# Patient Record
Sex: FEMALE | Race: WHITE | HISPANIC OR LATINO | Employment: PART TIME | URBAN - METROPOLITAN AREA
[De-identification: names, ages, dates, MRNs, and addresses within clinical notes are randomized per-mention and may not be internally consistent; named-entity substitution may affect disease eponyms.]

---

## 2018-07-22 ENCOUNTER — HOSPITAL ENCOUNTER (EMERGENCY)
Facility: HOSPITAL | Age: 38
Discharge: HOME | End: 2018-07-22
Attending: EMERGENCY MEDICINE | Admitting: EMERGENCY MEDICINE
Payer: COMMERCIAL

## 2018-07-22 ENCOUNTER — HOSPITAL ENCOUNTER (EMERGENCY)
Facility: HOSPITAL | Age: 38
Discharge: HOME | End: 2018-07-22
Attending: EMERGENCY MEDICINE
Payer: COMMERCIAL

## 2018-07-22 VITALS
HEART RATE: 73 BPM | TEMPERATURE: 98.1 F | SYSTOLIC BLOOD PRESSURE: 152 MMHG | RESPIRATION RATE: 18 BRPM | WEIGHT: 218.26 LBS | OXYGEN SATURATION: 97 % | BODY MASS INDEX: 41.24 KG/M2 | DIASTOLIC BLOOD PRESSURE: 72 MMHG

## 2018-07-22 VITALS
RESPIRATION RATE: 18 BRPM | HEART RATE: 84 BPM | SYSTOLIC BLOOD PRESSURE: 110 MMHG | HEIGHT: 61 IN | TEMPERATURE: 97 F | BODY MASS INDEX: 41.54 KG/M2 | OXYGEN SATURATION: 100 % | DIASTOLIC BLOOD PRESSURE: 76 MMHG | WEIGHT: 220 LBS

## 2018-07-22 DIAGNOSIS — F41.9 ANXIETY: Primary | ICD-10-CM

## 2018-07-22 DIAGNOSIS — F10.929 ALCOHOL INTOXICATION (HCC): ICD-10-CM

## 2018-07-22 LAB
ATRIAL RATE: 79 BPM
P AXIS: 91 DEGREES
PR INTERVAL: 160 MS
QRS AXIS: 69 DEGREES
QRSD INTERVAL: 82 MS
QT INTERVAL: 406 MS
QTC INTERVAL: 465 MS
T WAVE AXIS: 64 DEGREES
VENTRICULAR RATE: 79 BPM

## 2018-07-22 PROCEDURE — 93010 ELECTROCARDIOGRAM REPORT: CPT | Performed by: INTERNAL MEDICINE

## 2018-07-22 PROCEDURE — 93005 ELECTROCARDIOGRAM TRACING: CPT

## 2018-07-22 PROCEDURE — 99283 EMERGENCY DEPT VISIT LOW MDM: CPT

## 2018-07-22 RX ORDER — HYDROXYZINE PAMOATE 50 MG/1
50 CAPSULE ORAL 3 TIMES DAILY PRN
Qty: 20 CAPSULE | Refills: 0 | Status: SHIPPED | OUTPATIENT
Start: 2018-07-22 | End: 2018-09-28 | Stop reason: ALTCHOICE

## 2018-07-22 RX ORDER — LORAZEPAM 1 MG/1
1 TABLET ORAL ONCE
Status: COMPLETED | OUTPATIENT
Start: 2018-07-22 | End: 2018-07-22

## 2018-07-22 RX ADMIN — LORAZEPAM 1 MG: 1 TABLET ORAL at 17:09

## 2018-07-22 NOTE — ED NOTES
Pt changed into hospital gown, when pt removed shirt and bra, small bag of marijuana fell onto floor  Security was called to dispose of small bag of marijuana  Pt states she does not use any drugs       Dearl Marcy  07/22/18 5917

## 2018-07-22 NOTE — ED NOTES
Pt continues to grab at staff, hyperventilating and shaking hands, only speaking one word phrase at a time, repeating same word over and over while hyperventilating  Pt states, "I am sorry  I am so sorry, I fucked up " Pt shaking hands and smaking them on her leg       Mati Du RN  07/22/18 6113

## 2018-07-22 NOTE — DISCHARGE INSTRUCTIONS
Anxiety   WHAT YOU SHOULD KNOW:   Anxiety is a condition that causes you to feel excessive worry, uneasiness, or fear  Family or work stress, smoking, caffeine, and alcohol can increase your risk for anxiety  Certain medicines or health conditions can also increase your risk  Anxiety may begin gradually, and can become a long-term condition if it is not managed or treated  AFTER YOU LEAVE:   Medicines:   · Medicines  can help you feel more calm and relaxed, and decrease your symptoms  · Take your medicine as directed  Contact your healthcare provider if you think your medicine is not helping or if you have side effects  Tell him if you are allergic to any medicine  Keep a list of the medicines, vitamins, and herbs you take  Include the amounts, and when and why you take them  Bring the list or the pill bottles to follow-up visits  Carry your medicine list with you in case of an emergency  Follow up with your healthcare provider within 2 weeks or as directed:  Write down your questions so you remember to ask them during your visits  Manage anxiety:   · Go to counseling as directed  Cognitive behavioral therapy can help you understand and change how you react to events that trigger your symptoms  · Find ways to manage your symptoms  Activities such as exercise, meditation, or listening to music can help you relax  · Practice deep breathing  Breathing can change how your body reacts to stress  Focus on taking slow, deep breaths several times a day, or during an anxiety attack  Breathe in through your nose, and out through your mouth  · Avoid caffeine  Caffeine can make your symptoms worse  Avoid foods or drinks that are meant to increase your energy level  · Limit or avoid alcohol  Ask your healthcare provider if alcohol is safe for you  You may not be able to drink alcohol if you take certain anxiety or depression medicines  Limit alcohol to 1 drink per day if you are a woman   Limit alcohol to 2 drinks per day if you are a man  A drink of alcohol is 12 ounces of beer, 5 ounces of wine, or 1½ ounces of liquor  Contact your healthcare provider if:   · Your symptoms get worse or do not get better with treatment  · You think your medicine may be causing side effects  · Your anxiety keeps you from doing your regular daily activities  · You have new symptoms since your last visit  · You have questions or concerns about your condition or care  Seek care immediately or call 911 if:   · You have chest pain, tightness, or heaviness that may spread to your shoulders, arms, jaw, neck, or back  · You feel like hurting yourself or someone else  · You feel dizzy, lightheaded, or faint  © 2014 [address] [employer] is for End User's use only and may not be sold, redistributed or otherwise used for commercial purposes  All illustrations and images included in CareNotes® are the copyrighted property of A D A M , Inc  or Michael Su  The above information is an  only  It is not intended as medical advice for individual conditions or treatments  Talk to your doctor, nurse or pharmacist before following any medical regimen to see if it is safe and effective for you

## 2018-07-22 NOTE — ED NOTES
Daughter and friend arrive to ED, pt states she wants NO visitors  Pt informed of current visitors and only requests her daughter to come back to room at this time  The friend waits in waiting room   Daughter of pt states, "I don't know what happened, I just got a call that she came in by ambulance "      Mati Du, RN  07/22/18 2200 [facility], [address] [place] [address]  07/22/18 2719

## 2018-07-22 NOTE — ED PROVIDER NOTES
History  Chief Complaint   Patient presents with    Anxiety     Pt reports anxiety attack since yesterday, reports "issues with her brother", pt hyperventalating, reports passed out before arriving  does not take anything for anxiety  Seen at [facility] yesterday, "they didnt do anything"       History provided by:  Patient   used: No    Anxiety   Presenting symptoms: no agitation and no suicidal thoughts    Presenting symptoms comment:  Anxious    Patient accompanied by:  Family member  Degree of incapacity (severity): Moderate  Onset quality:  Gradual  Duration:  2 days  Timing:  Intermittent  Progression:  Waxing and waning  Chronicity:  New  Context comment:  Family 'stuff'  Treatment compliance:  Untreated  Relieved by:  Nothing  Worsened by:  Nothing  Ineffective treatments:  None tried  Associated symptoms: anxiety and hyperventilating    Associated symptoms: no abdominal pain, no chest pain and no headaches    Risk factors: hx of mental illness        Prior to Admission Medications   Prescriptions Last Dose Informant Patient Reported? Taking?   hydrOXYzine pamoate (VISTARIL) 50 mg capsule   No No   Sig: Take 1 capsule (50 mg total) by mouth 3 (three) times a day as needed for anxiety      Facility-Administered Medications: None       Past Medical History:   Diagnosis Date    Anxiety        Past Surgical History:   Procedure Laterality Date    ABDOMINAL SURGERY         No family history on file  I have reviewed and agree with the history as documented  Social History   Substance Use Topics    Smoking status: Current Some Day Smoker     Types: Cigarettes    Smokeless tobacco: Never Used    Alcohol use Yes        Review of Systems   Constitutional: Negative for activity change, chills and fever  HENT: Negative for facial swelling, sore throat and trouble swallowing  Eyes: Negative for pain and visual disturbance     Respiratory: Negative for cough, chest tightness and shortness of breath  Cardiovascular: Negative for chest pain and leg swelling  Gastrointestinal: Negative for abdominal pain, blood in stool, diarrhea, nausea and vomiting  Genitourinary: Negative for dysuria and flank pain  Musculoskeletal: Negative for back pain, neck pain and neck stiffness  Skin: Negative for pallor and rash  Allergic/Immunologic: Negative for environmental allergies and immunocompromised state  Neurological: Negative for dizziness and headaches  Hematological: Negative for adenopathy  Does not bruise/bleed easily  Psychiatric/Behavioral: Negative for agitation, behavioral problems and suicidal ideas  The patient is nervous/anxious  All other systems reviewed and are negative  Physical Exam  Physical Exam   Constitutional: She is oriented to person, place, and time  She appears well-developed and well-nourished  No distress  HENT:   Head: Normocephalic and atraumatic  Eyes: EOM are normal    Neck: Normal range of motion  Neck supple  Cardiovascular: Normal rate, regular rhythm, normal heart sounds and intact distal pulses  Pulmonary/Chest: Effort normal and breath sounds normal    Abdominal: Soft  Bowel sounds are normal  There is no tenderness  There is no rebound and no guarding  Musculoskeletal: Normal range of motion  Neurological: She is alert and oriented to person, place, and time  Skin: Skin is warm and dry  Psychiatric: Her mood appears anxious  Her speech is rapid and/or pressured  She expresses no suicidal ideation  Nursing note and vitals reviewed        Vital Signs  ED Triage Vitals   Temperature Pulse Respirations Blood Pressure SpO2   07/22/18 1710 07/22/18 1616 07/22/18 1616 07/22/18 1616 07/22/18 1616   98 1 °F (36 7 °C) 89 (!) 34 109/69 98 %      Temp Source Heart Rate Source Patient Position - Orthostatic VS BP Location FiO2 (%)   07/22/18 1710 07/22/18 1616 07/22/18 1710 07/22/18 1616 --   Oral Monitor Sitting Right arm       Pain Score 07/22/18 1710       No Pain           Vitals:    07/22/18 1616 07/22/18 1710   BP: 109/69 152/72   Pulse: 89 73   Patient Position - Orthostatic VS:  Sitting       Visual Acuity      ED Medications  Medications   LORazepam (ATIVAN) tablet 1 mg (1 mg Oral Given 7/22/18 1709)       Diagnostic Studies  Results Reviewed     None                 No orders to display              Procedures  Procedures       Phone Contacts  ED Phone Contact    ED Course     Note: One dose of Ativan 1 mg po given, More family present at bedside  Patient felt better  Psych resources given for follow up  MDM  Number of Diagnoses or Management Options  Anxiety: new and does not require workup  Diagnosis management comments: Patient is a 76-year-old female, history of anxiety, bipolar disorder, not on medications currently, has not followed up with a psychiatrist recently; comes in with complaints of anxiety, states that happened after some 'family stuff', possible arguments; denies chest pain, dyspnea, patient has been beating fast; vital signs stable, lungs clear, cardiovascular normal heart sounds; appears anxious, family at bedside  Patient used to be on Xanax but currently on no medications  Will give 1 dose of Ativan outpatient follow-up with psychiatric and behavioral health  CritCare Time    Disposition  Final diagnoses:   Anxiety     Time reflects when diagnosis was documented in both MDM as applicable and the Disposition within this note     Time User Action Codes Description Comment    7/22/2018  5:18 PM Layla Kearney, [facility] [F41 9] Anxiety       ED Disposition     ED Disposition Condition Comment    Discharge  Emily Desai discharge to home/self care      Condition at discharge: Stable        Follow-up Information     Follow up With Specialties Details Why Contact Info    Venkatesh Burk MD [facility]   [address] [place] [address] [address]  [address] [place] [address]  [address]  [facility] [address]       Lori Busch MD Psychiatry Schedule an appointment as soon as possible for a visit in 1 day  [address]  [address]          Please follow-up with psychiatrist/Behavioral Health, as per printed resources instructions given to you  Discharge Medication List as of 7/22/2018  5:20 PM      CONTINUE these medications which have NOT CHANGED    Details   hydrOXYzine pamoate (VISTARIL) 50 mg capsule Take 1 capsule (50 mg total) by mouth 3 (three) times a day as needed for anxiety, Starting Sun 7/22/2018, Print           No discharge procedures on file      ED Provider  Electronically Signed by           Cem Parra MD  07/22/18 1320

## 2018-07-22 NOTE — ED NOTES
Patient hyperventilating and tearful throughout triage  Patient unable to answer most of triage questions on her own  Patient states that she was seen at [facility] yesterday for an "anxiety attack but they did nothing " Patient unable to control her breathing on her own  Non-rebreather placed on patient but patient ripped it off   Patient states, "that's not helping me, it's taking my breath away "      Evelyne Warner, NICHOLAS  07/22/18 5151

## 2018-07-22 NOTE — ED PROVIDER NOTES
History  Chief Complaint   Patient presents with    Anxiety     pt  anxious and hyperventilating upon arrival pt  uses xanax but has not had it in awhile     49-year-old female presents with [facility]  Patient reports that she is under a lot of stress and has a history of anxiety  She is not taking her Xanax because the place closed  She admits to drinking somewhat heavily today to deal with the stress and anxiety  She presents hyperventilating needing a great deal of redirection  No history or report of suicidal or homicidal ideation  Denies any drug use  Denies any other issues whatsoever at this time  Prior to Admission Medications   Prescriptions Last Dose Informant Patient Reported? Taking?   ondansetron (ZOFRAN ODT) 4 mg disintegrating tablet   No No   Sig: Take 1 tablet (4 mg total) by mouth every 8 (eight) hours as needed for nausea or vomiting    ranitidine (ZANTAC) 150 MG capsule   No No   Sig: Take 1 capsule (150 mg total) by mouth daily  sucralfate (CARAFATE) 1 g tablet   No No   Sig: Take 1 tablet (1 g total) by mouth 4 (four) times a day  Facility-Administered Medications: None       Past Medical History:   Diagnosis Date    Anxiety        Past Surgical History:   Procedure Laterality Date    ABDOMINAL SURGERY         History reviewed  No pertinent family history  I have reviewed and agree with the history as documented  Social History   Substance Use Topics    Smoking status: Current Some Day Smoker    Smokeless tobacco: Never Used    Alcohol use Yes        Review of Systems   Constitutional: Negative for appetite change, chills, fatigue and fever  HENT: Negative for postnasal drip, sinus pain and trouble swallowing  Eyes: Negative for redness and itching  Respiratory: Negative for chest tightness, shortness of breath and wheezing  Cardiovascular: Negative for chest pain and leg swelling     Gastrointestinal: Negative for abdominal pain, constipation, diarrhea, nausea and vomiting  Endocrine: Negative  Genitourinary: Negative for difficulty urinating and dysuria  Musculoskeletal: Negative for back pain and myalgias  Skin: Negative for rash  Allergic/Immunologic: Negative  Neurological: Negative for dizziness, numbness and headaches  Hematological: Negative  Psychiatric/Behavioral: Positive for agitation  Negative for suicidal ideas  The patient is nervous/anxious  Physical Exam  Physical Exam   Constitutional: She is oriented to person, place, and time  She appears well-developed and well-nourished  She is uncooperative  HENT:   Head: Normocephalic and atraumatic  Nose: Nose normal    Mouth/Throat: Oropharynx is clear and moist    Eyes: Conjunctivae and EOM are normal  Pupils are equal, round, and reactive to light  Neck: Normal range of motion  Neck supple  Cardiovascular: Normal rate, regular rhythm and normal heart sounds  Pulmonary/Chest: Effort normal and breath sounds normal  No respiratory distress  She has no wheezes  Hyperventilating   Abdominal: Soft  Bowel sounds are normal  There is no tenderness  There is no guarding  Musculoskeletal: She exhibits no edema, tenderness or deformity  Neurological: She is alert and oriented to person, place, and time  Skin: Skin is warm and dry  Capillary refill takes less than 2 seconds  No rash noted  Psychiatric: Her behavior is normal  Thought content normal  Her mood appears anxious  Her speech is rapid and/or pressured  She expresses impulsivity  Nursing note and vitals reviewed        Vital Signs  ED Triage Vitals [07/22/18 0455]   Temperature Pulse Respirations Blood Pressure SpO2   (!) 97 °F (36 1 °C) (!) 131 (!) 100 106/78 98 %      Temp Source Heart Rate Source Patient Position - Orthostatic VS BP Location FiO2 (%)   Temporal -- Sitting Left arm --      Pain Score       --           Vitals:    07/22/18 0455   BP: 106/78   Pulse: (!) 131   Patient Position - Orthostatic VS: Sitting       Visual Acuity      ED Medications  Medications - No data to display    Diagnostic Studies  Results Reviewed     None                 No orders to display              Procedures  Procedures       Phone Contacts  ED Phone Contact    ED Course  ED Course as of Jul 22 0612   Sun Jul 22, 2018   6423 Patient remains calm and cooperative at this time  Her daughter is here and is willing to take her home  Patient understands that she is in need of a family physician and/or a psychiatrist to manage her anxiety and provide appropriate prescriptions for her  She is aware of reasons to return to the emergency department  MDM  CritCare Time    Disposition  Final diagnoses:   Anxiety   Alcohol intoxication ([address] )     Time reflects when diagnosis was documented in both MDM as applicable and the Disposition within this note     Time User Action Codes Description Comment    7/22/2018  5:12 AM Dennie Brenner Add [F41 9] Anxiety     7/22/2018  5:12 AM Dennie Brenner Add [F10 929] Alcohol intoxication [facility])       ED Disposition     None      Follow-up Information    None         Patient's Medications   Discharge Prescriptions    No medications on file     No discharge procedures on file      ED Provider  Electronically Signed by           Gaby Schwarz DO  07/22/18 9102

## 2018-07-22 NOTE — DISCHARGE INSTRUCTIONS
Anxiety   WHAT YOU SHOULD KNOW:   Anxiety is a condition that causes you to feel excessive worry, uneasiness, or fear  Family or work stress, smoking, caffeine, and alcohol can increase your risk for anxiety  Certain medicines or health conditions can also increase your risk  Anxiety may begin gradually, and can become a long-term condition if it is not managed or treated  AFTER YOU LEAVE:   Medicines:   · Medicines  can help you feel more calm and relaxed, and decrease your symptoms  · Take your medicine as directed  Contact your healthcare provider if you think your medicine is not helping or if you have side effects  Tell him if you are allergic to any medicine  Keep a list of the medicines, vitamins, and herbs you take  Include the amounts, and when and why you take them  Bring the list or the pill bottles to follow-up visits  Carry your medicine list with you in case of an emergency  Follow up with your healthcare provider within 2 weeks or as directed:  Write down your questions so you remember to ask them during your visits  Manage anxiety:   · Go to counseling as directed  Cognitive behavioral therapy can help you understand and change how you react to events that trigger your symptoms  · Find ways to manage your symptoms  Activities such as exercise, meditation, or listening to music can help you relax  · Practice deep breathing  Breathing can change how your body reacts to stress  Focus on taking slow, deep breaths several times a day, or during an anxiety attack  Breathe in through your nose, and out through your mouth  · Avoid caffeine  Caffeine can make your symptoms worse  Avoid foods or drinks that are meant to increase your energy level  · Limit or avoid alcohol  Ask your healthcare provider if alcohol is safe for you  You may not be able to drink alcohol if you take certain anxiety or depression medicines  Limit alcohol to 1 drink per day if you are a woman   Limit alcohol to 2 drinks per day if you are a man  A drink of alcohol is 12 ounces of beer, 5 ounces of wine, or 1½ ounces of liquor  Contact your healthcare provider if:   · Your symptoms get worse or do not get better with treatment  · You think your medicine may be causing side effects  · Your anxiety keeps you from doing your regular daily activities  · You have new symptoms since your last visit  · You have questions or concerns about your condition or care  Seek care immediately or call 911 if:   · You have chest pain, tightness, or heaviness that may spread to your shoulders, arms, jaw, neck, or back  · You feel like hurting yourself or someone else  · You feel dizzy, lightheaded, or faint  © 2014 [address] [employer] is for End User's use only and may not be sold, redistributed or otherwise used for commercial purposes  All illustrations and images included in CareNotes® are the copyrighted property of A D A M , Inc  or [employer] Su  The above information is an  only  It is not intended as medical advice for individual conditions or treatments  Talk to your doctor, nurse or pharmacist before following any medical regimen to see if it is safe and effective for you

## 2018-07-22 NOTE — ED NOTES
Daughter of pt at bedside, pt appears to not be hyperventilating at this point       Eric Sloan RN  07/22/18 4114

## 2018-07-22 NOTE — ED NOTES
Pt more reasonable, understands situation, understands her anxiety  Pt thankful to staff for helping her get through it  Pt cooperative and A&O, dresses self and has steady gait  No complaints at this time        Francy Laboy RN  07/22/18 0202

## 2018-07-24 ENCOUNTER — HOSPITAL ENCOUNTER (EMERGENCY)
Facility: HOSPITAL | Age: 38
Discharge: HOME | End: 2018-07-24
Attending: EMERGENCY MEDICINE | Admitting: EMERGENCY MEDICINE
Payer: COMMERCIAL

## 2018-07-24 ENCOUNTER — APPOINTMENT (EMERGENCY)
Dept: CT IMAGING | Facility: HOSPITAL | Age: 38
End: 2018-07-24
Payer: COMMERCIAL

## 2018-07-24 VITALS
HEART RATE: 77 BPM | RESPIRATION RATE: 18 BRPM | BODY MASS INDEX: 38.17 KG/M2 | SYSTOLIC BLOOD PRESSURE: 115 MMHG | WEIGHT: 202 LBS | DIASTOLIC BLOOD PRESSURE: 67 MMHG | OXYGEN SATURATION: 100 % | TEMPERATURE: 97.1 F

## 2018-07-24 DIAGNOSIS — K22.6 MALLORY-WEISS TEAR: Primary | ICD-10-CM

## 2018-07-24 DIAGNOSIS — R11.10 VOMITING: ICD-10-CM

## 2018-07-24 LAB
ALBUMIN SERPL BCP-MCNC: 4.2 G/DL (ref 3–5.2)
ALP SERPL-CCNC: 73 U/L (ref 43–122)
ALT SERPL W P-5'-P-CCNC: 23 U/L (ref 9–52)
ANION GAP SERPL CALCULATED.3IONS-SCNC: 12 MMOL/L (ref 5–14)
APTT PPP: 30 SECONDS (ref 23–34)
AST SERPL W P-5'-P-CCNC: 31 U/L (ref 14–36)
BASOPHILS # BLD AUTO: 0.1 THOUSANDS/ΜL (ref 0–0.1)
BASOPHILS NFR BLD AUTO: 1 % (ref 0–1)
BILIRUB SERPL-MCNC: 0.5 MG/DL
BUN SERPL-MCNC: 13 MG/DL (ref 5–25)
CALCIUM SERPL-MCNC: 9.3 MG/DL (ref 8.4–10.2)
CHLORIDE SERPL-SCNC: 106 MMOL/L (ref 97–108)
CO2 SERPL-SCNC: 20 MMOL/L (ref 22–30)
CREAT SERPL-MCNC: 0.76 MG/DL (ref 0.6–1.2)
EOSINOPHIL # BLD AUTO: 0.1 THOUSAND/ΜL (ref 0–0.4)
EOSINOPHIL NFR BLD AUTO: 1 % (ref 0–6)
ERYTHROCYTE [DISTWIDTH] IN BLOOD BY AUTOMATED COUNT: 14.6 %
GFR SERPL CREATININE-BSD FRML MDRD: 99 ML/MIN/1.73SQ M
GLUCOSE SERPL-MCNC: 111 MG/DL (ref 70–99)
HCG SERPL QL: NEGATIVE
HCT VFR BLD AUTO: 41 % (ref 36–46)
HGB BLD-MCNC: 13.8 G/DL (ref 12–16)
INR PPP: 1.05 (ref 0.89–1.1)
LIPASE SERPL-CCNC: 35 U/L (ref 23–300)
LYMPHOCYTES # BLD AUTO: 1.7 THOUSANDS/ΜL (ref 0.5–4)
LYMPHOCYTES NFR BLD AUTO: 16 % (ref 20–50)
MCH RBC QN AUTO: 31.4 PG (ref 26–34)
MCHC RBC AUTO-ENTMCNC: 33.7 G/DL (ref 31–36)
MCV RBC AUTO: 93 FL (ref 80–100)
MONOCYTES # BLD AUTO: 0.6 THOUSAND/ΜL (ref 0.2–0.9)
MONOCYTES NFR BLD AUTO: 5 % (ref 1–10)
NEUTROPHILS # BLD AUTO: 8.5 THOUSANDS/ΜL (ref 1.8–7.8)
NEUTS SEG NFR BLD AUTO: 77 % (ref 45–65)
PLATELET # BLD AUTO: 224 THOUSANDS/UL (ref 150–450)
PMV BLD AUTO: 8.7 FL (ref 8.9–12.7)
POTASSIUM SERPL-SCNC: 3.5 MMOL/L (ref 3.6–5)
PROT SERPL-MCNC: 8 G/DL (ref 5.9–8.4)
PROTHROMBIN TIME: 11.1 SECONDS (ref 9.5–11.6)
RBC # BLD AUTO: 4.4 MILLION/UL (ref 4–5.2)
SODIUM SERPL-SCNC: 138 MMOL/L (ref 137–147)
WBC # BLD AUTO: 11 THOUSAND/UL (ref 4.5–11)

## 2018-07-24 PROCEDURE — 96361 HYDRATE IV INFUSION ADD-ON: CPT

## 2018-07-24 PROCEDURE — 85025 COMPLETE CBC W/AUTO DIFF WBC: CPT | Performed by: EMERGENCY MEDICINE

## 2018-07-24 PROCEDURE — 96376 TX/PRO/DX INJ SAME DRUG ADON: CPT

## 2018-07-24 PROCEDURE — 85730 THROMBOPLASTIN TIME PARTIAL: CPT | Performed by: EMERGENCY MEDICINE

## 2018-07-24 PROCEDURE — 96375 TX/PRO/DX INJ NEW DRUG ADDON: CPT

## 2018-07-24 PROCEDURE — 74177 CT ABD & PELVIS W/CONTRAST: CPT

## 2018-07-24 PROCEDURE — 80053 COMPREHEN METABOLIC PANEL: CPT | Performed by: EMERGENCY MEDICINE

## 2018-07-24 PROCEDURE — C9113 INJ PANTOPRAZOLE SODIUM, VIA: HCPCS

## 2018-07-24 PROCEDURE — 83690 ASSAY OF LIPASE: CPT | Performed by: EMERGENCY MEDICINE

## 2018-07-24 PROCEDURE — 84703 CHORIONIC GONADOTROPIN ASSAY: CPT | Performed by: EMERGENCY MEDICINE

## 2018-07-24 PROCEDURE — 96374 THER/PROPH/DIAG INJ IV PUSH: CPT

## 2018-07-24 PROCEDURE — 36415 COLL VENOUS BLD VENIPUNCTURE: CPT | Performed by: EMERGENCY MEDICINE

## 2018-07-24 PROCEDURE — 85610 PROTHROMBIN TIME: CPT | Performed by: EMERGENCY MEDICINE

## 2018-07-24 PROCEDURE — 99284 EMERGENCY DEPT VISIT MOD MDM: CPT

## 2018-07-24 RX ORDER — MORPHINE SULFATE 4 MG/ML
INJECTION, SOLUTION INTRAMUSCULAR; INTRAVENOUS
Status: COMPLETED
Start: 2018-07-24 | End: 2018-07-24

## 2018-07-24 RX ORDER — ONDANSETRON 2 MG/ML
4 INJECTION INTRAMUSCULAR; INTRAVENOUS ONCE
Status: COMPLETED | OUTPATIENT
Start: 2018-07-24 | End: 2018-07-24

## 2018-07-24 RX ORDER — SUCRALFATE ORAL 1 G/10ML
1000 SUSPENSION ORAL ONCE
Status: COMPLETED | OUTPATIENT
Start: 2018-07-24 | End: 2018-07-24

## 2018-07-24 RX ORDER — ONDANSETRON 4 MG/1
4 TABLET, ORALLY DISINTEGRATING ORAL EVERY 8 HOURS PRN
Qty: 12 TABLET | Refills: 0 | Status: SHIPPED | OUTPATIENT
Start: 2018-07-24 | End: 2018-09-28 | Stop reason: ALTCHOICE

## 2018-07-24 RX ORDER — OMEPRAZOLE 20 MG/1
20 CAPSULE, DELAYED RELEASE ORAL DAILY
Qty: 14 CAPSULE | Refills: 0 | Status: SHIPPED | OUTPATIENT
Start: 2018-07-24 | End: 2018-09-28 | Stop reason: ALTCHOICE

## 2018-07-24 RX ORDER — SUCRALFATE ORAL 1 G/10ML
SUSPENSION ORAL
Status: DISCONTINUED
Start: 2018-07-24 | End: 2018-07-24 | Stop reason: HOSPADM

## 2018-07-24 RX ORDER — ONDANSETRON 2 MG/ML
INJECTION INTRAMUSCULAR; INTRAVENOUS
Status: DISCONTINUED
Start: 2018-07-24 | End: 2018-07-24 | Stop reason: HOSPADM

## 2018-07-24 RX ORDER — MORPHINE SULFATE 10 MG/ML
4 INJECTION, SOLUTION INTRAMUSCULAR; INTRAVENOUS ONCE
Status: DISCONTINUED | OUTPATIENT
Start: 2018-07-24 | End: 2018-07-24 | Stop reason: HOSPADM

## 2018-07-24 RX ORDER — ONDANSETRON 2 MG/ML
INJECTION INTRAMUSCULAR; INTRAVENOUS
Status: COMPLETED
Start: 2018-07-24 | End: 2018-07-24

## 2018-07-24 RX ORDER — PANTOPRAZOLE SODIUM 40 MG/1
40 INJECTION, POWDER, FOR SOLUTION INTRAVENOUS ONCE
Status: COMPLETED | OUTPATIENT
Start: 2018-07-24 | End: 2018-07-24

## 2018-07-24 RX ORDER — SUCRALFATE ORAL 1 G/10ML
1 SUSPENSION ORAL 4 TIMES DAILY
Qty: 420 ML | Refills: 0 | Status: SHIPPED | OUTPATIENT
Start: 2018-07-24 | End: 2018-09-28 | Stop reason: ALTCHOICE

## 2018-07-24 RX ORDER — MORPHINE SULFATE 10 MG/ML
4 INJECTION, SOLUTION INTRAMUSCULAR; INTRAVENOUS ONCE
Status: COMPLETED | OUTPATIENT
Start: 2018-07-24 | End: 2018-07-24

## 2018-07-24 RX ORDER — PANTOPRAZOLE SODIUM 40 MG/1
INJECTION, POWDER, FOR SOLUTION INTRAVENOUS
Status: COMPLETED
Start: 2018-07-24 | End: 2018-07-24

## 2018-07-24 RX ADMIN — PANTOPRAZOLE SODIUM 40 MG: 40 INJECTION, POWDER, FOR SOLUTION INTRAVENOUS at 14:27

## 2018-07-24 RX ADMIN — SODIUM CHLORIDE 1000 ML: 9 INJECTION, SOLUTION INTRAVENOUS at 14:27

## 2018-07-24 RX ADMIN — MORPHINE SULFATE 4 MG: 10 INJECTION, SOLUTION INTRAMUSCULAR; INTRAVENOUS at 14:36

## 2018-07-24 RX ADMIN — IOHEXOL 100 ML: 350 INJECTION, SOLUTION INTRAVENOUS at 18:03

## 2018-07-24 RX ADMIN — SUCRALFATE 1000 MG: 1 SUSPENSION ORAL at 18:34

## 2018-07-24 RX ADMIN — ONDANSETRON 4 MG: 2 INJECTION INTRAMUSCULAR; INTRAVENOUS at 14:27

## 2018-07-24 RX ADMIN — ONDANSETRON 4 MG: 2 INJECTION, SOLUTION INTRAMUSCULAR; INTRAVENOUS at 14:27

## 2018-07-24 RX ADMIN — MORPHINE SULFATE 4 MG: 4 INJECTION INTRAVENOUS at 15:36

## 2018-07-24 RX ADMIN — MORPHINE SULFATE 4 MG: 4 INJECTION INTRAVENOUS at 14:28

## 2018-07-24 RX ADMIN — ONDANSETRON 4 MG: 2 INJECTION INTRAMUSCULAR; INTRAVENOUS at 15:25

## 2018-07-24 NOTE — DISCHARGE INSTRUCTIONS
Acute Nausea and Vomiting   WHAT YOU NEED TO KNOW:   Acute nausea and vomiting start suddenly, worsen quickly, and last a short time  DISCHARGE INSTRUCTIONS:   Return to the emergency department if:   · You see blood in your vomit or your bowel movements  · You have sudden, severe pain in your chest and upper abdomen after hard vomiting or retching  · You have swelling in your neck and chest      · You are dizzy, cold, and thirsty and your eyes and mouth are dry  · You are urinating very little or not at all  · You have muscle weakness, leg cramps, and trouble breathing  · Your heart is beating much faster than normal      · You continue to vomit for more than 48 hours  Contact your healthcare provider if:   · You have frequent dry heaves (vomiting but nothing comes out)  · Your nausea and vomiting does not get better or go away after you use medicine  · You have questions or concerns about your condition or treatment  Medicines: You may need any of the following:  · Medicines  may be given to calm your stomach and stop your vomiting  You may also need medicines to help you feel more relaxed or to stop nausea and vomiting caused by motion sickness  · Gastrointestinal stimulants  are used to help empty your stomach and bowels  This may help decrease nausea and vomiting  · Take your medicine as directed  Contact your healthcare provider if you think your medicine is not helping or if you have side effects  Tell him or her if you are allergic to any medicine  Keep a list of the medicines, vitamins, and herbs you take  Include the amounts, and when and why you take them  Bring the list or the pill bottles to follow-up visits  Carry your medicine list with you in case of an emergency  Prevent or manage acute nausea and vomiting:   · Do not drink alcohol  Alcohol may upset or irritate your stomach  Too much alcohol can also cause acute nausea and vomiting  · Control stress    Headaches due to stress may cause nausea and vomiting  Find ways to relax and manage your stress  Get more rest and sleep  · Drink more liquids as directed  Vomiting can lead to dehydration  It is important to drink more liquids to help replace lost body fluids  Ask your healthcare provider how much liquid to drink each day and which liquids are best for you  Your provider may recommend that you drink an oral rehydration solution (ORS)  ORS contains water, salts, and sugar that are needed to replace the lost body fluids  Ask what kind of ORS to use, how much to drink, and where to get it  · Eat smaller meals, more often  Eat small amounts of food every 2 to 3 hours, even if you are not hungry  Food in your stomach may decrease your nausea  · Talk to your healthcare provider before you take over-the-counter (OTC) medicines  These medicines can cause serious problems if you use certain other medicines, or you have a medical condition  You may have problems if you use too much or use them for longer than the label says  Follow directions on the label carefully  Follow up with your healthcare provider as directed:  Write down your questions so you remember to ask them during your follow-up visits  © 2017 [employer]  Information is for End User's use only and may not be sold, redistributed or otherwise used for commercial purposes  All illustrations and images included in CareNotes® are the copyrighted property of [employer]  or [employer]  The above information is an  only  It is not intended as medical advice for individual conditions or treatments  Talk to your doctor, nurse or pharmacist before following any medical regimen to see if it is safe and effective for you  Gastrointestinal Bleeding   WHAT YOU NEED TO KNOW:   Gastrointestinal (GI) bleeding may occur in any part of your digestive tract  This includes your esophagus, stomach, intestines, rectum, or anus  Bleeding may be mild to severe  Your bleeding may begin suddenly, or start slowly and last for a longer period of time  Bleeding that lasts for a longer period of time is called chronic GI bleeding  DISCHARGE INSTRUCTIONS:   Call 911 for any of the following:   · You have shortness of breath or trouble breathing  · You faint or lose consciousness  · You have chest pain  Return to the emergency department if:   · You feel dizzy or are too weak to stand  · Your heart is beating faster than usual      · You vomit blood, or your vomit looks like coffee grounds  · You have blood in your bowel movement  · You have abdominal pain or swelling  Contact your healthcare provider if:   · You have bowel movements that are tarry or black  · You have nausea or are vomiting  · You have heartburn  · You have questions or concerns about your condition or care  Activity:  Rest as directed  Ask when you can return to your usual activities, such as work  Slowly do more each day  Nutrition:  Ask if you need to be on a special diet  A special diet can help treat GI conditions and prevent problems such as GI bleeding  Eat small meals more often while your digestive system heals  Avoid or limit caffeine and spicy foods  Also avoid foods that cause heartburn, nausea, or diarrhea  Prevent GI bleeding:   · Manage GI conditions as directed  Examples of GI conditions include gastroesophageal reflux, peptic ulcer disease, and ulcerative colitis  Take all medicines for these conditions as directed  · Limit or do not take NSAIDs  Ask your healthcare provider if it is safe for you to take NSAIDs  NSAIDs can increase your risk for ulcers and GI bleeding  · Do not drink alcohol  Alcohol can cause ulcers and esophageal varices  Esophageal varices are swollen blood vessels in your esophagus  Over time the blood vessels become weak and may bleed  · Do not smoke    Nicotine and other chemicals in cigarettes and cigars can increase your risk for ulcers  Ask your healthcare provider for information if you currently smoke and need help to quit  E-cigarettes or smokeless tobacco still contain nicotine  Talk to your healthcare provider before you use these products  Follow up with your healthcare provider as directed: You may need to return for a colonoscopy, endoscopy, or other tests  These tests can make sure you do not have more bleeding  Write down your questions so you remember to ask them during your visits  © 2017 [employer] Information is for End User's use only and may not be sold, redistributed or otherwise used for commercial purposes  All illustrations and images included in CareNotes® are the copyrighted property of [employer]  or [employer] Su  The above information is an  only  It is not intended as medical advice for individual conditions or treatments  Talk to your doctor, nurse or pharmacist before following any medical regimen to see if it is safe and effective for you

## 2018-07-27 NOTE — ED PROVIDER NOTES
History  Chief Complaint   Patient presents with    Abdominal Pain     " I started this morning with stomach pains and vomiting at least 10 times "     This is a 75-year-old female presents emergency department with vomiting times several episodes this morning  Initially just plain emesis but then began to have flecks of blood throughout emesis  Patient has had similar symptoms in the past approximately 1 year ago with no identified cause  Complaining of epigastric abdominal pain  No radiation of symptoms  Patient rates symptoms severe in nature  No aggravating or alleviating factors  Denies black or bloody stool  Patient is very anxious with symptoms  Differential diagnosis includes could gastritis, Donna-Pena tear, pancreatitis, cholecystitis, bowel obstruction  Prior to Admission Medications   Prescriptions Last Dose Informant Patient Reported? Taking?   hydrOXYzine pamoate (VISTARIL) 50 mg capsule   No No   Sig: Take 1 capsule (50 mg total) by mouth 3 (three) times a day as needed for anxiety      Facility-Administered Medications: None       Past Medical History:   Diagnosis Date    Anxiety        Past Surgical History:   Procedure Laterality Date    ABDOMINAL SURGERY         History reviewed  No pertinent family history  I have reviewed and agree with the history as documented  Social History   Substance Use Topics    Smoking status: Current Some Day Smoker     Types: Cigarettes    Smokeless tobacco: Never Used    Alcohol use No        Review of Systems   Constitutional: Negative for activity change, appetite change, chills and fever  HENT: Negative for congestion, ear pain, rhinorrhea and sore throat  Eyes: Negative for pain, discharge and redness  Respiratory: Negative for cough, shortness of breath and wheezing  Cardiovascular: Negative for chest pain and palpitations  Gastrointestinal: Positive for abdominal pain, nausea and vomiting   Negative for blood in stool, constipation and diarrhea  Endocrine: Negative for polyuria  Genitourinary: Negative for difficulty urinating, dysuria, frequency and urgency  Musculoskeletal: Negative for arthralgias and myalgias  Skin: Negative for color change and rash  Allergic/Immunologic: Negative for immunocompromised state  Neurological: Negative for dizziness, syncope and light-headedness  Hematological: Does not bruise/bleed easily  Psychiatric/Behavioral: Negative for confusion  The patient is nervous/anxious  All other systems reviewed and are negative  Physical Exam  Physical Exam   Constitutional: She is oriented to person, place, and time  She appears well-developed and well-nourished  HENT:   Head: Normocephalic and atraumatic  Nose: Nose normal    Eyes: Conjunctivae are normal  No scleral icterus  Neck: Normal range of motion  Neck supple  Cardiovascular: Normal rate, regular rhythm, normal heart sounds and intact distal pulses  Pulmonary/Chest: Effort normal and breath sounds normal  No stridor  No respiratory distress  She has no wheezes  Abdominal: Soft  She exhibits no distension  There is tenderness (Tender epigastric region  )  There is no rebound and no guarding  Musculoskeletal: She exhibits no edema or deformity  Neurological: She is alert and oriented to person, place, and time  Skin: Skin is warm and dry  No rash noted  Psychiatric: She has a normal mood and affect  Thought content normal    Nursing note and vitals reviewed        Vital Signs  ED Triage Vitals [07/24/18 1352]   Temperature Pulse Respirations Blood Pressure SpO2   (!) 97 1 °F (36 2 °C) 91 20 148/51 100 %      Temp Source Heart Rate Source Patient Position - Orthostatic VS BP Location FiO2 (%)   Temporal Monitor Sitting Left arm --      Pain Score       Worst Possible Pain           Vitals:    07/24/18 1352 07/24/18 1538 07/24/18 1737   BP: 148/51 141/79 115/67   Pulse: 91 83 77   Patient Position - Orthostatic VS: Sitting Sitting Lying       Visual Acuity      ED Medications  Medications   sodium chloride 0 9 % bolus 1,000 mL (0 mL Intravenous Stopped 7/24/18 1745)   pantoprazole (PROTONIX) injection 40 mg (40 mg Intravenous Given 7/24/18 1427)   ondansetron (ZOFRAN) injection 4 mg (4 mg Intravenous Given 7/24/18 1427)   morphine (PF) 10 mg/mL injection 4 mg (4 mg Intravenous Given 7/24/18 1436)   morphine (PF) 4 mg/mL injection **AcuDose Override Pull** (4 mg  Given 7/24/18 1428)   ondansetron (ZOFRAN) injection 4 mg (4 mg Intravenous Given 7/24/18 1525)   morphine (PF) 4 mg/mL injection **AcuDose Override Pull** (4 mg  Given 7/24/18 1536)   iohexol (OMNIPAQUE) 350 MG/ML injection (MULTI-DOSE) 100 mL (100 mL Intravenous Given 7/24/18 1803)   sucralfate (CARAFATE) oral suspension 1,000 mg (1,000 mg Oral Given 7/24/18 1834)       Diagnostic Studies  Results Reviewed     Procedure Component Value Units Date/Time    hCG, qualitative pregnancy [[id number]]  (Normal) Collected:  07/24/18 1419    Lab Status:  Final result Specimen:  Blood from Arm, Right Updated:  07/24/18 1720     Preg, Serum Negative    Narrative:       PREGNANCY, SERUM: ef5    Protime-INR [[id number]]  (Normal) Collected:  07/24/18 1419    Lab Status:  Final result Specimen:  Blood from Arm, Right Updated:  07/24/18 1441     Protime 11 1 seconds      INR 1 05    Narrative:       INR:  ,PROTIME:      APTT [[id number]]  (Normal) Collected:  07/24/18 1419    Lab Status:  Final result Specimen:  Blood from Arm, Right Updated:  07/24/18 1441     PTT 30 seconds     Narrative:       PTT:      Lipase [[id number]]  (Normal) Collected:  07/24/18 1419    Lab Status:  Final result Specimen:  Blood from Arm, Right Updated:  07/24/18 1439     Lipase 35 u/L     Comprehensive metabolic panel [[id number]]  (Abnormal) Collected:  07/24/18 1419    Lab Status:  Final result Specimen:  Blood from Arm, Right Updated:  07/24/18 1438     Sodium 138 mmol/L      Potassium 3 5 (L) mmol/L      Chloride 106 mmol/L      CO2 20 (L) mmol/L      Anion Gap 12 mmol/L      BUN 13 mg/dL      Creatinine 0 76 mg/dL      Glucose 111 (H) mg/dL      Calcium 9 3 mg/dL      AST 31 U/L      ALT 23 U/L      Alkaline Phosphatase 73 U/L      Total Protein 8 0 g/dL      Albumin 4 2 g/dL      Total Bilirubin 0 50 mg/dL      eGFR 99 ml/min/1 73sq m     Narrative:         National Kidney Disease Education Program recommendations are as follows:  GFR calculation is accurate only with a steady state creatinine  Chronic Kidney disease less than 60 ml/min/1 73 sq  meters  Kidney failure less than 15 ml/min/1 73 sq  meters  CBC and differential [[id number]]  (Abnormal) Collected:  07/24/18 1419    Lab Status:  Final result Specimen:  Blood from Arm, Right Updated:  07/24/18 1430     WBC 11 00 Thousand/uL      RBC 4 40 Million/uL      Hemoglobin 13 8 g/dL      Hematocrit 41 0 %      MCV 93 fL      MCH 31 4 pg      MCHC 33 7 g/dL      RDW 14 6 %      MPV 8 7 (L) fL      Platelets 037 Thousands/uL      Neutrophils Relative 77 (H) %      Lymphocytes Relative 16 (L) %      Monocytes Relative 5 %      Eosinophils Relative 1 %      Basophils Relative 1 %      Neutrophils Absolute 8 50 (H) Thousands/µL      Lymphocytes Absolute 1 70 Thousands/µL      Monocytes Absolute 0 60 Thousand/µL      Eosinophils Absolute 0 10 Thousand/µL      Basophils Absolute 0 10 Thousands/µL                  CT abdomen pelvis with contrast   Final Result by Cady Shukla MD (07/24 1817)      No acute inflammatory findings in the abdomen or pelvis in this patient with nausea; specifically, no bowel obstruction or other acute bowel findings  As previously described there is thickening of the fundus of the gallbladder which could indicate adenomyomatosis  No acute gallbladder findings  Gallbladder is nondistended        Workstation performed: [id number]                    Procedures  Procedures       Phone Contacts  ED Phone Contact    ED Course  ED Course as of Jul 27 0959 Tue Jul 24, 2018   [id number] Patient is feeling much better afterCarafate  Discussed CT scan findings including thickened gallbladder fundusand need for follow-up with her primary care doctor  MDM  Number of Diagnoses or Management Options  Odnna-Pena tear:   Vomiting:   Diagnosis management comments: Patient feeling much better after Zofran and Carafate and Protonix  Suspect Donna-Pena tear  Will send patient home on PPI  Amount and/or Complexity of Data Reviewed  Clinical lab tests: ordered and reviewed  Tests in the radiology section of CPT®: ordered      CritCare Time    Disposition  Final diagnoses:   Donna-Pena tear   Vomiting     Time reflects when diagnosis was documented in both MDM as applicable and the Disposition within this note     Time User Action Codes Description Comment    7/24/2018  6:58 PM Antonio Halima Add [K22 6] Donna-Pena tear     7/24/2018  6:58 PM Buzz Plascencia 107 [R11 10] Vomiting       ED Disposition     ED Disposition Condition Comment    Discharge  [facility] discharge to home/self care  Condition at discharge: Stable        Follow-up Information     Follow up With Specialties Details Why Eryn Khan MD Family Medicine In 1 week for further evaluation of your gallbladder that looks chronically thickened over the fundus   [address]  [address]            Discharge Medication List as of 7/24/2018  7:01 PM      START taking these medications    Details   omeprazole (PriLOSEC) 20 mg delayed release capsule Take 1 capsule (20 mg total) by mouth daily for 14 days, Starting Tue 7/24/2018, Until Tue 8/7/2018, Print      ondansetron (ZOFRAN-ODT) 4 mg disintegrating tablet Take 1 tablet (4 mg total) by mouth every 8 (eight) hours as needed for nausea or vomiting for up to 12 doses, Starting Tue 7/24/2018, Print      sucralfate (1110 Profig) 1 g/10 mL suspension Take 10 mL (1 g total) by mouth 4 (four) times a day, Starting Tue 7/24/2018, Print         CONTINUE these medications which have NOT CHANGED    Details   hydrOXYzine pamoate (VISTARIL) 50 mg capsule Take 1 capsule (50 mg total) by mouth 3 (three) times a day as needed for anxiety, Starting Sun 7/22/2018, Print           No discharge procedures on file      ED Provider  Electronically Signed by           Marilyn Jackson MD  07/27/18 5770

## 2018-09-28 ENCOUNTER — APPOINTMENT (EMERGENCY)
Dept: ULTRASOUND IMAGING | Facility: HOSPITAL | Age: 38
End: 2018-09-28
Payer: COMMERCIAL

## 2018-09-28 ENCOUNTER — HOSPITAL ENCOUNTER (EMERGENCY)
Facility: HOSPITAL | Age: 38
Discharge: HOME | End: 2018-09-28
Attending: EMERGENCY MEDICINE | Admitting: EMERGENCY MEDICINE
Payer: COMMERCIAL

## 2018-09-28 VITALS
OXYGEN SATURATION: 97 % | SYSTOLIC BLOOD PRESSURE: 170 MMHG | WEIGHT: 210 LBS | HEART RATE: 84 BPM | TEMPERATURE: 97.8 F | BODY MASS INDEX: 39.68 KG/M2 | RESPIRATION RATE: 18 BRPM | DIASTOLIC BLOOD PRESSURE: 55 MMHG

## 2018-09-28 DIAGNOSIS — O26.91 ABNORMAL PREGNANCY IN FIRST TRIMESTER: Primary | ICD-10-CM

## 2018-09-28 DIAGNOSIS — O20.0 THREATENED MISCARRIAGE: ICD-10-CM

## 2018-09-28 LAB
ABO GROUP BLD: NORMAL
ANION GAP SERPL CALCULATED.3IONS-SCNC: 10 MMOL/L (ref 4–13)
APTT PPP: 29 SECONDS (ref 24–36)
B-HCG SERPL-ACNC: 4948.8 MIU/ML
BACTERIA UR QL AUTO: ABNORMAL /HPF
BASOPHILS # BLD AUTO: 0.05 THOUSANDS/ΜL (ref 0–0.1)
BASOPHILS NFR BLD AUTO: 1 % (ref 0–1)
BILIRUB UR QL STRIP: NEGATIVE
BLD GP AB SCN SERPL QL: NEGATIVE
BUN SERPL-MCNC: 10 MG/DL (ref 5–25)
CALCIUM SERPL-MCNC: 8.9 MG/DL (ref 8.3–10.1)
CHLORIDE SERPL-SCNC: 104 MMOL/L (ref 100–108)
CLARITY UR: ABNORMAL
CO2 SERPL-SCNC: 25 MMOL/L (ref 21–32)
COLOR UR: YELLOW
CREAT SERPL-MCNC: 0.76 MG/DL (ref 0.6–1.3)
EOSINOPHIL # BLD AUTO: 0.23 THOUSAND/ΜL (ref 0–0.61)
EOSINOPHIL NFR BLD AUTO: 2 % (ref 0–6)
ERYTHROCYTE [DISTWIDTH] IN BLOOD BY AUTOMATED COUNT: 13.9 % (ref 11.6–15.1)
EXT PREG TEST URINE: POSITIVE
GFR SERPL CREATININE-BSD FRML MDRD: 98 ML/MIN/1.73SQ M
GLUCOSE SERPL-MCNC: 89 MG/DL (ref 65–140)
GLUCOSE UR STRIP-MCNC: NEGATIVE MG/DL
HCT VFR BLD AUTO: 39.8 % (ref 34.8–46.1)
HGB BLD-MCNC: 13.1 G/DL (ref 11.5–15.4)
HGB UR QL STRIP.AUTO: ABNORMAL
IMM GRANULOCYTES # BLD AUTO: 0.03 THOUSAND/UL (ref 0–0.2)
IMM GRANULOCYTES NFR BLD AUTO: 0 % (ref 0–2)
INR PPP: 1.14 (ref 0.86–1.17)
KETONES UR STRIP-MCNC: NEGATIVE MG/DL
LEUKOCYTE ESTERASE UR QL STRIP: NEGATIVE
LYMPHOCYTES # BLD AUTO: 1.87 THOUSANDS/ΜL (ref 0.6–4.47)
LYMPHOCYTES NFR BLD AUTO: 19 % (ref 14–44)
MCH RBC QN AUTO: 30.5 PG (ref 26.8–34.3)
MCHC RBC AUTO-ENTMCNC: 32.9 G/DL (ref 31.4–37.4)
MCV RBC AUTO: 93 FL (ref 82–98)
MONOCYTES # BLD AUTO: 1.13 THOUSAND/ΜL (ref 0.17–1.22)
MONOCYTES NFR BLD AUTO: 11 % (ref 4–12)
MUCOUS THREADS UR QL AUTO: ABNORMAL
NEUTROPHILS # BLD AUTO: 6.68 THOUSANDS/ΜL (ref 1.85–7.62)
NEUTS SEG NFR BLD AUTO: 67 % (ref 43–75)
NITRITE UR QL STRIP: NEGATIVE
NON-SQ EPI CELLS URNS QL MICRO: ABNORMAL /HPF
NRBC BLD AUTO-RTO: 0 /100 WBCS
PH UR STRIP.AUTO: 6 [PH] (ref 4.5–8)
PLATELET # BLD AUTO: 260 THOUSANDS/UL (ref 149–390)
PMV BLD AUTO: 9.4 FL (ref 8.9–12.7)
POTASSIUM SERPL-SCNC: 4.5 MMOL/L (ref 3.5–5.3)
PROGEST SERPL-MCNC: 6.6 NG/ML
PROT UR STRIP-MCNC: ABNORMAL MG/DL
PROTHROMBIN TIME: 14.7 SECONDS (ref 11.8–14.2)
RBC # BLD AUTO: 4.29 MILLION/UL (ref 3.81–5.12)
RBC #/AREA URNS AUTO: ABNORMAL /HPF
RH BLD: POSITIVE
SODIUM SERPL-SCNC: 139 MMOL/L (ref 136–145)
SP GR UR STRIP.AUTO: >=1.03 (ref 1–1.03)
SPECIMEN EXPIRATION DATE: NORMAL
UROBILINOGEN UR QL STRIP.AUTO: 0.2 E.U./DL
WBC # BLD AUTO: 9.99 THOUSAND/UL (ref 4.31–10.16)
WBC #/AREA URNS AUTO: ABNORMAL /HPF

## 2018-09-28 PROCEDURE — 36415 COLL VENOUS BLD VENIPUNCTURE: CPT | Performed by: EMERGENCY MEDICINE

## 2018-09-28 PROCEDURE — 85025 COMPLETE CBC W/AUTO DIFF WBC: CPT | Performed by: EMERGENCY MEDICINE

## 2018-09-28 PROCEDURE — 85730 THROMBOPLASTIN TIME PARTIAL: CPT | Performed by: EMERGENCY MEDICINE

## 2018-09-28 PROCEDURE — 80048 BASIC METABOLIC PNL TOTAL CA: CPT | Performed by: EMERGENCY MEDICINE

## 2018-09-28 PROCEDURE — 86901 BLOOD TYPING SEROLOGIC RH(D): CPT | Performed by: EMERGENCY MEDICINE

## 2018-09-28 PROCEDURE — 84702 CHORIONIC GONADOTROPIN TEST: CPT | Performed by: EMERGENCY MEDICINE

## 2018-09-28 PROCEDURE — 84144 ASSAY OF PROGESTERONE: CPT | Performed by: EMERGENCY MEDICINE

## 2018-09-28 PROCEDURE — 76815 OB US LIMITED FETUS(S): CPT

## 2018-09-28 PROCEDURE — 87491 CHLMYD TRACH DNA AMP PROBE: CPT | Performed by: EMERGENCY MEDICINE

## 2018-09-28 PROCEDURE — 87591 N.GONORRHOEAE DNA AMP PROB: CPT | Performed by: EMERGENCY MEDICINE

## 2018-09-28 PROCEDURE — 81001 URINALYSIS AUTO W/SCOPE: CPT

## 2018-09-28 PROCEDURE — 99284 EMERGENCY DEPT VISIT MOD MDM: CPT

## 2018-09-28 PROCEDURE — 86900 BLOOD TYPING SEROLOGIC ABO: CPT | Performed by: EMERGENCY MEDICINE

## 2018-09-28 PROCEDURE — 86850 RBC ANTIBODY SCREEN: CPT | Performed by: EMERGENCY MEDICINE

## 2018-09-28 PROCEDURE — 81025 URINE PREGNANCY TEST: CPT | Performed by: EMERGENCY MEDICINE

## 2018-09-28 PROCEDURE — 85610 PROTHROMBIN TIME: CPT | Performed by: EMERGENCY MEDICINE

## 2018-09-28 RX ORDER — ONDANSETRON 4 MG/1
4 TABLET, ORALLY DISINTEGRATING ORAL ONCE
Status: COMPLETED | OUTPATIENT
Start: 2018-09-28 | End: 2018-09-28

## 2018-09-28 RX ADMIN — ONDANSETRON 4 MG: 4 TABLET, ORALLY DISINTEGRATING ORAL at 15:07

## 2018-09-28 NOTE — ED PROVIDER NOTES
History  Chief Complaint   Patient presents with    Pelvic Pain     c/o pelvic pain, headache  weakness that started today  missed period lmp 18  denies vaginal bleeding       History provided by:  Patient   used: No    Pelvic Pain   Location:  Left-sided adnexal pain  Severity:  Moderate  Onset quality:  Sudden  Timing:  Constant  Progression:  Waxing and waning  Chronicity:  New  Relieved by:  Nothing  Worsened by: Movement and movement and palpation  Ineffective treatments:  None tried  Associated symptoms: abdominal pain and nausea    Associated symptoms: no chest pain, no congestion, no cough, no diarrhea, no fever, no headaches, no shortness of breath, no sore throat and no vomiting     Patient is a  with 2 miscarriages and her last menstrual period was   Her pregnancy test here in the emergency department is positive  She is having no vaginal bleeding or discharge  She has had her tubal ligation reanastomosed  No urinary symptoms  No back pain  No other significant medical problems  None       Past Medical History:   Diagnosis Date    Anxiety     Scalp cyst 04/10/2014    EXCISION OF SCALP PLIAR CYST X 4        Past Surgical History:   Procedure Laterality Date    ABDOMINAL SURGERY      CYST REMOVAL  04/10/2014    EXCISION OF SCALP PLIAR CYST X 4        History reviewed  No pertinent family history  I have reviewed and agree with the history as documented  Social History   Substance Use Topics    Smoking status: Former Smoker     Types: Cigarettes    Smokeless tobacco: Never Used      Comment: NEVER SMOKER, NO TOBACCO USE AS PER NEXTGEN    Alcohol use No        Review of Systems   Constitutional: Negative for chills and fever  HENT: Negative for congestion and sore throat  Respiratory: Negative for cough, chest tightness and shortness of breath  Cardiovascular: Negative for chest pain and leg swelling     Gastrointestinal: Positive for abdominal pain and nausea  Negative for diarrhea and vomiting  Genitourinary: Positive for pelvic pain  Negative for dysuria, flank pain, vaginal bleeding and vaginal discharge  Musculoskeletal: Negative for neck pain  Neurological: Negative for weakness and headaches  All other systems reviewed and are negative  Physical Exam  Physical Exam   Constitutional: She appears well-developed and well-nourished  She is cooperative  Non-toxic appearance  She does not have a sickly appearance  She does not appear ill  No distress  HENT:   Head: Normocephalic and atraumatic  Right Ear: Hearing normal  No drainage or swelling  Left Ear: Hearing normal  No drainage or swelling  Mouth/Throat: Mucous membranes are normal    Eyes: Conjunctivae and lids are normal  Right eye exhibits no discharge  Left eye exhibits no discharge  Neck: Trachea normal and normal range of motion  No JVD present  Cardiovascular: Normal rate, regular rhythm, normal heart sounds, intact distal pulses and normal pulses  Exam reveals no gallop and no friction rub  No murmur heard  Pulmonary/Chest: Effort normal and breath sounds normal  No stridor  No respiratory distress  She has no wheezes  She has no rales  Abdominal: Soft  Normal appearance  She exhibits no distension, no ascites and no mass  There is no hepatosplenomegaly  There is no tenderness  There is no rebound, no guarding and no CVA tenderness  No hernia  Musculoskeletal: Normal range of motion  She exhibits no edema, tenderness or deformity  Lymphadenopathy:     She has no cervical adenopathy  Right: No inguinal adenopathy present  Left: No inguinal adenopathy present  Neurological: She is alert  She has normal strength  No cranial nerve deficit  She exhibits normal muscle tone  Gait normal  GCS eye subscore is 4  GCS verbal subscore is 5  GCS motor subscore is 6  Skin: Skin is warm, dry and intact  No rash noted   She is not diaphoretic  No pallor  Psychiatric: She has a normal mood and affect  Her speech is normal  Cognition and memory are normal    Nursing note and vitals reviewed  Vital Signs  ED Triage Vitals [09/28/18 1226]   Temperature Pulse Respirations Blood Pressure SpO2   97 8 °F (36 6 °C) 93 16 123/72 98 %      Temp Source Heart Rate Source Patient Position - Orthostatic VS BP Location FiO2 (%)   Temporal Monitor Sitting Right arm --      Pain Score       8           Vitals:    09/28/18 1226 09/28/18 1417 09/28/18 1620   BP: 123/72 96/57 170/55   Pulse: 93 86 84   Patient Position - Orthostatic VS: Sitting Sitting Lying       Visual Acuity      ED Medications  Medications   ondansetron (ZOFRAN-ODT) dispersible tablet 4 mg (4 mg Oral Given 9/28/18 1507)       Diagnostic Studies  Results Reviewed     Procedure Component Value Units Date/Time    Progesterone [[id number]] Collected:  09/28/18 1327    Lab Status:  Final result Specimen:  Blood from Arm, Left Updated:  09/28/18 1651     Progesterone 6 60 ng/mL     Basic metabolic panel [[id number]] Collected:  09/28/18 1327    Lab Status:  Final result Specimen:  Blood from Arm, Left Updated:  09/28/18 1418     Sodium 139 mmol/L      Potassium 4 5 mmol/L      Chloride 104 mmol/L      CO2 25 mmol/L      ANION GAP 10 mmol/L      BUN 10 mg/dL      Creatinine 0 76 mg/dL      Glucose 89 mg/dL      Calcium 8 9 mg/dL      eGFR 98 ml/min/1 73sq m     Narrative:         National Kidney Disease Education Program recommendations are as follows:  GFR calculation is accurate only with a steady state creatinine  Chronic Kidney disease less than 60 ml/min/1 73 sq  meters  Kidney failure less than 15 ml/min/1 73 sq  meters      Quantitative hCG [[id number]]  (Abnormal) Collected:  09/28/18 1327    Lab Status:  Final result Specimen:  Blood from Arm, Left Updated:  09/28/18 1418     HCG, Quant 4,948 8 (H) mIU/mL     Narrative:          Expected Ranges:     Approximate               Approximate HCG  Gestation age          Concentration ( mIU/mL)  _____________          ______________________   [place]                      HCG values  0 2-1                       5-50  1-2                           2-3                         100-5000  3-4                         500-27642  4-5                         1000-20688  5-6                         61550-297921  6-8                         74672-783471  8-12                        87391-033103    APTT [[id number]]  (Normal) Collected:  09/28/18 1327    Lab Status:  Final result Specimen:  Blood from Arm, Left Updated:  09/28/18 1352     PTT 29 seconds     Protime-INR [[id number]]  (Abnormal) Collected:  09/28/18 1327    Lab Status:  Final result Specimen:  Blood from Arm, Left Updated:  09/28/18 1352     Protime 14 7 (H) seconds      INR 1 14    Urine Microscopic [[id number]]  (Abnormal) Collected:  09/28/18 1325    Lab Status:  Final result Specimen:  Urine from Urine, Clean Catch Updated:  09/28/18 1333     RBC, UA 1-2 (A) /hpf      WBC, UA 4-10 (A) /hpf      Epithelial Cells Moderate (A) /hpf      Bacteria, UA Moderate (A) /hpf      MUCOUS THREADS Moderate (A)    CBC and differential [[id number]] Collected:  09/28/18 1327    Lab Status:  Final result Specimen:  Blood from Arm, Left Updated:  09/28/18 1333     WBC 9 99 Thousand/uL      RBC 4 29 Million/uL      Hemoglobin 13 1 g/dL      Hematocrit 39 8 %      MCV 93 fL      MCH 30 5 pg      MCHC 32 9 g/dL      RDW 13 9 %      MPV 9 4 fL      Platelets 250 Thousands/uL      nRBC 0 /100 WBCs      Neutrophils Relative 67 %      Immat GRANS % 0 %      Lymphocytes Relative 19 %      Monocytes Relative 11 %      Eosinophils Relative 2 %      Basophils Relative 1 %      Neutrophils Absolute 6 68 Thousands/µL      Immature Grans Absolute 0 03 Thousand/uL      Lymphocytes Absolute 1 87 Thousands/µL      Monocytes Absolute 1 13 Thousand/µL      Eosinophils Absolute 0 23 Thousand/µL      Basophils Absolute 0 05 Thousands/µL POCT urinalysis dipstick [[id number]]  (Abnormal) Resulted:  18 1316    Lab Status:  Final result Specimen:  Urine Updated:  18    ED Urine Macroscopic [[id number]]  (Abnormal) Collected:  18 1325    Lab Status:  Final result Specimen:  Urine Updated:  18 1314     Color, UA Yellow     Clarity, UA Slightly Cloudy     pH, UA 6 0     Leukocytes, UA Negative     Nitrite, UA Negative     Protein, UA Trace (A) mg/dl      Glucose, UA Negative mg/dl      Ketones, UA Negative mg/dl      Urobilinogen, UA 0 2 E U /dl      Bilirubin, UA Negative     Blood, UA Trace (A)     Specific Gravity, UA >=1 030    Narrative:       CLINITEK RESULT    POCT pregnancy, urine [[id number]]  (Abnormal) Resulted:  18 1300    Lab Status:  Final result Updated:  18     EXT PREG TEST UR (Ref: Negative) POSITIVE                 US OB pregnancy limited with transvaginal   Final Result by Nunu Reese MD ([id number])   No intrauterine gestation or adnexal mass identified  Markedly thickened endometrium  Differential remains early IUP, spontaneous  and ectopic pregnancy  Correlate with serial quantitative BHCG  Workstation performed: [id number]                    Procedures  Procedures       Phone Contacts  ED Phone Contact    ED Course                               MDM  Number of Diagnoses or Management Options  Abnormal pregnancy in first trimester:   Threatened miscarriage:   Diagnosis management comments: Patient pregnant  HCG mL 5000  Nothing seen on ultrasound  OBGYN consult  They came and saw the patient  Will check HCG in 2 days and follow up with the [facility]  Concerning with the cramping discomfort that this could potentially be a miscarriage  She will get follow-up blood work in 2 days and follow up with the [facility] next week  She was seen by OBGYN  Urine test looks quite contaminated with epithelial cells  Will be sent for culture    She has no urinary complaints  Amount and/or Complexity of Data Reviewed  Clinical lab tests: ordered and reviewed  Tests in the radiology section of CPT®: ordered and reviewed  Discuss the patient with other providers: yes    Patient Progress  Patient progress: stable    CritCare Time    Disposition  Final diagnoses:   Abnormal pregnancy in first trimester   Threatened miscarriage     Time reflects when diagnosis was documented in both MDM as applicable and the Disposition within this note     Time User Action Codes Description Comment    9/28/2018  4:01 PM Lauri Mccrary [O26 91] Abnormal pregnancy in first trimester     9/28/2018  4:46 PM Jamie [address] [place] [address] [O20 0] Threatened miscarriage       ED Disposition     ED Disposition Condition Comment    Discharge  Tin John discharge to home/self care  Condition at discharge: Good        Follow-up Information     Follow up With Specialties Details [facility] Schedule an appointment as soon as possible for a visit in 1 week  Michelle  [id number] [phone]          Patient's Medications   Discharge Prescriptions    No medications on file       Outpatient Discharge Orders  hCG, quantitative   Standing Status: Future  Standing Exp   Date: 09/28/19         ED Provider  Electronically Signed by           Juan M Shepherd MD  09/28/18 4125

## 2018-09-28 NOTE — DISCHARGE INSTRUCTIONS
Threatened Miscarriage   WHAT YOU NEED TO KNOW:   A threatened miscarriage occurs when you have vaginal bleeding within the first 20 weeks of pregnancy  It means that a miscarriage may happen  A threatened miscarriage may also be called a threatened   DISCHARGE INSTRUCTIONS:   Seek care immediately if:   · You feel weak or faint  · Your pain or cramping in your abdomen or back gets worse  · You have vaginal bleeding that soaks 1 or more pads in an hour  · You pass material that looks like tissue or large clots  Contact your healthcare provider or obstetrician if:   · You have a fever  · You have trouble urinating, burning when you urinate, or feel a need to urinate often  · You have new or worsening vaginal bleeding  · You have vaginal pain or itching, or vaginal discharge that is yellow, green, or foul-smelling  · You have questions or concerns about your condition or care  Self-care: The following may help you manage your symptoms and decrease your risk for a miscarriage:  · Do not put anything in your vagina  Do not have sex, douche, or use tampons  These actions may increase your risk for infection and miscarriage  · Rest as directed  Do not exercise or do strenuous activities  These activities may cause  labor or miscarriage  Ask your healthcare provider what activities are okay to do  Stay healthy during pregnancy:   · Eat a variety of healthy foods  Healthy foods can help you get extra protein, water, and calories that you need while you are pregnant  Healthy foods include fruits, vegetables, whole-grain breads, low-fat dairy products, beans, lean meats, and fish  Avoid raw or undercooked meat and fish  Ask your healthcare provider if you need a special diet  · Take prenatal vitamins as directed  These help you get the right amount of vitamins and minerals  They may also decrease the risk of certain birth defects      · Do not drink alcohol or use illegal drugs  These can increase your risk for a miscarriage or harm your baby  · Do not smoke  Nicotine and other chemicals in cigarettes and cigars can harm your baby and cause miscarriage or  labor  Ask your healthcare provider for information if you currently smoke and need help to quit  E-cigarettes or smokeless tobacco still contain nicotine  Do not use these products  · Decrease your risk for an infection  Always wash your hands before eating or preparing meals  Do not spend time with people who are sick  Ask your healthcare provider if you need immunizations such as the flu or hepatitis B vaccine  Immunizations may decrease your risk for infections that could cause a miscarriage  · Manage your medical conditions  Keep your blood pressure and blood sugars under control  Maintain a healthy weight during pregnancy  Follow up with your obstetrician as directed: You may need to see your obstetrician frequently for ultrasounds or blood tests  Write down your questions so you remember to ask them during your visits  ©  2600 [employer] Information is for End User's use only and may not be sold, redistributed or otherwise used for commercial purposes  All illustrations and images included in CareNotes® are the copyrighted property of [employer]  or [employer]  The above information is an  only  It is not intended as medical advice for individual conditions or treatments  Talk to your doctor, nurse or pharmacist before following any medical regimen to see if it is safe and effective for you

## 2018-09-28 NOTE — ED NOTES
Pt in [address] at this time        Devante Salgado, RN  09/28/18 8111

## 2018-09-28 NOTE — ED NOTES
Dr Davon Donovan aware that pt c/o nausea  No additional orders received to this time        Doug Reyes, RN  09/28/18 7707

## 2018-09-28 NOTE — CONSULTS
Consult - OB/GYN   Fahad Paz 34 y o  female MRN: [id number]  Unit/Bed#: ED 18 Encounter: [id number]          Chief complaint:  "I have some pain in my lower abdomen after I stretched"    HPI:   Santo Monroy is a 34 y o  [id number] with LMP 8/7/18 who presented to the emergency department complaining of left lower quadrant pain  Patient states that this morning she reached up high to retrieve an item and felt like she pulled something in her left lower abdomen  She started to have pain which was non-radiating in nature  She also started to feel dizzy and nauseous and decided to come to the ED for evaluation  Pregnancy test was noted to be positive with an HCG of 4,948 and a progesterone of 6 6  Transvaginal ultrasound showed a thickened endometrium however no intrauterine pregnancy was identified  No suspicious adnexal masses or free fluid was noted  Of note patient had a tubal reanastomosis surgery in 2015 by Dr Daysi Willoughby in [facility]  Patient denies any current nausea, vomiting, vaginal bleeding, dizziness, chest pain or shortness of breath  The patient does not currently have an OBGYN  She denies any history of any abnormal Pap smears or STDs  Her obstetrical history is significant for 4 prior vaginal deliveries and 2 spontaneous miscarriages  Active Problems:   Pregnancy of unknown location    PMH:  Past Medical History:   Diagnosis Date    Anxiety     Scalp cyst 04/10/2014    EXCISION OF SCALP PLIAR CYST X 4        PSH:  Past Surgical History:   Procedure Laterality Date    ABDOMINAL SURGERY      CYST REMOVAL  04/10/2014    EXCISION OF SCALP PLIAR CYST X 4    2011- bilateral tubal ligation  2015- tubal reanastomosis ease    Social Hx:  Social History     Social History    Marital status: Single     Spouse name: N/A    Number of children: N/A    Years of education: N/A     Occupational History    Not on file       Social History Main Topics    Smoking status: Former Smoker Types: Cigarettes    Smokeless tobacco: Never Used      Comment: NEVER SMOKER, NO TOBACCO USE AS PER NEXTGEN    Alcohol use No    Drug use: No    Sexual activity: Not on file     Other Topics Concern    Not on file     Social History Narrative    No narrative on file     Recent Results (from the past 12 hour(s))   POCT pregnancy, urine    Collection Time: 09/28/18  1:00 PM   Result Value Ref Range    EXT PREG TEST UR (Ref: Negative) POSITIVE    ED Urine Macroscopic    Collection Time: 09/28/18  1:25 PM   Result Value Ref Range    Color, UA Yellow     Clarity, UA Slightly Cloudy     pH, UA 6 0 4 5 - 8 0    Leukocytes, UA Negative Negative    Nitrite, UA Negative Negative    Protein, UA Trace (A) Negative mg/dl    Glucose, UA Negative Negative mg/dl    Ketones, UA Negative Negative mg/dl    Urobilinogen, UA 0 2 0 2, 1 0 E U /dl E U /dl    Bilirubin, UA Negative Negative    Blood, UA Trace (A) Negative    Specific Gravity, UA >=1 030 1 003 - 1 030   Urine Microscopic    Collection Time: 09/28/18  1:25 PM   Result Value Ref Range    RBC, UA 1-2 (A) None Seen, 0-5 /hpf    WBC, UA 4-10 (A) None Seen, 0-5, 5-55, 5-65 /hpf    Epithelial Cells Moderate (A) None Seen, Occasional /hpf    Bacteria, UA Moderate (A) None Seen, Occasional /hpf    MUCOUS THREADS Moderate (A) None Seen   CBC and differential    Collection Time: 09/28/18  1:27 PM   Result Value Ref Range    WBC 9 99 4 31 - 10 16 Thousand/uL    RBC 4 29 3 81 - 5 12 Million/uL    Hemoglobin 13 1 11 5 - 15 4 g/dL    Hematocrit 39 8 34 8 - 46 1 %    MCV 93 82 - 98 fL    MCH 30 5 26 8 - 34 3 pg    MCHC 32 9 31 4 - 37 4 g/dL    RDW 13 9 11 6 - 15 1 %    MPV 9 4 8 9 - 12 7 fL    Platelets 589 058 - 260 Thousands/uL    nRBC 0 /100 WBCs    Neutrophils Relative 67 43 - 75 %    Immat GRANS % 0 0 - 2 %    Lymphocytes Relative 19 14 - 44 %    Monocytes Relative 11 4 - 12 %    Eosinophils Relative 2 0 - 6 %    Basophils Relative 1 0 - 1 %    Neutrophils Absolute 6 68 1 85 - 7 62 Thousands/µL    Immature Grans Absolute 0 03 0 00 - 0 20 Thousand/uL    Lymphocytes Absolute 1 87 0 60 - 4 47 Thousands/µL    Monocytes Absolute 1 13 0 17 - 1 22 Thousand/µL    Eosinophils Absolute 0 23 0 00 - 0 61 Thousand/µL    Basophils Absolute 0 05 0 00 - 0 10 Thousands/µL   Basic metabolic panel    Collection Time: 09/28/18  1:27 PM   Result Value Ref Range    Sodium 139 136 - 145 mmol/L    Potassium 4 5 3 5 - 5 3 mmol/L    Chloride 104 100 - 108 mmol/L    CO2 25 21 - 32 mmol/L    ANION GAP 10 4 - 13 mmol/L    BUN 10 5 - 25 mg/dL    Creatinine 0 76 0 60 - 1 30 mg/dL    Glucose 89 65 - 140 mg/dL    Calcium 8 9 8 3 - 10 1 mg/dL    eGFR 98 ml/min/1 73sq m   Quantitative hCG    Collection Time: 09/28/18  1:27 PM   Result Value Ref Range    HCG, Quant 4,948 8 (H) <=6 mIU/mL   Protime-INR    Collection Time: 09/28/18  1:27 PM   Result Value Ref Range    Protime 14 7 (H) 11 8 - 14 2 seconds    INR 1 14 0 86 - 1 17   APTT    Collection Time: 09/28/18  1:27 PM   Result Value Ref Range    PTT 29 24 - 36 seconds   Type and screen    Collection Time: 09/28/18  1:27 PM   Result Value Ref Range    ABO Grouping A     Rh Factor Positive     Antibody Screen Negative     Specimen Expiration Date [id number]    Progesterone    Collection Time: 09/28/18  1:27 PM   Result Value Ref Range    Progesterone 6 60 ng/mL         Meds:  No current facility-administered medications on file prior to encounter        Current Outpatient Prescriptions on File Prior to Encounter   Medication Sig Dispense Refill    [DISCONTINUED] hydrOXYzine pamoate (VISTARIL) 50 mg capsule Take 1 capsule (50 mg total) by mouth 3 (three) times a day as needed for anxiety 20 capsule 0    [DISCONTINUED] omeprazole (PriLOSEC) 20 mg delayed release capsule Take 1 capsule (20 mg total) by mouth daily for 14 days 14 capsule 0    [DISCONTINUED] ondansetron (ZOFRAN-ODT) 4 mg disintegrating tablet Take 1 tablet (4 mg total) by mouth every 8 (eight) hours as needed for nausea or vomiting for up to 12 doses 12 tablet 0    [DISCONTINUED] sucralfate (CARAFATE) 1 g/10 mL suspension Take 10 mL (1 g total) by mouth 4 (four) times a day 420 mL 0       Allergies:  No Known Allergies    Physical Exam:  /55 (BP Location: Right arm)   Pulse 84   Temp 97 8 °F (36 6 °C) (Temporal)   Resp 18   Wt 95 3 kg (210 lb)   LMP 08/07/2018   SpO2 97%   BMI 39 68 kg/m²     Physical Exam   Constitutional: She is oriented to person, place, and time  She appears well-developed and well-nourished  No distress  HENT:   Head: Normocephalic and atraumatic  Cardiovascular: Normal rate, regular rhythm, normal heart sounds and intact distal pulses  Pulmonary/Chest: Effort normal and breath sounds normal  No respiratory distress  She has no wheezes  She has no rales  She exhibits no tenderness  Abdominal: Soft  She exhibits no distension and no mass  There is tenderness (mild in the left lower quadrant)  There is no rebound and no guarding  Genitourinary: Vagina normal  No vaginal discharge found  Genitourinary Comments: No bleeding noted  Cervical os visibly closed  Musculoskeletal: Normal range of motion  She exhibits no edema, tenderness or deformity  Neurological: She is alert and oriented to person, place, and time  Skin: Skin is warm and dry  She is not diaphoretic  Psychiatric: She has a normal mood and affect  Her behavior is normal    Vitals reviewed  Assessment:   34 y o  [id number] with left lower quadrant pain and pregnancy of undetermined location  Plan:   1  Pregnancy of undetermined location   - Detailed discussion with patient about the possibility of this pregnancy being an ectopic given her risk factors which include tubal reanastomosis and no identifiable IUP Surgery  Patient given precautions to return to the emergency department if she starts to have vaginal bleeding or increase abdominal pain       -  Slip for repeat HCG in 2 days provided to the patient  Hcg today is ~4900   - plan for patient to follow up with the [facility] next week following HCG for repeat ultrasound  - instructed patient to start taking a prenatal vitamin    Discussed with Dr Roland Stack

## 2018-09-28 NOTE — ED NOTES
OB resident paged to place order for culture collected during pelvic exam      Haley Rincon RN  09/28/18 6072

## 2018-10-01 ENCOUNTER — APPOINTMENT (OUTPATIENT)
Dept: LAB | Facility: HOSPITAL | Age: 38
End: 2018-10-01
Attending: EMERGENCY MEDICINE
Payer: COMMERCIAL

## 2018-10-01 DIAGNOSIS — O20.0 THREATENED MISCARRIAGE: ICD-10-CM

## 2018-10-01 DIAGNOSIS — O26.91 ABNORMAL PREGNANCY IN FIRST TRIMESTER: ICD-10-CM

## 2018-10-01 LAB
B-HCG SERPL-ACNC: 7033.5 MIU/ML
CHLAMYDIA DNA CVX QL NAA+PROBE: NORMAL
N GONORRHOEA DNA GENITAL QL NAA+PROBE: NORMAL

## 2018-10-01 PROCEDURE — 84702 CHORIONIC GONADOTROPIN TEST: CPT

## 2018-10-01 PROCEDURE — 36415 COLL VENOUS BLD VENIPUNCTURE: CPT

## 2018-10-04 ENCOUNTER — OFFICE VISIT (OUTPATIENT)
Dept: OBGYN CLINIC | Facility: CLINIC | Age: 38
End: 2018-10-04
Payer: COMMERCIAL

## 2018-10-04 VITALS
HEIGHT: 61 IN | SYSTOLIC BLOOD PRESSURE: 104 MMHG | BODY MASS INDEX: 41.16 KG/M2 | WEIGHT: 218 LBS | HEART RATE: 88 BPM | DIASTOLIC BLOOD PRESSURE: 67 MMHG

## 2018-10-04 DIAGNOSIS — N91.2 AMENORRHEA: Primary | ICD-10-CM

## 2018-10-04 DIAGNOSIS — O36.80X0 PREGNANCY, LOCATION UNKNOWN: ICD-10-CM

## 2018-10-04 LAB — SL AMB POCT URINE HCG: POSITIVE

## 2018-10-04 PROCEDURE — 99214 OFFICE O/P EST MOD 30 MIN: CPT | Performed by: OBSTETRICS & GYNECOLOGY

## 2018-10-04 PROCEDURE — 81025 URINE PREGNANCY TEST: CPT | Performed by: OBSTETRICS & GYNECOLOGY

## 2018-10-04 PROCEDURE — 76801 OB US < 14 WKS SINGLE FETUS: CPT | Performed by: OBSTETRICS & GYNECOLOGY

## 2018-10-04 NOTE — PROGRESS NOTES
OB/GYN  PN Visit  Lokesh Hamilton  [id number]  10/4/2018  4:44 PM  Dr Gracy Nino MD    S: Rupa Westbrook is a 35 y/o [id number] with LMP 8/7/18 who presented to the emergency department on 9/28/2018 complaining of left lower quadrant pain  At that time, pregnancy test was noted to be positive with a value of 4,948  No IUP was detected at that time  She was discharged with instructions to get a 48 hour HCG  Her follow up HCG at approximately 72 hours was 7,033 5  Today on TVUS, no pregnancy was detected  The patient had a tubal re-anastomosis in 2015  She states that she thinks she may have miscarried once since her procedure, but never took a pregnancy test      She states that she is experiencing nausea, fatigue and breast tenderness  She denies abdominal pain, vomiting, bleeding, dizziness, shortness of breath, chest pain or calf pain  O:  Vitals:    10/04/18 1610   BP: 104/67   Pulse: 88           A/P:  Rupa Westbrook is a 34 y o  [id number] who presents with pregnancy of undetermined location    - TVUS inconclusive: no IUP detected   - patient sent to hospital for inpatient pelvic US and follow up management     An extensive conversation with the patient about the ramifications of our findings and the management plan was held  She was agreeable and appeared to understand  Patient seen, examined and counseled with Dr Ana Nino MD  10/4/2018  4:44 PM

## 2018-10-05 ENCOUNTER — APPOINTMENT (EMERGENCY)
Dept: ULTRASOUND IMAGING | Facility: HOSPITAL | Age: 38
End: 2018-10-05
Payer: COMMERCIAL

## 2018-10-05 ENCOUNTER — HOSPITAL ENCOUNTER (OUTPATIENT)
Facility: HOSPITAL | Age: 38
Setting detail: OBSERVATION
Discharge: HOME | End: 2018-10-07
Attending: OBSTETRICS & GYNECOLOGY | Admitting: OBSTETRICS & GYNECOLOGY
Payer: COMMERCIAL

## 2018-10-05 DIAGNOSIS — O00.101 RIGHT TUBAL PREGNANCY WITHOUT INTRAUTERINE PREGNANCY: Primary | ICD-10-CM

## 2018-10-05 PROBLEM — O00.90 ECTOPIC PREGNANCY WITHOUT INTRAUTERINE PREGNANCY: Status: ACTIVE | Noted: 2018-10-05

## 2018-10-05 LAB
ALBUMIN SERPL BCP-MCNC: 3.1 G/DL (ref 3.5–5)
ALP SERPL-CCNC: 48 U/L (ref 46–116)
ALT SERPL W P-5'-P-CCNC: 32 U/L (ref 12–78)
ANION GAP SERPL CALCULATED.3IONS-SCNC: 8 MMOL/L (ref 4–13)
AST SERPL W P-5'-P-CCNC: 20 U/L (ref 5–45)
B-HCG SERPL-ACNC: ABNORMAL MIU/ML
BASOPHILS # BLD AUTO: 0.03 THOUSANDS/ΜL (ref 0–0.1)
BASOPHILS NFR BLD AUTO: 1 % (ref 0–1)
BILIRUB SERPL-MCNC: 0.17 MG/DL (ref 0.2–1)
BUN SERPL-MCNC: 7 MG/DL (ref 5–25)
CALCIUM SERPL-MCNC: 8.8 MG/DL (ref 8.3–10.1)
CHLORIDE SERPL-SCNC: 104 MMOL/L (ref 100–108)
CO2 SERPL-SCNC: 27 MMOL/L (ref 21–32)
CREAT SERPL-MCNC: 0.83 MG/DL (ref 0.6–1.3)
EOSINOPHIL # BLD AUTO: 0.27 THOUSAND/ΜL (ref 0–0.61)
EOSINOPHIL NFR BLD AUTO: 4 % (ref 0–6)
ERYTHROCYTE [DISTWIDTH] IN BLOOD BY AUTOMATED COUNT: 14.1 % (ref 11.6–15.1)
GFR SERPL CREATININE-BSD FRML MDRD: 88 ML/MIN/1.73SQ M
GLUCOSE SERPL-MCNC: 106 MG/DL (ref 65–140)
HCT VFR BLD AUTO: 37.5 % (ref 34.8–46.1)
HGB BLD-MCNC: 12.2 G/DL (ref 11.5–15.4)
IMM GRANULOCYTES # BLD AUTO: 0.01 THOUSAND/UL (ref 0–0.2)
IMM GRANULOCYTES NFR BLD AUTO: 0 % (ref 0–2)
LYMPHOCYTES # BLD AUTO: 1.24 THOUSANDS/ΜL (ref 0.6–4.47)
LYMPHOCYTES NFR BLD AUTO: 19 % (ref 14–44)
MCH RBC QN AUTO: 30.8 PG (ref 26.8–34.3)
MCHC RBC AUTO-ENTMCNC: 32.5 G/DL (ref 31.4–37.4)
MCV RBC AUTO: 95 FL (ref 82–98)
MONOCYTES # BLD AUTO: 0.45 THOUSAND/ΜL (ref 0.17–1.22)
MONOCYTES NFR BLD AUTO: 7 % (ref 4–12)
NEUTROPHILS # BLD AUTO: 4.58 THOUSANDS/ΜL (ref 1.85–7.62)
NEUTS SEG NFR BLD AUTO: 69 % (ref 43–75)
NRBC BLD AUTO-RTO: 0 /100 WBCS
PLATELET # BLD AUTO: 240 THOUSANDS/UL (ref 149–390)
PMV BLD AUTO: 9.6 FL (ref 8.9–12.7)
POTASSIUM SERPL-SCNC: 3.7 MMOL/L (ref 3.5–5.3)
PROGEST SERPL-MCNC: 4.2 NG/ML
PROT SERPL-MCNC: 6.6 G/DL (ref 6.4–8.2)
RBC # BLD AUTO: 3.96 MILLION/UL (ref 3.81–5.12)
SODIUM SERPL-SCNC: 139 MMOL/L (ref 136–145)
WBC # BLD AUTO: 6.58 THOUSAND/UL (ref 4.31–10.16)

## 2018-10-05 PROCEDURE — 85025 COMPLETE CBC W/AUTO DIFF WBC: CPT | Performed by: OBSTETRICS & GYNECOLOGY

## 2018-10-05 PROCEDURE — 96374 THER/PROPH/DIAG INJ IV PUSH: CPT

## 2018-10-05 PROCEDURE — 99285 EMERGENCY DEPT VISIT HI MDM: CPT

## 2018-10-05 PROCEDURE — 76817 TRANSVAGINAL US OBSTETRIC: CPT

## 2018-10-05 PROCEDURE — 36415 COLL VENOUS BLD VENIPUNCTURE: CPT | Performed by: OBSTETRICS & GYNECOLOGY

## 2018-10-05 PROCEDURE — 80053 COMPREHEN METABOLIC PANEL: CPT | Performed by: OBSTETRICS & GYNECOLOGY

## 2018-10-05 PROCEDURE — 76816 OB US FOLLOW-UP PER FETUS: CPT

## 2018-10-05 PROCEDURE — 96376 TX/PRO/DX INJ SAME DRUG ADON: CPT

## 2018-10-05 PROCEDURE — 99219 PR INITIAL OBSERVATION CARE/DAY 50 MINUTES: CPT | Performed by: OBSTETRICS & GYNECOLOGY

## 2018-10-05 PROCEDURE — 84702 CHORIONIC GONADOTROPIN TEST: CPT | Performed by: OBSTETRICS & GYNECOLOGY

## 2018-10-05 PROCEDURE — 84144 ASSAY OF PROGESTERONE: CPT | Performed by: OBSTETRICS & GYNECOLOGY

## 2018-10-05 RX ORDER — ONDANSETRON 2 MG/ML
4 INJECTION INTRAMUSCULAR; INTRAVENOUS EVERY 6 HOURS PRN
Status: DISCONTINUED | OUTPATIENT
Start: 2018-10-05 | End: 2018-10-07 | Stop reason: HOSPADM

## 2018-10-05 RX ORDER — IBUPROFEN 600 MG/1
600 TABLET ORAL EVERY 6 HOURS PRN
Status: DISCONTINUED | OUTPATIENT
Start: 2018-10-05 | End: 2018-10-05

## 2018-10-05 RX ORDER — HYDROMORPHONE HCL/PF 1 MG/ML
1 SYRINGE (ML) INJECTION
Status: DISCONTINUED | OUTPATIENT
Start: 2018-10-05 | End: 2018-10-07 | Stop reason: HOSPADM

## 2018-10-05 RX ORDER — SODIUM CHLORIDE, SODIUM LACTATE, POTASSIUM CHLORIDE, CALCIUM CHLORIDE 600; 310; 30; 20 MG/100ML; MG/100ML; MG/100ML; MG/100ML
125 INJECTION, SOLUTION INTRAVENOUS CONTINUOUS
Status: DISCONTINUED | OUTPATIENT
Start: 2018-10-05 | End: 2018-10-07 | Stop reason: HOSPADM

## 2018-10-05 RX ORDER — HYDROMORPHONE HCL/PF 1 MG/ML
1 SYRINGE (ML) INJECTION ONCE
Status: COMPLETED | OUTPATIENT
Start: 2018-10-05 | End: 2018-10-05

## 2018-10-05 RX ORDER — OXYCODONE HYDROCHLORIDE AND ACETAMINOPHEN 5; 325 MG/1; MG/1
2 TABLET ORAL EVERY 4 HOURS PRN
Status: DISCONTINUED | OUTPATIENT
Start: 2018-10-05 | End: 2018-10-07 | Stop reason: HOSPADM

## 2018-10-05 RX ORDER — OXYCODONE HYDROCHLORIDE AND ACETAMINOPHEN 5; 325 MG/1; MG/1
1 TABLET ORAL EVERY 4 HOURS PRN
Status: DISCONTINUED | OUTPATIENT
Start: 2018-10-05 | End: 2018-10-07 | Stop reason: HOSPADM

## 2018-10-05 RX ORDER — HYDROMORPHONE HCL/PF 1 MG/ML
1 SYRINGE (ML) INJECTION
Status: DISCONTINUED | OUTPATIENT
Start: 2018-10-05 | End: 2018-10-05

## 2018-10-05 RX ORDER — METHOTREXATE 25 MG/ML
49 INJECTION INTRA-ARTERIAL; INTRAMUSCULAR; INTRATHECAL; INTRAVENOUS ONCE
Status: COMPLETED | OUTPATIENT
Start: 2018-10-05 | End: 2018-10-05

## 2018-10-05 RX ORDER — METHOTREXATE 25 MG/ML
50 INJECTION, SOLUTION INTRA-ARTERIAL; INTRAMUSCULAR; INTRAVENOUS ONCE
Status: DISCONTINUED | OUTPATIENT
Start: 2018-10-05 | End: 2018-10-05 | Stop reason: SDUPTHER

## 2018-10-05 RX ADMIN — HYDROMORPHONE HYDROCHLORIDE 1 MG: 1 INJECTION, SOLUTION INTRAMUSCULAR; INTRAVENOUS; SUBCUTANEOUS at 21:29

## 2018-10-05 RX ADMIN — HYDROMORPHONE HYDROCHLORIDE 1 MG: 1 INJECTION, SOLUTION INTRAMUSCULAR; INTRAVENOUS; SUBCUTANEOUS at 17:29

## 2018-10-05 RX ADMIN — ONDANSETRON 4 MG: 2 INJECTION INTRAMUSCULAR; INTRAVENOUS at 21:29

## 2018-10-05 RX ADMIN — METHOTREXATE 49 MG: 25 INJECTION INTRA-ARTERIAL; INTRAMUSCULAR; INTRATHECAL; INTRAVENOUS at 19:28

## 2018-10-05 RX ADMIN — METHOTREXATE 49 MG: 25 INJECTION INTRA-ARTERIAL; INTRAMUSCULAR; INTRATHECAL; INTRAVENOUS at 19:31

## 2018-10-05 RX ADMIN — OXYCODONE AND ACETAMINOPHEN 1 TABLET: 5; 325 TABLET ORAL at 20:05

## 2018-10-05 RX ADMIN — HYDROMORPHONE HYDROCHLORIDE 1 MG: 1 INJECTION, SOLUTION INTRAMUSCULAR; INTRAVENOUS; SUBCUTANEOUS at 15:12

## 2018-10-05 RX ADMIN — SODIUM CHLORIDE, SODIUM LACTATE, POTASSIUM CHLORIDE, AND CALCIUM CHLORIDE 125 ML/HR: .6; .31; .03; .02 INJECTION, SOLUTION INTRAVENOUS at 20:09

## 2018-10-05 NOTE — PROGRESS NOTES
Attempted to call patient multiple times at [phone], her current number on file  Patient's sister had picked up initially and confirmed the patient's name and  accurately  She stated she would get in touch with patient as it was advised that the patient go to the emergency room for evaluation after being seen for possible ectopic pregnancy in clinic today (10/4/2018)  Subsequent calls to this number in the evening have been unsuccessful at reaching anyone  Voicemail was left with instructions for her sister and/or the patient to contact the hospital at [phone]  Will continue to try to contact the patient at this number  The emergency number listed Charmaine Chowdhury @ [phone], went straight to voicemail despite multiple attempts as well  Darinel Brown DO  PGY-2 OB/GYN   10/4/2018 8:37 PM    Addendum:    Attempted to contact patient at listed number again and unsuccessful      10/5/2018 12:40 AM

## 2018-10-05 NOTE — ED NOTES
Ob resident paged, aware that pt is requesting pain medication  Will place orders        Chiquis Mccartney RN  10/05/18 8384

## 2018-10-05 NOTE — ED NOTES
Pt requesting more pain medication, OB paged, will be down to evaluate pt shortly        Carry NICHOLAS Bruner  10/05/18 3795

## 2018-10-05 NOTE — ED NOTES
OBGYN resident paged again, patient states she just had an [address] done earlier today at her OBGYN office and does not want this test repeated if not necessary  Awaiting call back from [facility] resident       Yanna Abraham RN  10/05/18 2947

## 2018-10-05 NOTE — PROGRESS NOTES
H&P Exam - Gynecology   Jose Molina 34 y o  female MRN: [id number]  Unit/Bed#: ED 25 Encounter: [id number]    Chief Complaint   Patient presents with    Medical Problem     pt states she was sent by her doctor, refuses to answer triage questions  states she was sent in to get "some kind of medicine"          History of Present Illness     HPI:  Jose Molina is a 34 y o  [id number] with LMP 8/7/18 who presents for further evaluation for topic pregnancy  Patient was initially in the emergency department on 09/28/2018 for complaint of the left lower abdominal pain  Pregnancy was confirmed via HCG which was approximately 4900  Ultrasound at that time showed a thickened endometrium with no evidence of an IUP or adnexal mass  Patient was counseled about the risk of a possible ectopic pregnancy and instructed to have a repeat a hCG and follow-up at the [facility]  She was seen at the [facility] on 10/04/2018 and HCG was 7000  An office ultrasound was performed with no evidence of an IUP  Patient was instructed to go to the emergency department for a formal ultrasound  Repeat HCG today was 11,250 and transvaginal ultrasound showed a right adnexal mass with no evidence of an IUP  Patient states that she has some right lower quadrant pain, denies vaginal bleeding, chest pain, nausea, vomiting or any additional symptoms  She has expressed her desire to avoid surgical intervention at this time and would prefer conservative management  This was a desired pregnancy  Of note patient had a tubal reanastomosis surgery in 2015 by Dr Maday Loco in [place]  Review of Systems   Constitutional: Negative for appetite change, chills, fatigue, fever and unexpected weight change  Eyes: Negative for visual disturbance  Respiratory: Negative for cough, choking, chest tightness, shortness of breath, wheezing and stridor  Cardiovascular: Negative for chest pain, palpitations and leg swelling  Gastrointestinal: Positive for abdominal pain and nausea  Negative for abdominal distention, constipation, diarrhea and vomiting  Genitourinary: Positive for vaginal bleeding (spotting after vaginal exam)  Negative for dysuria and vaginal discharge  Musculoskeletal: Negative for back pain and myalgias  Skin: Negative for pallor  Neurological: Negative for dizziness, weakness, light-headedness, numbness and headaches  Psychiatric/Behavioral: Negative for behavioral problems  Historical Information   Past Medical History:   Diagnosis Date    Anxiety     Scalp cyst 04/10/2014    EXCISION OF SCALP PLIAR CYST X 4      Past Surgical History:   Procedure Laterality Date    ABDOMINAL SURGERY      CYST REMOVAL  04/10/2014    EXCISION OF SCALP PLIAR CYST X 4      OB/GYN History: [id number]  History reviewed  No pertinent family history  Social History   History   Alcohol Use No     History   Drug Use No     History   Smoking Status    Former Smoker    Types: Cigarettes   Smokeless Tobacco    Never Used     Comment: NEVER SMOKER, NO TOBACCO USE AS PER [facility]       Meds/Allergies     (Not in a hospital admission)  No Known Allergies    Objective   /64 (BP Location: Left arm)   Pulse 66   Temp 97 8 °F (36 6 °C) (Temporal)   Resp 18   LMP 08/07/2018   SpO2 96%     No intake or output data in the 24 hours ending 10/05/18 3215    Invasive Devices: Invasive Devices     Peripheral Intravenous Line            Peripheral IV 10/05/18 Left Antecubital less than 1 day                Physical Exam   Constitutional: She is oriented to person, place, and time  She appears well-developed and well-nourished  No distress  HENT:   Head: Normocephalic and atraumatic  Cardiovascular: Normal rate, regular rhythm, normal heart sounds and intact distal pulses  Exam reveals no gallop and no friction rub  No murmur heard  Pulmonary/Chest: Effort normal and breath sounds normal  No respiratory distress   She has no wheezes  She has no rales  She exhibits no tenderness  Abdominal: Soft  She exhibits no distension and no mass  There is tenderness  There is no rebound and no guarding  Genitourinary: Vagina normal    Genitourinary Comments: No bleeding noted, cervical os visibly closed  Small amount of clear discharge  Cervical motion tenderness with tenderness in the right adnexal region   Musculoskeletal: Normal range of motion  She exhibits no edema, tenderness or deformity  Neurological: She is alert and oriented to person, place, and time  Skin: Skin is warm and dry  Psychiatric: She has a normal mood and affect  Her behavior is normal    Vitals reviewed        Lab Results:   Admission on 10/05/2018   Component Date Value    WBC 10/05/2018 6 58     RBC 10/05/2018 3 96     Hemoglobin 10/05/2018 12 2     Hematocrit 10/05/2018 37 5     MCV 10/05/2018 95     MCH 10/05/2018 30 8     MCHC 10/05/2018 32 5     RDW 10/05/2018 14 1     MPV 10/05/2018 9 6     Platelets 81/32/5383 240     nRBC 10/05/2018 0     Neutrophils Relative 10/05/2018 69     Immat GRANS % 10/05/2018 0     Lymphocytes Relative 10/05/2018 19     Monocytes Relative 10/05/2018 7     Eosinophils Relative 10/05/2018 4     Basophils Relative 10/05/2018 1     Neutrophils Absolute 10/05/2018 4 58     Immature Grans Absolute 10/05/2018 0 01     Lymphocytes Absolute 10/05/2018 1 24     Monocytes Absolute 10/05/2018 0 45     Eosinophils Absolute 10/05/2018 0 27     Basophils Absolute 10/05/2018 0 03     Sodium 10/05/2018 139     Potassium 10/05/2018 3 7     Chloride 10/05/2018 104     CO2 10/05/2018 27     ANION GAP 10/05/2018 8     BUN 10/05/2018 7     Creatinine 10/05/2018 0 83     Glucose 10/05/2018 106     Calcium 10/05/2018 8 8     AST 10/05/2018 20     ALT 10/05/2018 32     Alkaline Phosphatase 10/05/2018 48     Total Protein 10/05/2018 6 6     Albumin 10/05/2018 3 1*    Total Bilirubin 10/05/2018 0 17*    eGFR 10/05/2018 88     HCG, Quant 10/05/2018 36001 5*      Imaging: I have personally reviewed pertinent reports  Assessment/Plan     A/P: Lizzie Cervantes is a 35 yo [id number] with LMP 8/7/18 admitted for observation for management of ectopic pregnancy     1) Ectopic pregnancy in the right adnexa  I had an extensive discussion with patient about options for management of ectopic pregnancy  We discussed medical management with methotrexate versus surgical management  Patient is aware that given her HCG of greater than 11,000 that she may indeed require more than 1 dose of methotrexate  She is also aware for the need for close follow-up with repeated beta HCG levels  I discussed that even with medical management she may still require surgical intervention if HCG levels do not decrease appropriately  Patient is currently stable at this time, however discuss that a ruptured ectopic can occur at any time and this is a medical emergency that requires immediate surgical intervention  I again reviewed with patient that if the methotrexate is successful that she is still at an increased risk for a repeat ectopic pregnancy  I also discussed with patient that after this pregnancy she will need to have a method of contraception until her beta HCGs levels have completely resolved  We reviewed the risk benefits and alternatives of both treatment options  Patient confirmed understanding and expressed her desire to proceed with medical management  -10/5: ultrasound showed 2 8 x 2 2 x 2 4 mass suspicious for an ectopic pregnancy  No cardiac activity noted   Small amount of pelvic free fluid    -the patient instructed to report signs of increased abdominal pain or vaginal bleeding   -methotrexate ordered with plan to repeat beta HCG on Tuesday 10/9 and Friday 1010/12   2) Pain Control:   Motrin, Percocet and Dilaudid for breakthrough  3) FEN: regular diet, IV fluids  4) DVT prophylaxis: SCDs    This case was discussed with Dr Shauna Rosenberg and she was present at Bedside for discussion with patient and her partner      Maria D Montoya MD, MPH  OB/GYN, PGY3  10/5/2018, 6:34 PM      Code Status: Full

## 2018-10-06 PROCEDURE — 99224 PR SBSQ OBSERVATION CARE/DAY 15 MINUTES: CPT | Performed by: OBSTETRICS & GYNECOLOGY

## 2018-10-06 RX ORDER — METOCLOPRAMIDE HYDROCHLORIDE 5 MG/ML
10 INJECTION INTRAMUSCULAR; INTRAVENOUS EVERY 6 HOURS PRN
Status: DISCONTINUED | OUTPATIENT
Start: 2018-10-06 | End: 2018-10-07 | Stop reason: HOSPADM

## 2018-10-06 RX ORDER — PROMETHAZINE HYDROCHLORIDE 25 MG/ML
12.5 INJECTION, SOLUTION INTRAMUSCULAR; INTRAVENOUS EVERY 4 HOURS PRN
Status: DISCONTINUED | OUTPATIENT
Start: 2018-10-06 | End: 2018-10-07 | Stop reason: HOSPADM

## 2018-10-06 RX ADMIN — SODIUM CHLORIDE, SODIUM LACTATE, POTASSIUM CHLORIDE, AND CALCIUM CHLORIDE 125 ML/HR: .6; .31; .03; .02 INJECTION, SOLUTION INTRAVENOUS at 04:00

## 2018-10-06 RX ADMIN — OXYCODONE HYDROCHLORIDE AND ACETAMINOPHEN 2 TABLET: 5; 325 TABLET ORAL at 09:26

## 2018-10-06 RX ADMIN — OXYCODONE HYDROCHLORIDE AND ACETAMINOPHEN 2 TABLET: 5; 325 TABLET ORAL at 14:02

## 2018-10-06 RX ADMIN — HYDROMORPHONE HYDROCHLORIDE 1 MG: 1 INJECTION, SOLUTION INTRAMUSCULAR; INTRAVENOUS; SUBCUTANEOUS at 22:39

## 2018-10-06 RX ADMIN — ONDANSETRON 4 MG: 2 INJECTION INTRAMUSCULAR; INTRAVENOUS at 16:41

## 2018-10-06 RX ADMIN — OXYCODONE HYDROCHLORIDE AND ACETAMINOPHEN 2 TABLET: 5; 325 TABLET ORAL at 19:52

## 2018-10-06 RX ADMIN — OXYCODONE AND ACETAMINOPHEN 1 TABLET: 5; 325 TABLET ORAL at 04:54

## 2018-10-06 RX ADMIN — HYDROMORPHONE HYDROCHLORIDE 1 MG: 1 INJECTION, SOLUTION INTRAMUSCULAR; INTRAVENOUS; SUBCUTANEOUS at 16:41

## 2018-10-06 RX ADMIN — PROMETHAZINE HYDROCHLORIDE 12.5 MG: 25 INJECTION INTRAMUSCULAR; INTRAVENOUS at 18:59

## 2018-10-06 RX ADMIN — ONDANSETRON 4 MG: 2 INJECTION INTRAMUSCULAR; INTRAVENOUS at 09:22

## 2018-10-06 RX ADMIN — METOCLOPRAMIDE 10 MG: 5 INJECTION, SOLUTION INTRAMUSCULAR; INTRAVENOUS at 14:02

## 2018-10-06 RX ADMIN — HYDROMORPHONE HYDROCHLORIDE 1 MG: 1 INJECTION, SOLUTION INTRAMUSCULAR; INTRAVENOUS; SUBCUTANEOUS at 08:02

## 2018-10-06 RX ADMIN — HYDROMORPHONE HYDROCHLORIDE 1 MG: 1 INJECTION, SOLUTION INTRAMUSCULAR; INTRAVENOUS; SUBCUTANEOUS at 12:21

## 2018-10-06 NOTE — PLAN OF CARE
DISCHARGE PLANNING     Discharge to home or other facility with appropriate resources Progressing        GASTROINTESTINAL - ADULT     Minimal or absence of nausea and/or vomiting Progressing        HEMATOLOGIC - ADULT     Maintains hematologic stability Progressing        INFECTION - ADULT     Absence or prevention of progression during hospitalization Progressing     Absence of fever/infection during neutropenic period Progressing        Knowledge Deficit     Patient/family/caregiver demonstrates understanding of disease process, treatment plan, medications, and discharge instructions Progressing        PAIN - ADULT     Verbalizes/displays adequate comfort level or baseline comfort level Progressing        SAFETY ADULT     Patient will remain free of falls Progressing     Maintain or return to baseline ADL function Progressing     Maintain or return mobility status to optimal level Progressing

## 2018-10-06 NOTE — CASE MANAGEMENT
Initial Clinical Review    Admission: Date/Time/Statement: OBS 10/5 @ 1804    Orders Placed This Encounter   Procedures    Place in Observation     Standing Status:   Standing     Number of Occurrences:   1     Order Specific Question:   Admitting Physician     Answer:   Karthikeyan Moore [[id number]]     Order Specific Question:   Level of Care     Answer:   Med Surg [[id number]]     Order Specific Question:   Bed request comments     Answer: If possible please plase on [address]       ED: Date/Time/Mode of Arrival:   ED Arrival Information     Expected Arrival Acuity Means of Arrival Escorted By Service Admission Type    10/4/2018  10/5/2018 13:50 Urgent Walk-In Self OB/GYN Urgent    Arrival Complaint    r/o ectopic -         Chief Complaint:   Chief Complaint   Patient presents with    Medical Problem     pt states she was sent by her doctor, refuses to answer triage questions  states she was sent in to get "some kind of medicine"        History of Illness: 34 y o  [id number] with LMP 8/7/18 who presents for further evaluation for topic pregnancy  Patient was initially in the emergency department on 09/28/2018 for complaint of the left lower abdominal pain  Pregnancy was confirmed via HCG which was approximately 4900  Ultrasound at that time showed a thickened endometrium with no evidence of an IUP or adnexal mass  Patient was counseled about the risk of a possible ectopic pregnancy and instructed to have a repeat a hCG and follow-up at the [facility]  She was seen at the [facility] on 10/04/2018 and HCG was 7000  An office ultrasound was performed with no evidence of an IUP  Patient was instructed to go to the emergency department for a formal ultrasound  Repeat HCG today was 11,250 and transvaginal ultrasound showed a right adnexal mass with no evidence of an IUP     Patient states that she has some right lower quadrant pain, denies vaginal bleeding, chest pain, nausea, vomiting or any additional symptoms  She has expressed her desire to avoid surgical intervention at this time and would prefer conservative management  This was a desired pregnancy    Of note patient had a tubal reanastomosis surgery in 2015 by Dr Milton Wright in [facility]    Genitourinary Comments: No bleeding noted, cervical os visibly closed  Small amount of clear discharge  Cervical motion tenderness with tenderness in the right adnexal region     ED Vital Signs:   ED Triage Vitals [10/05/18 1355]   Temperature Pulse Respirations Blood Pressure SpO2   97 8 °F (36 6 °C) 70 18 122/71 96 %      Temp Source Heart Rate Source Patient Position - Orthostatic VS BP Location FiO2 (%)   Temporal Monitor Sitting Right arm --      Pain Score       8        Wt Readings from Last 1 Encounters:   10/05/18 98 9 kg (218 lb 0 6 oz)       Abnormal Labs/Diagnostic Test Results:   HC,250    US: No intrauterine gestation   Somewhat suspicious appearing mass lesion in the right adnexal area concerning for ectopic pregnancy  Trace simple appearing pelvic free fluid   No overt evidence of hemoperitoneum  Thick heterogeneous vascular endometrium  ED Treatment:   Medication Administration from 10/04/2018 1730 to 10/05/2018 195       Date/Time Order Dose Route Action Action by Comments     10/05/2018 1512 HYDROmorphone (DILAUDID) injection 1 mg 1 mg Intravenous Given       10/05/2018 1729 HYDROmorphone (DILAUDID) injection 1 mg 1 mg Intravenous Given       10/05/2018 1928 methotrexate (PF) injection 49 mg 49 mg Intramuscular Given       10/05/2018 1931 methotrexate (PF) injection 49 mg 49 mg Intramuscular Given            Past Medical/Surgical History:    Active Ambulatory Problems     Diagnosis Date Noted    No Active Ambulatory Problems     Resolved Ambulatory Problems     Diagnosis Date Noted    No Resolved Ambulatory Problems     Past Medical History:   Diagnosis Date    Anxiety     Scalp cyst 04/10/2014       Admitting Diagnosis: History of medical problems [[id number]]    Age/Sex: 34 y o  female    Assessment/Plan:  35 yo [id number] with LMP 8/7/18 admitted for observation for management of ectopic pregnancy      1) Ectopic pregnancy in the right adnexa  I had an extensive discussion with patient about options for management of ectopic pregnancy  We discussed medical management with methotrexate versus surgical management  Patient is aware that given her HCG of greater than 11,000 that she may indeed require more than 1 dose of methotrexate  She is also aware for the need for close follow-up with repeated beta HCG levels  I discussed that even with medical management she may still require surgical intervention if HCG levels do not decrease appropriately  Patient is currently stable at this time, however discuss that a ruptured ectopic can occur at any time and this is a medical emergency that requires immediate surgical intervention  I again reviewed with patient that if the methotrexate is successful that she is still at an increased risk for a repeat ectopic pregnancy  I also discussed with patient that after this pregnancy she will need to have a method of contraception until her beta HCGs levels have completely resolved  We reviewed the risk benefits and alternatives of both treatment options  Patient confirmed understanding and expressed her desire to proceed with medical management  -10/5: ultrasound showed 2 8 x 2 2 x 2 4 mass suspicious for an ectopic pregnancy  No cardiac activity noted   Small amount of pelvic free fluid               -the patient instructed to report signs of increased abdominal pain or vaginal bleeding              -methotrexate ordered with plan to repeat beta HCG on Tuesday 10/9 and Friday 1010/12   2) Pain Control:   Motrin, Percocet and Dilaudid for breakthrough  3) FEN: regular diet, IV fluids  4) DVT prophylaxis: SCDs    Admission Orders: OBS  SCD's  Reg Diet    Scheduled Meds:   Current Facility-Administered Medications:  HYDROmorphone 1 mg Intravenous Q3H PRN             metoclopramide 10 mg Intravenous Q6H PRN     ondansetron 4 mg Intravenous Q6H PRN     oxyCODONE-acetaminophen 1 tablet Oral Q4H PRN     oxyCODONE-acetaminophen 2 tablet Oral Q4H PRN       Continuous Infusions:   lactated ringers 125 mL/hr Last Rate: 125 mL/hr (10/06/18 0400)     PRN Meds:   HYDROmorphone IV X 1 and x 1 thus far 10/6    metoclopramide    Ondansetron IV X 1    oxyCODONE-acetaminophen x 1 and x 1 10/6    oxyCODONE-acetaminophen    10/6:    reports nausea and vomiting overnight  She also c/o cramping and light pink vaginal bleeding when she wipes  intermittent sharp shooting RLQ pain that shoots ups to wrap around lower back   tenderness (in RLQ)  There is rebound (R side of abdomen)  There is no guarding  NPO  Ectopic pregnancy in the right adnexa               -10/5: ultrasound showed 2 8 x 2 2 x 2 4 mass suspicious for an ectopic pregnancy  No cardiac activity noted  Small amount of pelvic free fluid               - S/p MTX 98mg IM              - Given patient's physical exam findings and new complaints this AM, pt made NPO  VSS  Re-evaluate pt this morning for change in status requiring further inpatient management, either continued observation vs surgical management  This was fully discussed with patient  She verbalized understanding                -RKB patient instructed to report signs of increased abdominal pain or vaginal bleeding              -Will repeat HCG on Tuesday 10/9   2) Pain Control: Motrin, Percocet and Dilaudid for breakthrough  3) FEN: NPO, IV fluids  4) DVT prophylaxis: SCDs     A/P: Will continue with serial abdominal exam   If abdominal exams remain stable, will allow patient to eat dinner, possible discharge after tolerating oral intak    Thank you,  [address] [facility] Review Department  Phone: [phone];  Fax [phone]  ATTENTION: Please call with any questions or concerns to [phone]  and carefully follow the prompts so that you are directed to the right person  Send all requests for admission clinical reviews, approved or denied determinations and any other requests to fax [phone]   All voicemails are confidential

## 2018-10-06 NOTE — PROGRESS NOTES
S:Continue to report similar abdominal discomfort  Some nausea and dizziness with movements, but is ambulating out of bed to chair and bathroom  Vitals:    10/06/18 1500   BP: 93/54   Pulse: 78   Resp: 20   Temp: 97 6 °F (36 4 °C)   SpO2: 98%     Physical Exam   Constitutional: She appears well-developed and well-nourished  Abdominal: Soft  She exhibits no distension and no mass  There is tenderness  There is no guarding  No hernia  Tenderness to right lower quadrant    Skin: She is not diaphoretic  Will advance diet to clear liquids  Continue observation      Dr Abdi Given aware    Aga Bonifacio, DO

## 2018-10-06 NOTE — PROGRESS NOTES
S: Patient reporting similar abdominal discomfort as stated earlier today  Otherwise without complaints  Vitals:    10/06/18 0900   BP: 100/54   Pulse: 64   Resp: 16   Temp: 98 8 °F (37 1 °C)   SpO2: 98%     Physical Exam   Constitutional: She appears well-developed and well-nourished  No distress  Abdominal: Soft  Bowel sounds are normal  She exhibits no distension  There is tenderness  There is no rebound and no guarding  Right lower quadrant tenderness    Skin: She is not diaphoretic  A/P: Will continue with serial abdominal exam   If abdominal exams remain stable, will allow patient to eat dinner, possible discharge after tolerating oral intake      Dr Lyric Ortiz aware    Danielle Johnson, DO

## 2018-10-06 NOTE — PROGRESS NOTES
Gynocology Progress note   Connie Nava 34 y o  female MRN: [id number]  Unit/Bed#: E5 -01 Encounter: [id number]    Connie Nava reports nausea and vomiting overnight  She also c/o cramping and light pink vaginal bleeding when she wipes  Pt states she is unsure if this is related to the TVUS as she had similar bleeding after it before, but it only lasted for a couple hours  Pt reports intermittent sharp shooting RLQ pain that shoots ups to wrap around lower back, but states it is quick and resolves spontaneously  Pt also reports dizziness with getting up out of bed to use the restroom  Pt denies chest pain, sob, leg pain  She is passing flatus  Denies fever, chills, RUQ/shoulder pain  She took zofran and reglan overnight with minimal relief, however patient states she "felt like crap" after eating the steak for dinner that her friend made her  Pt states she is not in a rush to leave as she was told the ectopic could "burst and she could die " She states "I'm not trying to die "     /56 (BP Location: Right arm)   Pulse 75   Temp 98 °F (36 7 °C) (Temporal)   Resp 18   Ht 5' 1" (1 549 m)   Wt 98 9 kg (218 lb 0 6 oz)   LMP 08/07/2018   SpO2 100%   BMI 41 20 kg/m²     I/O last 3 completed shifts: In: 950 [I V :950]  Out: -   No intake/output data recorded  Lab Results   Component Value Date    WBC 6 58 10/05/2018    HGB 12 2 10/05/2018    HCT 37 5 10/05/2018    MCV 95 10/05/2018     10/05/2018       Lab Results   Component Value Date    GLUCOSE 95 05/30/2016    CALCIUM 8 8 10/05/2018     10/05/2018    K 3 7 10/05/2018    CO2 27 10/05/2018     10/05/2018    BUN 7 10/05/2018    CREATININE 0 83 10/05/2018       No results found for: POCGLU    Physical Exam   Constitutional: She is oriented to person, place, and time  She appears well-developed and well-nourished  No distress  HENT:   Head: Normocephalic and atraumatic     Cardiovascular: Normal rate, regular rhythm and normal heart sounds  Exam reveals no gallop and no friction rub  No murmur heard  Pulmonary/Chest: Effort normal and breath sounds normal  No respiratory distress  She has no wheezes  She has no rales  Abdominal: Soft  Bowel sounds are normal  She exhibits no distension and no mass  There is tenderness (in RLQ)  There is rebound (R side of abdomen)  There is no guarding  While talking to patient, I was listening to patient's bowel sounds and appreciated pt wincing in pain as I deeply palpated RLQ and RUQ with my stethoscope   Musculoskeletal: Normal range of motion  She exhibits no edema, tenderness or deformity  Neurological: She is alert and oriented to person, place, and time  Skin: Skin is warm and dry  No rash noted  She is not diaphoretic  No erythema  No pallor  Psychiatric: She has a normal mood and affect  Her behavior is normal  Judgment and thought content normal    Vitals reviewed  A/P: Everett Villegas is a 33 yo [id number] with LMP 8/7/18 admitted for observation for management of ectopic pregnancy      1) Ectopic pregnancy in the right adnexa               -10/5: ultrasound showed 2 8 x 2 2 x 2 4 mass suspicious for an ectopic pregnancy  No cardiac activity noted  Small amount of pelvic free fluid    - S/p MTX 98mg IM   - Given patient's physical exam findings and new complaints this AM, pt made NPO  VSS  Re-evaluate pt this morning for change in status requiring further inpatient management, either continued observation vs surgical management  This was fully discussed with patient   She verbalized understanding                -the patient instructed to report signs of increased abdominal pain or vaginal bleeding              -Will repeat HCG on Tuesday 10/9 and Friday 1010/18  2) Pain Control: Motrin, Percocet and Dilaudid for breakthrough  3) FEN: NPO, IV fluids  4) DVT prophylaxis: SCDs    Fiordaliza Barrios MD  OBGYN, PGY-2  10/6/2018 7:51 AM

## 2018-10-06 NOTE — PROGRESS NOTES
Spoke with OB resident re: patient's pain  Percocet given one hour ago without any change in pain, still 6/10  Per resident, will change IV dilaudid order from NPO only to breakthrough pain

## 2018-10-07 VITALS
DIASTOLIC BLOOD PRESSURE: 54 MMHG | TEMPERATURE: 97.1 F | SYSTOLIC BLOOD PRESSURE: 92 MMHG | RESPIRATION RATE: 16 BRPM | HEIGHT: 61 IN | BODY MASS INDEX: 41.17 KG/M2 | WEIGHT: 218.03 LBS | HEART RATE: 64 BPM | OXYGEN SATURATION: 97 %

## 2018-10-07 LAB
ALBUMIN SERPL BCP-MCNC: 2.5 G/DL (ref 3.5–5)
ALP SERPL-CCNC: 43 U/L (ref 46–116)
ALT SERPL W P-5'-P-CCNC: 66 U/L (ref 12–78)
ANION GAP SERPL CALCULATED.3IONS-SCNC: 5 MMOL/L (ref 4–13)
AST SERPL W P-5'-P-CCNC: 72 U/L (ref 5–45)
BASOPHILS # BLD AUTO: 0.04 THOUSANDS/ΜL (ref 0–0.1)
BASOPHILS NFR BLD AUTO: 1 % (ref 0–1)
BILIRUB SERPL-MCNC: 0.29 MG/DL (ref 0.2–1)
BUN SERPL-MCNC: 8 MG/DL (ref 5–25)
CALCIUM SERPL-MCNC: 8.2 MG/DL (ref 8.3–10.1)
CHLORIDE SERPL-SCNC: 105 MMOL/L (ref 100–108)
CO2 SERPL-SCNC: 26 MMOL/L (ref 21–32)
CREAT SERPL-MCNC: 0.71 MG/DL (ref 0.6–1.3)
EOSINOPHIL # BLD AUTO: 0.23 THOUSAND/ΜL (ref 0–0.61)
EOSINOPHIL NFR BLD AUTO: 3 % (ref 0–6)
ERYTHROCYTE [DISTWIDTH] IN BLOOD BY AUTOMATED COUNT: 14.2 % (ref 11.6–15.1)
GFR SERPL CREATININE-BSD FRML MDRD: 107 ML/MIN/1.73SQ M
GLUCOSE P FAST SERPL-MCNC: 83 MG/DL (ref 65–99)
GLUCOSE SERPL-MCNC: 83 MG/DL (ref 65–140)
HCT VFR BLD AUTO: 32.8 % (ref 34.8–46.1)
HGB BLD-MCNC: 10.8 G/DL (ref 11.5–15.4)
IMM GRANULOCYTES # BLD AUTO: 0.03 THOUSAND/UL (ref 0–0.2)
IMM GRANULOCYTES NFR BLD AUTO: 0 % (ref 0–2)
LYMPHOCYTES # BLD AUTO: 2.08 THOUSANDS/ΜL (ref 0.6–4.47)
LYMPHOCYTES NFR BLD AUTO: 27 % (ref 14–44)
MCH RBC QN AUTO: 31.2 PG (ref 26.8–34.3)
MCHC RBC AUTO-ENTMCNC: 32.9 G/DL (ref 31.4–37.4)
MCV RBC AUTO: 95 FL (ref 82–98)
MONOCYTES # BLD AUTO: 0.61 THOUSAND/ΜL (ref 0.17–1.22)
MONOCYTES NFR BLD AUTO: 8 % (ref 4–12)
NEUTROPHILS # BLD AUTO: 4.63 THOUSANDS/ΜL (ref 1.85–7.62)
NEUTS SEG NFR BLD AUTO: 61 % (ref 43–75)
NRBC BLD AUTO-RTO: 0 /100 WBCS
PLATELET # BLD AUTO: 174 THOUSANDS/UL (ref 149–390)
PMV BLD AUTO: 10.7 FL (ref 8.9–12.7)
POTASSIUM SERPL-SCNC: 4.9 MMOL/L (ref 3.5–5.3)
PROT SERPL-MCNC: 6 G/DL (ref 6.4–8.2)
RBC # BLD AUTO: 3.46 MILLION/UL (ref 3.81–5.12)
SODIUM SERPL-SCNC: 136 MMOL/L (ref 136–145)
WBC # BLD AUTO: 7.62 THOUSAND/UL (ref 4.31–10.16)

## 2018-10-07 PROCEDURE — 99217 PR OBSERVATION CARE DISCHARGE MANAGEMENT: CPT | Performed by: OBSTETRICS & GYNECOLOGY

## 2018-10-07 PROCEDURE — 80053 COMPREHEN METABOLIC PANEL: CPT | Performed by: OBSTETRICS & GYNECOLOGY

## 2018-10-07 PROCEDURE — 85025 COMPLETE CBC W/AUTO DIFF WBC: CPT | Performed by: OBSTETRICS & GYNECOLOGY

## 2018-10-07 RX ORDER — ACETAMINOPHEN AND CODEINE PHOSPHATE 300; 30 MG/1; MG/1
1 TABLET ORAL EVERY 4 HOURS PRN
Qty: 10 TABLET | Refills: 0 | Status: SHIPPED | OUTPATIENT
Start: 2018-10-07 | End: 2018-10-11 | Stop reason: HOSPADM

## 2018-10-07 RX ORDER — ACETAMINOPHEN 325 MG/1
650 TABLET ORAL EVERY 6 HOURS PRN
Status: DISCONTINUED | OUTPATIENT
Start: 2018-10-07 | End: 2018-10-07 | Stop reason: HOSPADM

## 2018-10-07 RX ORDER — ONDANSETRON 4 MG/1
4 TABLET, FILM COATED ORAL EVERY 8 HOURS PRN
Qty: 20 TABLET | Refills: 0 | Status: SHIPPED | OUTPATIENT
Start: 2018-10-07 | End: 2019-11-04

## 2018-10-07 RX ORDER — ACETAMINOPHEN AND CODEINE PHOSPHATE 300; 30 MG/1; MG/1
1 TABLET ORAL EVERY 4 HOURS PRN
Qty: 30 TABLET | Refills: 0 | Status: SHIPPED | OUTPATIENT
Start: 2018-10-07 | End: 2018-10-07

## 2018-10-07 RX ADMIN — ACETAMINOPHEN 650 MG: 325 TABLET, FILM COATED ORAL at 09:53

## 2018-10-07 RX ADMIN — ONDANSETRON 4 MG: 2 INJECTION INTRAMUSCULAR; INTRAVENOUS at 06:44

## 2018-10-07 RX ADMIN — METOCLOPRAMIDE 10 MG: 5 INJECTION, SOLUTION INTRAMUSCULAR; INTRAVENOUS at 12:24

## 2018-10-07 RX ADMIN — HYDROMORPHONE HYDROCHLORIDE 1 MG: 1 INJECTION, SOLUTION INTRAMUSCULAR; INTRAVENOUS; SUBCUTANEOUS at 02:26

## 2018-10-07 RX ADMIN — HYDROMORPHONE HYDROCHLORIDE 1 MG: 1 INJECTION, SOLUTION INTRAMUSCULAR; INTRAVENOUS; SUBCUTANEOUS at 09:46

## 2018-10-07 RX ADMIN — HYDROMORPHONE HYDROCHLORIDE 1 MG: 1 INJECTION, SOLUTION INTRAMUSCULAR; INTRAVENOUS; SUBCUTANEOUS at 06:44

## 2018-10-07 RX ADMIN — OXYCODONE AND ACETAMINOPHEN 1 TABLET: 5; 325 TABLET ORAL at 08:28

## 2018-10-07 RX ADMIN — OXYCODONE HYDROCHLORIDE AND ACETAMINOPHEN 2 TABLET: 5; 325 TABLET ORAL at 12:24

## 2018-10-07 NOTE — NURSING NOTE
Reviewed discharge instructions with patient including follow-up appointments and medications  IV was removed  Patient tolerated regular diet well, no complaints of nausea or vomiting at this time  Patient walked out in a wheelchair with her partner and a PCA

## 2018-10-07 NOTE — PROGRESS NOTES
Progress Note - OB/GYN   Matthew Mae 34 y o  female MRN: [id number]  Unit/Bed#: E5 -01 Encounter: [id number]    Assessment/Plan:  35 yo [id number] with LMP 8/7/18 admitted for observation for management of ectopic pregnancy      1) Ectopic pregnancy in the right adnexa               -10/5: ultrasound showed 2 8 x 2 2 x 2 4 mass suspicious for an ectopic pregnancy  No cardiac activity noted  Small amount of pelvic free fluid               - S/p MTX 98mg IM              - Serial abdominal exams stable  Remains NPO  Consider discharge home if patient remains clinically stable  Reviewed extensively plan for follow-up for day 4 and 7 hcg and follow-up at [facility] early next week  Patient continues to verbalize understanding  2) Pain Control: Motrin, Percocet and Dilaudid for breakthrough  3) FEN: NPO, IV fluids  4) DVT prophylaxis: SCDs  5) Nausea/Vomiting: zofran, reglan, phenergan    Subjective/Objective   Chief Complaint:   35 yo [id number] with LMP 8/7/18 admitted for observation for management of ectopic pregnancy       Subjective:   Vaginal bleeding: minimal pink spotting   Pain: continued right lower abdominal discomfort  Tolerating PO: yes, improved nausea with phenergan  Voiding: yes  Flatus: no  BM: no  Ambulating: yes  Chest pain: no  Shortness of breath: no  Leg pain: no      Objective:     Vitals: Blood pressure 91/55, pulse 62, temperature 97 5 °F (36 4 °C), temperature source Temporal, resp  rate 18, height 5' 1" (1 549 m), weight 98 9 kg (218 lb 0 6 oz), last menstrual period 08/07/2018, SpO2 100 %  Physical Exam:     Physical Exam   Constitutional: She appears well-developed and well-nourished  No distress  Cardiovascular: Normal rate, regular rhythm, normal heart sounds and intact distal pulses  Pulmonary/Chest: Effort normal and breath sounds normal  No respiratory distress  She has no wheezes  Abdominal: Soft  Bowel sounds are normal  She exhibits no distension   There is tenderness  There is no rebound and no guarding    + tenderness to right lower quadrant          Lab, Imaging and other studies: I have personally reviewed pertinent reports        Lab Results   Component Value Date    WBC 6 58 10/05/2018    HGB 12 2 10/05/2018    HCT 37 5 10/05/2018    MCV 95 10/05/2018     10/05/2018               Abbie Walters DO  10/07/18

## 2018-10-07 NOTE — PROGRESS NOTES
Gynocology Progress note   Ermelinda Castellanos 34 y o  female MRN: [id number]  Unit/Bed#: E5 -01 Encounter: [id number]    Ermelinda Castellanos reports she is having the same pain that she has been experiencing throughout her hospital stay  States it is cramping in nature and a 6-7/10  Pt states it has not gotten better, but also hasn't worsened  Pt reports nausea and headache as well  Pt has been NPO  She attempted to drink apple juice last night per the overnight resident, but had nausea that resolved with phenergan  BP 92/54 (BP Location: Right arm)   Pulse 64   Temp (!) 97 1 °F (36 2 °C) (Temporal)   Resp 16   Ht 5' 1" (1 549 m)   Wt 98 9 kg (218 lb 0 6 oz)   LMP 08/07/2018   SpO2 97%   BMI 41 20 kg/m²     I/O last 3 completed shifts: In: 950 [I V :950]  Out: -   No intake/output data recorded  Lab Results   Component Value Date    WBC 7 62 10/07/2018    HGB 10 8 (L) 10/07/2018    HCT 32 8 (L) 10/07/2018    MCV 95 10/07/2018     10/07/2018       Lab Results   Component Value Date    GLUCOSE 95 05/30/2016    CALCIUM 8 2 (L) 10/07/2018     10/07/2018    K 4 9 10/07/2018    CO2 26 10/07/2018     10/07/2018    BUN 8 10/07/2018    CREATININE 0 71 10/07/2018       No results found for: POCGLU    Physical Exam   Constitutional: She is oriented to person, place, and time  She appears well-developed and well-nourished  No distress  HENT:   Head: Normocephalic and atraumatic  Abdominal: Soft  She exhibits no distension and no mass  There is no tenderness  There is no rebound and no guarding  Musculoskeletal: Normal range of motion  She exhibits no edema, tenderness or deformity  Neurological: She is alert and oriented to person, place, and time  Skin: Skin is warm and dry  No rash noted  She is not diaphoretic  No erythema  No pallor  Psychiatric: She has a normal mood and affect  Her behavior is normal  Judgment and thought content normal    Vitals reviewed          A/P: Salud Co Carmen Valentino is a 33 yo [id number] with LMP 8/7/18 admitted for management of ectopic pregnancy      1) Ectopic pregnancy in the right adnexa               -10/5: ultrasound showed 2 8 x 2 2 x 2 4 mass suspicious for an ectopic pregnancy  No cardiac activity noted  Small amount of pelvic free fluid    - S/p MTX 98mg IM   - Pt has been NPO with serial abdominal exams, no change in clinical status requiring immediate surgery at this time  However, discussed concerns with patient regarding her "6-7/10" pain with no improvement and need for possible surgery  However, pt refuses  She states she wants to wait it out with the medication treatment  She states she lives 15 minutes away and has her  to look after her if her status changes requiring immediate treatment                -the patient instructed to report signs of increased abdominal pain or vaginal bleeding              -Will repeat HCG on Tuesday 10/9 and Friday 1010/18   2) Dispo: Hypotension noted on vitals, however, pt stable and requesting to go home  Will discharge home with strict precautions on when to return  Pt verbalized understanding       Lashae Waldron MD  OBGYN, PGY-2  10/7/2018 11:33 AM

## 2018-10-07 NOTE — PLAN OF CARE
DISCHARGE PLANNING     Discharge to home or other facility with appropriate resources Progressing        GASTROINTESTINAL - ADULT     Minimal or absence of nausea and/or vomiting Progressing        HEMATOLOGIC - ADULT     Maintains hematologic stability Progressing        INFECTION - ADULT     Absence or prevention of progression during hospitalization Progressing     Absence of fever/infection during neutropenic period Progressing        Knowledge Deficit     Patient/family/caregiver demonstrates understanding of disease process, treatment plan, medications, and discharge instructions Progressing        Nutrition/Hydration-ADULT     Nutrient/Hydration intake appropriate for improving, restoring or maintaining nutritional needs Progressing        PAIN - ADULT     Verbalizes/displays adequate comfort level or baseline comfort level Progressing        SAFETY ADULT     Patient will remain free of falls Progressing     Maintain or return to baseline ADL function Progressing     Maintain or return mobility status to optimal level Progressing

## 2018-10-07 NOTE — DISCHARGE INSTRUCTIONS
Ectopic Pregnancy   WHAT YOU NEED TO KNOW:   Ectopic pregnancy occurs when a fertilized egg attaches and begins to grow outside of the uterus  The most common place for this to happen is in the fallopian tube  This is sometimes called a tubal pregnancy  The egg can also implant on the outside of the uterus, on the ovary or cervix, or in the abdomen  The egg may begin to grow, but the pregnancy cannot continue normally  Ectopic pregnancy can cause heavy bleeding and may be life-threatening  DISCHARGE INSTRUCTIONS:   Follow up with your gynecologist as directed: You may need to return for a follow-up exam, treatment, or blood tests  Write down your questions so you remember to ask them during your visits  For support and more information:   · [employer] [facility] [employer]  [address]  Phone: [phone]  Phone: [phone]  Web Address: http://[employer]/  org  Contact your gynecologist if:   · You have a fever  · You have questions or concerns about your condition or care  Seek care immediately or call 911 if:   · You feel lightheaded or like you are going to faint  · You have increasing abdominal or pelvic pain or heavy vaginal bleeding  · You have shoulder pain  · You have chest pain or trouble breathing  © 2017 [facility] Information is for End User's use only and may not be sold, redistributed or otherwise used for commercial purposes  All illustrations and images included in CareNotes® are the copyrighted property of [employer]  or [employer]  The above information is an  only  It is not intended as medical advice for individual conditions or treatments  Talk to your doctor, nurse or pharmacist before following any medical regimen to see if it is safe and effective for you

## 2018-10-07 NOTE — DISCHARGE SUMMARY
Discharge Summary - Ruben Cortes 34 y o  female MRN: [id number]    Unit/Bed#: E5 -01 Encounter: [id number]    Admission Date: 10/5/2018     Discharge Date: 10/07/18    Admitting Diagnosis:   1  Right tubal ectopic pregnancy  2  Hx of tubal re-anastamosis in 2015    Discharge Diagnosis: same, s/p MTX administration    Attending: Markos Ring MD    Hospital Course:     Ruben Cortes is a 34 y o  [id number] at Unknown wks who was initially admitted for right tubal ectopic pregnancy  Pt was noted to have HCG of 11, 250 5 and c/o lower abdominal pain  US on admission demonstrated a right adnexal mass with no evidence of an IUP  It was discussed with patient management of the ectopic pregnancy with medical vs surgical intervention  Pt adamant to avoid surgery and preferred conservative management with MTX  This was a desired pregnancy  Pt was thoroughly counseled and ultimately received 98mg of MTX  Pt was kept for observation and remained clinically stable  On HD#2, pt continued to refuse surgery and desired to go home with close follow-up       Recent Results (from the past 24 hour(s))   CBC and differential    Collection Time: 10/07/18  6:47 AM   Result Value Ref Range    WBC 7 62 4 31 - 10 16 Thousand/uL    RBC 3 46 (L) 3 81 - 5 12 Million/uL    Hemoglobin 10 8 (L) 11 5 - 15 4 g/dL    Hematocrit 32 8 (L) 34 8 - 46 1 %    MCV 95 82 - 98 fL    MCH 31 2 26 8 - 34 3 pg    MCHC 32 9 31 4 - 37 4 g/dL    RDW 14 2 11 6 - 15 1 %    MPV 10 7 8 9 - 12 7 fL    Platelets 204 292 - 891 Thousands/uL    nRBC 0 /100 WBCs    Neutrophils Relative 61 43 - 75 %    Immat GRANS % 0 0 - 2 %    Lymphocytes Relative 27 14 - 44 %    Monocytes Relative 8 4 - 12 %    Eosinophils Relative 3 0 - 6 %    Basophils Relative 1 0 - 1 %    Neutrophils Absolute 4 63 1 85 - 7 62 Thousands/µL    Immature Grans Absolute 0 03 0 00 - 0 20 Thousand/uL    Lymphocytes Absolute 2 08 0 60 - 4 47 Thousands/µL    Monocytes Absolute 0 61 0 17 - 1 22 Thousand/µL    Eosinophils Absolute 0 23 0 00 - 0 61 Thousand/µL    Basophils Absolute 0 04 0 00 - 0 10 Thousands/µL   Comprehensive metabolic panel    Collection Time: 10/07/18  6:47 AM   Result Value Ref Range    Sodium 136 136 - 145 mmol/L    Potassium 4 9 3 5 - 5 3 mmol/L    Chloride 105 100 - 108 mmol/L    CO2 26 21 - 32 mmol/L    ANION GAP 5 4 - 13 mmol/L    BUN 8 5 - 25 mg/dL    Creatinine 0 71 0 60 - 1 30 mg/dL    Glucose 83 65 - 140 mg/dL    Glucose, Fasting 83 65 - 99 mg/dL    Calcium 8 2 (L) 8 3 - 10 1 mg/dL    AST 72 (H) 5 - 45 U/L    ALT 66 12 - 78 U/L    Alkaline Phosphatase 43 (L) 46 - 116 U/L    Total Protein 6 0 (L) 6 4 - 8 2 g/dL    Albumin 2 5 (L) 3 5 - 5 0 g/dL    Total Bilirubin 0 29 0 20 - 1 00 mg/dL    eGFR 107 ml/min/1 73sq m       On day of discharge, she was ambulating and able to reasonably perform all ADLs  She was voiding and had appropriate bowel function  She was discharged home on Hospital day #2 without complications  Patient was instructed to follow up with her OBGYN as an outpatient and was given appropriate warnings to call provider if she develops signs of ruptured ectopic pregnancy, such as vaginal bleeding or uncontrolled pain  Complications: none apparent    Condition at discharge: stable      Discharge Medications:   Tylenol-Codeine #3   Zofran 4mg     Discharge instructions: See after visit summary for complete information      Disposition: Home    Planned Readmission: Yes, due to potential change in clinical status as a result of ectopic pregnancy    Fiordaliza Barrios MD  OBGYN, PGY-2  10/7/2018 11:56 AM

## 2018-10-07 NOTE — H&P
H&P Exam - Gynecology   Dilip Lopez 34 y o  female MRN: [id number]  Unit/Bed#: ED 25 Encounter: [id number]          Chief Complaint   Patient presents with    Medical Problem       pt states she was sent by her doctor, refuses to answer triage questions  states she was sent in to get "some kind of medicine"             History of Present Illness            HPI:  Dilip Lopez is a 34 y o  [id number] with LMP 8/7/18 who presents for further evaluation for topic pregnancy  Patient was initially in the emergency department on 09/28/2018 for complaint of the left lower abdominal pain  Pregnancy was confirmed via HCG which was approximately 4900  Ultrasound at that time showed a thickened endometrium with no evidence of an IUP or adnexal mass  Patient was counseled about the risk of a possible ectopic pregnancy and instructed to have a repeat a hCG and follow-up at the [facility]  She was seen at the [facility] on 10/04/2018 and HCG was 7000  An office ultrasound was performed with no evidence of an IUP  Patient was instructed to go to the emergency department for a formal ultrasound  Repeat HCG today was 11,250 and transvaginal ultrasound showed a right adnexal mass with no evidence of an IUP  Patient states that she has some right lower quadrant pain, denies vaginal bleeding, chest pain, nausea, vomiting or any additional symptoms  She has expressed her desire to avoid surgical intervention at this time and would prefer conservative management  This was a desired pregnancy       Of note patient had a tubal reanastomosis surgery in 2015 by Dr Thomas Jones in [place]      Review of Systems   Constitutional: Negative for appetite change, chills, fatigue, fever and unexpected weight change  Eyes: Negative for visual disturbance  Respiratory: Negative for cough, choking, chest tightness, shortness of breath, wheezing and stridor      Cardiovascular: Negative for chest pain, palpitations and leg swelling  Gastrointestinal: Positive for abdominal pain and nausea  Negative for abdominal distention, constipation, diarrhea and vomiting  Genitourinary: Positive for vaginal bleeding (spotting after vaginal exam)  Negative for dysuria and vaginal discharge  Musculoskeletal: Negative for back pain and myalgias  Skin: Negative for pallor  Neurological: Negative for dizziness, weakness, light-headedness, numbness and headaches  Psychiatric/Behavioral: Negative for behavioral problems          Historical Information         Medical History        Past Medical History:   Diagnosis Date    Anxiety      Scalp cyst 04/10/2014     EXCISION OF SCALP PLIAR CYST X 4          Surgical History         Past Surgical History:   Procedure Laterality Date    ABDOMINAL SURGERY        CYST REMOVAL   04/10/2014     EXCISION OF SCALP PLIAR CYST X 4          OB/GYN History: [id number]  History reviewed  No pertinent family history  Social History             History   Alcohol Use No          History   Drug Use No            History   Smoking Status    Former Smoker    Types: Cigarettes   Smokeless Tobacco    Never Used       Comment: NEVER SMOKER, NO TOBACCO USE AS PER NEXTGEN         Meds/Allergies     Prescriptions Prior to Admission      (Not in a hospital admission)     No Known Allergies     Objective   /64 (BP Location: Left arm)   Pulse 66   Temp 97 8 °F (36 6 °C) (Temporal)   Resp 18   LMP 08/07/2018   SpO2 96%      No intake or output data in the 24 hours ending 10/05/18 6816     Invasive Devices: Invasive Devices            Peripheral Intravenous Line                     Peripheral IV 10/05/18 Left Antecubital less than 1 day                     Physical Exam   Constitutional: She is oriented to person, place, and time  She appears well-developed and well-nourished  No distress  HENT:   Head: Normocephalic and atraumatic     Cardiovascular: Normal rate, regular rhythm, normal heart sounds and intact distal pulses  Exam reveals no gallop and no friction rub  No murmur heard  Pulmonary/Chest: Effort normal and breath sounds normal  No respiratory distress  She has no wheezes  She has no rales  She exhibits no tenderness  Abdominal: Soft  She exhibits no distension and no mass  There is tenderness  There is no rebound and no guarding  Genitourinary: Vagina normal    Genitourinary Comments: No bleeding noted, cervical os visibly closed  Small amount of clear discharge  Cervical motion tenderness with tenderness in the right adnexal region   Musculoskeletal: Normal range of motion  She exhibits no edema, tenderness or deformity  Neurological: She is alert and oriented to person, place, and time  Skin: Skin is warm and dry  Psychiatric: She has a normal mood and affect   Her behavior is normal    Vitals reviewed         Lab Results:         Admission on 10/05/2018   Component Date Value    WBC 10/05/2018 6 58     RBC 10/05/2018 3 96     Hemoglobin 10/05/2018 12 2     Hematocrit 10/05/2018 37 5     MCV 10/05/2018 95     MCH 10/05/2018 30 8     MCHC 10/05/2018 32 5     RDW 10/05/2018 14 1     MPV 10/05/2018 9 6     Platelets 66/81/8042 240     nRBC 10/05/2018 0     Neutrophils Relative 10/05/2018 69     Immat GRANS % 10/05/2018 0     Lymphocytes Relative 10/05/2018 19     Monocytes Relative 10/05/2018 7     Eosinophils Relative 10/05/2018 4     Basophils Relative 10/05/2018 1     Neutrophils Absolute 10/05/2018 4 58     Immature Grans Absolute 10/05/2018 0 01     Lymphocytes Absolute 10/05/2018 1 24     Monocytes Absolute 10/05/2018 0 45     Eosinophils Absolute 10/05/2018 0 27     Basophils Absolute 10/05/2018 0 03     Sodium 10/05/2018 139     Potassium 10/05/2018 3 7     Chloride 10/05/2018 104     CO2 10/05/2018 27     ANION GAP 10/05/2018 8     BUN 10/05/2018 7     Creatinine 10/05/2018 0 83     Glucose 10/05/2018 106     Calcium 10/05/2018 8 8     AST 10/05/2018 20     ALT 10/05/2018 32     Alkaline Phosphatase 10/05/2018 48     Total Protein 10/05/2018 6 6     Albumin 10/05/2018 3 1*    Total Bilirubin 10/05/2018 0 17*    eGFR 10/05/2018 88     HCG, Quant 10/05/2018 37243 5*      Imaging: I have personally reviewed pertinent reports        Assessment/Plan      A/P: Zoraida Goldmann is a 33 yo [id number] with LMP 8/7/18 admitted for observation for management of ectopic pregnancy      1) Ectopic pregnancy in the right adnexa  I had an extensive discussion with patient about options for management of ectopic pregnancy  We discussed medical management with methotrexate versus surgical management  Patient is aware that given her HCG of greater than 11,000 that she may indeed require more than 1 dose of methotrexate  She is also aware for the need for close follow-up with repeated beta HCG levels  I discussed that even with medical management she may still require surgical intervention if HCG levels do not decrease appropriately  Patient is currently stable at this time, however discuss that a ruptured ectopic can occur at any time and this is a medical emergency that requires immediate surgical intervention  I again reviewed with patient that if the methotrexate is successful that she is still at an increased risk for a repeat ectopic pregnancy  I also discussed with patient that after this pregnancy she will need to have a method of contraception until her beta HCGs levels have completely resolved  We reviewed the risk benefits and alternatives of both treatment options  Patient confirmed understanding and expressed her desire to proceed with medical management  -10/5: ultrasound showed 2 8 x 2 2 x 2 4 mass suspicious for an ectopic pregnancy  No cardiac activity noted   Small amount of pelvic free fluid               -the patient instructed to report signs of increased abdominal pain or vaginal bleeding              -methotrexate ordered with plan to repeat beta HCG on Tuesday 10/9 and Friday 1010/12   2) Pain Control:   Motrin, Percocet and Dilaudid for breakthrough  3) FEN: regular diet, IV fluids  4) DVT prophylaxis: SCDs     This case was discussed with Dr Steve Singh and she was present at Bedside for discussion with patient and her partner   Ivett Lam MD, MPH  OB/GYN, PGY3  10/5/2018, 6:34 PM        Code Status: Full      I performed history and exam of patient, separate from the resident  I have reviewed the notes & assessments performed by Dr Roselia Ren, I concur with her documented findings and plan for Arcelia Terry  She is currently stable and will be admitted for observation and MTX treatment  I also stressed the importance of compliance with labs, vigilance of symptoms, and the fact that she may need repeat MTX treatment or surgical management  She verbalized understanding  We reviewed the side effects of methotrexate, especially a possible increase in abdominal pain, and the fact that this can also be a sign of ruptured ectopic, and that she needs to call us immediately or go to ER if she gets an increase in pain, or gets lightheaded, dizzy, palpitations, etc   She also understands that if she is discharged home, that she is still at risk of ruptured ectopic  She verbalized understanding  Will monitor overnight and re-eval in am     FABIOLA Samano        Cosigned by: Usama Tejeda DO at 10/5/2018  7:36 PM

## 2018-10-07 NOTE — PROGRESS NOTES
S: Reports unchanged abdominal discomfort  Initial nausea and vomiting with first attempt at oral intake, but with improved nausea with phenergan, tolerating broth and apple juice  Remains unsure about discharge to home secondary to ongoing discomfort and nausea/vomiting    Vitals:    10/06/18 1914   BP: 124/58   Pulse: 77   Resp:    Temp:    SpO2:        Physical Exam   Constitutional: She appears well-developed and well-nourished  No distress  Abdominal: Soft  Bowel sounds are normal  She exhibits no distension and no mass  There is tenderness  There is no rebound and no guarding    + tenderness to RLQ   Skin: She is not diaphoretic  Given stable clinical status throughout the day and without acute surgical abdomen, able to continue with conservative management  Will continue to observe overnight and reassess clinical status in the AM   NPO at midnight if AM assessment necessitates surgical management of ectopic pregnancy  Spoke at length about outpatient follow-up if stable for discharge tomorrow morning, with day 4 and 7 hcg and for outpatient follow-up at the [facility] early next week  Patient expresses understanding      Dr Edilson Abad at bedside for assessment and counseling    Dano Mancera DO

## 2018-10-09 ENCOUNTER — APPOINTMENT (OUTPATIENT)
Dept: LAB | Facility: HOSPITAL | Age: 38
End: 2018-10-09
Payer: COMMERCIAL

## 2018-10-09 DIAGNOSIS — O00.101 RIGHT TUBAL PREGNANCY WITHOUT INTRAUTERINE PREGNANCY: ICD-10-CM

## 2018-10-09 LAB — B-HCG SERPL-ACNC: ABNORMAL MIU/ML

## 2018-10-09 PROCEDURE — 36415 COLL VENOUS BLD VENIPUNCTURE: CPT

## 2018-10-09 PROCEDURE — 84702 CHORIONIC GONADOTROPIN TEST: CPT

## 2018-10-10 ENCOUNTER — ANESTHESIA EVENT (EMERGENCY)
Dept: PERIOP | Facility: HOSPITAL | Age: 38
End: 2018-10-10
Payer: COMMERCIAL

## 2018-10-10 ENCOUNTER — TELEPHONE (OUTPATIENT)
Dept: OBGYN CLINIC | Facility: CLINIC | Age: 38
End: 2018-10-10

## 2018-10-10 ENCOUNTER — ANESTHESIA (EMERGENCY)
Dept: PERIOP | Facility: HOSPITAL | Age: 38
End: 2018-10-10
Payer: COMMERCIAL

## 2018-10-10 ENCOUNTER — HOSPITAL ENCOUNTER (OUTPATIENT)
Facility: HOSPITAL | Age: 38
Discharge: HOME | End: 2018-10-11
Attending: EMERGENCY MEDICINE | Admitting: OBSTETRICS & GYNECOLOGY
Payer: COMMERCIAL

## 2018-10-10 VITALS
OXYGEN SATURATION: 98 % | BODY MASS INDEX: 41.24 KG/M2 | WEIGHT: 218.26 LBS | HEART RATE: 73 BPM | RESPIRATION RATE: 18 BRPM | DIASTOLIC BLOOD PRESSURE: 53 MMHG | TEMPERATURE: 98.5 F | SYSTOLIC BLOOD PRESSURE: 106 MMHG

## 2018-10-10 DIAGNOSIS — O00.109 ECTOPIC PREGNANCY, TUBAL: ICD-10-CM

## 2018-10-10 DIAGNOSIS — O00.101 RIGHT TUBAL PREGNANCY WITHOUT INTRAUTERINE PREGNANCY: Primary | ICD-10-CM

## 2018-10-10 PROBLEM — Z90.79 HX OF UNILATERAL SALPINGECTOMY: Status: ACTIVE | Noted: 2018-10-10

## 2018-10-10 PROBLEM — O00.90 ECTOPIC PREGNANCY WITHOUT INTRAUTERINE PREGNANCY: Status: RESOLVED | Noted: 2018-10-05 | Resolved: 2018-10-10

## 2018-10-10 LAB
ABO GROUP BLD: NORMAL
B-HCG SERPL-ACNC: ABNORMAL MIU/ML
BACTERIA UR QL AUTO: ABNORMAL /HPF
BASOPHILS # BLD AUTO: 0.04 THOUSANDS/ΜL (ref 0–0.1)
BASOPHILS NFR BLD AUTO: 0 % (ref 0–1)
BILIRUB UR QL STRIP: NEGATIVE
BLD GP AB SCN SERPL QL: NEGATIVE
CLARITY UR: ABNORMAL
COLOR UR: YELLOW
EOSINOPHIL # BLD AUTO: 0.25 THOUSAND/ΜL (ref 0–0.61)
EOSINOPHIL NFR BLD AUTO: 2 % (ref 0–6)
ERYTHROCYTE [DISTWIDTH] IN BLOOD BY AUTOMATED COUNT: 13.8 % (ref 11.6–15.1)
GLUCOSE UR STRIP-MCNC: NEGATIVE MG/DL
HCT VFR BLD AUTO: 35.9 % (ref 34.8–46.1)
HGB BLD-MCNC: 11.8 G/DL (ref 11.5–15.4)
HGB UR QL STRIP.AUTO: ABNORMAL
IMM GRANULOCYTES # BLD AUTO: 0.07 THOUSAND/UL (ref 0–0.2)
IMM GRANULOCYTES NFR BLD AUTO: 0 % (ref 0–2)
KETONES UR STRIP-MCNC: NEGATIVE MG/DL
LEUKOCYTE ESTERASE UR QL STRIP: ABNORMAL
LYMPHOCYTES # BLD AUTO: 1.63 THOUSANDS/ΜL (ref 0.6–4.47)
LYMPHOCYTES NFR BLD AUTO: 10 % (ref 14–44)
MCH RBC QN AUTO: 30.6 PG (ref 26.8–34.3)
MCHC RBC AUTO-ENTMCNC: 32.9 G/DL (ref 31.4–37.4)
MCV RBC AUTO: 93 FL (ref 82–98)
MONOCYTES # BLD AUTO: 1.11 THOUSAND/ΜL (ref 0.17–1.22)
MONOCYTES NFR BLD AUTO: 7 % (ref 4–12)
NEUTROPHILS # BLD AUTO: 13.63 THOUSANDS/ΜL (ref 1.85–7.62)
NEUTS SEG NFR BLD AUTO: 81 % (ref 43–75)
NITRITE UR QL STRIP: NEGATIVE
NON-SQ EPI CELLS URNS QL MICRO: ABNORMAL /HPF
NRBC BLD AUTO-RTO: 0 /100 WBCS
PH UR STRIP.AUTO: 7.5 [PH] (ref 4.5–8)
PLATELET # BLD AUTO: 210 THOUSANDS/UL (ref 149–390)
PMV BLD AUTO: 10 FL (ref 8.9–12.7)
PROT UR STRIP-MCNC: ABNORMAL MG/DL
RBC # BLD AUTO: 3.86 MILLION/UL (ref 3.81–5.12)
RBC #/AREA URNS AUTO: ABNORMAL /HPF
RH BLD: POSITIVE
SP GR UR STRIP.AUTO: 1.02 (ref 1–1.03)
SPECIMEN EXPIRATION DATE: NORMAL
UROBILINOGEN UR QL STRIP.AUTO: 0.2 E.U./DL
WBC # BLD AUTO: 16.73 THOUSAND/UL (ref 4.31–10.16)
WBC #/AREA URNS AUTO: ABNORMAL /HPF

## 2018-10-10 PROCEDURE — 87086 URINE CULTURE/COLONY COUNT: CPT

## 2018-10-10 PROCEDURE — 36415 COLL VENOUS BLD VENIPUNCTURE: CPT | Performed by: OBSTETRICS & GYNECOLOGY

## 2018-10-10 PROCEDURE — 59151 TREAT ECTOPIC PREGNANCY: CPT | Performed by: OBSTETRICS & GYNECOLOGY

## 2018-10-10 PROCEDURE — 99285 EMERGENCY DEPT VISIT HI MDM: CPT

## 2018-10-10 PROCEDURE — 86900 BLOOD TYPING SEROLOGIC ABO: CPT | Performed by: OBSTETRICS & GYNECOLOGY

## 2018-10-10 PROCEDURE — 85025 COMPLETE CBC W/AUTO DIFF WBC: CPT | Performed by: OBSTETRICS & GYNECOLOGY

## 2018-10-10 PROCEDURE — 86850 RBC ANTIBODY SCREEN: CPT | Performed by: OBSTETRICS & GYNECOLOGY

## 2018-10-10 PROCEDURE — 99213 OFFICE O/P EST LOW 20 MIN: CPT | Performed by: OBSTETRICS & GYNECOLOGY

## 2018-10-10 PROCEDURE — 84702 CHORIONIC GONADOTROPIN TEST: CPT | Performed by: OBSTETRICS & GYNECOLOGY

## 2018-10-10 PROCEDURE — 88305 TISSUE EXAM BY PATHOLOGIST: CPT | Performed by: PATHOLOGY

## 2018-10-10 PROCEDURE — 96374 THER/PROPH/DIAG INJ IV PUSH: CPT

## 2018-10-10 PROCEDURE — 81001 URINALYSIS AUTO W/SCOPE: CPT

## 2018-10-10 PROCEDURE — 86923 COMPATIBILITY TEST ELECTRIC: CPT

## 2018-10-10 PROCEDURE — 86901 BLOOD TYPING SEROLOGIC RH(D): CPT | Performed by: OBSTETRICS & GYNECOLOGY

## 2018-10-10 RX ORDER — ONDANSETRON 2 MG/ML
4 INJECTION INTRAMUSCULAR; INTRAVENOUS EVERY 6 HOURS PRN
Status: DISCONTINUED | OUTPATIENT
Start: 2018-10-10 | End: 2018-10-11 | Stop reason: HOSPADM

## 2018-10-10 RX ORDER — BUPIVACAINE HYDROCHLORIDE 2.5 MG/ML
INJECTION, SOLUTION EPIDURAL; INFILTRATION; INTRACAUDAL AS NEEDED
Status: DISCONTINUED | OUTPATIENT
Start: 2018-10-10 | End: 2018-10-10 | Stop reason: HOSPADM

## 2018-10-10 RX ORDER — HYDROMORPHONE HCL/PF 1 MG/ML
0.5 SYRINGE (ML) INJECTION
Status: DISCONTINUED | OUTPATIENT
Start: 2018-10-10 | End: 2018-10-10 | Stop reason: HOSPADM

## 2018-10-10 RX ORDER — PROPOFOL 10 MG/ML
INJECTION, EMULSION INTRAVENOUS AS NEEDED
Status: DISCONTINUED | OUTPATIENT
Start: 2018-10-10 | End: 2018-10-10 | Stop reason: SURG

## 2018-10-10 RX ORDER — MIDAZOLAM HYDROCHLORIDE 1 MG/ML
INJECTION INTRAMUSCULAR; INTRAVENOUS AS NEEDED
Status: DISCONTINUED | OUTPATIENT
Start: 2018-10-10 | End: 2018-10-10 | Stop reason: SURG

## 2018-10-10 RX ORDER — SODIUM CHLORIDE 9 MG/ML
125 INJECTION, SOLUTION INTRAVENOUS CONTINUOUS
Status: DISCONTINUED | OUTPATIENT
Start: 2018-10-10 | End: 2018-10-10

## 2018-10-10 RX ORDER — PROMETHAZINE HYDROCHLORIDE 25 MG/ML
12.5 INJECTION, SOLUTION INTRAMUSCULAR; INTRAVENOUS EVERY 4 HOURS PRN
Status: DISCONTINUED | OUTPATIENT
Start: 2018-10-10 | End: 2018-10-10 | Stop reason: HOSPADM

## 2018-10-10 RX ORDER — FENTANYL CITRATE 50 UG/ML
50 INJECTION, SOLUTION INTRAMUSCULAR; INTRAVENOUS
Status: DISCONTINUED | OUTPATIENT
Start: 2018-10-10 | End: 2018-10-10 | Stop reason: HOSPADM

## 2018-10-10 RX ORDER — SODIUM CHLORIDE, SODIUM LACTATE, POTASSIUM CHLORIDE, CALCIUM CHLORIDE 600; 310; 30; 20 MG/100ML; MG/100ML; MG/100ML; MG/100ML
125 INJECTION, SOLUTION INTRAVENOUS CONTINUOUS
Status: DISCONTINUED | OUTPATIENT
Start: 2018-10-10 | End: 2018-10-11 | Stop reason: HOSPADM

## 2018-10-10 RX ORDER — FENTANYL CITRATE 50 UG/ML
INJECTION, SOLUTION INTRAMUSCULAR; INTRAVENOUS AS NEEDED
Status: DISCONTINUED | OUTPATIENT
Start: 2018-10-10 | End: 2018-10-10 | Stop reason: SURG

## 2018-10-10 RX ORDER — OXYCODONE HYDROCHLORIDE AND ACETAMINOPHEN 5; 325 MG/1; MG/1
2 TABLET ORAL EVERY 4 HOURS PRN
Status: DISCONTINUED | OUTPATIENT
Start: 2018-10-10 | End: 2018-10-11 | Stop reason: HOSPADM

## 2018-10-10 RX ORDER — ROCURONIUM BROMIDE 10 MG/ML
INJECTION, SOLUTION INTRAVENOUS AS NEEDED
Status: DISCONTINUED | OUTPATIENT
Start: 2018-10-10 | End: 2018-10-10 | Stop reason: SURG

## 2018-10-10 RX ORDER — SUCCINYLCHOLINE CHLORIDE 20 MG/ML
INJECTION INTRAMUSCULAR; INTRAVENOUS AS NEEDED
Status: DISCONTINUED | OUTPATIENT
Start: 2018-10-10 | End: 2018-10-10 | Stop reason: SURG

## 2018-10-10 RX ORDER — IBUPROFEN 600 MG/1
600 TABLET ORAL EVERY 6 HOURS PRN
Status: DISCONTINUED | OUTPATIENT
Start: 2018-10-10 | End: 2018-10-11 | Stop reason: HOSPADM

## 2018-10-10 RX ORDER — KETOROLAC TROMETHAMINE 30 MG/ML
30 INJECTION, SOLUTION INTRAMUSCULAR; INTRAVENOUS ONCE
Status: COMPLETED | OUTPATIENT
Start: 2018-10-10 | End: 2018-10-10

## 2018-10-10 RX ORDER — HYDROMORPHONE HCL/PF 1 MG/ML
1 SYRINGE (ML) INJECTION ONCE
Status: COMPLETED | OUTPATIENT
Start: 2018-10-10 | End: 2018-10-10

## 2018-10-10 RX ORDER — ONDANSETRON 2 MG/ML
INJECTION INTRAMUSCULAR; INTRAVENOUS AS NEEDED
Status: DISCONTINUED | OUTPATIENT
Start: 2018-10-10 | End: 2018-10-10 | Stop reason: SURG

## 2018-10-10 RX ORDER — ONDANSETRON 2 MG/ML
4 INJECTION INTRAMUSCULAR; INTRAVENOUS EVERY 6 HOURS PRN
Status: DISCONTINUED | OUTPATIENT
Start: 2018-10-10 | End: 2018-10-10 | Stop reason: HOSPADM

## 2018-10-10 RX ORDER — OXYCODONE HYDROCHLORIDE AND ACETAMINOPHEN 5; 325 MG/1; MG/1
1 TABLET ORAL EVERY 4 HOURS PRN
Status: DISCONTINUED | OUTPATIENT
Start: 2018-10-10 | End: 2018-10-11 | Stop reason: HOSPADM

## 2018-10-10 RX ORDER — MAGNESIUM HYDROXIDE 1200 MG/15ML
LIQUID ORAL AS NEEDED
Status: DISCONTINUED | OUTPATIENT
Start: 2018-10-10 | End: 2018-10-10 | Stop reason: HOSPADM

## 2018-10-10 RX ORDER — GLYCOPYRROLATE 0.2 MG/ML
INJECTION INTRAMUSCULAR; INTRAVENOUS AS NEEDED
Status: DISCONTINUED | OUTPATIENT
Start: 2018-10-10 | End: 2018-10-10 | Stop reason: SURG

## 2018-10-10 RX ADMIN — ROCURONIUM BROMIDE 20 MG: 10 INJECTION INTRAVENOUS at 15:24

## 2018-10-10 RX ADMIN — OXYCODONE AND ACETAMINOPHEN 2 TABLET: 5; 325 TABLET ORAL at 18:04

## 2018-10-10 RX ADMIN — TRIMETHOBENZAMIDE HYDROCHLORIDE 200 MG: 100 INJECTION INTRAMUSCULAR at 16:40

## 2018-10-10 RX ADMIN — ONDANSETRON 4 MG: 2 INJECTION INTRAMUSCULAR; INTRAVENOUS at 20:06

## 2018-10-10 RX ADMIN — IBUPROFEN 600 MG: 600 TABLET, FILM COATED ORAL at 22:13

## 2018-10-10 RX ADMIN — ROCURONIUM BROMIDE 10 MG: 10 INJECTION INTRAVENOUS at 15:13

## 2018-10-10 RX ADMIN — HYDROMORPHONE HYDROCHLORIDE 1 MG: 1 INJECTION, SOLUTION INTRAMUSCULAR; INTRAVENOUS; SUBCUTANEOUS at 14:05

## 2018-10-10 RX ADMIN — KETOROLAC TROMETHAMINE 30 MG: 30 INJECTION, SOLUTION INTRAMUSCULAR at 20:03

## 2018-10-10 RX ADMIN — SODIUM CHLORIDE 125 ML/HR: 0.9 INJECTION, SOLUTION INTRAVENOUS at 15:02

## 2018-10-10 RX ADMIN — SODIUM CHLORIDE, SODIUM LACTATE, POTASSIUM CHLORIDE, AND CALCIUM CHLORIDE 125 ML/HR: .6; .31; .03; .02 INJECTION, SOLUTION INTRAVENOUS at 17:51

## 2018-10-10 RX ADMIN — PROPOFOL 200 MG: 10 INJECTION, EMULSION INTRAVENOUS at 15:13

## 2018-10-10 RX ADMIN — FENTANYL CITRATE 50 MCG: 50 INJECTION, SOLUTION INTRAMUSCULAR; INTRAVENOUS at 15:33

## 2018-10-10 RX ADMIN — GLYCOPYRROLATE 0.4 MG: 0.2 INJECTION, SOLUTION INTRAMUSCULAR; INTRAVENOUS at 16:01

## 2018-10-10 RX ADMIN — ONDANSETRON 4 MG: 2 INJECTION INTRAMUSCULAR; INTRAVENOUS at 16:24

## 2018-10-10 RX ADMIN — MIDAZOLAM 2 MG: 1 INJECTION INTRAMUSCULAR; INTRAVENOUS at 15:08

## 2018-10-10 RX ADMIN — SODIUM CHLORIDE: 0.9 INJECTION, SOLUTION INTRAVENOUS at 15:54

## 2018-10-10 RX ADMIN — FENTANYL CITRATE 50 MCG: 50 INJECTION, SOLUTION INTRAMUSCULAR; INTRAVENOUS at 16:37

## 2018-10-10 RX ADMIN — OXYCODONE AND ACETAMINOPHEN 2 TABLET: 5; 325 TABLET ORAL at 22:12

## 2018-10-10 RX ADMIN — ONDANSETRON HYDROCHLORIDE 4 MG: 2 INJECTION, SOLUTION INTRAVENOUS at 15:52

## 2018-10-10 RX ADMIN — SUCCINYLCHOLINE CHLORIDE 100 MG: 20 INJECTION, SOLUTION INTRAMUSCULAR; INTRAVENOUS at 15:13

## 2018-10-10 RX ADMIN — HYDROMORPHONE HYDROCHLORIDE 0.5 MG: 1 INJECTION, SOLUTION INTRAMUSCULAR; INTRAVENOUS; SUBCUTANEOUS at 16:57

## 2018-10-10 RX ADMIN — FENTANYL CITRATE 50 MCG: 50 INJECTION, SOLUTION INTRAMUSCULAR; INTRAVENOUS at 16:25

## 2018-10-10 RX ADMIN — NEOSTIGMINE METHYLSULFATE 2 MG: 1 INJECTION, SOLUTION INTRAMUSCULAR; INTRAVENOUS; SUBCUTANEOUS at 16:01

## 2018-10-10 RX ADMIN — FENTANYL CITRATE 100 MCG: 50 INJECTION, SOLUTION INTRAMUSCULAR; INTRAVENOUS at 15:13

## 2018-10-10 RX ADMIN — HYDROMORPHONE HYDROCHLORIDE 0.5 MG: 1 INJECTION, SOLUTION INTRAMUSCULAR; INTRAVENOUS; SUBCUTANEOUS at 16:46

## 2018-10-10 RX ADMIN — DEXAMETHASONE SODIUM PHOSPHATE 4 MG: 10 INJECTION INTRAMUSCULAR; INTRAVENOUS at 15:16

## 2018-10-10 RX ADMIN — SODIUM CHLORIDE 125 ML/HR: 0.9 INJECTION, SOLUTION INTRAVENOUS at 17:10

## 2018-10-10 NOTE — TELEPHONE ENCOUNTER
Patient called stating that she received her second dose of Methotrexate in the ER ON 10/5/2018  Patient is complaining of having uncontrolled pelvic and back pain x 1 day  Patient instructed to go to the ER for evaluation  Patient verbalized understanding

## 2018-10-10 NOTE — ANESTHESIA PREPROCEDURE EVALUATION
Review of Systems/Medical History  Patient summary reviewed  Chart reviewed  History of anesthetic complications PONV    Cardiovascular  Negative cardio ROS    Pulmonary  Negative pulmonary ROS        GI/Hepatic  Negative GI/hepatic ROS          Negative  ROS        Endo/Other    Obesity  morbid obesity   GYN      Comment: Ectopic pregnancy      Hematology  Negative hematology ROS      Musculoskeletal  Negative musculoskeletal ROS        Neurology  Negative neurology ROS      Psychology   Anxiety,              Physical Exam    Airway    Mallampati score: II  TM Distance: >3 FB  Neck ROM: full     Dental   No notable dental hx     Cardiovascular  Comment: Negative ROS, Rhythm: regular, Rate: normal, Cardiovascular exam normal    Pulmonary  Pulmonary exam normal Breath sounds clear to auscultation,     Other Findings        Anesthesia Plan  ASA Score- 2 Emergent    Anesthesia Type- general with ASA Monitors  Additional Monitors:   Airway Plan: ETT  Plan Factors-    Induction- rapid sequence induction and intravenous  Postoperative Plan- Plan for postoperative opioid use  Planned trial extubation    Informed Consent- Anesthetic plan and risks discussed with patient  I personally reviewed this patient with the CRNA  Discussed and agreed on the Anesthesia Plan with the CRNA Gerhardt Sep

## 2018-10-10 NOTE — H&P
H&P Exam - Gynecology   Oksana Lyons 34 y o  female MRN: [id number]  Unit/Bed#: ED 26 Encounter: [id number]    Chief Complaint   Patient presents with    Abdominal Pain     Was told she had an ectopic pregnancy on Friday  Was given shot in the ED  Is having increased pain and bleeding  History of Present Illness     HPI:  Oksana Lyons is a 34 y o  [id number] with a right ectopic pregnancy diagnosed on 10/05/2018  She was admitted for observation on 10/05/2018 and received 1 dose of methotrexate  She was discharged with instructions to follow up with repeat a repeat HCG on 10/9/18  HCG levels prior to methotrexate was 11,250 and on day 4 was 11,149  She called the office today complaining of increased abdominal pain as well as vaginal bleeding that started this morning  She was instructed to present to the emergency department for further evaluation  In the ED a fast exam was noted to be within normal limits  Patient states that pain is in her lower abdomen and is worsened with movement  She took Tylenol with codeine the which did not relieve the pain  This pain is acutely different than the pain she was feeling for the past several days  She also notes onset of vaginal bleeding similar to a period that started this morning  Patient denies any nausea and vomiting, weakness, lightheadedness, chest pain or palpitations  Review of Systems   Constitutional: Positive for activity change  Negative for chills, diaphoresis and fever  Respiratory: Negative for chest tightness, shortness of breath and wheezing  Cardiovascular: Negative for chest pain, palpitations and leg swelling  Gastrointestinal: Positive for abdominal pain  Negative for abdominal distention, diarrhea, nausea and vomiting  Genitourinary: Positive for pelvic pain and vaginal bleeding  Neurological: Negative for dizziness, weakness, light-headedness and headaches     Psychiatric/Behavioral: The patient is nervous/anxious  Historical Information   Past Medical History:   Diagnosis Date    Anxiety     Scalp cyst 04/10/2014    EXCISION OF SCALP PLIAR CYST X 4      Past Surgical History:   Procedure Laterality Date    ABDOMINAL SURGERY      CYST REMOVAL  04/10/2014    EXCISION OF SCALP PLIAR CYST X 4      OB/GYN History: [id number]  4  Vaginal deliveries    History reviewed  No pertinent family history  Social History   History   Alcohol Use No     History   Drug Use No     History   Smoking Status    Former Smoker    Types: Cigarettes   Smokeless Tobacco    Never Used     Comment: NEVER SMOKER, NO TOBACCO USE AS PER NEXTGEN       Meds/Allergies     (Not in a hospital admission)  No Known Allergies    Objective   /65 (BP Location: Right arm)   Pulse 93   Temp 98 °F (36 7 °C) (Temporal)   Resp 16   Wt 99 kg (218 lb 4 1 oz)   LMP 08/07/2018   SpO2 98%   BMI 41 24 kg/m²     No intake or output data in the 24 hours ending 10/10/18 1355    Invasive Devices: Invasive Devices     Peripheral Intravenous Line            Peripheral IV 10/10/18 Left Hand less than 1 day                Physical Exam   Constitutional: She appears well-developed and well-nourished  No distress  HENT:   Head: Normocephalic and atraumatic  Cardiovascular: Normal rate, regular rhythm, normal heart sounds and intact distal pulses  Exam reveals no gallop and no friction rub  No murmur heard  Pulmonary/Chest: Breath sounds normal  No respiratory distress  She has no wheezes  She has no rales  She exhibits no tenderness  Abdominal: Soft  She exhibits no distension  There is tenderness  There is rebound and guarding (Voluntary)  Genitourinary:   Genitourinary Comments: Vaginal bleeding   Skin: She is not diaphoretic  Vitals reviewed        Lab Results:   Admission on 10/10/2018   Component Date Value    Color, UA 10/10/2018 Yellow     Clarity, UA 10/10/2018 Cloudy     pH, UA 10/10/2018 7 5     Leukocytes, UA 10/10/2018 Small*    Nitrite, UA 10/10/2018 Negative     Protein, UA 10/10/2018 30 (1+)*    Glucose, UA 10/10/2018 Negative     Ketones, UA 10/10/2018 Negative     Urobilinogen, UA 10/10/2018 0 2     Bilirubin, UA 10/10/2018 Negative     Blood, UA 10/10/2018 Large*    Specific Gravity, UA 10/10/2018 1 020     WBC 10/10/2018 16 73*    RBC 10/10/2018 3 86     Hemoglobin 10/10/2018 11 8     Hematocrit 10/10/2018 35 9     MCV 10/10/2018 93     MCH 10/10/2018 30 6     MCHC 10/10/2018 32 9     RDW 10/10/2018 13 8     MPV 10/10/2018 10 0     Platelets 42/29/3209 210     nRBC 10/10/2018 0     Neutrophils Relative 10/10/2018 81*    Immat GRANS % 10/10/2018 0     Lymphocytes Relative 10/10/2018 10*    Monocytes Relative 10/10/2018 7     Eosinophils Relative 10/10/2018 2     Basophils Relative 10/10/2018 0     Neutrophils Absolute 10/10/2018 13 63*    Immature Grans Absolute 10/10/2018 0 07     Lymphocytes Absolute 10/10/2018 1 63     Monocytes Absolute 10/10/2018 1 11     Eosinophils Absolute 10/10/2018 0 25     Basophils Absolute 10/10/2018 0 04           Assessment/Plan     A/P: Benjamin Simmons is a 34 y o  [id number] with a right ectopic pregnancy admitted for surgical management  Ectopic Pregnancy:    Patient was counseled on the need for surgery given the worsening of her symptoms  We discussed the risk of laparoscopic salpingectomy which include bleeding, need for blood products, infection, injury to surrounding organs which include bowel, bladder, ovaries and tubes  We also discussed loss of fertility if contralateral tube was damaged  Patient verbalized that she does not want the left tube to be removed even if pathology is found  Dr Curtis Jaimes discussed with the patient that in the event that there was a complication that removal of the left tube or hysterectomy may be required  All questions were answered and patient provided informed consent     -s/p 1 dose of Methotrexate on 10/5/18   -risk, benefits and alternatives reviewed and patient was consented for a right salpingectomy   -preop labs obtained, type and cross for 2 units   -To OR for right salpingectomy    Case discussed with Dr Sharifa Colon    Code Status: Full    Renetta Reyez MD, MPH  OB/GYN, PGY3  10/10/2018, 2:24 PM

## 2018-10-10 NOTE — ED PROVIDER NOTES
History  Chief Complaint   Patient presents with    Abdominal Pain     Was told she had an ectopic pregnancy on Friday  Was given shot in the ED  Is having increased pain and bleeding  52-year-old female with known ectopic pregnancy on the right seen here few days ago and given methotrexate for medical   Patient returns today because worsening lower abdominal pain and vaginal bleeding she is passing clots she is using one pad every few hours  She appears to be in moderate distress and is unable to give a comprehensive history  She denies syncope or chest pain or ever  Patient on exam is not tachycardic or hypotensive currently  Bedside fast exam shows no definite free fluid however there may be trace pelvic fluid  Patient was seen by myself with the OBGYN on-call the bedside as well  The patient will be taken to the operating room for definitive treatment her likely ruptured tubal pregnancy patient will have an IV placed labs drawn transfusion if necessary        Pregnancy Problem   Quality:  Spotting  Severity:  Severe  Onset quality:  Sudden  Timing:  Intermittent  Chronicity:  New  Pregnancy confirmed by ultrasound: ectopic  Relieved by:  Nothing  Worsened by:  Nothing  Ineffective treatments:  None tried  Associated symptoms: abdominal pain    Associated symptoms: no back pain, no dizziness, no dysuria, no fatigue, no fever and no nausea             Prior to Admission Medications   Prescriptions Last Dose Informant Patient Reported?  Taking?   acetaminophen-codeine (TYLENOL #3) 300-30 mg per tablet   No Yes   Sig: Take 1 tablet by mouth every 4 (four) hours as needed for moderate pain for up to 10 days   ondansetron (ZOFRAN) 4 mg tablet   No Yes   Sig: Take 1 tablet (4 mg total) by mouth every 8 (eight) hours as needed for nausea or vomiting      Facility-Administered Medications: None       Past Medical History:   Diagnosis Date    Anxiety     Asthma     Scalp cyst 04/10/2014 EXCISION OF SCALP PLIAR CYST X 4        Past Surgical History:   Procedure Laterality Date    ABDOMINAL SURGERY      CYST REMOVAL  04/10/2014    EXCISION OF SCALP PLIAR CYST X 4     ID LAP,RMV  ADNEXAL STRUCTURE Right 10/10/2018    Procedure: SALPINGECTOMY, LAPAROSCOPIC;  Surgeon: Bianca Loyola MD;  Location: [facility];  Service: Gynecology    TUBAL LIGATION  2002    reanastomosis in 2015        History reviewed  No pertinent family history  I have reviewed and agree with the history as documented  Social History   Substance Use Topics    Smoking status: Former Smoker     Types: Cigarettes    Smokeless tobacco: Never Used      Comment: NEVER SMOKER, NO TOBACCO USE AS PER [employer]GEN    Alcohol use No        Review of Systems   Constitutional: Negative for chills, fatigue and fever  Eyes: Negative for photophobia and visual disturbance  Respiratory: Negative for cough and shortness of breath  Cardiovascular: Negative for chest pain, palpitations and leg swelling  Gastrointestinal: Positive for abdominal pain  Negative for diarrhea, nausea and vomiting  Endocrine: Negative for polydipsia and polyuria  Genitourinary: Positive for vaginal bleeding  Negative for decreased urine volume, difficulty urinating, dysuria and frequency  Musculoskeletal: Negative for back pain, neck pain and neck stiffness  Skin: Negative for color change and rash  Allergic/Immunologic: Negative for environmental allergies and immunocompromised state  Neurological: Negative for dizziness and headaches  Hematological: Negative for adenopathy  Does not bruise/bleed easily  Psychiatric/Behavioral: Negative for dysphoric mood  The patient is not nervous/anxious  Physical Exam  Physical Exam   Constitutional: She is oriented to person, place, and time  She appears well-developed and well-nourished  No distress  HENT:   Head: Normocephalic and atraumatic     Nose: Nose normal    Eyes: Pupils are equal, round, and reactive to light  Conjunctivae and EOM are normal  No scleral icterus  Neck: Normal range of motion  Neck supple  No JVD present  No tracheal deviation present  No thyromegaly present  Cardiovascular: Normal rate, regular rhythm, normal heart sounds and intact distal pulses  Exam reveals no gallop and no friction rub  Pulmonary/Chest: Effort normal and breath sounds normal  No respiratory distress  She has no wheezes  She has no rales  She exhibits no tenderness  Abdominal: Soft  Bowel sounds are normal  She exhibits no distension and no mass  There is tenderness  There is rebound  There is no guarding  No hernia  Musculoskeletal: Normal range of motion  She exhibits no edema, tenderness or deformity  Neurological: She is alert and oriented to person, place, and time  She has normal reflexes  No cranial nerve deficit  Coordination normal    Skin: Skin is warm and dry  She is not diaphoretic  No erythema  Psychiatric: She has a normal mood and affect  Her behavior is normal    Nursing note and vitals reviewed        Vital Signs  ED Triage Vitals [10/10/18 1256]   Temperature Pulse Respirations Blood Pressure SpO2   98 °F (36 7 °C) 93 16 126/65 98 %      Temp Source Heart Rate Source Patient Position - Orthostatic VS BP Location FiO2 (%)   Temporal Monitor Sitting Right arm --      Pain Score       Worst Possible Pain           Vitals:    10/10/18 2021 10/10/18 2121 10/10/18 2221 10/10/18 2340   BP: 111/55 107/58 102/55 106/53   Pulse: 71 77 62 73   Patient Position - Orthostatic VS: Lying Lying Lying Lying       Visual Acuity      ED Medications  Medications   HYDROmorphone (DILAUDID) injection 1 mg (1 mg Intravenous Given 10/10/18 1405)   trimethobenzamide (TIGAN) IM injection 200 mg (200 mg Intramuscular Given 10/10/18 1640)   ketorolac (TORADOL) injection 30 mg (30 mg Intravenous Given 10/10/18 2003)   diphenhydrAMINE (BENADRYL) tablet 50 mg (50 mg Oral Given 10/11/18 0057) Diagnostic Studies  Results Reviewed     Procedure Component Value Units Date/Time    Urine culture [[id number]] Collected:  10/10/18 1349    Lab Status:  Final result Specimen:  Urine from Urine, Clean Catch Updated:  10/11/18 2049     Urine Culture 10,000-19,000 cfu/ml     hCG, quantitative [[id number]]  (Abnormal) Collected:  10/10/18 1341    Lab Status:  Final result Specimen:  Blood from Arm, Left Updated:  10/10/18 1438     HCG, Quant 11,510 3 (H) mIU/mL     Narrative:          Expected Ranges:     Approximate               Approximate HCG  Gestation age          Concentration ( mIU/mL)  _____________          ______________________   Irl Matt                      HCG values  0 2-1                       5-50  1-2                           2-3                         100-5000  3-4                         500-76459  4-5                         1000-68602  5-6                         [id number]  6-8                         72100-306672  8-12                        [id number]    Urine Microscopic [[id number]]  (Abnormal) Collected:  10/10/18 1349    Lab Status:  Final result Specimen:  Urine from Urine, Clean Catch Updated:  10/10/18 1405     RBC, UA Innumerable (A) /hpf      WBC, UA 10-20 (A) /hpf      Epithelial Cells Occasional /hpf      Bacteria, UA Occasional /hpf     CBC and differential [[id number]]  (Abnormal) Collected:  10/10/18 1341    Lab Status:  Final result Specimen:  Blood from Arm, Left Updated:  10/10/18 1353     WBC 16 73 (H) Thousand/uL      RBC 3 86 Million/uL      Hemoglobin 11 8 g/dL      Hematocrit 35 9 %      MCV 93 fL      MCH 30 6 pg      MCHC 32 9 g/dL      RDW 13 8 %      MPV 10 0 fL      Platelets 108 Thousands/uL      nRBC 0 /100 WBCs      Neutrophils Relative 81 (H) %      Immat GRANS % 0 %      Lymphocytes Relative 10 (L) %      Monocytes Relative 7 %      Eosinophils Relative 2 %      Basophils Relative 0 %      Neutrophils Absolute 13 63 (H) Thousands/µL      Immature Grans Absolute 0 07 Thousand/uL      Lymphocytes Absolute 1 63 Thousands/µL      Monocytes Absolute 1 11 Thousand/µL      Eosinophils Absolute 0 25 Thousand/µL      Basophils Absolute 0 04 Thousands/µL     ED Urine Macroscopic [[id number]]  (Abnormal) Collected:  10/10/18 1349    Lab Status:  Final result Specimen:  Urine Updated:  10/10/18 1338     Color, UA Yellow     Clarity, UA Cloudy     pH, UA 7 5     Leukocytes, UA Small (A)     Nitrite, UA Negative     Protein, UA 30 (1+) (A) mg/dl      Glucose, UA Negative mg/dl      Ketones, UA Negative mg/dl      Urobilinogen, UA 0 2 E U /dl      Bilirubin, UA Negative     Blood, UA Large (A)     Specific [place], UA 1 020    Narrative:       CLINITEK RESULT                 No orders to display              Procedures  CriticalCare Time  Performed by: Iraida Briones by: Ang Velásquez     Critical care provider statement:     Critical care time (minutes):  40    Critical care time was exclusive of:  Separately billable procedures and treating other patients and teaching time    Critical care was necessary to treat or prevent imminent or life-threatening deterioration of the following conditions:  Sepsis    Critical care was time spent personally by me on the following activities:  Blood draw for specimens, obtaining history from patient or surrogate, review of old charts, evaluation of patient's response to treatment, examination of patient, ordering and review of laboratory studies, ordering and performing treatments and interventions, development of treatment plan with patient or surrogate and ordering and review of radiographic studies           Phone Contacts  ED Phone Contact    ED Course  ED Course as of Oct 12 1524   Wed Oct 10, 2018   1342 Obgyn at bedside    1406 Patient was consented by OBGYN for ruptured ectopic pregnancy, will be sent over to the OR shortly vital signs are stable currently bedside fast shows possible trace fluid MDM  Number of Diagnoses or Management Options  Ectopic pregnancy, tubal: new and requires workup  Diagnosis management comments:  patient be taken to the OR with OBGYN       Amount and/or Complexity of Data Reviewed  Clinical lab tests: ordered and reviewed  Tests in the medicine section of CPT®: reviewed and ordered  Review and summarize past medical records: yes  Discuss the patient with other providers: yes  Independent visualization of images, tracings, or specimens: yes    Patient Progress  Patient progress: stable    CritCare Time    Disposition  Final diagnoses:   Ectopic pregnancy, tubal     Time reflects when diagnosis was documented in both MDM as applicable and the Disposition within this note     Time User Action Codes Description Comment    10/10/2018  1:49 PM Roxane Diaz K Add [O00 101] Right tubal pregnancy without intrauterine pregnancy     10/10/2018  1:49 PM Roxane Diaz Modify [O00 101] Right tubal pregnancy without intrauterine pregnancy     10/10/2018  3:51 PM Nolvia Pino Modify [O00 101] Right tubal pregnancy without intrauterine pregnancy     10/10/2018  5:09 PM Sonja Hoe F Modify [O00 101] Right tubal pregnancy without intrauterine pregnancy     10/10/2018  5:09 PM Sonja Hoe F Add [O00 90] Ruptured ectopic pregnancy     10/10/2018  5:09 PM Sonja Hoe F Remove [O00 90] Ruptured ectopic pregnancy     10/10/2018  5:09 PM Sonja Hoe F Add [O00 101,  K66 1] Ruptured right tubal ectopic pregnancy causing hemoperitoneum     10/10/2018  5:10 PM Sonja Hoe F Remove [O00 101,  K66 1] Ruptured right tubal ectopic pregnancy causing hemoperitoneum     10/10/2018  5:10 PM Ulises Sheppard Add [O00 109] Ectopic pregnancy, tubal       ED Disposition     None      Follow-up Information     Follow up With Specialties Details Why Contact Info Additional Addie Asif [address] Ob[facility]etric[facility] and Gynecology Schedule an appointment as soon as possible for a visit in 2 week(s) Postop visit Michelle Larkin [phone]  [facility] Stephanie Spive[place]kaya, [address], [place], [place], [phone]          Discharge Medication List as of 10/11/2018  9:29 AM      START taking these medications    Details   ibuprofen (MOTRIN) 600 mg tablet Take 1 tablet (600 mg total) by mouth every 6 (six) hours as needed for mild pain, Starting Thu 10/11/2018, Normal      oxyCODONE-acetaminophen (PERCOCET) 5-325 mg per tablet Take 1 tablet by mouth every 4 (four) hours as needed for moderate pain for up to 10 days Max Daily Amount: 6 tablets, Starting Thu 10/11/2018, Until Sun 10/21/2018, No Print         CONTINUE these medications which have NOT CHANGED    Details   ondansetron (ZOFRAN) 4 mg tablet Take 1 tablet (4 mg total) by mouth every 8 (eight) hours as needed for nausea or vomiting, Starting Sun 10/7/2018, Print      acetaminophen-codeine (TYLENOL #3) 300-30 mg per tablet Take 1 tablet by mouth every 4 (four) hours as needed for moderate pain for up to 10 days, Starting Sun 10/7/2018, Until Wed 10/17/2018, Print           No discharge procedures on file      ED Provider  Electronically Signed by           Helga Alonzo DO  10/12/18 1524

## 2018-10-10 NOTE — ED NOTES
OR called notified lab has blood ready, Lab notified to have blood on standby for 3100 Nicolette Ponce, RN  10/10/18 7244

## 2018-10-10 NOTE — DISCHARGE INSTRUCTIONS
Salpingectomy   WHAT YOU NEED TO KNOW:   A salpingectomy is surgery to remove one or both of your fallopian tubes  The fallopian tubes carry eggs from the ovaries to the uterus  They are part of a woman's reproductive system  A salpingectomy may be done to treat an ectopic pregnancy, cancer, endometriosis, or an infection  It may also be done to prevent pregnancy or some types of cancer  DISCHARGE INSTRUCTIONS:   Call 911 for any of the following:   · You feel lightheaded, short of breath, and have chest pain  · You cough up blood  · You have trouble breathing  Seek care immediately if:   · Your arm or leg feels warm, tender, and painful  It may look swollen and red  · Blood soaks through your bandage  · Your stitches come apart  · You soak through 1 sanitary pad in 1 hour  · You have trouble urinating or cannot urinate at all  Contact your healthcare provider if:   · You have a fever or chills  · Your wound is red, swollen, or draining pus  · You have pus or a foul-smelling odor coming from your vagina  · Your pain does not get better after you take your medicine  · You have nausea or are vomiting  · Your skin is itchy, swollen, or you have a rash  · You have questions or concerns about your condition or care  Medicines: You may need any of the following:  · Prescription pain medicine  may be given  Ask your healthcare provider how to take this medicine safely  · NSAIDs , such as ibuprofen, help decrease swelling, pain, and fever  NSAIDs can cause stomach bleeding or kidney problems in certain people  If you take blood thinner medicine, always ask your healthcare provider if NSAIDs are safe for you  Always read the medicine label and follow directions  · Take your medicine as directed  Contact your healthcare provider if you think your medicine is not helping or if you have side effects  Tell him or her if you are allergic to any medicine   Keep a list of the medicines, vitamins, and herbs you take  Include the amounts, and when and why you take them  Bring the list or the pill bottles to follow-up visits  Carry your medicine list with you in case of an emergency  Care for your wound as directed:  Ask your healthcare provider when your wound can get wet  Do not take a bath until your healthcare provider says it is okay  Take a shower only  Carefully wash around the wound with soap and water  Let the soap and water gently run over your incision  Do not  scrub your incision  Dry the area and put on new, clean bandages as directed  Change your bandages when they get wet or dirty  If you have strips of medical tape, let them fall off on their own  Activity:  Ask your healthcare provider when you can return to your normal activities  Do not douche, use tampons, or have sex until your healthcare provider says it is okay  These activities may cause infection  Do not exercise or lift anything heavy until your healthcare provider says it is okay  This may put too much stress on your incision  Follow up with your healthcare provider as directed:  Write down your questions so you remember to ask them during your visits  © 2017 [facility] Information is for End User's use only and may not be sold, redistributed or otherwise used for commercial purposes  All illustrations and images included in CareNotes® are the copyrighted property of [employer]  or [employer]  The above information is an  only  It is not intended as medical advice for individual conditions or treatments  Talk to your doctor, nurse or pharmacist before following any medical regimen to see if it is safe and effective for you

## 2018-10-10 NOTE — OP NOTE
OPERATIVE REPORT  PATIENT NAME: Stephanie Wheat    :  1980  MRN: [id number]  Pt Location: [facility] ROOM 08    SURGERY DATE: 10/10/2018    Surgeon(s) and Role:     * Rebeka Novak MD - Primary     * Cesilia Nguyễn MD - Leno Wallis MD - Assisting    Preop Diagnosis:  Right tubal pregnancy without intrauterine pregnancy [O00 101]    Post-Op Diagnosis Codes:     * Right tubal pregnancy without intrauterine pregnancy [O00 101]    Procedure(s) (LRB):  SALPINGECTOMY, LAPAROSCOPIC (Right)    Specimen(s):  ID Type Source Tests Collected by Time Destination   1 : Right fallopian tube with ectopic Tissue Fallopian Tube, Right TISSUE EXAM Rebeka Novak MD 10/10/2018 1551        Estimated Blood Loss:   Minimal    Drains:  Urine output - 75cc clear yellow urine    Anesthesia Type:   General - ETT    Operative Indications:  Right tubal pregnancy without intrauterine pregnancy [O00 101]    Operative Findings:  -right tubal ectopic pregnancy, no evidence of rupture - s/p removal & right salpingecotomy  -scant blood in posterior cul-de-sac  -large adhesion of omental to anterior abdominal wall & left pelvic side wall - unable to visualize left ovary and left fallopian tube  -normal appearing uterus  -normal liver edge and gallbladder visualized  -did not visualize appendix    Complications:   None    Procedure and Technique:  Patient was taken to the operating room were a time out was performed to confirm correct patient and correct procedure  General anesthesia was administered and the patient was positioned on the OR table in the dorsal lithotomy position  All pressure points were padded and a chuyita hugger was placed to maintain control of core body temperature  A bimanual exam was performed and the uterus was noted to be anteverted, normal in size and consistency with no palpable adnexal masses or fullness   The patient was prepped and draped in the usual sterile fashion with chloroprep on the abdomen and betadine prep on the vagina and perineum  A straight catheter was introduced into the bladder, which was drained of 75mL  clear yellow urine  A speculum was inserted into the vagina and used to visualize the anterior lip of the cervix, a uterine dilator was inserted into the cervix and secured to the tenaculum that was attached to the anterior lip of the cervix  The speculum was removed from the vagina  Sterile gloves were then exchanged and attention was turned to the abdomen  A 10mm incision was made at the inferior edge of the umbilicus vertically for introduction of a 11mm trocar  The laparoscope was inserted under direct visualization  Pneumoperitoneum was then established to a maximum of 15mmHg  Trocar was introduced under direct visualization  The entire abdomen and pelvis was inspected and there was no evidence of injury to bowel, bladder, vasculature, or other structures  See above for operative findings  Patient was placed in Trendelenburg and the uterus was elevated to visualize the fallopian tubes  A 5 mm horizontal incision was then made in the right lower quadrant two fingers breaths superior and medial to the right anterior superior iliac spine and a 5 mm trocar was introduced under direct visualization after local anesthetic was injected  The exact procedure was also carried out on the contralateral side,  and a 5 mm trocar was introduced under direct visualization  A blunt grasper was inserted through this port and used to visualize the fimbriated ends of the tubes  The right tubal ectopic pregnancy was visualized  Using the Enseal device, a right salpingectomy was performed  Hemostasis noted  No evidence of tubal rupture  The laparoscope was switched to the lower port and then an Endocatch bag was placed through the 11mm port  The ectopic tubal pregnancy was removed via the bag intact and sent to pathology   Suction irrigation performed, all pedicle sites were hemostatic  Following right salpingectomy, pneumoperitoneum was allowed to escape  Adequate hemostasis was visualized  The inferior trocars were removed under direct visualization  The laparoscope was withdrawn from the abdomen, followed by its trocar sleeve at the umbilicus  Skin incisions were closed with 4-0 Monocryl suture and Histocryl  Attention was turned to the vagina  The uterine dilator and tenaculum were withdrawn  Good hemostasis was confirmed at the tenaculum puncture sites  Speculum was then removed from the vagina  At the conclusion of the procedure, all needle, sponge, and instrument counts were noted to be correct x2  Patient tolerated the procedure well and was transferred to PACU in stable condition prior to discharge with follow up in 1-2 weeks  Dr Luly Hood was present and participated in all key portions of the case      Patient Disposition:  PACU     SIGNATURE: Estela Washington MD  DATE: October 10, 2018  TIME: 4:01 PM

## 2018-10-11 LAB — BACTERIA UR CULT: NORMAL

## 2018-10-11 RX ORDER — OXYCODONE HYDROCHLORIDE AND ACETAMINOPHEN 5; 325 MG/1; MG/1
1 TABLET ORAL EVERY 4 HOURS PRN
Qty: 10 TABLET | Refills: 0
Start: 2018-10-11 | End: 2018-10-11

## 2018-10-11 RX ORDER — DIPHENHYDRAMINE HCL 25 MG
50 TABLET ORAL ONCE
Status: COMPLETED | OUTPATIENT
Start: 2018-10-11 | End: 2018-10-11

## 2018-10-11 RX ORDER — OXYCODONE HYDROCHLORIDE AND ACETAMINOPHEN 5; 325 MG/1; MG/1
1 TABLET ORAL EVERY 4 HOURS PRN
Qty: 10 TABLET | Refills: 0 | Status: SHIPPED | OUTPATIENT
Start: 2018-10-11 | End: 2018-10-21

## 2018-10-11 RX ORDER — IBUPROFEN 600 MG/1
600 TABLET ORAL EVERY 6 HOURS PRN
Qty: 30 TABLET | Refills: 0 | Status: SHIPPED | OUTPATIENT
Start: 2018-10-11 | End: 2019-11-04

## 2018-10-11 RX ADMIN — DIPHENHYDRAMINE HCL 50 MG: 25 TABLET ORAL at 00:57

## 2018-10-11 RX ADMIN — ONDANSETRON 4 MG: 2 INJECTION INTRAMUSCULAR; INTRAVENOUS at 02:49

## 2018-10-11 RX ADMIN — OXYCODONE AND ACETAMINOPHEN 2 TABLET: 5; 325 TABLET ORAL at 02:47

## 2018-10-11 RX ADMIN — SODIUM CHLORIDE, SODIUM LACTATE, POTASSIUM CHLORIDE, AND CALCIUM CHLORIDE 125 ML/HR: .6; .31; .03; .02 INJECTION, SOLUTION INTRAVENOUS at 00:59

## 2018-10-11 RX ADMIN — OXYCODONE AND ACETAMINOPHEN 2 TABLET: 5; 325 TABLET ORAL at 08:54

## 2018-10-11 NOTE — PROGRESS NOTES
Gynecology Progress note   Herb Ayon 34 y o  female MRN: [id number]  Unit/Bed#: E5 -01 Encounter: [id number]    Herb Ayon reports that she is feeling much better this morning  Her abdominal is much improved although she still feels very sore  Patient is currently voiding  She is ambulating  Patient is currently passing flatus and has had no bowel movement  She is tolerating PO, and denies nausea or vomitting  Patient denies fever, chills, chest pain, shortness of breath, or calf tenderness  /53 (BP Location: Right arm)   Pulse 73   Temp 98 5 °F (36 9 °C) (Temporal)   Resp 18   Wt 99 kg (218 lb 4 1 oz)   LMP 08/07/2018   SpO2 98%   BMI 41 24 kg/m²     I/O last 3 completed shifts: In: 2229 2 [I V :2229 2]  Out: -   I/O this shift:   In: 887 5 [I V :887 5]  Out: 700 [Urine:700]    Lab Results   Component Value Date    WBC 16 73 (H) 10/10/2018    HGB 11 8 10/10/2018    HCT 35 9 10/10/2018    MCV 93 10/10/2018     10/10/2018       Lab Results   Component Value Date    GLUCOSE 95 05/30/2016    CALCIUM 8 2 (L) 10/07/2018     10/07/2018    K 4 9 10/07/2018    CO2 26 10/07/2018     10/07/2018    BUN 8 10/07/2018    CREATININE 0 71 10/07/2018       No results found for: POCGLU    Physical Exam  Gen: AAOx3, NAD, comfortable in bed  CVS: S1S2+, RRR, no murmurs  Lungs: CTA b/l normal respiratory effort and rate  Abdomen: soft, non tender, 3 incisions C/D/I  Extremities: SCDs on and on, non tender    A/P: 34 y o  s/p Diagnostic lap and right salpingectomy for an ectopic pregnancy, HD #1, Stable  1) Right Ectopic Pregnancy : s/p surgery, plan to f/u outpatient  2) Post operative Care:                        - FEN: Regular Diet, d/c IVF this morning                        - Pain: Motrin, Percocet                        - DVT ppx: SCDs                        - Encouraged incentive spirometry to reduce atelectasis and pneumonia risk                        - Encouraged ambulation as tolerated  3) Dispo: Discharge to home today      India Garcia MD, MPH  OBGYN PGY3  10/11/2018  7:00 AM

## 2018-10-13 LAB
ABO GROUP BLD BPU: NORMAL
ABO GROUP BLD BPU: NORMAL
BPU ID: NORMAL
BPU ID: NORMAL
UNIT DISPENSE STATUS: NORMAL
UNIT DISPENSE STATUS: NORMAL
UNIT PRODUCT CODE: NORMAL
UNIT PRODUCT CODE: NORMAL
UNIT RH: NORMAL
UNIT RH: NORMAL

## 2019-05-01 ENCOUNTER — OFFICE VISIT (OUTPATIENT)
Dept: FAMILY MEDICINE CLINIC | Facility: CLINIC | Age: 39
End: 2019-05-01

## 2019-05-01 VITALS
BODY MASS INDEX: 40.4 KG/M2 | TEMPERATURE: 97.5 F | OXYGEN SATURATION: 100 % | WEIGHT: 214 LBS | HEIGHT: 61 IN | DIASTOLIC BLOOD PRESSURE: 70 MMHG | RESPIRATION RATE: 15 BRPM | SYSTOLIC BLOOD PRESSURE: 100 MMHG | HEART RATE: 85 BPM

## 2019-05-01 DIAGNOSIS — Z02.1 ENCOUNTER FOR PRE-EMPLOYMENT EXAMINATION: Primary | ICD-10-CM

## 2019-05-01 PROCEDURE — 99213 OFFICE O/P EST LOW 20 MIN: CPT | Performed by: FAMILY MEDICINE

## 2019-05-01 PROCEDURE — 86580 TB INTRADERMAL TEST: CPT | Performed by: FAMILY MEDICINE

## 2019-05-01 PROCEDURE — 3008F BODY MASS INDEX DOCD: CPT | Performed by: FAMILY MEDICINE

## 2019-05-03 ENCOUNTER — CLINICAL SUPPORT (OUTPATIENT)
Dept: FAMILY MEDICINE CLINIC | Facility: CLINIC | Age: 39
End: 2019-05-03

## 2019-05-03 DIAGNOSIS — Z11.1 ENCOUNTER FOR PPD SKIN TEST READING: Primary | ICD-10-CM

## 2019-05-03 LAB
INDURATION: 0 MM
TB SKIN TEST: NEGATIVE

## 2019-07-25 ENCOUNTER — TELEPHONE (OUTPATIENT)
Dept: FAMILY MEDICINE CLINIC | Facility: CLINIC | Age: 39
End: 2019-07-25

## 2019-11-04 ENCOUNTER — OFFICE VISIT (OUTPATIENT)
Dept: OBGYN CLINIC | Facility: CLINIC | Age: 39
End: 2019-11-04

## 2019-11-04 VITALS — WEIGHT: 226.8 LBS | DIASTOLIC BLOOD PRESSURE: 70 MMHG | SYSTOLIC BLOOD PRESSURE: 114 MMHG | BODY MASS INDEX: 42.85 KG/M2

## 2019-11-04 DIAGNOSIS — N89.8 VAGINAL ODOR: ICD-10-CM

## 2019-11-04 DIAGNOSIS — N89.8 VAGINAL DISCHARGE: Primary | ICD-10-CM

## 2019-11-04 DIAGNOSIS — N76.0 BV (BACTERIAL VAGINOSIS): ICD-10-CM

## 2019-11-04 DIAGNOSIS — B96.89 BV (BACTERIAL VAGINOSIS): ICD-10-CM

## 2019-11-04 PROBLEM — Z90.79 HX OF UNILATERAL SALPINGECTOMY: Status: RESOLVED | Noted: 2018-10-10 | Resolved: 2019-11-04

## 2019-11-04 PROBLEM — Z02.1 ENCOUNTER FOR PRE-EMPLOYMENT EXAMINATION: Status: RESOLVED | Noted: 2019-05-01 | Resolved: 2019-11-04

## 2019-11-04 LAB — SL AMB POCT WET MOUNT: ABNORMAL

## 2019-11-04 PROCEDURE — 99203 OFFICE O/P NEW LOW 30 MIN: CPT | Performed by: NURSE PRACTITIONER

## 2019-11-04 PROCEDURE — 87210 SMEAR WET MOUNT SALINE/INK: CPT | Performed by: NURSE PRACTITIONER

## 2019-11-04 RX ORDER — METRONIDAZOLE 500 MG/1
500 TABLET ORAL EVERY 12 HOURS SCHEDULED
Qty: 14 TABLET | Refills: 0 | Status: SHIPPED | OUTPATIENT
Start: 2019-11-04 | End: 2019-11-11

## 2019-11-04 NOTE — PROGRESS NOTES
PROBLEM GYNECOLOGICAL VISIT    Morris Mao is a 37 y o  female who presents today with complaint of vaginal discharge/odor  Her general medical history has been reviewed and she reports it as follows:    Past Medical History:   Diagnosis Date    Anxiety     has not req'd meds since     Asthma     has not req'd tx since     Chlamydia 2000    Migraine     manages with OTC remedies and Zofran prn     Past Surgical History:   Procedure Laterality Date    CT LAP,RMV  ADNEXAL STRUCTURE Right 10/10/2018    Procedure: SALPINGECTOMY, LAPAROSCOPIC;  Surgeon: Shawn Messina MD;  Location: [facility];  Service: Gynecology    SALPINGOSTOMY  2015    tubal reversal    SCALP EXCISION      x4    TUBAL LIGATION      WISDOM TOOTH EXTRACTION       OB History        8    Para   4    Term   4       0    AB   4    Living   4       SAB   2    TAB   1    Ectopic   1    Multiple   0    Live Births   4               Social History     Tobacco Use    Smoking status: Never Smoker    Smokeless tobacco: Never Used   Substance Use Topics    Alcohol use: Yes     Frequency: Monthly or less     Drinks per session: 1 or 2    Drug use: Not Currently     Types: Marijuana     Comment: last used marijuana      No current outpatient medications on file  History of Present Illness:   Reports vaginal discharge and odor for past 4 weeks  Also reports vulvar/vaginal irritation    Review of Systems:  Review of Systems   Constitutional: Negative  Gastrointestinal: Negative  Genitourinary: Positive for vaginal discharge  Vaginal odor  Vulvar irritation       Physical Exam:  /70   Wt 103 kg (226 lb 12 8 oz)   LMP 10/09/2019 (Exact Date)   BMI 42 85 kg/m²   Physical Exam   Constitutional: She appears well-developed and well-nourished  Genitourinary: Vaginal discharge found  Abdominal: Soft  There is no tenderness  Skin: Skin is warm and dry  Vitals reviewed        Point of Care Testing:   -Wet mount: + clue cells, no trichomonads, few WBC's   -KOH mount: no hyphae   -Whiff: POSITIVE    Assessment:   1  Bacterial vaginosis  Plan:   1  Given Rx Flagyl  2  Return to office prn

## 2020-01-16 ENCOUNTER — APPOINTMENT (EMERGENCY)
Dept: RADIOLOGY | Facility: HOSPITAL | Age: 40
End: 2020-01-16
Payer: COMMERCIAL

## 2020-01-16 ENCOUNTER — APPOINTMENT (EMERGENCY)
Dept: CT IMAGING | Facility: HOSPITAL | Age: 40
End: 2020-01-16
Payer: COMMERCIAL

## 2020-01-16 ENCOUNTER — HOSPITAL ENCOUNTER (EMERGENCY)
Facility: HOSPITAL | Age: 40
Discharge: HOME | End: 2020-01-16
Attending: EMERGENCY MEDICINE | Admitting: EMERGENCY MEDICINE
Payer: COMMERCIAL

## 2020-01-16 VITALS
SYSTOLIC BLOOD PRESSURE: 113 MMHG | HEART RATE: 82 BPM | TEMPERATURE: 98.6 F | RESPIRATION RATE: 18 BRPM | OXYGEN SATURATION: 98 % | DIASTOLIC BLOOD PRESSURE: 65 MMHG

## 2020-01-16 DIAGNOSIS — M54.2 NECK PAIN: ICD-10-CM

## 2020-01-16 DIAGNOSIS — S09.90XA CLOSED HEAD INJURY, INITIAL ENCOUNTER: ICD-10-CM
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
## 2020-01-16 DIAGNOSIS — S00.83XA CONTUSION OF FACE, INITIAL ENCOUNTER: ICD-10-CM

## 2020-01-16 LAB
ANION GAP SERPL CALCULATED.3IONS-SCNC: 13 MMOL/L (ref 4–13)
BASOPHILS # BLD AUTO: 0.06 THOUSANDS/ΜL (ref 0–0.1)
BASOPHILS NFR BLD AUTO: 1 % (ref 0–1)
BUN SERPL-MCNC: 11 MG/DL (ref 5–25)
CALCIUM SERPL-MCNC: 8.4 MG/DL (ref 8.3–10.1)
CHLORIDE SERPL-SCNC: 107 MMOL/L (ref 100–108)
CO2 SERPL-SCNC: 22 MMOL/L (ref 21–32)
CREAT SERPL-MCNC: 0.79 MG/DL (ref 0.6–1.3)
EOSINOPHIL # BLD AUTO: 0.21 THOUSAND/ΜL (ref 0–0.61)
EOSINOPHIL NFR BLD AUTO: 2 % (ref 0–6)
ERYTHROCYTE [DISTWIDTH] IN BLOOD BY AUTOMATED COUNT: 14.9 % (ref 11.6–15.1)
EXT PREG TEST URINE: NEGATIVE
EXT. CONTROL ED NAV: NORMAL
GFR SERPL CREATININE-BSD FRML MDRD: 93 ML/MIN/1.73SQ M
GLUCOSE SERPL-MCNC: 88 MG/DL (ref 65–140)
HCT VFR BLD AUTO: 39.3 % (ref 34.8–46.1)
HGB BLD-MCNC: 12.6 G/DL (ref 11.5–15.4)
IMM GRANULOCYTES # BLD AUTO: 0.03 THOUSAND/UL (ref 0–0.2)
IMM GRANULOCYTES NFR BLD AUTO: 0 % (ref 0–2)
LYMPHOCYTES # BLD AUTO: 1.78 THOUSANDS/ΜL (ref 0.6–4.47)
LYMPHOCYTES NFR BLD AUTO: 19 % (ref 14–44)
MCH RBC QN AUTO: 30.7 PG (ref 26.8–34.3)
MCHC RBC AUTO-ENTMCNC: 32.1 G/DL (ref 31.4–37.4)
MCV RBC AUTO: 96 FL (ref 82–98)
MONOCYTES # BLD AUTO: 0.83 THOUSAND/ΜL (ref 0.17–1.22)
MONOCYTES NFR BLD AUTO: 9 % (ref 4–12)
NEUTROPHILS # BLD AUTO: 6.41 THOUSANDS/ΜL (ref 1.85–7.62)
NEUTS SEG NFR BLD AUTO: 69 % (ref 43–75)
NRBC BLD AUTO-RTO: 0 /100 WBCS
PLATELET # BLD AUTO: 220 THOUSANDS/UL (ref 149–390)
PMV BLD AUTO: 10.5 FL (ref 8.9–12.7)
POTASSIUM SERPL-SCNC: 3.9 MMOL/L (ref 3.5–5.3)
RBC # BLD AUTO: 4.11 MILLION/UL (ref 3.81–5.12)
SODIUM SERPL-SCNC: 142 MMOL/L (ref 136–145)
WBC # BLD AUTO: 9.32 THOUSAND/UL (ref 4.31–10.16)

## 2020-01-16 PROCEDURE — 70450 CT HEAD/BRAIN W/O DYE: CPT

## 2020-01-16 PROCEDURE — 80048 BASIC METABOLIC PNL TOTAL CA: CPT | Performed by: PHYSICIAN ASSISTANT

## 2020-01-16 PROCEDURE — 74177 CT ABD & PELVIS W/CONTRAST: CPT

## 2020-01-16 PROCEDURE — 85025 COMPLETE CBC W/AUTO DIFF WBC: CPT | Performed by: PHYSICIAN ASSISTANT

## 2020-01-16 PROCEDURE — 73130 X-RAY EXAM OF HAND: CPT

## 2020-01-16 PROCEDURE — 96376 TX/PRO/DX INJ SAME DRUG ADON: CPT

## 2020-01-16 PROCEDURE — 73110 X-RAY EXAM OF WRIST: CPT

## 2020-01-16 PROCEDURE — 36415 COLL VENOUS BLD VENIPUNCTURE: CPT | Performed by: PHYSICIAN ASSISTANT

## 2020-01-16 PROCEDURE — 81025 URINE PREGNANCY TEST: CPT | Performed by: PHYSICIAN ASSISTANT

## 2020-01-16 PROCEDURE — 99284 EMERGENCY DEPT VISIT MOD MDM: CPT

## 2020-01-16 PROCEDURE — 72125 CT NECK SPINE W/O DYE: CPT

## 2020-01-16 PROCEDURE — 96374 THER/PROPH/DIAG INJ IV PUSH: CPT

## 2020-01-16 PROCEDURE — 70486 CT MAXILLOFACIAL W/O DYE: CPT

## 2020-01-16 PROCEDURE — 96361 HYDRATE IV INFUSION ADD-ON: CPT

## 2020-01-16 PROCEDURE — 99285 EMERGENCY DEPT VISIT HI MDM: CPT | Performed by: EMERGENCY MEDICINE

## 2020-01-16 PROCEDURE — 71260 CT THORAX DX C+: CPT

## 2020-01-16 RX ORDER — MORPHINE SULFATE 4 MG/ML
4 INJECTION, SOLUTION INTRAMUSCULAR; INTRAVENOUS ONCE
Status: COMPLETED | OUTPATIENT
Start: 2020-01-16 | End: 2020-01-16

## 2020-01-16 RX ORDER — OXYCODONE HYDROCHLORIDE AND ACETAMINOPHEN 5; 325 MG/1; MG/1
1 TABLET ORAL EVERY 4 HOURS PRN
Qty: 10 TABLET | Refills: 0 | Status: SHIPPED | OUTPATIENT
Start: 2020-01-16 | End: 2020-01-26

## 2020-01-16 RX ORDER — ACETAMINOPHEN 325 MG/1
650 TABLET ORAL ONCE
Status: COMPLETED | OUTPATIENT
Start: 2020-01-16 | End: 2020-01-16

## 2020-01-16 RX ORDER — IBUPROFEN 600 MG/1
600 TABLET ORAL EVERY 6 HOURS PRN
Qty: 30 TABLET | Refills: 0 | Status: SHIPPED | OUTPATIENT
Start: 2020-01-16 | End: 2020-10-15

## 2020-01-16 RX ADMIN — MORPHINE SULFATE 4 MG: 4 INJECTION INTRAVENOUS at 16:58

## 2020-01-16 RX ADMIN — ACETAMINOPHEN 650 MG: 325 TABLET ORAL at 16:06

## 2020-01-16 RX ADMIN — SODIUM CHLORIDE 1000 ML: 0.9 INJECTION, SOLUTION INTRAVENOUS at 14:46

## 2020-01-16 RX ADMIN — MORPHINE SULFATE 4 MG: 4 INJECTION INTRAVENOUS at 14:46

## 2020-01-16 RX ADMIN — IOHEXOL 100 ML: 350 INJECTION, SOLUTION INTRAVENOUS at 16:04

## 2020-01-16 NOTE — ED NOTES
Apple juice provided to patient, awaiting daughter to  at this time       Samuel Chowdhury, NICHOLAS  01/16/20 3527
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
## 2020-02-24 ENCOUNTER — OFFICE VISIT (OUTPATIENT)
Dept: FAMILY MEDICINE CLINIC | Facility: CLINIC | Age: 40
End: 2020-02-24

## 2020-02-24 VITALS
SYSTOLIC BLOOD PRESSURE: 118 MMHG | BODY MASS INDEX: 42.01 KG/M2 | TEMPERATURE: 96.9 F | HEIGHT: 61 IN | OXYGEN SATURATION: 98 % | WEIGHT: 222.5 LBS | DIASTOLIC BLOOD PRESSURE: 78 MMHG | RESPIRATION RATE: 18 BRPM | HEART RATE: 80 BPM

## 2020-02-24 DIAGNOSIS — Z12.39 SCREENING FOR BREAST CANCER: ICD-10-CM

## 2020-02-24 DIAGNOSIS — Z12.31 SCREENING MAMMOGRAM FOR HIGH-RISK PATIENT: ICD-10-CM

## 2020-02-24 DIAGNOSIS — J34.1 CYST OF MAXILLARY SINUS: Primary | ICD-10-CM

## 2020-02-24 DIAGNOSIS — Z23 NEED FOR VACCINATION: ICD-10-CM

## 2020-02-24 DIAGNOSIS — E66.01 CLASS 3 SEVERE OBESITY DUE TO EXCESS CALORIES WITHOUT SERIOUS COMORBIDITY WITH BODY MASS INDEX (BMI) OF 40.0 TO 44.9 IN ADULT (HCC): ICD-10-CM

## 2020-02-24 PROBLEM — E66.813 CLASS 3 SEVERE OBESITY DUE TO EXCESS CALORIES WITHOUT SERIOUS COMORBIDITY WITH BODY MASS INDEX (BMI) OF 40.0 TO 44.9 IN ADULT (HCC): Status: ACTIVE | Noted: 2020-02-24

## 2020-02-24 PROCEDURE — 99213 OFFICE O/P EST LOW 20 MIN: CPT | Performed by: FAMILY MEDICINE

## 2020-02-24 PROCEDURE — 3008F BODY MASS INDEX DOCD: CPT | Performed by: FAMILY MEDICINE

## 2020-02-24 PROCEDURE — 1036F TOBACCO NON-USER: CPT | Performed by: FAMILY MEDICINE

## 2020-02-24 NOTE — ASSESSMENT & PLAN NOTE
BMI Counseling: Body mass index is 42 04 kg/m²  The BMI is above normal  Nutrition recommendations include reducing portion sizes, decreasing overall calorie intake and reducing fast food intake  Patient referred to weight management due to patient being morbidly obese  Patient was seen by weight management in the past, was attending bariatric conferences, she lost some weight initially, but started to gain again for the last year  Will do some blood work including TSH, Lipid profile, HgA1C   BMP and CBC reviewed from 1/2020, no need to repeat  Patient is not interested in bariatric surgery, but wants to be seen by weight management, referral placed

## 2020-02-24 NOTE — ASSESSMENT & PLAN NOTE
As evident on the CT scan from 1/16/2020   Complains of chronic congestion for about a year, but denies difficulty breathing/inhaling, more congested on the right side  Will refer to ENT for evaluation and treatment

## 2020-02-24 NOTE — PROGRESS NOTES
Assessment/Plan:    Cyst of maxillary sinus  As evident on the CT scan from 1/16/2020  Complains of chronic congestion for about a year, but denies difficulty breathing/inhaling, more congested on the right side  Will refer to ENT for evaluation and treatment    Class 3 severe obesity due to excess calories without serious comorbidity with body mass index (BMI) of 40 0 to 44 9 in adult ([facility] [address] )  BMI Counseling: Body mass index is 42 04 kg/m²  The BMI is above normal  Nutrition recommendations include reducing portion sizes, decreasing overall calorie intake and reducing fast food intake  Patient referred to weight management due to patient being morbidly obese  Patient was seen by weight management in the past, was attending bariatric conferences, she lost some weight initially, but started to gain again for the last year  Will do some blood work including TSH, Lipid profile, HgA1C  BMP and CBC reviewed from 1/2020, no need to repeat  Patient is not interested in bariatric surgery, but wants to be seen by weight management, referral placed         Diagnoses and all orders for this visit:    Cyst of maxillary sinus  -     Ambulatory Referral to Otolaryngology; Future    Screening mammogram for high-risk patient    Need for vaccination  -     influenza vaccine, [id number], quadrivalent, 0 5 mL, preservative-free, for adult and pediatric patients 6 mos+ (AFLURIA, FLUARIX, FLULAVAL, FLUZONE)    Class 3 severe obesity due to excess calories without serious comorbidity with body mass index (BMI) of 40 0 to 44 9 in adult ([facility])  -     TSH, 3rd generation with Free T4 reflex; Future  -     Lipid panel; Future  -     HEMOGLOBIN A1C W/ EAG ESTIMATION; Future  -     Ambulatory referral to Weight Management; Future    Screening for breast cancer  -     Mammo screening bilateral w cad; Future          Subjective:      Patient ID: Gregoria Barry is a 37 y o  female      Patient presents to follow-up after ED visit in January were she found to have a maxillary cyst   She is concerned about the finding and states she has been feeling congested in the right side for about a year now  She is also concerned for her weight that she has been gaining progressively  The following portions of the patient's history were reviewed and updated as appropriate: allergies, current medications, past family history, past medical history, past social history, past surgical history and problem list     Review of Systems   Constitutional: Negative for chills, diaphoresis, fatigue and fever  HENT: Positive for congestion  Negative for ear discharge, ear pain, mouth sores, rhinorrhea, sore throat and trouble swallowing  Eyes: Negative for photophobia, pain and discharge  Respiratory: Negative for cough, chest tightness, shortness of breath and wheezing  Cardiovascular: Negative for chest pain, palpitations and leg swelling  Gastrointestinal: Negative for abdominal distention, abdominal pain, blood in stool, constipation, diarrhea and nausea  Genitourinary: Negative for difficulty urinating and frequency  Musculoskeletal: Negative for joint swelling and neck stiffness  Skin: Negative for color change, pallor and rash  Neurological: Negative for dizziness, syncope, numbness and headaches  Objective:      /78 (BP Location: Left arm, Patient Position: Sitting, Cuff Size: Large)   Pulse 80   Temp (!) 96 9 °F (36 1 °C) (Temporal)   Resp 18   Ht 5' 1" (1 549 m)   Wt 101 kg (222 lb 8 oz)   LMP 02/11/2020   SpO2 98%   BMI 42 04 kg/m²          Physical Exam   Constitutional: She is oriented to person, place, and time  She appears well-developed and well-nourished  No distress  HENT:   Head: Normocephalic and atraumatic  Nose: No mucosal edema or rhinorrhea  Eyes: Pupils are equal, round, and reactive to light  EOM are normal  No scleral icterus  Neck: Normal range of motion  Neck supple     Cardiovascular: Normal rate, regular rhythm and normal heart sounds  Exam reveals no gallop and no friction rub  No murmur heard  Pulmonary/Chest: Effort normal and breath sounds normal  No respiratory distress  She has no wheezes  She has no rales  She exhibits no tenderness  Abdominal: Soft  Bowel sounds are normal  She exhibits no distension  There is no tenderness  There is no rebound  Musculoskeletal: Normal range of motion  She exhibits no edema, tenderness or deformity  Lymphadenopathy:     She has no cervical adenopathy  Neurological: She is alert and oriented to person, place, and time  Skin: Skin is warm and dry  No rash noted  No erythema  No pallor  Psychiatric: She has a normal mood and affect

## 2020-04-14 ENCOUNTER — TELEPHONE (OUTPATIENT)
Dept: OTOLARYNGOLOGY | Facility: CLINIC | Age: 40
End: 2020-04-14

## 2020-04-30 ENCOUNTER — TELEMEDICINE (OUTPATIENT)
Dept: OTOLARYNGOLOGY | Facility: CLINIC | Age: 40
End: 2020-04-30
Payer: COMMERCIAL

## 2020-04-30 DIAGNOSIS — J33.0 ANTROCHOANAL POLYP: Primary | ICD-10-CM

## 2020-04-30 DIAGNOSIS — J34.1 CYST OF MAXILLARY SINUS: ICD-10-CM

## 2020-04-30 PROCEDURE — G2012 BRIEF CHECK IN BY MD/QHP: HCPCS | Performed by: OTOLARYNGOLOGY

## 2020-04-30 RX ORDER — FLUTICASONE PROPIONATE 50 MCG
1 SPRAY, SUSPENSION (ML) NASAL DAILY
Qty: 1 BOTTLE | Refills: 2 | Status: SHIPPED | OUTPATIENT
Start: 2020-04-30 | End: 2020-06-25 | Stop reason: HOSPADM

## 2020-06-03 ENCOUNTER — TELEPHONE (OUTPATIENT)
Dept: OTOLARYNGOLOGY | Facility: CLINIC | Age: 40
End: 2020-06-03

## 2020-06-04 ENCOUNTER — OFFICE VISIT (OUTPATIENT)
Dept: OTOLARYNGOLOGY | Facility: CLINIC | Age: 40
End: 2020-06-04
Payer: COMMERCIAL

## 2020-06-04 VITALS
HEIGHT: 61 IN | BODY MASS INDEX: 41.99 KG/M2 | HEART RATE: 89 BPM | WEIGHT: 222.4 LBS | SYSTOLIC BLOOD PRESSURE: 103 MMHG | RESPIRATION RATE: 16 BRPM | TEMPERATURE: 97.5 F | DIASTOLIC BLOOD PRESSURE: 68 MMHG

## 2020-06-04 DIAGNOSIS — J33.0 ANTROCHOANAL POLYP: ICD-10-CM

## 2020-06-04 DIAGNOSIS — E66.01 CLASS 3 SEVERE OBESITY DUE TO EXCESS CALORIES WITHOUT SERIOUS COMORBIDITY WITH BODY MASS INDEX (BMI) OF 40.0 TO 44.9 IN ADULT (HCC): ICD-10-CM

## 2020-06-04 DIAGNOSIS — K04.7 PERIAPICAL ABSCESS: ICD-10-CM

## 2020-06-04 DIAGNOSIS — J34.1 CYST OF MAXILLARY SINUS: ICD-10-CM

## 2020-06-04 DIAGNOSIS — J32.0 CHRONIC RIGHT MAXILLARY SINUSITIS: Primary | ICD-10-CM

## 2020-06-04 PROCEDURE — 99214 OFFICE O/P EST MOD 30 MIN: CPT | Performed by: OTOLARYNGOLOGY

## 2020-06-04 PROCEDURE — 1036F TOBACCO NON-USER: CPT | Performed by: OTOLARYNGOLOGY

## 2020-06-04 PROCEDURE — 3008F BODY MASS INDEX DOCD: CPT | Performed by: OTOLARYNGOLOGY

## 2020-06-04 PROCEDURE — 3008F BODY MASS INDEX DOCD: CPT | Performed by: FAMILY MEDICINE

## 2020-06-04 NOTE — H&P (VIEW-ONLY)
Christelle Hernandez 37 y o  female MRN: [id number]  Unit/Bed#:  Encounter: [id number]            History of Present Illness     Reason for Visit: follow up    HPI: Christelle Hernandez is a 37y o  year old female with several years history of "rhinitis/allergies", nasal congestion, symptoms mainly on the right side  Incidentally found to have a large right maxillary retention cyst and a right antrochoanal polyp in a CT scan of facial bones that was done due to trauma to the face  I had a telemedicine encounter and started nasal steroid spray  She reports no change on the symptoms: basically congestion right nasal cavity  In the recent CT scan the root of a molar appears to be in continuity with the very large cyst  When interrogated, she does have a filling on the molar, has had no associated symptoms  I did discuss with the patient the fact that overweight/obesity also entails multiple risks for long-term comorbidities and anesthesia, encouraged her to proceed with reduction of portions in an effort to control body weight  She has actually been able to lose a significant amount of body weight to this date  Review of Systems   All other systems reviewed and are negative      Complete review done, only positive for the symptoms described in the H&P section above      Historical Information   Past Medical History:   Diagnosis Date    Anxiety     has not req'd meds since 2014    Asthma     has not req'd tx since 2016    Chlamydia 2000    Migraine     manages with OTC remedies and Zofran prn     Past Surgical History:   Procedure Laterality Date    MI LAP,RMV  ADNEXAL STRUCTURE Right 10/10/2018    Procedure: SALPINGECTOMY, LAPAROSCOPIC;  Surgeon: Bri Lora MD;  Location: [facility];  Service: Gynecology    SALPINGOSTOMY  2015    tubal reversal    SCALP EXCISION      x4    TUBAL LIGATION  2002    WISDOM TOOTH EXTRACTION  1998     Social History   Social History     Substance and Sexual Activity Alcohol Use Yes    Frequency: Monthly or less    Drinks per session: 1 or 2     Social History     Substance and Sexual Activity   Drug Use Not Currently    Types: Marijuana    Comment: last used marijuana 2014     Social History     Tobacco Use   Smoking Status Never Smoker   Smokeless Tobacco Never Used     Family History:   Family History   Problem Relation Age of Onset    Cancer Maternal Aunt         lung    Breast cancer Neg Hx        Meds/Allergies   No current facility-administered medications for this visit  No Known Allergies    Objective       Physical Exam   Blood pressure 103/68, pulse 89, temperature 97 5 °F (36 4 °C), resp  rate 16, height 5' 1" (1 549 m), weight 101 kg (222 lb 6 4 oz), not currently breastfeeding  Constitutional: Oriented to person, place, and time  Well-developed and well-nourished, no apparent distress, non-toxic appearance  Cooperative, able to hear and answer questions without difficulty  Voice: Normal voice quality  Head: Normocephalic, atraumatic  No scars, masses or lesions  Face: Symmetric, no edema, no sinus tenderness  Eyes: Vision grossly intact, extra-ocular movement intact  Right Ear: External ear normal   Auditory canal clear  Tympanic membrane well-appearing, without retraction or scarring  No fluid present  No post-auricular erythema or tenderness  Left Ear: External ear normal   Auditory canal clear  Tympanic membrane well-appearing, without retraction or scarring  No fluid present  No post-auricular erythema or tenderness  Nose: Septum midline, Mucosa moist, turbinates normal size, no edema  Single posterior right polyp,  No additional polyps or masses, no discharge evident  No edema on the inferior or middle turbinates  Oral cavity:  Lips normal, no mucosal lesions  Dentition:  Some molars are missing, the molar with the potential root into the maxillary sinus does have a filling, it appears to be relatively in good condition  There is no erythema of the gingiva, no swelling to suspect active  Periodontal disease  Mucosa moist,  Tongue mobile, floor of mouth normal   Hard palate unremarkable  No masses or lesions  Oropharynx: Uvula is midline, sot palate normal   Unremarkable oropharyngeal inlet  Tonsils unremarkable  Posterior pharyngeal wall clear  No masses or lesions  Salivary glands:  Parotid glands and submandibular glands symmetric, no enlargement or tenderness  Neck: Normal laryngeal elevation with swallow  Trachea midline  No masses or lesions  No palpable adenopathy  Thyroid: normal in size, and consistency, unremarkable without tenderness or palpable nodules  Pulmonary/Chest: Normal effort and rate  No respiratory distress  Musculoskeletal: Normal range of motion  Neurological: Cranial nerves 2-12 intact  Skin: Skin is warm and dry  Psychiatric: Normal mood and affect  Lab Results: CBC: No results found for: WBC, HGB, HCT, MCV, PLT, ADJUSTEDWBC, MCH, MCHC, RDW, MPV, NRBC, CMP: No results found for: NA, K, CL, CO2, ANIONGAP, BUN, CREATININE, GLUCOSE, CALCIUM, AST, ALT, ALKPHOS, PROT, BILITOT, EGFR  Imaging Studies: I have personally reviewed images on the PACS system and :  CT facial bones 1/16/20: large right maxillary retention cyst and a right antrochoanal polyp, as well as a air-fluid level on the right sphenoid sinus, the sphenoid ostium appears occluded   CT scan facial bones  2012: Also confirms the presence of a large maxillary cyst, right antral choanal polyp and right sphenoid sinusitis  CT scan head 2006:Right sphenoid sinusitis, but unfortunately does not allow visualization of the maxillary sinus or the choanal aperture  Assessment:   Diagnosis ICD-10-CM Associated Orders   1  Chronic right maxillary sinusitis J32 0    2  Antrochoanal polyp J33 0    3  Cyst of maxillary sinus J34 1    4  Periapical abscess K04 7    5   Class 3 severe obesity due to excess calories without serious comorbidity with body mass index (BMI) of 40 0 to 44 9 in adult [facility]) E66 01     Z68 41        Plan:  Patient needs Functional endoscopic sinus surgery with right maxillary antrostomy removal of cyst, right sphenoidotomy, right polypectomy (large antral choanal polyp)  Risks, benefits and alternatives of surgery were discussed with the patient with decides to proceed with surgery  Informed consent was obtained

## 2020-06-10 DIAGNOSIS — Z01.818 PREOPERATIVE TESTING: Primary | ICD-10-CM

## 2020-06-22 DIAGNOSIS — Z01.818 PREOPERATIVE TESTING: ICD-10-CM

## 2020-06-22 LAB
ANION GAP SERPL CALCULATED.3IONS-SCNC: 8 MMOL/L (ref 5–14)
BASOPHILS # BLD AUTO: 0.1 THOUSANDS/ΜL (ref 0–0.1)
BASOPHILS NFR BLD AUTO: 1 % (ref 0–1)
BUN SERPL-MCNC: 14 MG/DL (ref 5–25)
CALCIUM SERPL-MCNC: 8.9 MG/DL (ref 8.4–10.2)
CHLORIDE SERPL-SCNC: 110 MMOL/L (ref 97–108)
CO2 SERPL-SCNC: 21 MMOL/L (ref 22–30)
CREAT SERPL-MCNC: 0.84 MG/DL (ref 0.6–1.2)
EOSINOPHIL # BLD AUTO: 0.2 THOUSAND/ΜL (ref 0–0.4)
EOSINOPHIL NFR BLD AUTO: 2 % (ref 0–6)
ERYTHROCYTE [DISTWIDTH] IN BLOOD BY AUTOMATED COUNT: 16.2 %
GFR SERPL CREATININE-BSD FRML MDRD: 87 ML/MIN/1.73SQ M
GLUCOSE SERPL-MCNC: 98 MG/DL (ref 70–99)
HCT VFR BLD AUTO: 39.1 % (ref 36–46)
HGB BLD-MCNC: 12.6 G/DL (ref 12–16)
LYMPHOCYTES # BLD AUTO: 1.4 THOUSANDS/ΜL (ref 0.5–4)
LYMPHOCYTES NFR BLD AUTO: 13 % (ref 25–45)
MCH RBC QN AUTO: 29.4 PG (ref 26–34)
MCHC RBC AUTO-ENTMCNC: 32.2 G/DL (ref 31–36)
MCV RBC AUTO: 91 FL (ref 80–100)
MONOCYTES # BLD AUTO: 0.9 THOUSAND/ΜL (ref 0.2–0.9)
MONOCYTES NFR BLD AUTO: 8 % (ref 1–10)
NEUTROPHILS # BLD AUTO: 8.6 THOUSANDS/ΜL (ref 1.8–7.8)
NEUTS SEG NFR BLD AUTO: 76 % (ref 45–65)
PLATELET # BLD AUTO: 278 THOUSANDS/UL (ref 150–450)
PMV BLD AUTO: 8.5 FL (ref 8.9–12.7)
POTASSIUM SERPL-SCNC: 4.1 MMOL/L (ref 3.6–5)
RBC # BLD AUTO: 4.28 MILLION/UL (ref 4–5.2)
SODIUM SERPL-SCNC: 139 MMOL/L (ref 137–147)
WBC # BLD AUTO: 11.3 THOUSAND/UL (ref 4.5–11)

## 2020-06-22 PROCEDURE — 80048 BASIC METABOLIC PNL TOTAL CA: CPT

## 2020-06-22 PROCEDURE — U0003 INFECTIOUS AGENT DETECTION BY NUCLEIC ACID (DNA OR RNA); SEVERE ACUTE RESPIRATORY SYNDROME CORONAVIRUS 2 (SARS-COV-2) (CORONAVIRUS DISEASE [COVID-19]), AMPLIFIED PROBE TECHNIQUE, MAKING USE OF HIGH THROUGHPUT TECHNOLOGIES AS DESCRIBED BY CMS-2020-01-R: HCPCS

## 2020-06-22 PROCEDURE — 85025 COMPLETE CBC W/AUTO DIFF WBC: CPT

## 2020-06-22 RX ORDER — COVID-19 ANTIGEN TEST
1 KIT MISCELLANEOUS 2 TIMES DAILY PRN
COMMUNITY
End: 2020-10-15

## 2020-06-22 NOTE — PRE-PROCEDURE INSTRUCTIONS
No outpatient medications have been marked as taking for the 6/25/20 encounter [facility] Encounter)

## 2020-06-23 LAB — SARS-COV-2 RNA SPEC QL NAA+PROBE: NOT DETECTED

## 2020-06-24 ENCOUNTER — ANESTHESIA EVENT (OUTPATIENT)
Dept: PERIOP | Facility: HOSPITAL | Age: 40
End: 2020-06-24
Payer: COMMERCIAL

## 2020-06-25 ENCOUNTER — ANESTHESIA (OUTPATIENT)
Dept: PERIOP | Facility: HOSPITAL | Age: 40
End: 2020-06-25
Payer: COMMERCIAL

## 2020-06-25 ENCOUNTER — HOSPITAL ENCOUNTER (OUTPATIENT)
Facility: HOSPITAL | Age: 40
Setting detail: OUTPATIENT SURGERY
Discharge: HOME | End: 2020-06-25
Attending: OTOLARYNGOLOGY | Admitting: OTOLARYNGOLOGY
Payer: COMMERCIAL

## 2020-06-25 VITALS
OXYGEN SATURATION: 94 % | SYSTOLIC BLOOD PRESSURE: 115 MMHG | DIASTOLIC BLOOD PRESSURE: 56 MMHG | TEMPERATURE: 97.5 F | HEART RATE: 106 BPM | RESPIRATION RATE: 20 BRPM

## 2020-06-25 DIAGNOSIS — Z01.810 PREOP CARDIOVASCULAR EXAM: ICD-10-CM

## 2020-06-25 DIAGNOSIS — K04.7 PERIAPICAL ABSCESS: ICD-10-CM

## 2020-06-25 DIAGNOSIS — J34.1 CYST OF MAXILLARY SINUS: ICD-10-CM

## 2020-06-25 DIAGNOSIS — E66.01 CLASS 3 SEVERE OBESITY DUE TO EXCESS CALORIES WITHOUT SERIOUS COMORBIDITY WITH BODY MASS INDEX (BMI) OF 40.0 TO 44.9 IN ADULT (HCC): ICD-10-CM

## 2020-06-25 DIAGNOSIS — J32.0 CHRONIC RIGHT MAXILLARY SINUSITIS: ICD-10-CM

## 2020-06-25 DIAGNOSIS — J33.0 ANTROCHOANAL POLYP: Primary | ICD-10-CM

## 2020-06-25 LAB
EXT PREGNANCY TEST URINE: NEGATIVE
EXT. CONTROL: NORMAL

## 2020-06-25 PROCEDURE — 88311 DECALCIFY TISSUE: CPT | Performed by: PATHOLOGY

## 2020-06-25 PROCEDURE — 88304 TISSUE EXAM BY PATHOLOGIST: CPT | Performed by: PATHOLOGY

## 2020-06-25 PROCEDURE — 81025 URINE PREGNANCY TEST: CPT | Performed by: OTOLARYNGOLOGY

## 2020-06-25 PROCEDURE — 31267 ENDOSCOPY MAXILLARY SINUS: CPT | Performed by: OTOLARYNGOLOGY

## 2020-06-25 PROCEDURE — 88305 TISSUE EXAM BY PATHOLOGIST: CPT | Performed by: PATHOLOGY

## 2020-06-25 PROCEDURE — 31288 NASAL/SINUS ENDOSCOPY SURG: CPT | Performed by: OTOLARYNGOLOGY

## 2020-06-25 PROCEDURE — 30115 REMOVAL OF NOSE POLYP(S): CPT | Performed by: OTOLARYNGOLOGY

## 2020-06-25 RX ORDER — ROCURONIUM BROMIDE 10 MG/ML
INJECTION, SOLUTION INTRAVENOUS AS NEEDED
Status: DISCONTINUED | OUTPATIENT
Start: 2020-06-25 | End: 2020-06-25 | Stop reason: SURG

## 2020-06-25 RX ORDER — SODIUM CHLORIDE, SODIUM LACTATE, POTASSIUM CHLORIDE, CALCIUM CHLORIDE 600; 310; 30; 20 MG/100ML; MG/100ML; MG/100ML; MG/100ML
125 INJECTION, SOLUTION INTRAVENOUS CONTINUOUS
Status: DISCONTINUED | OUTPATIENT
Start: 2020-06-25 | End: 2020-06-25 | Stop reason: HOSPADM

## 2020-06-25 RX ORDER — SODIUM CHLORIDE 9 MG/ML
INJECTION, SOLUTION INTRAVENOUS AS NEEDED
Status: DISCONTINUED | OUTPATIENT
Start: 2020-06-25 | End: 2020-06-25 | Stop reason: HOSPADM

## 2020-06-25 RX ORDER — DEXAMETHASONE SODIUM PHOSPHATE 10 MG/ML
INJECTION, SOLUTION INTRAMUSCULAR; INTRAVENOUS AS NEEDED
Status: DISCONTINUED | OUTPATIENT
Start: 2020-06-25 | End: 2020-06-25 | Stop reason: SURG

## 2020-06-25 RX ORDER — DEXAMETHASONE SODIUM PHOSPHATE 4 MG/ML
4 INJECTION, SOLUTION INTRA-ARTICULAR; INTRALESIONAL; INTRAMUSCULAR; INTRAVENOUS; SOFT TISSUE ONCE
Status: COMPLETED | OUTPATIENT
Start: 2020-06-25 | End: 2020-06-25

## 2020-06-25 RX ORDER — PREDNISONE 10 MG/1
TABLET ORAL
Qty: 30 TABLET | Refills: 0 | Status: SHIPPED | OUTPATIENT
Start: 2020-06-25 | End: 2020-10-15

## 2020-06-25 RX ORDER — MIDAZOLAM HYDROCHLORIDE 2 MG/2ML
INJECTION, SOLUTION INTRAMUSCULAR; INTRAVENOUS AS NEEDED
Status: DISCONTINUED | OUTPATIENT
Start: 2020-06-25 | End: 2020-06-25 | Stop reason: SURG

## 2020-06-25 RX ORDER — PROMETHAZINE HYDROCHLORIDE 25 MG/ML
12.5 INJECTION, SOLUTION INTRAMUSCULAR; INTRAVENOUS ONCE AS NEEDED
Status: COMPLETED | OUTPATIENT
Start: 2020-06-25 | End: 2020-06-25

## 2020-06-25 RX ORDER — CEFAZOLIN SODIUM 1 G/50ML
SOLUTION INTRAVENOUS AS NEEDED
Status: DISCONTINUED | OUTPATIENT
Start: 2020-06-25 | End: 2020-06-25 | Stop reason: SURG

## 2020-06-25 RX ORDER — LIDOCAINE HYDROCHLORIDE 10 MG/ML
INJECTION, SOLUTION EPIDURAL; INFILTRATION; INTRACAUDAL; PERINEURAL AS NEEDED
Status: DISCONTINUED | OUTPATIENT
Start: 2020-06-25 | End: 2020-06-25 | Stop reason: SURG

## 2020-06-25 RX ORDER — HYDROCODONE BITARTRATE AND ACETAMINOPHEN 5; 325 MG/1; MG/1
2 TABLET ORAL EVERY 6 HOURS PRN
Status: DISCONTINUED | OUTPATIENT
Start: 2020-06-25 | End: 2020-06-25 | Stop reason: HOSPADM

## 2020-06-25 RX ORDER — SODIUM CHLORIDE/ALOE VERA
GEL (GRAM) NASAL AS NEEDED
Status: DISCONTINUED | OUTPATIENT
Start: 2020-06-25 | End: 2020-06-25 | Stop reason: HOSPADM

## 2020-06-25 RX ORDER — SODIUM CHLORIDE, SODIUM LACTATE, POTASSIUM CHLORIDE, CALCIUM CHLORIDE 600; 310; 30; 20 MG/100ML; MG/100ML; MG/100ML; MG/100ML
50 INJECTION, SOLUTION INTRAVENOUS CONTINUOUS
Status: DISCONTINUED | OUTPATIENT
Start: 2020-06-25 | End: 2020-06-25 | Stop reason: HOSPADM

## 2020-06-25 RX ORDER — NEOSTIGMINE METHYLSULFATE 1 MG/ML
INJECTION INTRAVENOUS AS NEEDED
Status: DISCONTINUED | OUTPATIENT
Start: 2020-06-25 | End: 2020-06-25 | Stop reason: SURG

## 2020-06-25 RX ORDER — ONDANSETRON 2 MG/ML
4 INJECTION INTRAMUSCULAR; INTRAVENOUS EVERY 6 HOURS PRN
Status: DISCONTINUED | OUTPATIENT
Start: 2020-06-25 | End: 2020-06-25 | Stop reason: HOSPADM

## 2020-06-25 RX ORDER — HYDROMORPHONE HCL/PF 1 MG/ML
0.5 SYRINGE (ML) INJECTION
Status: DISCONTINUED | OUTPATIENT
Start: 2020-06-25 | End: 2020-06-25 | Stop reason: HOSPADM

## 2020-06-25 RX ORDER — HYDROCODONE BITARTRATE AND ACETAMINOPHEN 5; 325 MG/1; MG/1
1 TABLET ORAL EVERY 4 HOURS PRN
Qty: 20 TABLET | Refills: 0 | Status: SHIPPED | OUTPATIENT
Start: 2020-06-25 | End: 2020-10-15

## 2020-06-25 RX ORDER — DEXMEDETOMIDINE HYDROCHLORIDE 100 UG/ML
INJECTION, SOLUTION INTRAVENOUS AS NEEDED
Status: DISCONTINUED | OUTPATIENT
Start: 2020-06-25 | End: 2020-06-25 | Stop reason: SURG

## 2020-06-25 RX ORDER — LIDOCAINE HYDROCHLORIDE AND EPINEPHRINE 10; 10 MG/ML; UG/ML
INJECTION, SOLUTION INFILTRATION; PERINEURAL AS NEEDED
Status: DISCONTINUED | OUTPATIENT
Start: 2020-06-25 | End: 2020-06-25 | Stop reason: HOSPADM

## 2020-06-25 RX ORDER — ONDANSETRON 2 MG/ML
INJECTION INTRAMUSCULAR; INTRAVENOUS AS NEEDED
Status: DISCONTINUED | OUTPATIENT
Start: 2020-06-25 | End: 2020-06-25 | Stop reason: SURG

## 2020-06-25 RX ORDER — CEPHALEXIN 500 MG/1
500 CAPSULE ORAL 4 TIMES DAILY
Qty: 40 CAPSULE | Refills: 0 | Status: SHIPPED | OUTPATIENT
Start: 2020-06-25 | End: 2020-07-05

## 2020-06-25 RX ORDER — PROPOFOL 10 MG/ML
INJECTION, EMULSION INTRAVENOUS AS NEEDED
Status: DISCONTINUED | OUTPATIENT
Start: 2020-06-25 | End: 2020-06-25 | Stop reason: SURG

## 2020-06-25 RX ORDER — ONDANSETRON 2 MG/ML
4 INJECTION INTRAMUSCULAR; INTRAVENOUS ONCE AS NEEDED
Status: COMPLETED | OUTPATIENT
Start: 2020-06-25 | End: 2020-06-25

## 2020-06-25 RX ORDER — OXYMETAZOLINE HYDROCHLORIDE 0.05 G/100ML
SPRAY NASAL AS NEEDED
Status: DISCONTINUED | OUTPATIENT
Start: 2020-06-25 | End: 2020-06-25 | Stop reason: HOSPADM

## 2020-06-25 RX ORDER — MAGNESIUM HYDROXIDE 1200 MG/15ML
LIQUID ORAL AS NEEDED
Status: DISCONTINUED | OUTPATIENT
Start: 2020-06-25 | End: 2020-06-25 | Stop reason: HOSPADM

## 2020-06-25 RX ORDER — GLYCOPYRROLATE 0.2 MG/ML
INJECTION INTRAMUSCULAR; INTRAVENOUS AS NEEDED
Status: DISCONTINUED | OUTPATIENT
Start: 2020-06-25 | End: 2020-06-25 | Stop reason: SURG

## 2020-06-25 RX ORDER — FENTANYL CITRATE 50 UG/ML
INJECTION, SOLUTION INTRAMUSCULAR; INTRAVENOUS AS NEEDED
Status: DISCONTINUED | OUTPATIENT
Start: 2020-06-25 | End: 2020-06-25 | Stop reason: SURG

## 2020-06-25 RX ADMIN — MIDAZOLAM HYDROCHLORIDE 2 MG: 1 INJECTION, SOLUTION INTRAMUSCULAR; INTRAVENOUS at 09:58

## 2020-06-25 RX ADMIN — HYDROMORPHONE HYDROCHLORIDE 0.5 MG: 1 INJECTION, SOLUTION INTRAMUSCULAR; INTRAVENOUS; SUBCUTANEOUS at 11:53

## 2020-06-25 RX ADMIN — PROMETHAZINE HYDROCHLORIDE 12.5 MG: 25 INJECTION INTRAMUSCULAR; INTRAVENOUS at 12:04

## 2020-06-25 RX ADMIN — ROCURONIUM BROMIDE 50 MG: 10 INJECTION, SOLUTION INTRAVENOUS at 10:05

## 2020-06-25 RX ADMIN — ONDANSETRON HYDROCHLORIDE 4 MG: 2 INJECTION, SOLUTION INTRAMUSCULAR; INTRAVENOUS at 10:05

## 2020-06-25 RX ADMIN — DEXAMETHASONE SODIUM PHOSPHATE 4 MG: 4 INJECTION, SOLUTION INTRAMUSCULAR; INTRAVENOUS at 12:33

## 2020-06-25 RX ADMIN — SODIUM CHLORIDE, SODIUM LACTATE, POTASSIUM CHLORIDE, AND CALCIUM CHLORIDE 50 ML/HR: .6; .31; .03; .02 INJECTION, SOLUTION INTRAVENOUS at 09:35

## 2020-06-25 RX ADMIN — CEFAZOLIN SODIUM 2000 MG: 1 SOLUTION INTRAVENOUS at 10:02

## 2020-06-25 RX ADMIN — DEXMEDETOMIDINE HYDROCHLORIDE 8 MCG: 100 INJECTION, SOLUTION INTRAVENOUS at 11:23

## 2020-06-25 RX ADMIN — SODIUM CHLORIDE, SODIUM LACTATE, POTASSIUM CHLORIDE, AND CALCIUM CHLORIDE: .6; .31; .03; .02 INJECTION, SOLUTION INTRAVENOUS at 11:39

## 2020-06-25 RX ADMIN — GLYCOPYRROLATE 0.2 MG: 0.2 INJECTION, SOLUTION INTRAMUSCULAR; INTRAVENOUS at 10:35

## 2020-06-25 RX ADMIN — ONDANSETRON 4 MG: 2 INJECTION INTRAMUSCULAR; INTRAVENOUS at 12:19

## 2020-06-25 RX ADMIN — HYDROMORPHONE HYDROCHLORIDE 0.5 MG: 1 INJECTION, SOLUTION INTRAMUSCULAR; INTRAVENOUS; SUBCUTANEOUS at 12:25

## 2020-06-25 RX ADMIN — LIDOCAINE HYDROCHLORIDE 50 MG: 10 INJECTION, SOLUTION EPIDURAL; INFILTRATION; INTRACAUDAL; PERINEURAL at 10:05

## 2020-06-25 RX ADMIN — FENTANYL CITRATE 50 MCG: 50 INJECTION INTRAMUSCULAR; INTRAVENOUS at 10:05

## 2020-06-25 RX ADMIN — GLYCOPYRROLATE 0.4 MG: 0.2 INJECTION, SOLUTION INTRAMUSCULAR; INTRAVENOUS at 11:20

## 2020-06-25 RX ADMIN — SODIUM CHLORIDE, SODIUM LACTATE, POTASSIUM CHLORIDE, AND CALCIUM CHLORIDE: .6; .31; .03; .02 INJECTION, SOLUTION INTRAVENOUS at 09:49

## 2020-06-25 RX ADMIN — HYDROMORPHONE HYDROCHLORIDE 0.5 MG: 1 INJECTION, SOLUTION INTRAMUSCULAR; INTRAVENOUS; SUBCUTANEOUS at 11:43

## 2020-06-25 RX ADMIN — PROPOFOL 200 MG: 10 INJECTION, EMULSION INTRAVENOUS at 10:05

## 2020-06-25 RX ADMIN — DEXMEDETOMIDINE HYDROCHLORIDE 12 MCG: 100 INJECTION, SOLUTION INTRAVENOUS at 10:15

## 2020-06-25 RX ADMIN — NEOSTIGMINE METHYLSULFATE 3 MG: 1 INJECTION INTRAVENOUS at 11:20

## 2020-06-25 RX ADMIN — FENTANYL CITRATE 50 MCG: 50 INJECTION INTRAMUSCULAR; INTRAVENOUS at 10:12

## 2020-06-25 RX ADMIN — DEXAMETHASONE SODIUM PHOSPHATE 4 MG: 10 INJECTION, SOLUTION INTRAMUSCULAR; INTRAVENOUS at 10:05

## 2020-06-25 NOTE — NURSING NOTE
Tolerated juice  No drainage noted  Instructions give  Will  medications at pharmacy on her way home

## 2020-06-25 NOTE — OP NOTE
OPERATIVE REPORT  PATIENT NAME: Magdalene Rueda    :  1980  MRN: [id number]  Pt Location:  OR ROOM 08    SURGERY DATE: 2020    Surgeon(s) and Role:     * Giovani Fritz MD - Primary    Preop Diagnosis:  Chronic right maxillary sinusitis [J32 0]  Cyst of maxillary sinus [J34 1]  Periapical abscess [K04 7]  Class 3 severe obesity due to excess calories without serious comorbidity with body mass index (BMI) of 40 0 to 44 9 in adult [facility] ) [E66 01, Z68 41]    Post-Op Diagnosis Codes:     * Chronic right maxillary sinusitis [J32 0]     * Cyst of maxillary sinus [J34 1]     * Periapical abscess [K04 7]     * Class 3 severe obesity due to excess calories without serious comorbidity with body mass index (BMI) of 40 0 to 44 9 in adult ([place] [address] ) [E66 01, Z68 41]    Procedure(s) (LRB):  FUNCTIONAL ENDOSCOPIC SINUS SURGERY WITH RIGHT MAXILLARY ANTROSTOMY REMOVALOF CYST, RIGHT SPHENOIDOTOMY, RIGHT POLYPECTOMY (LARGE ANTRAL CHOANAL POLYP)  (Right)    Specimen(s):  ID Type Source Tests Collected by Time Destination   1 : Antrocolial polyp right Tissue Soft Tissue, Other TISSUE EXAM Giovani Fritz MD 2020 1045    2 : right maxillary sinus cyst Tissue Soft Tissue, Other TISSUE EXAM Giovani Fritz MD 2020 1113        Estimated Blood Loss:   Minimal    Drains:  * No LDAs found *    Anesthesia Type:   General    Operative Indications:  Chronic right maxillary sinusitis [J32 0]  Cyst of maxillary sinus [J34 1]  Periapical abscess [K04 7]  Class 3 severe obesity due to excess calories without serious comorbidity with body mass index (BMI) of 40 0 to 44 9 in adult ([facility] ) [E66 01, Z68 41]      Operative Findings:  Large polyp on the right nasal cavity occupying most of the choanal aperture  The attachment pedicle was identified near the area of the natural ostium of the right maxillary sinus  Large cyst with polyp within the right maxillary sinus    Scant retained mucus but no signs of infection or chronic inflammation on the right sphenoid sinus  Complications:   None    Procedure and Technique:    Patient was met in holding area, positively identified and risks, benefits and alternatives discussed, Patient confirmed informed consent  The patient was transferred onto the operating table in the supine position  Appropriate monitoring devices were put in place, general anesthesia was administered by anesthesiologist and patient intubated without complications, tube taped in midline  The patient was prepped and draped in the usual clean fashion  Before proceeding further, a time-out was taken during which the patients identification and planned surgical procedure were confirmed  taped in midline  The patient was prepped and draped in the usual clean fashion  Before proceeding further, a time-out was taken during which the patients identification and planned surgical procedure were confirmed  Examination with a speculum confirmed significant obstruction bilaterally,  left middle meatus, middle turbinate and lateral wall were injected with 1% lidocaine with epinephrine 1:100 000 under endoscopic visualization with a 0 degree endoscope  After waiting for vasoconstrictor and anesthesia to take effect, the paradoxical portion of the turbinate was excised with microdebrider, the lateral portion of danika praful was also excised and middle turbinate then contoured to a normal size and appearance  The ball tip curved seeker was used to locate the ostium that was then enlarged with microdebrider, removing the inferior portion of the uncinate  Topical oxymetazoline applied with cotton pledgets placed, once decongestion was obtained examination was done with the 0 degree endoscope confirming a completely normal appearing left nasal cavity, and a extremely large right antral choanal polyp   1% lidocaine with 1/100,000 epinephrine was injected to the  middle turbinate,  and lateral wall injected with 1% lidocaine with epinephrine 1:100 000 under endoscopic visualization with a 0 degree endoscope using on long spinal needle  After waiting for vasoconstrictor and anesthesia to take effect, the middle turbinate was bluntly medialized with Mark dissector  The pedicle of the polyp was then identified and using the long spinal needle the pedicle and proximal portion of the polyp were injected  with 1% lidocaine with epinephrine 1:100 000  until blanching was obtained  The uncinate was cut then with endoscopic micro scissors and subsequently the polyp retrieved using a straight Blakesley forceps  Pledgets with Afrin were placed on the middle meatus and attention was then directed to the sphenoid ethmoid recess  Middle turbinate was then bluntly lateralized, the superior turbinate was then identified, the natural ostium was then visualized on the sphenoethmoid recess  No evident obstruction was noticed  A pledgets with Afrin were placed in the area and left for approximately 5 minutes to obtain additional vasoconstriction  Subsequently the pledgets were removed from the nasal cavity and middle meatus as well as the sphenoid ethmoid recess  The micro debrider was then used to enlarge the natural ostium  This allowed for examination of the sphenoid sinus by advancing a 0 degree endoscope subsequently a 30 degree and 70° endoscopes  Only scant amount of pooled mucus was noticed  The sinus was copiously irrigated with saline and suctioned until the sinus was completely clear  The mucosa was noticed to be relatively healthy no significant edema or erythema  No polyps or cysts were noticed within the sphenoid sinus  A pledget was placed on the sphenoid ethmoid recess to reduce bleeding and subsequently the middle turbinates was medialized, attention was then directed to the middle meatus and the maxillary sinus was noticed to have a relatively large natural ostium    Through this very large natural ostium the cyst was noticed and using up biting Karoline forceps the cyst was removed and retrieved completely on a repeat maneuver to grasp the wall of the cyst, displace it towards  the nasal cavity and subsequently grasp it from a more distal portion   Once the cyst and polypoid contents was completely removed examination of the maxillary sinus revealed relatively normal mucosa on the lateral wall as well as on the roof  The inferior portion of the uncinate was then removed with Staleslieberger backbiting forceps  The microdebrider was used to remove remnants of mucosa at the edges of the antrostomy obtaining good smooth edges  The middle turbinates were then medialized with a transfixion stitch using 4/0 plain gut with a small Phil needle in a mattress type suture  The nasopharynx was then suctioned free of blood and secretions  Sedation was discontinued, drapes were removed, patient awakened, and taken to the recovery room in stable condition  All counts were correct at the end of the case, and no complications were encountered        I was present for the entire procedure    Patient Disposition:  PACU     SIGNATURE: Sharon Sin MD  DATE: June 25, 2020  TIME: 1:29 PM

## 2020-06-25 NOTE — INTERVAL H&P NOTE
H&P reviewed  After examining the patient I find no changes in the patients condition since the H&P had been written      Vitals:    06/25/20 0843   BP: 111/69   Pulse: 79   Resp: 20   Temp: (!) 96 7 °F (35 9 °C)   SpO2: 96%

## 2020-06-25 NOTE — DISCHARGE INSTRUCTIONS
Nasal Endoscopy   WHAT YOU NEED TO KNOW:   Nasal endoscopy is a procedure to look inside of your nose and sinuses  Nasal endoscopy is used to determine the cause of congestion, sinus pain or pressure, or nosebleeds  During nasal endoscopy your healthcare provider can examine or remove a polyp, lesion, or foreign object  A tissue or fluid sample may be collected during the endoscopy and sent to a lab for tests  DISCHARGE INSTRUCTIONS:   Contact your healthcare provider if:   · You have more pain or pressure in your nose, sinuses, or head  · You cannot stop the bleeding from your nose after holding firm pressure for 5 minutes  · You have questions or concerns about your procedure or care  Follow up with your healthcare provider as directed:  Write down your questions so you remember to ask them during your visits  © 2017 [facility] [facility] Information is for End User's use only and may not be sold, redistributed or otherwise used for commercial purposes  All illustrations and images included in CareNotes® are the copyrighted property of A D A M , Inc  or Michael Su  The above information is an  only  It is not intended as medical advice for individual conditions or treatments  Talk to your doctor, nurse or pharmacist before following any medical regimen to see if it is safe and effective for you

## 2020-09-04 ENCOUNTER — TELEPHONE (OUTPATIENT)
Dept: OBGYN CLINIC | Facility: CLINIC | Age: 40
End: 2020-09-04

## 2020-09-04 NOTE — TELEPHONE ENCOUNTER
COVID Pre-Visit Screening     1  Is this a family member screening? nO  2  Have you traveled outside of your state in the past 2 weeks? nO  3  Do you presently have a fever or flu-like symptoms? nO   4  Do you have symptoms of an upper respiratory infection like runny nose, sore throat, or cough? nO  5  Are you suffering from new headache that you have not had in the past?  nO  6  Do you have/have you experienced any new shortness of breath recently? NO  7  Do you have any new diarrhea, nausea or vomiting? NO  8  Have you been in contact with anyone who has been sick or diagnosed with COVID-19? {NO  9  Do you have any new loss of taste or smell? NO    Are you able to wear a mask without a valve for the entire visit?  NO

## 2020-10-15 ENCOUNTER — OFFICE VISIT (OUTPATIENT)
Dept: OBGYN CLINIC | Facility: CLINIC | Age: 40
End: 2020-10-15

## 2020-10-15 VITALS
DIASTOLIC BLOOD PRESSURE: 78 MMHG | HEART RATE: 81 BPM | BODY MASS INDEX: 42.29 KG/M2 | WEIGHT: 223.8 LBS | TEMPERATURE: 98.4 F | SYSTOLIC BLOOD PRESSURE: 119 MMHG

## 2020-10-15 DIAGNOSIS — N89.8 VAGINAL ODOR: Primary | ICD-10-CM

## 2020-10-15 DIAGNOSIS — N76.0 BV (BACTERIAL VAGINOSIS): ICD-10-CM

## 2020-10-15 DIAGNOSIS — B96.89 BV (BACTERIAL VAGINOSIS): ICD-10-CM

## 2020-10-15 LAB
BV WHIFF TEST VAG QL: POSITIVE
CLUE CELLS SPEC QL WET PREP: POSITIVE
SL AMB POCT WET MOUNT: ABNORMAL
T VAGINALIS VAG QL WET PREP: NEGATIVE
YEAST VAG QL WET PREP: NEGATIVE

## 2020-10-15 PROCEDURE — 87210 SMEAR WET MOUNT SALINE/INK: CPT | Performed by: NURSE PRACTITIONER

## 2020-10-15 PROCEDURE — 99213 OFFICE O/P EST LOW 20 MIN: CPT | Performed by: NURSE PRACTITIONER

## 2020-10-15 PROCEDURE — 1036F TOBACCO NON-USER: CPT | Performed by: NURSE PRACTITIONER

## 2020-10-15 RX ORDER — METRONIDAZOLE 500 MG/1
500 TABLET ORAL EVERY 12 HOURS SCHEDULED
Qty: 14 TABLET | Refills: 0 | Status: SHIPPED | OUTPATIENT
Start: 2020-10-15 | End: 2020-10-22

## 2020-11-02 ENCOUNTER — TRANSCRIBE ORDERS (OUTPATIENT)
Dept: LAB | Facility: HOSPITAL | Age: 40
End: 2020-11-02

## 2020-11-02 ENCOUNTER — LAB (OUTPATIENT)
Dept: LAB | Facility: HOSPITAL | Age: 40
End: 2020-11-02
Payer: COMMERCIAL

## 2020-11-02 ENCOUNTER — OFFICE VISIT (OUTPATIENT)
Dept: LAB | Facility: HOSPITAL | Age: 40
End: 2020-11-02
Payer: COMMERCIAL

## 2020-11-02 DIAGNOSIS — Z79.899 ENCOUNTER FOR LONG-TERM (CURRENT) USE OF OTHER MEDICATIONS: Primary | ICD-10-CM

## 2020-11-02 DIAGNOSIS — Z01.818 PREOPERATIVE TESTING: ICD-10-CM

## 2020-11-02 DIAGNOSIS — E66.01 CLASS 3 SEVERE OBESITY DUE TO EXCESS CALORIES WITHOUT SERIOUS COMORBIDITY WITH BODY MASS INDEX (BMI) OF 40.0 TO 44.9 IN ADULT (HCC): ICD-10-CM

## 2020-11-02 LAB
ALBUMIN SERPL BCP-MCNC: 4.2 G/DL (ref 3–5.2)
ALP SERPL-CCNC: 56 U/L (ref 43–122)
ALT SERPL W P-5'-P-CCNC: 20 U/L (ref 9–52)
ANION GAP SERPL CALCULATED.3IONS-SCNC: 5 MMOL/L (ref 5–14)
AST SERPL W P-5'-P-CCNC: 27 U/L (ref 14–36)
BASOPHILS # BLD AUTO: 0.1 THOUSANDS/ΜL (ref 0–0.1)
BASOPHILS NFR BLD AUTO: 1 % (ref 0–1)
BILIRUB SERPL-MCNC: 0.6 MG/DL
BUN SERPL-MCNC: 13 MG/DL (ref 5–25)
CALCIUM SERPL-MCNC: 9.1 MG/DL (ref 8.4–10.2)
CHLORIDE SERPL-SCNC: 105 MMOL/L (ref 97–108)
CHOLEST SERPL-MCNC: 179 MG/DL
CO2 SERPL-SCNC: 27 MMOL/L (ref 22–30)
CREAT SERPL-MCNC: 0.72 MG/DL (ref 0.6–1.2)
EOSINOPHIL # BLD AUTO: 0.3 THOUSAND/ΜL (ref 0–0.4)
EOSINOPHIL NFR BLD AUTO: 3 % (ref 0–6)
ERYTHROCYTE [DISTWIDTH] IN BLOOD BY AUTOMATED COUNT: 16 %
GFR SERPL CREATININE-BSD FRML MDRD: 104 ML/MIN/1.73SQ M
GLUCOSE P FAST SERPL-MCNC: 84 MG/DL (ref 70–99)
HCT VFR BLD AUTO: 37 % (ref 36–46)
HDLC SERPL-MCNC: 56 MG/DL
HGB BLD-MCNC: 12.4 G/DL (ref 12–16)
LDLC SERPL CALC-MCNC: 95 MG/DL
LYMPHOCYTES # BLD AUTO: 2.7 THOUSANDS/ΜL (ref 0.5–4)
LYMPHOCYTES NFR BLD AUTO: 34 % (ref 25–45)
MCH RBC QN AUTO: 30.4 PG (ref 26–34)
MCHC RBC AUTO-ENTMCNC: 33.4 G/DL (ref 31–36)
MCV RBC AUTO: 91 FL (ref 80–100)
MONOCYTES # BLD AUTO: 0.8 THOUSAND/ΜL (ref 0.2–0.9)
MONOCYTES NFR BLD AUTO: 10 % (ref 1–10)
NEUTROPHILS # BLD AUTO: 4.1 THOUSANDS/ΜL (ref 1.8–7.8)
NEUTS SEG NFR BLD AUTO: 52 % (ref 45–65)
PHOSPHATE SERPL-MCNC: 3.6 MG/DL (ref 2.5–4.8)
PLATELET # BLD AUTO: 262 THOUSANDS/UL (ref 150–450)
PMV BLD AUTO: 9.1 FL (ref 8.9–12.7)
POTASSIUM SERPL-SCNC: 4.3 MMOL/L (ref 3.6–5)
PROT SERPL-MCNC: 7.8 G/DL (ref 5.9–8.4)
RBC # BLD AUTO: 4.06 MILLION/UL (ref 4–5.2)
SODIUM SERPL-SCNC: 137 MMOL/L (ref 137–147)
TRIGL SERPL-MCNC: 140 MG/DL
TSH SERPL DL<=0.05 MIU/L-ACNC: 1.27 UIU/ML (ref 0.47–4.68)
WBC # BLD AUTO: 7.9 THOUSAND/UL (ref 4.5–11)

## 2020-11-02 PROCEDURE — 80061 LIPID PANEL: CPT

## 2020-11-02 PROCEDURE — 36415 COLL VENOUS BLD VENIPUNCTURE: CPT

## 2020-11-02 PROCEDURE — 80053 COMPREHEN METABOLIC PANEL: CPT

## 2020-11-02 PROCEDURE — 85025 COMPLETE CBC W/AUTO DIFF WBC: CPT

## 2020-11-02 PROCEDURE — 83036 HEMOGLOBIN GLYCOSYLATED A1C: CPT

## 2020-11-02 PROCEDURE — 84100 ASSAY OF PHOSPHORUS: CPT

## 2020-11-02 PROCEDURE — 84443 ASSAY THYROID STIM HORMONE: CPT

## 2020-11-03 ENCOUNTER — TELEPHONE (OUTPATIENT)
Dept: FAMILY MEDICINE CLINIC | Facility: CLINIC | Age: 40
End: 2020-11-03

## 2020-11-03 LAB
EST. AVERAGE GLUCOSE BLD GHB EST-MCNC: 105 MG/DL
HBA1C MFR BLD: 5.3 %

## 2021-01-22 ENCOUNTER — TELEPHONE (OUTPATIENT)
Dept: OBGYN CLINIC | Facility: CLINIC | Age: 41
End: 2021-01-22

## 2021-01-25 ENCOUNTER — ANNUAL EXAM (OUTPATIENT)
Dept: OBGYN CLINIC | Facility: CLINIC | Age: 41
End: 2021-01-25

## 2021-01-25 VITALS
SYSTOLIC BLOOD PRESSURE: 117 MMHG | HEART RATE: 98 BPM | WEIGHT: 228.8 LBS | HEIGHT: 61 IN | BODY MASS INDEX: 43.2 KG/M2 | DIASTOLIC BLOOD PRESSURE: 70 MMHG

## 2021-01-25 DIAGNOSIS — N76.0 BV (BACTERIAL VAGINOSIS): ICD-10-CM

## 2021-01-25 DIAGNOSIS — N92.0 MENORRHAGIA WITH REGULAR CYCLE: ICD-10-CM

## 2021-01-25 DIAGNOSIS — Z12.31 ENCOUNTER FOR SCREENING MAMMOGRAM FOR MALIGNANT NEOPLASM OF BREAST: ICD-10-CM

## 2021-01-25 DIAGNOSIS — N89.8 VAGINAL ODOR: ICD-10-CM

## 2021-01-25 DIAGNOSIS — G43.909 MIGRAINE WITHOUT STATUS MIGRAINOSUS, NOT INTRACTABLE, UNSPECIFIED MIGRAINE TYPE: ICD-10-CM

## 2021-01-25 DIAGNOSIS — Z01.419 ENCOUNTER FOR GYNECOLOGICAL EXAMINATION WITHOUT ABNORMAL FINDING: Primary | ICD-10-CM

## 2021-01-25 DIAGNOSIS — B96.89 BV (BACTERIAL VAGINOSIS): ICD-10-CM

## 2021-01-25 DIAGNOSIS — Z11.3 SCREEN FOR STD (SEXUALLY TRANSMITTED DISEASE): ICD-10-CM

## 2021-01-25 DIAGNOSIS — Z12.4 SCREENING FOR CERVICAL CANCER: ICD-10-CM

## 2021-01-25 PROCEDURE — 1036F TOBACCO NON-USER: CPT | Performed by: NURSE PRACTITIONER

## 2021-01-25 PROCEDURE — 87491 CHLMYD TRACH DNA AMP PROBE: CPT | Performed by: NURSE PRACTITIONER

## 2021-01-25 PROCEDURE — 99215 OFFICE O/P EST HI 40 MIN: CPT | Performed by: NURSE PRACTITIONER

## 2021-01-25 PROCEDURE — 87591 N.GONORRHOEAE DNA AMP PROB: CPT | Performed by: NURSE PRACTITIONER

## 2021-01-25 PROCEDURE — 3008F BODY MASS INDEX DOCD: CPT | Performed by: NURSE PRACTITIONER

## 2021-01-25 RX ORDER — CITALOPRAM 20 MG/1
20 TABLET ORAL DAILY
COMMUNITY

## 2021-01-25 RX ORDER — TRAZODONE HYDROCHLORIDE 50 MG/1
50 TABLET ORAL
COMMUNITY

## 2021-01-25 RX ORDER — CLONAZEPAM 0.5 MG/1
0.5 TABLET ORAL 2 TIMES DAILY
COMMUNITY

## 2021-01-25 RX ORDER — METRONIDAZOLE 500 MG/1
500 TABLET ORAL EVERY 12 HOURS SCHEDULED
Qty: 14 TABLET | Refills: 0 | Status: SHIPPED | OUTPATIENT
Start: 2021-01-25 | End: 2021-02-01

## 2021-01-25 NOTE — LETTER
2021    To Ayaan Lee  : 1980      This letter is to advise you that your recent CULTURE results were reviewed by me and are NORMAL  Please contact the office for an appointment if you have any additional concerns      JONATHON Aguilar

## 2021-01-25 NOTE — PATIENT INSTRUCTIONS
OBESITY     Obesity is defined as a body mass index (BMI) which is greater than 30  Your Body mass index is 43 23 kg/m²       The risks of obesity include  many health problems, such as injuries or physical disability  You may need tests to check for the following:  · Diabetes     · High blood pressure or high cholesterol     · Heart disease     · Gallbladder or liver disease     · Cancer of the colon, breast, prostate, liver, or kidney     · Sleep apnea     · Arthritis or gout    Seek care immediately if:   · You have a severe headache, confusion, or difficulty speaking  · You have weakness on one side of your body  · You have chest pain, sweating, or shortness of breath  Contact your healthcare provider if:   · You have symptoms of gallbladder or liver disease, such as pain in your upper abdomen  · You have knee or hip pain and discomfort while walking  · You have symptoms of diabetes, such as intense hunger and thirst, and frequent urination  · You have symptoms of sleep apnea, such as snoring or daytime sleepiness  · You have questions or concerns about your condition or care  Treatment for obesity  focuses on helping you lose weight to improve your health  Even a small decrease in BMI can reduce the risk for many health problems  Your healthcare provider will help you set a weight-loss goal   · Lifestyle changes  are the first step in treating obesity  These include making healthy food choices and getting regular physical activity  Your healthcare provider may suggest a weight-loss program that involves coaching, education, and therapy  · Medicine  may help you lose weight when it is used with a healthy diet and physical activity  · Surgery  can help you lose weight if you are very obese and have other health problems  There are several types of weight-loss surgery  Ask your healthcare provider for more information      Be successful losing weight:   · Set small, realistic goals  An example of a small goal is to walk for 20 minutes 5 days a week  Anther goal is to lose 5% of your body weight  · Tell friends, family members, and coworkers about your goals  and ask for their support  Ask a friend to lose weight with you, or join a weight-loss support group  · Identify foods or triggers that may cause you to overeat , and find ways to avoid them  Remove tempting high-calorie foods from your home and workplace  Place a bowl of fresh fruit on your kitchen counter  If stress causes you to eat, then find other ways to cope with stress  · Keep a diary to track what you eat and drink  Also write down how many minutes of physical activity you do each day  Weigh yourself once a week and record it in your diary  Eating changes: You will need to eat 500 to 1,000 fewer calories each day than you currently eat to lose 1 to 2 pounds a week  The following changes will help you cut calories:  · Eat smaller portions  Use small plates, no larger than 9 inches in diameter  Fill your plate half full of fruits and vegetables  Measure your food using measuring cups until you know what a serving size looks like  · Eat 3 meals and 1 or 2 snacks each day  Plan your meals in advance  Alvaro Ureña and eat at home most of the time  Eat slowly  · Eat fruits and vegetables at every meal   They are low in calories and high in fiber, which makes you feel full  Do not add butter, margarine, or cream sauce to vegetables  Use herbs to season steamed vegetables  · Eat less fat and fewer fried foods  Eat more baked or grilled chicken and fish  These protein sources are lower in calories and fat than red meat  Limit fast food  Dress your salads with olive oil and vinegar instead of bottled dressing  · Limit the amount of sugar you eat  Do not drink sugary beverages  Limit alcohol  Activity changes:  Physical activity is good for your body in many ways   It helps you burn calories and build strong muscles  It decreases stress and depression, and improves your mood  It can also help you sleep better  Talk to your healthcare provider before you begin an exercise program   · Exercise for at least 30 minutes 5 days a week  Start slowly  Set aside time each day for physical activity that you enjoy and that is convenient for you  It is best to do both weight training and an activity that increases your heart rate, such as walking, bicycling, or swimming  · Find ways to be more active  Do yard work and housecleaning  Walk up the stairs instead of using elevators  Spend your leisure time going to events that require walking, such as outdoor festivals or fairs  This extra physical activity can help you lose weight and keep it off  Follow up with your primary healthcare provider as directed  You may need to meet with a dietitian  Write down your questions so you remember to ask them during your visits

## 2021-01-25 NOTE — LETTER
2021    To Rosas Phillips  : 1980      This letter is to advise you that your recent BLOODWORK results were reviewed by me and are NORMAL  Please contact our office for an appointment if you have any additional concerns      JONATHON Pimentel

## 2021-01-25 NOTE — PROGRESS NOTES
ANNUAL GYNECOLOGICAL EXAMINATION    Kayla Lara is a 41 y o  female who presents today for annual GYN exam   Her last pap smear was performed 2019 and result was NILM  She reports no history of abnormal pap smears in her past  She has never had a mammogram performed previously  She reports menses as regular but very heavy  Patient's last menstrual period was 2020 (exact date)  Her contraceptive method is BTL  Her general medical history has been reviewed and she reports it as follows:    Past Medical History:   Diagnosis Date    Anxiety and depression     Asthma     has not req'd tx since     Chlamydia 2000    GERD (gastroesophageal reflux disease)     no meds    Migraine     manages with OTC remedies     Past Surgical History:   Procedure Laterality Date    NASAL/SINUS ENDOSCOPY Right 2020    Procedure: FUNCTIONAL ENDOSCOPIC SINUS SURGERY WITH RIGHT MAXILLARY ANTROSTOMY REMOVALOF CYST, RIGHT SPHENOIDOTOMY, RIGHT POLYPECTOMY (LARGE ANTRAL CHOANAL POLYP)  ;  Surgeon: Dipika Lakhani MD;  Location: [address] [place] [address] MAIN OR;  Service: ENT    IN LAP,RMV  ADNEXAL STRUCTURE Right 10/10/2018    Procedure: SALPINGECTOMY, LAPAROSCOPIC;  Surgeon: Telma Bennett MD;  Location[facility];  Service: Gynecology    SALPINGOSTOMY  2015    tubal reversal    SCALP EXCISION      x4    TUBAL LIGATION  2002    WISDOM TOOTH EXTRACTION  1998     OB History        8    Para   4    Term   4       0    AB   4    Living   4       SAB   2    TAB   1    Ectopic   1    Multiple   0    Live Births   4               Social History     Tobacco Use    Smoking status: Never Smoker    Smokeless tobacco: Never Used   Substance Use Topics    Alcohol use: Yes     Frequency: Monthly or less     Drinks per session: 1 or 2    Drug use: Not Currently     Types: Marijuana     Comment: last used marijuana      Cancer-related family history includes Cancer in her maternal aunt   There is no history of Breast cancer  Current Outpatient Medications   Medication Instructions    citalopram (CELEXA) 20 mg, Oral, Daily    clonazePAM (KLONOPIN) 0 5 mg, Oral, 2 times daily    traZODone (DESYREL) 50 mg, Oral, Daily at bedtime       Review of Systems:  Review of Systems   Constitutional: Negative  Gastrointestinal: Negative  Genitourinary: Positive for menstrual problem  Negative for difficulty urinating, pelvic pain and vaginal discharge  Vaginal odor   Skin: Negative  Neurological: Positive for headaches  Physical Exam:  /70   Pulse 98   Ht 5' 1" (1 549 m)   Wt 104 kg (228 lb 12 8 oz)   LMP 12/28/2020 (Exact Date)   BMI 43 23 kg/m²   Physical Exam  Constitutional:       General: She is not in acute distress  Appearance: She is well-developed  Genitourinary:      Uterus normal       No lesions in the vagina  Vaginal discharge present  No vaginal rugosity  No cervical motion tenderness, lesion or pinkness  Uterus is not tender  No right or left adnexal mass present  Right adnexa not tender  Left adnexa not tender  Neck:      Musculoskeletal: Neck supple  Thyroid: No thyromegaly  Cardiovascular:      Rate and Rhythm: Normal rate and regular rhythm  Pulmonary:      Effort: Pulmonary effort is normal       Breath sounds: Normal breath sounds  Chest:      Breasts:         Right: No mass, nipple discharge, skin change or tenderness  Left: No mass, nipple discharge, skin change or tenderness  Abdominal:      General: Bowel sounds are normal       Palpations: Abdomen is soft  Neurological:      Mental Status: She is alert and oriented to person, place, and time  Skin:     General: Skin is warm and dry  Vitals signs reviewed  Assessment/Plan:   1  Normal well-woman GYN exam   2  Cervical cancer screening:  Normal pelvic exam   Pap smear not indicated at this time     3  STD screening:  Orders placed for vaginal GC/CT cultures  Orders placed for serum anti-HIV, anti-HCV, HbsAg, RPR    4  Breast cancer screening:  Normal breast exam   Order placed for bilateral screening mammogram   Reviewed breast self-awareness  5  Depression Screening: Patient's depression screening was assessed with a PHQ-2 score of 2  Their PHQ-9 score was 9  Continue regular follow-up with their psychologist/therapist/psychiatrist who is managing their mental health condition(s)  6  BMI Counseling: Body mass index is 43 23 kg/m²  Discussed the patient's BMI with her  The BMI is above normal  Patient referred to PCP due to patient being obese  7  Contraception:  BTL  8  Heavy menses: Order placed for pelvic ultrasound  Will check CBC as well  Return in 1 week to review results  Discussed Mirena IUD for menses management - will discuss further at follow up visit  9  Migraines: Order placed for referral to neurology  10  Bacterial vaginosis: Given Rx Flagyl     11  Return to office in 1 year for annual GYN exam

## 2021-01-28 LAB
C TRACH DNA SPEC QL NAA+PROBE: NEGATIVE
N GONORRHOEA DNA SPEC QL NAA+PROBE: NEGATIVE

## 2021-03-04 ENCOUNTER — OFFICE VISIT (OUTPATIENT)
Dept: OTOLARYNGOLOGY | Facility: CLINIC | Age: 41
End: 2021-03-04
Payer: COMMERCIAL

## 2021-03-04 VITALS
HEIGHT: 61 IN | BODY MASS INDEX: 43.65 KG/M2 | SYSTOLIC BLOOD PRESSURE: 108 MMHG | TEMPERATURE: 97.6 F | RESPIRATION RATE: 16 BRPM | WEIGHT: 231.2 LBS | DIASTOLIC BLOOD PRESSURE: 75 MMHG | HEART RATE: 73 BPM

## 2021-03-04 DIAGNOSIS — J33.0 ANTROCHOANAL POLYP: ICD-10-CM

## 2021-03-04 DIAGNOSIS — J30.1 SEASONAL ALLERGIC RHINITIS DUE TO POLLEN: ICD-10-CM

## 2021-03-04 DIAGNOSIS — J34.1 CYST OF MAXILLARY SINUS: ICD-10-CM

## 2021-03-04 DIAGNOSIS — R09.82 POST-NASAL DRIP: ICD-10-CM

## 2021-03-04 DIAGNOSIS — J30.0 VASOMOTOR RHINITIS: ICD-10-CM

## 2021-03-04 DIAGNOSIS — J32.0 CHRONIC RIGHT MAXILLARY SINUSITIS: Primary | ICD-10-CM

## 2021-03-04 PROCEDURE — 99214 OFFICE O/P EST MOD 30 MIN: CPT | Performed by: SPECIALIST

## 2021-03-04 PROCEDURE — 31231 NASAL ENDOSCOPY DX: CPT | Performed by: SPECIALIST

## 2021-03-04 PROCEDURE — 3008F BODY MASS INDEX DOCD: CPT | Performed by: SPECIALIST

## 2021-03-04 PROCEDURE — 3008F BODY MASS INDEX DOCD: CPT | Performed by: NURSE PRACTITIONER

## 2021-03-04 PROCEDURE — 1036F TOBACCO NON-USER: CPT | Performed by: SPECIALIST

## 2021-03-04 RX ORDER — IPRATROPIUM BROMIDE 21 UG/1
SPRAY, METERED NASAL
Qty: 30 ML | Refills: 6 | Status: SHIPPED | OUTPATIENT
Start: 2021-03-04 | End: 2021-12-10

## 2021-03-04 NOTE — PROGRESS NOTES
Otolaryngology Head and Neck Surgery History and Physical    Chief complaint    Chief Complaint   Patient presents with    Nasal drip     Pt had surgery with Dr George Colon in 06/25/20        History of the Present Illness    Anh Coburn is a 41 y o  who presents for evaluation of which she describes as some persistent postnasal drip and mucus going down the back of her throat with some occasional dripping from the front part of her nose  She does not have a chronic rhinorrhea on a daily basis  She did try some Flonase for months without any improvement  Patient did have surgery on 6/ 25/2020 where she had a right maxillary antrostomy and removal of a right choanal polyp  This had been seen when she was in the emergency room for some trauma and had both a head CT scan and a facial bone CT scan which showed some fluid in the right maxillary sinus cyst in that area  She tolerated the surgery without difficulty  No history of any allergy to seasonal problems no chronic sinus infections  She did have a periapical abscess on 1 of the anterior maxillary teeth  This was taken care of in the clinic  Review of Systems   Constitutional: Negative  HENT: Positive for postnasal drip and rhinorrhea  Eyes: Negative  Respiratory: Negative  Cardiovascular: Negative  Gastrointestinal: Negative  Endocrine: Negative  Genitourinary: Negative  Musculoskeletal: Negative  Skin: Negative  Allergic/Immunologic: Negative  Neurological: Positive for headaches  Hematological: Negative  Psychiatric/Behavioral: Negative          Reviewed by TK      Past Medical History:   Diagnosis Date    Anxiety and depression     Asthma     has not req'd tx since 2016    Chlamydia 2000    GERD (gastroesophageal reflux disease)     no meds    Migraine     manages with OTC remedies       Past Surgical History:   Procedure Laterality Date    NASAL/SINUS ENDOSCOPY Right 6/25/2020    Procedure: FUNCTIONAL ENDOSCOPIC SINUS SURGERY WITH RIGHT MAXILLARY ANTROSTOMY REMOVALOF CYST, RIGHT SPHENOIDOTOMY, RIGHT POLYPECTOMY (LARGE ANTRAL CHOANAL POLYP)  ;  Surgeon: Saul Moreno MD;  Location: [address] [place] [address] MAIN OR;  Service: ENT    MD LAP,RMV  ADNEXAL STRUCTURE Right 10/10/2018    Procedure: SALPINGECTOMY, LAPAROSCOPIC;  Surgeon: Lisa Greer MD;  Location[facility];  Service: Gynecology    SALPINGOSTOMY  2015    tubal reversal    SCALP EXCISION      x4    TUBAL LIGATION  2002    WISDOM TOOTH EXTRACTION  1998       Social History     Socioeconomic History    Marital status: Single     Spouse name: Not on file    Number of children: Not on file    Years of education: Not on file    Highest education level: Not on file   Occupational History    Not on file   Social Needs    Financial resource strain: Not on file    Food insecurity     Worry: Not on file     Inability: Not on file    Transportation needs     Medical: Not on file     Non-medical: Not on file   Tobacco Use    Smoking status: Never Smoker    Smokeless tobacco: Never Used   Substance and Sexual Activity    Alcohol use: Yes     Frequency: Monthly or less     Drinks per session: 1 or 2    Drug use: Not Currently     Types: Marijuana     Comment: last used marijuana 2014    Sexual activity: Yes     Partners: Female     Birth control/protection: Female Sterilization   Lifestyle    Physical activity     Days per week: Not on file     Minutes per session: Not on file    Stress: Not on file   Relationships    Social connections     Talks on phone: Not on file     Gets together: Not on file     Attends Faith service: Not on file     Active member of club or organization: Not on file     Attends meetings of clubs or organizations: Not on file     Relationship status: Not on file    Intimate partner violence     Fear of current or ex partner: Not on file     Emotionally abused: Not on file     Physically abused: Not on file     Forced sexual activity: Not on file   Other Topics Concern    Not on file   Social History Narrative    Not on file       Family History   Problem Relation Age of Onset    Cancer Maternal Aunt         lung    Breast cancer Neg Hx            /75 (BP Location: Right arm, Patient Position: Sitting, Cuff Size: Standard)   Pulse 73   Temp 97 6 °F (36 4 °C) (Temporal)   Resp 16   Ht 5' 1" (1 549 m)   Wt 105 kg (231 lb 3 2 oz)   BMI 43 68 kg/m²       Current Outpatient Medications:     citalopram (CeleXA) 20 mg tablet, Take 20 mg by mouth daily, Disp: , Rfl:     clonazePAM (KlonoPIN) 0 5 mg tablet, Take 0 5 mg by mouth 2 (two) times a day, Disp: , Rfl:     traZODone (DESYREL) 50 mg tablet, Take 50 mg by mouth daily at bedtime, Disp: , Rfl:      Physical Exam  Vitals signs reviewed  Constitutional:       Appearance: Normal appearance  She is well-developed and normal weight  HENT:      Head: Normocephalic and atraumatic  Right Ear: Tympanic membrane, ear canal and external ear normal       Left Ear: Tympanic membrane, ear canal and external ear normal       Nose: Nose normal       Mouth/Throat:      Mouth: Mucous membranes are moist    Eyes:      Pupils: Pupils are equal, round, and reactive to light  Neck:      Musculoskeletal: Normal range of motion and neck supple  Pulmonary:      Effort: Pulmonary effort is normal       Breath sounds: Normal breath sounds  Abdominal:      General: Abdomen is flat  Musculoskeletal: Normal range of motion  Skin:     General: Skin is warm  Neurological:      General: No focal deficit present  Mental Status: She is alert and oriented to person, place, and time  Cranial Nerves: No cranial nerve deficit  Psychiatric:         Mood and Affect: Mood normal          Behavior: Behavior normal          Thought Content:  Thought content normal          Judgment: Judgment normal            Procedure:  Due to patient's complaints some postnasal drip and mucus and some anterior rhinorrhea nasal endoscopy was recommended  Patient was agreement gave verbal consent  The right nasal cavity was plated with 4% xylocaine/Afrin combination spray  The scope was placed into the right nasal cavity  He was then moved into the right middle meatus  The middle meatus and maxillary antrostomy well healed  There was no evidence of any type of discharge or swelling  I had the flexible scope band into the maxillary sinus and there was no evidence of any secretions in the right maxillary sinus  Imaging studies:      Pertinent laboratory data:  Notes from 04/30/2020 office note  Op note from 06/25/2020  CT scan report from 01/16/2020  Facial bone CT scan report from 01/16/2020    Assessment and plan:    1  Chronic right maxillary sinusitis     2  Cyst of maxillary sinus     3  Antrochoanal polyp     4  Post-nasal drip     5  Seasonal allergic rhinitis due to pollen         Discussed with patient I did not see any evidence of any infectious material or chronic fluid that would contribute to her present complaint  The right maxillary sinus in antrostomy look completely normal   I am not sure if she is just getting some fluid from the sinuses that is normal but this point since she did not notice any significant improvement with Flonase I am going to give her a script for some generic Atrovent to use 1 puff b i d  In the right nasal cavity and see if that gives her any improvement  If she continues with problems we will need to decide whether to proceed with any type of scan

## 2021-05-10 ENCOUNTER — TELEPHONE (OUTPATIENT)
Dept: OBGYN CLINIC | Facility: CLINIC | Age: 41
End: 2021-05-10

## 2021-05-12 RX ORDER — BROMPHENIRAMINE MALEATE, PSEUDOEPHEDRINE HYDROCHLORIDE, AND DEXTROMETHORPHAN HYDROBROMIDE 2; 30; 10 MG/5ML; MG/5ML; MG/5ML
10 SYRUP ORAL 4 TIMES DAILY PRN
COMMUNITY
Start: 2021-03-23 | End: 2021-08-01

## 2021-06-30 ENCOUNTER — APPOINTMENT (EMERGENCY)
Dept: CT IMAGING | Facility: HOSPITAL | Age: 41
End: 2021-06-30
Payer: COMMERCIAL

## 2021-06-30 ENCOUNTER — HOSPITAL ENCOUNTER (EMERGENCY)
Facility: HOSPITAL | Age: 41
Discharge: HOME | End: 2021-06-30
Attending: EMERGENCY MEDICINE | Admitting: EMERGENCY MEDICINE
Payer: COMMERCIAL

## 2021-06-30 VITALS
RESPIRATION RATE: 18 BRPM | SYSTOLIC BLOOD PRESSURE: 156 MMHG | TEMPERATURE: 97.3 F | OXYGEN SATURATION: 96 % | BODY MASS INDEX: 41.99 KG/M2 | DIASTOLIC BLOOD PRESSURE: 61 MMHG | HEART RATE: 65 BPM | WEIGHT: 222.22 LBS

## 2021-06-30 DIAGNOSIS — R11.10 VOMITING AND DIARRHEA: ICD-10-CM

## 2021-06-30 DIAGNOSIS — K76.0 HEPATIC STEATOSIS: ICD-10-CM

## 2021-06-30 DIAGNOSIS — R19.7 VOMITING AND DIARRHEA: ICD-10-CM

## 2021-06-30 DIAGNOSIS — R10.84 GENERALIZED ABDOMINAL PAIN: ICD-10-CM

## 2021-06-30 DIAGNOSIS — K52.9 COLITIS: Primary | ICD-10-CM

## 2021-06-30 DIAGNOSIS — N39.46 URGE AND STRESS INCONTINENCE: ICD-10-CM

## 2021-06-30 LAB
ALBUMIN SERPL BCP-MCNC: 4.1 G/DL (ref 3.5–5)
ALP SERPL-CCNC: 83 U/L (ref 46–116)
ALT SERPL W P-5'-P-CCNC: 25 U/L (ref 12–78)
ANION GAP SERPL CALCULATED.3IONS-SCNC: 16 MMOL/L (ref 4–13)
AST SERPL W P-5'-P-CCNC: 20 U/L (ref 5–45)
BASOPHILS # BLD AUTO: 0.08 THOUSANDS/ΜL (ref 0–0.1)
BASOPHILS NFR BLD AUTO: 1 % (ref 0–1)
BILIRUB SERPL-MCNC: 1.14 MG/DL (ref 0.2–1)
BILIRUB UR QL STRIP: ABNORMAL
BUN SERPL-MCNC: 13 MG/DL (ref 5–25)
CALCIUM SERPL-MCNC: 9.3 MG/DL (ref 8.3–10.1)
CHLORIDE SERPL-SCNC: 101 MMOL/L (ref 100–108)
CLARITY UR: CLEAR
CO2 SERPL-SCNC: 22 MMOL/L (ref 21–32)
COLOR UR: YELLOW
CREAT SERPL-MCNC: 1.22 MG/DL (ref 0.6–1.3)
EOSINOPHIL # BLD AUTO: 0 THOUSAND/ΜL (ref 0–0.61)
EOSINOPHIL NFR BLD AUTO: 0 % (ref 0–6)
ERYTHROCYTE [DISTWIDTH] IN BLOOD BY AUTOMATED COUNT: 14.7 % (ref 11.6–15.1)
EXT PREG TEST URINE: NEGATIVE
EXT. CONTROL ED NAV: NORMAL
GFR SERPL CREATININE-BSD FRML MDRD: 55 ML/MIN/1.73SQ M
GLUCOSE SERPL-MCNC: 163 MG/DL (ref 65–140)
GLUCOSE UR STRIP-MCNC: NEGATIVE MG/DL
HCT VFR BLD AUTO: 38.9 % (ref 34.8–46.1)
HGB BLD-MCNC: 13.2 G/DL (ref 11.5–15.4)
HGB UR QL STRIP.AUTO: ABNORMAL
IMM GRANULOCYTES # BLD AUTO: 0.11 THOUSAND/UL (ref 0–0.2)
IMM GRANULOCYTES NFR BLD AUTO: 1 % (ref 0–2)
KETONES UR STRIP-MCNC: ABNORMAL MG/DL
LEUKOCYTE ESTERASE UR QL STRIP: NEGATIVE
LIPASE SERPL-CCNC: 56 U/L (ref 73–393)
LYMPHOCYTES # BLD AUTO: 0.76 THOUSANDS/ΜL (ref 0.6–4.47)
LYMPHOCYTES NFR BLD AUTO: 5 % (ref 14–44)
MCH RBC QN AUTO: 29.2 PG (ref 26.8–34.3)
MCHC RBC AUTO-ENTMCNC: 33.9 G/DL (ref 31.4–37.4)
MCV RBC AUTO: 86 FL (ref 82–98)
MONOCYTES # BLD AUTO: 1.44 THOUSAND/ΜL (ref 0.17–1.22)
MONOCYTES NFR BLD AUTO: 9 % (ref 4–12)
NEUTROPHILS # BLD AUTO: 14.51 THOUSANDS/ΜL (ref 1.85–7.62)
NEUTS SEG NFR BLD AUTO: 84 % (ref 43–75)
NITRITE UR QL STRIP: NEGATIVE
NRBC BLD AUTO-RTO: 0 /100 WBCS
PH UR STRIP.AUTO: 6 [PH] (ref 4.5–8)
PLATELET # BLD AUTO: 281 THOUSANDS/UL (ref 149–390)
PMV BLD AUTO: 10.3 FL (ref 8.9–12.7)
POTASSIUM SERPL-SCNC: 3.4 MMOL/L (ref 3.5–5.3)
PROT SERPL-MCNC: 8.6 G/DL (ref 6.4–8.2)
PROT UR STRIP-MCNC: ABNORMAL MG/DL
RBC # BLD AUTO: 4.52 MILLION/UL (ref 3.81–5.12)
SODIUM SERPL-SCNC: 139 MMOL/L (ref 136–145)
SP GR UR STRIP.AUTO: >=1.03 (ref 1–1.03)
UROBILINOGEN UR QL STRIP.AUTO: 1 E.U./DL
WBC # BLD AUTO: 16.9 THOUSAND/UL (ref 4.31–10.16)

## 2021-06-30 PROCEDURE — 99284 EMERGENCY DEPT VISIT MOD MDM: CPT

## 2021-06-30 PROCEDURE — 83690 ASSAY OF LIPASE: CPT | Performed by: PHYSICIAN ASSISTANT

## 2021-06-30 PROCEDURE — 96375 TX/PRO/DX INJ NEW DRUG ADDON: CPT

## 2021-06-30 PROCEDURE — 74177 CT ABD & PELVIS W/CONTRAST: CPT

## 2021-06-30 PROCEDURE — 85025 COMPLETE CBC W/AUTO DIFF WBC: CPT | Performed by: PHYSICIAN ASSISTANT

## 2021-06-30 PROCEDURE — 96361 HYDRATE IV INFUSION ADD-ON: CPT

## 2021-06-30 PROCEDURE — 36415 COLL VENOUS BLD VENIPUNCTURE: CPT | Performed by: PHYSICIAN ASSISTANT

## 2021-06-30 PROCEDURE — 80053 COMPREHEN METABOLIC PANEL: CPT | Performed by: PHYSICIAN ASSISTANT

## 2021-06-30 PROCEDURE — 96374 THER/PROPH/DIAG INJ IV PUSH: CPT

## 2021-06-30 PROCEDURE — 81025 URINE PREGNANCY TEST: CPT | Performed by: PHYSICIAN ASSISTANT

## 2021-06-30 PROCEDURE — 99285 EMERGENCY DEPT VISIT HI MDM: CPT | Performed by: PHYSICIAN ASSISTANT

## 2021-06-30 RX ORDER — ONDANSETRON 2 MG/ML
4 INJECTION INTRAMUSCULAR; INTRAVENOUS ONCE
Status: COMPLETED | OUTPATIENT
Start: 2021-06-30 | End: 2021-06-30

## 2021-06-30 RX ORDER — MORPHINE SULFATE 4 MG/ML
4 INJECTION, SOLUTION INTRAMUSCULAR; INTRAVENOUS ONCE
Status: COMPLETED | OUTPATIENT
Start: 2021-06-30 | End: 2021-06-30

## 2021-06-30 RX ORDER — METOCLOPRAMIDE HYDROCHLORIDE 5 MG/ML
10 INJECTION INTRAMUSCULAR; INTRAVENOUS ONCE
Status: COMPLETED | OUTPATIENT
Start: 2021-06-30 | End: 2021-06-30

## 2021-06-30 RX ORDER — DICYCLOMINE HCL 20 MG
20 TABLET ORAL 2 TIMES DAILY
Qty: 20 TABLET | Refills: 0 | Status: SHIPPED | OUTPATIENT
Start: 2021-06-30 | End: 2021-07-08 | Stop reason: SDUPTHER

## 2021-06-30 RX ORDER — ONDANSETRON 4 MG/1
4 TABLET, ORALLY DISINTEGRATING ORAL EVERY 6 HOURS PRN
Qty: 20 TABLET | Refills: 0 | Status: SHIPPED | OUTPATIENT
Start: 2021-06-30 | End: 2021-07-08 | Stop reason: SDUPTHER

## 2021-06-30 RX ORDER — DIPHENHYDRAMINE HYDROCHLORIDE 50 MG/ML
25 INJECTION INTRAMUSCULAR; INTRAVENOUS ONCE
Status: COMPLETED | OUTPATIENT
Start: 2021-06-30 | End: 2021-06-30

## 2021-06-30 RX ORDER — KETOROLAC TROMETHAMINE 30 MG/ML
15 INJECTION, SOLUTION INTRAMUSCULAR; INTRAVENOUS ONCE
Status: COMPLETED | OUTPATIENT
Start: 2021-06-30 | End: 2021-06-30

## 2021-06-30 RX ADMIN — DIPHENHYDRAMINE HYDROCHLORIDE 25 MG: 50 INJECTION, SOLUTION INTRAMUSCULAR; INTRAVENOUS at 09:49

## 2021-06-30 RX ADMIN — IOHEXOL 100 ML: 350 INJECTION, SOLUTION INTRAVENOUS at 09:30

## 2021-06-30 RX ADMIN — METOCLOPRAMIDE 10 MG: 5 INJECTION, SOLUTION INTRAMUSCULAR; INTRAVENOUS at 09:52

## 2021-06-30 RX ADMIN — SODIUM CHLORIDE 1000 ML: 0.9 INJECTION, SOLUTION INTRAVENOUS at 08:31

## 2021-06-30 RX ADMIN — SODIUM CHLORIDE 1000 ML: 0.9 INJECTION, SOLUTION INTRAVENOUS at 11:21

## 2021-06-30 RX ADMIN — MORPHINE SULFATE 4 MG: 4 INJECTION INTRAVENOUS at 09:04

## 2021-06-30 RX ADMIN — KETOROLAC TROMETHAMINE 15 MG: 30 INJECTION, SOLUTION INTRAMUSCULAR at 08:26

## 2021-06-30 RX ADMIN — ONDANSETRON 4 MG: 2 INJECTION INTRAMUSCULAR; INTRAVENOUS at 08:22

## 2021-06-30 NOTE — ED NOTES
Patient transported to [address] [place] [address],[address], Critical access hospital0 [facility]  06/30/21 1817

## 2021-06-30 NOTE — ED PROVIDER NOTES
History  Chief Complaint   Patient presents with    Abdominal Pain     Generalized abdominal pain, nausea, vomiting, diarrhea since this morning  Denies urinary sx  No sick contacts  Patient is a 40 y/o female with hx of asthma, GERD, anxiety/depression, presents to the ED for evaluation of abdominal pain, N/V/D  Pt states she began with onset of symptoms this morning, reporting generalized abdominal pain, multiple episodes of NBNB vomiting, multiple episodes of watery diarrhea  Pt states hx of similar symptoms in the past, does drink ETOH, last drink was yesterday  Pt denies family members with similar sx or recent travel  Pt without fevers, chest pain, SOB, melena, hematochezia, hematemesis, dysuria, hematuria, flank pain  Prior to Admission Medications   Prescriptions Last Dose Informant Patient Reported? Taking?   brompheniramine-pseudoephedrine-DM 30-2-10 MG/5ML syrup   Yes No   Sig: Take 10 mL by mouth 4 (four) times a day as needed   citalopram (CeleXA) 20 mg tablet   Yes No   Sig: Take 20 mg by mouth daily   clonazePAM (KlonoPIN) 0 5 mg tablet   Yes No   Sig: Take 0 5 mg by mouth 2 (two) times a day   ipratropium (ATROVENT) 0 03 % nasal spray   No No   Si-2 sprays each nostril twice a day as needed for rhinorrhea   traZODone (DESYREL) 50 mg tablet   Yes No   Sig: Take 50 mg by mouth daily at bedtime      Facility-Administered Medications: None       Past Medical History:   Diagnosis Date    Anxiety and depression     Asthma     has not req'd tx since 2016    Chlamydia     GERD (gastroesophageal reflux disease)     no meds    Migraine     manages with OTC remedies       Past Surgical History:   Procedure Laterality Date    NASAL/SINUS ENDOSCOPY Right 2020    Procedure: FUNCTIONAL ENDOSCOPIC SINUS SURGERY WITH RIGHT MAXILLARY ANTROSTOMY REMOVALOF CYST, RIGHT SPHENOIDOTOMY, RIGHT POLYPECTOMY (LARGE ANTRAL CHOANAL POLYP)  ;  Surgeon: Arlin Shepherd MD;  Location:  MAIN OR; Service: ENT    IA LAP,RMV  ADNEXAL STRUCTURE Right 10/10/2018    Procedure: SALPINGECTOMY, LAPAROSCOPIC;  Surgeon: Jessica Uribe MD;  Location: [facility];  Service: Gynecology    SALPINGOSTOMY  2015    tubal reversal    SCALP EXCISION      x4    TUBAL LIGATION  2002    WISDOM TOOTH EXTRACTION  1998       Family History   Problem Relation Age of Onset    Cancer Maternal Aunt         lung    Breast cancer Neg Hx      I have reviewed and agree with the history as documented  E-Cigarette/Vaping    E-Cigarette Use Never User      E-Cigarette/Vaping Substances    Nicotine No     THC No     CBD No     Flavoring No      Social History     Tobacco Use    Smoking status: Never Smoker    Smokeless tobacco: Never Used   Vaping Use    Vaping Use: Never used   Substance Use Topics    Alcohol use: Yes    Drug use: Not Currently     Types: Marijuana     Comment: last used marijuana 2014       Review of Systems   Constitutional: Negative for chills and fever  HENT: Negative for ear pain and sore throat  Eyes: Negative for visual disturbance  Respiratory: Negative for cough and shortness of breath  Cardiovascular: Negative for chest pain, palpitations and leg swelling  Gastrointestinal: Positive for abdominal pain, diarrhea, nausea and vomiting  Genitourinary: Negative for dysuria and hematuria  Musculoskeletal: Negative for back pain and neck pain  Skin: Negative for rash  Neurological: Negative for speech difficulty and headaches  Psychiatric/Behavioral: Negative for confusion  Physical Exam  Physical Exam  Constitutional:       Appearance: She is well-developed  She is not toxic-appearing or diaphoretic  HENT:      Head: Normocephalic and atraumatic  Right Ear: External ear normal       Left Ear: External ear normal       Nose: Nose normal       Mouth/Throat:      Lips: Pink        Mouth: Mucous membranes are moist    Eyes:      Extraocular Movements: Extraocular movements intact  Conjunctiva/sclera: Conjunctivae normal    Cardiovascular:      Rate and Rhythm: Normal rate and regular rhythm  Pulmonary:      Effort: Pulmonary effort is normal       Breath sounds: Normal breath sounds  No decreased breath sounds, wheezing, rhonchi or rales  Abdominal:      General: Abdomen is flat  There is no distension  Palpations: Abdomen is soft  Tenderness: There is generalized abdominal tenderness  There is no guarding or rebound  Comments: Retching on exam   Musculoskeletal:      Cervical back: Normal range of motion and neck supple  Skin:     General: Skin is warm  Capillary Refill: Capillary refill takes less than 2 seconds  Coloration: Skin is not jaundiced or pale  Findings: No rash  Neurological:      Mental Status: She is alert and oriented to person, place, and time     Psychiatric:         Mood and Affect: Mood and affect normal          Speech: Speech normal          Vital Signs  ED Triage Vitals   Temperature Pulse Respirations Blood Pressure SpO2   06/30/21 0743 06/30/21 0744 06/30/21 0744 06/30/21 0744 06/30/21 0744   (!) 97 3 °F (36 3 °C) 104 18 132/84 97 %      Temp Source Heart Rate Source Patient Position - Orthostatic VS BP Location FiO2 (%)   06/30/21 0743 06/30/21 1000 06/30/21 1000 06/30/21 1000 --   Oral Monitor Lying Right arm       Pain Score       06/30/21 0744       Worst Possible Pain           Vitals:    06/30/21 0744 06/30/21 1000   BP: 132/84 156/61   Pulse: 104 65   Patient Position - Orthostatic VS:  Lying         Visual Acuity      ED Medications  Medications   sodium chloride 0 9 % bolus 1,000 mL (1,000 mL Intravenous New Bag 6/30/21 1121)   sodium chloride 0 9 % bolus 1,000 mL (1,000 mL Intravenous New Bag 6/30/21 0831)   ondansetron (ZOFRAN) injection 4 mg (4 mg Intravenous Given 6/30/21 0822)   ketorolac (TORADOL) injection 15 mg (15 mg Intravenous Given 6/30/21 0826)   morphine (PF) 4 mg/mL injection 4 mg (4 mg Intravenous Given 6/30/21 0904)   iohexol (OMNIPAQUE) 350 MG/ML injection (SINGLE-DOSE) 100 mL (100 mL Intravenous Given 6/30/21 0930)   metoclopramide (REGLAN) injection 10 mg (10 mg Intravenous Given 6/30/21 0952)   diphenhydrAMINE (BENADRYL) injection 25 mg (25 mg Intravenous Given 6/30/21 0949)       Diagnostic Studies  Results Reviewed     Procedure Component Value Units Date/Time    Urine Microscopic [[id number]] Updated: 06/30/21 1110    Lab Status: No result Specimen: Urine     Urine Macroscopic, POC [[id number]]  (Abnormal) Collected: 06/30/21 0909    Lab Status: Final result Specimen: Urine Updated: 06/30/21 0911     Color, UA Yellow     Clarity, UA Clear     pH, UA 6 0     Leukocytes, UA Negative     Nitrite, UA Negative     Protein,  (2+) mg/dl      Glucose, UA Negative mg/dl      Ketones, UA >=160 (4+) mg/dl      Urobilinogen, UA 1 0 E U /dl      Bilirubin, UA Interference- unable to analyze     Blood, UA Trace     Specific Gravity, UA >=1 030    Narrative:      CL[facility] RESULT    Comprehensive metabolic panel [[id number]]  (Abnormal) Collected: 06/30/21 0833    Lab Status: Final result Specimen: Blood from Arm, Left Updated: 06/30/21 0852     Sodium 139 mmol/L      Potassium 3 4 mmol/L      Chloride 101 mmol/L      CO2 22 mmol/L      ANION GAP 16 mmol/L      BUN 13 mg/dL      Creatinine 1 22 mg/dL      Glucose 163 mg/dL      Calcium 9 3 mg/dL      AST 20 U/L      ALT 25 U/L      Alkaline Phosphatase 83 U/L      Total Protein 8 6 g/dL      Albumin 4 1 g/dL      Total Bilirubin 1 14 mg/dL      eGFR 55 ml/min/1 73sq m     Narrative:      [place] guidelines for Chronic Kidney Disease (CKD):     Stage 1 with normal or high GFR (GFR > 90 mL/min/1 73 square meters)    Stage 2 Mild CKD (GFR = 60-89 mL/min/1 73 square meters)    Stage 3A Moderate CKD (GFR = 45-59 mL/min/1 73 square meters)    Stage 3B Moderate CKD (GFR = 30-44 mL/min/1 73 square meters)    Stage 4 Severe CKD (GFR = 15-29 mL/min/1 73 square meters)    Stage 5 End Stage CKD (GFR <15 mL/min/1 73 square meters)  Note: GFR calculation is accurate only with a steady state creatinine    Lipase [[id number]]  (Abnormal) Collected: 06/30/21 0833    Lab Status: Final result Specimen: Blood from Arm, Left Updated: 06/30/21 0852     Lipase 56 u/L     CBC and differential [[id number]]  (Abnormal) Collected: 06/30/21 0833    Lab Status: Final result Specimen: Blood from Arm, Left Updated: 06/30/21 0837     WBC 16 90 Thousand/uL      RBC 4 52 Million/uL      Hemoglobin 13 2 g/dL      Hematocrit 38 9 %      MCV 86 fL      MCH 29 2 pg      MCHC 33 9 g/dL      RDW 14 7 %      MPV 10 3 fL      Platelets 588 Thousands/uL      nRBC 0 /100 WBCs      Neutrophils Relative 84 %      Immat GRANS % 1 %      Lymphocytes Relative 5 %      Monocytes Relative 9 %      Eosinophils Relative 0 %      Basophils Relative 1 %      Neutrophils Absolute 14 51 Thousands/µL      Immature Grans Absolute 0 11 Thousand/uL      Lymphocytes Absolute 0 76 Thousands/µL      Monocytes Absolute 1 44 Thousand/µL      Eosinophils Absolute 0 00 Thousand/µL      Basophils Absolute 0 08 Thousands/µL     POCT pregnancy, urine [[id number]]  (Normal) Resulted: 06/30/21 0825    Lab Status: Final result Updated: 06/30/21 0826     EXT PREG TEST UR (Ref: Negative) negative     Control valid                 CT abdomen pelvis with contrast   Final Result by Migel Hood MD (06/30 2366)      1  Mild colonic mural fatty infiltration may indicate sequela of colitis  2   Suggested hepatic steatosis which can be further assessed with ultrasound  Workstation performed: [id number]                    Procedures  Procedures         ED Course  ED Course as of Jun 30 1200   Wed Jun 30, 2021   1512 CT abdomen pelvis with contrast   0959 1  Mild colonic mural fatty infiltration may indicate sequela of colitis    2   Suggested hepatic steatosis which can be further assessed with ultrasound  1054 Patient feeling improved, will PO challenge                                              MDM  Number of Diagnoses or Management Options  Colitis: new and does not require workup  Generalized abdominal pain: new and does not require workup  Hepatic steatosis: new and requires workup  Urge and stress incontinence: new and requires workup  Vomiting and diarrhea: new and does not require workup  Diagnosis management comments: Patient is a 40 y/o female with hx of asthma, GERD, anxiety/depression, presents to the ED for evaluation of abdominal pain, N/V/D  Pt states she began with onset of symptoms this morning, reporting generalized abdominal pain, multiple episodes of NBNB vomiting, multiple episodes of watery diarrhea  Pt states hx of similar symptoms in the past, does drink ETOH, last drink was yesterday  UA shows 4+ ketones, 2L IVF given  Zofran, morphine, toradol, reglan and benadryl given with improvement in symptoms  WBC 16  CT a/p shows  1  Mild colonic mural fatty infiltration may indicate sequela of colitis  2   Suggested hepatic steatosis which can be further assessed with ultrasound  Patient unable to provide stool sample  Pt was able to tolerate PO prior to discharge  Will give rx for zofran and bentyl for symptomatic relief  Encouraged to stay well hydrated, BRAT diet and f/u with PCP regarding hepatic steatosis findings on CT that will require outpatient U/S of liver  Patient also reporting chronic urge/stress incontinence, information for Urology provided  Patient verbalizes understanding and agrees with plan  The management plan was discussed in detail with the patient at bedside and all questions were answered  Prior to discharge, I provided both verbal and written instructions  I discussed with the patient the signs and symptoms for which to return to the emergency department    All questions were answered and patient was comfortable with the plan of care and discharged to home  The patient agrees to return to the Emergency Department for concerns and/or progression of illness  Disposition  Final diagnoses:   Colitis   Generalized abdominal pain   Vomiting and diarrhea   Hepatic steatosis   Urge and stress incontinence     Time reflects when diagnosis was documented in both MDM as applicable and the Disposition within this note     Time User Action Codes Description Comment    6/30/2021 11:52 AM Mariano Fent Add [K52 9] Colitis     6/30/2021 11:52 AM Mariano Fent Add [R10 84] Generalized abdominal pain     6/30/2021 11:52 AM Mariano Fent Add [R11 10,  R19 7] Vomiting and diarrhea     6/30/2021 11:52 AM Mariano Fent Add [K76 0] Hepatic steatosis     6/30/2021 11:54 AM Mariano Fent Add [N39 46] Urge and stress incontinence       ED Disposition     ED Disposition Condition Date/Time Comment    Discharge Stable Wed Jun 30, 2021 11:52 AM Austin Cook discharge to home/self care              Follow-up Information     Follow up With Specialties Details Why Contact Info Additional [facility] Schedule an appointment as soon as possible for a visit  for follow-up regarding hepatic steatosis [address], [address]  [address]  [address], [address], [address] [place], [place], [address]    [facility] Schedule an appointment as soon as possible for a visit  regarding urinary symptoms [place] [id number]  [address] [place] [address] For Urology [place], [address] [place] [address], [address] [phone]          Patient's Medications   Discharge Prescriptions    DICYCLOMINE (BENTYL) 20 MG TABLET    Take 1 tablet (20 mg total) by mouth 2 (two) times a day for 10 days       Start Date: 6/30/2021 End Date: 7/10/2021       Order Dose: 20 mg       Quantity: 20 tablet    Refills: 0    ONDANSETRON (ZOFRAN-ODT) 4 MG DISINTEGRATING TABLET    Take 1 tablet (4 mg total) by mouth every 6 (six) hours as needed for nausea or vomiting       Start Date: 6/30/2021 End Date: --       Order Dose: 4 mg       Quantity: 20 tablet    Refills: 0     No discharge procedures on file      PDMP Review       Value Time User    PDMP Reviewed  Yes 6/25/2020 12:02 PM Estela Doss MD          ED Provider  Electronically Signed by           José Luis Villalpando PA-C  06/30/21 1200

## 2021-06-30 NOTE — DISCHARGE INSTRUCTIONS
ABDOMEN     LOWER CHEST:  No clinically significant abnormality identified in the visualized lower chest      LIVER/BILIARY TREE:  Suggested hepatic steatosis  GALLBLADDER:  No calcified gallstones  No pericholecystic inflammatory change  SPLEEN:  Unremarkable  PANCREAS:  Unremarkable  ADRENAL GLANDS:  Unremarkable  KIDNEYS/URETERS:  Unremarkable  No hydronephrosis  STOMACH AND BOWEL:  Mild colonic mural fatty infiltration  APPENDIX:  A normal appendix was visualized  ABDOMINOPELVIC CAVITY:  No ascites  No pneumoperitoneum  No lymphadenopathy  VESSELS:  Unremarkable for patient's age  PELVIS     REPRODUCTIVE ORGANS:  Surgical change in right adnexa     URINARY BLADDER:  Unremarkable  ABDOMINAL WALL/INGUINAL REGIONS:  Unremarkable  OSSEOUS STRUCTURES:  No acute fracture or destructive osseous lesion  IMPRESSION:     1  Mild colonic mural fatty infiltration may indicate sequela of colitis  2   Suggested hepatic steatosis which can be further assessed with ultrasound

## 2021-07-07 ENCOUNTER — OFFICE VISIT (OUTPATIENT)
Dept: FAMILY MEDICINE CLINIC | Facility: CLINIC | Age: 41
End: 2021-07-07

## 2021-07-07 VITALS
WEIGHT: 234.9 LBS | BODY MASS INDEX: 44.35 KG/M2 | RESPIRATION RATE: 20 BRPM | OXYGEN SATURATION: 98 % | SYSTOLIC BLOOD PRESSURE: 118 MMHG | HEART RATE: 76 BPM | TEMPERATURE: 97.8 F | DIASTOLIC BLOOD PRESSURE: 82 MMHG | HEIGHT: 61 IN

## 2021-07-07 DIAGNOSIS — K76.0 HEPATIC STEATOSIS: Primary | ICD-10-CM

## 2021-07-07 DIAGNOSIS — R10.84 GENERALIZED ABDOMINAL PAIN: ICD-10-CM

## 2021-07-07 DIAGNOSIS — F33.9 DEPRESSION, RECURRENT (HCC): ICD-10-CM

## 2021-07-07 DIAGNOSIS — Z13.31 DEPRESSION SCREEN: ICD-10-CM

## 2021-07-07 DIAGNOSIS — N39.46 URGE AND STRESS INCONTINENCE: ICD-10-CM

## 2021-07-07 PROCEDURE — 3725F SCREEN DEPRESSION PERFORMED: CPT | Performed by: PHYSICIAN ASSISTANT

## 2021-07-07 PROCEDURE — 3008F BODY MASS INDEX DOCD: CPT | Performed by: PHYSICIAN ASSISTANT

## 2021-07-07 PROCEDURE — 99214 OFFICE O/P EST MOD 30 MIN: CPT | Performed by: PHYSICIAN ASSISTANT

## 2021-07-07 PROCEDURE — 1036F TOBACCO NON-USER: CPT | Performed by: PHYSICIAN ASSISTANT

## 2021-07-07 NOTE — PROGRESS NOTES
Assessment/Plan:    Depression, recurrent ([facility] [address] )  - Continue follow-up with Psychiatry as scheduled for therapy and medication management  Hepatic steatosis  - Reviewed CT abdomen pelvis from 06/30/2021 which revealed suggested hepatic steatosis which can be further assessed with ultrasound  Will order liver ultrasound for further evaluation   -  Reviewed causes and pathophysiology with patient  Patient does admit to heavy alcohol drinking about once a week  Encouraged to cut down on alcohol drinking  Generalized abdominal pain  - Symptoms have improved, but still persist   Continue Bentyl 20 mg twice daily as needed and Zofran 4 mg, every 6 hours as needed for nausea/ vomiting     - Due to persistent symptoms, will refer to Gastroenterology for further evaluation and management  Urge and stress incontinence  - Continue daily Kegel exercises  - Advised to follow-up as scheduled for later this month with Urology to establish care  Depression Screening Follow-up Plan: Patient's depression screening was positive with a PHQ-2 score of 4  Their PHQ-9 score was 15  Continue regular follow-up with their psychologist/therapist/psychiatrist who is managing their mental health condition(s)  Diagnoses and all orders for this visit:    Hepatic steatosis  -     US liver; Future  -     Ambulatory referral to Gastroenterology; Future    Depression screen    Generalized abdominal pain  -     Ambulatory referral to Gastroenterology; Future    Depression, recurrent (HCC)    Urge and stress incontinence          All of patients questions were answered  Patient understands and agrees with the above plan  Return in about 22 days (around 7/29/2021) for Next scheduled follow up abdominal pain      Luis Jeong PA-C  07/07/21  [address] [facility]          Subjective:     Patient ID: Austin Cook  is a 41 y o  female  has a past medical history of Anxiety and depression, Asthma, Chlamydia, GERD (gastroesophageal reflux disease), and Migraine  who presents today in office for ED visit follow up  - Patient is a 41 y o  female who presents today for ED visit follow up  Patient [facility] ED on [facility]/30/2021 due to abdominal pain, nausea, vomiting, diarrhea  Patient does drink alcohol and her last drink was the day prior  CT abdomen pelvis with contrast was completed and revealed mild colonic mural fatty infiltration may indicate sequela of colitis  Suggested hepatic steatosis which can be further assessed with ultrasound  Patient received Zofran, morphine, Toradol, Reglan, Benadryl with improvement in symptoms  Patient was discharged home with prescription for Bentyl and Zofran and was told to follow-up with her PCP  Also, patient was complaining of urinary incontinence and was given information for Urology  - Today, patient notes she had gone down to see her brother in [place] prior to her emergency room visit  Patient notes she had not seen him in a long time and she does admit to drinking a lot of alcohol  Patient notes her brother does drink a lot as well and she try to " keep up" with him in drinking, but she was not able to  Patient notes the next day she did not have many symptoms, but the following day is when she started to experience nausea, vomiting, diarrhea, abdominal pain  Patient notes that is when she went to the emergency room  Since her ED visit, patient notes she still does experience cramping abdominal pain and has not been eating much  Patient notes she has been trying to eat very bland foods  Patient has been taking the Bentyl and Zofran which have been helpful  She is requesting a refill today  Patient notes she has had these symptoms in the past after drinking alcohol, but they never found anything with the testing  However, patient notes this was the first time they found something wrong with her liver    Today, patient notes she does believe that she has a problem with drinking  Patient notes she does not drink every day, but when she does drink, it is a lot at once  Patient notes she does follow with her therapist and she plans on disclosing this information at her next visit  Patient notes she probably drinks about once a week and will drink half a bottle of vodka on her own  Patient notes she suffers from anxiety and depression and alcohol helps to make her feel better  Patient notes her sister  from an overdose and her good friend  due to drinking too much  Patient notes her  is very supportive  Patient notes she does enjoy working on puzzles to help with her anxiety  Patient notes she believes this recent ED visit was a "wake up" call to herself  Patient notes she is now realizing that she is causing damage to her body from what she is doing  Patient denies any further fevers, diarrhea, vomiting  Patient notes she has had some chills  - Patient notes she experiences urinary urgency and as soon as she feels urge to use the bathroom, she has to go and last she will have an accident  Patient notes often when she sneezes or coughs she will leak urine as well  Patient notes she has been performing Kegel's with little relief  Patient notes she has already made an appointment Urology and is scheduled to see them later this month  The following portions of the patient's history were reviewed and updated as appropriate: allergies, current medications, past family history, past medical history, past social history, past surgical history and problem list         Review of Systems   Constitutional: Positive for chills and fatigue  Negative for fever  HENT: Negative for congestion and sore throat  Eyes: Negative for pain  Respiratory: Negative for cough, chest tightness and shortness of breath  Cardiovascular: Negative for chest pain and palpitations  Gastrointestinal: Positive for abdominal pain   Negative for constipation, diarrhea (Resolved), nausea and vomiting (Resolved)  Genitourinary: Positive for urgency  Negative for difficulty urinating, dysuria and frequency  Urge and stress incontinence   Musculoskeletal: Negative for arthralgias  Skin: Negative for rash  Neurological: Negative for dizziness and headaches  Psychiatric/Behavioral: Positive for dysphoric mood  Negative for self-injury and suicidal ideas  The patient is nervous/anxious  Objective:   Vitals:    07/07/21 1534   BP: 118/82   BP Location: Right arm   Patient Position: Sitting   Cuff Size: Adult   Pulse: 76   Resp: 20   Temp: 97 8 °F (36 6 °C)   TempSrc: Temporal   SpO2: 98%   Weight: 107 kg (234 lb 14 4 oz)   Height: 5' 1" (1 549 m)         Physical Exam  Vitals and nursing note reviewed  Constitutional:       Appearance: She is well-developed  HENT:      Head: Normocephalic and atraumatic  Right Ear: External ear normal       Left Ear: External ear normal       Nose: Nose normal    Eyes:      Conjunctiva/sclera: Conjunctivae normal    Cardiovascular:      Rate and Rhythm: Normal rate and regular rhythm  Heart sounds: Normal heart sounds  No murmur heard  Pulmonary:      Effort: Pulmonary effort is normal       Breath sounds: Normal breath sounds  No wheezing  Abdominal:      General: Bowel sounds are normal       Palpations: Abdomen is soft  There is no mass  Tenderness: There is no abdominal tenderness  There is no guarding  Musculoskeletal:         General: Normal range of motion  Cervical back: Normal range of motion and neck supple  Skin:     General: Skin is warm and dry  Neurological:      Mental Status: She is alert and oriented to person, place, and time     Psychiatric:         Speech: Speech normal          Behavior: Behavior normal            PHQ-9 Depression Screening    PHQ-9:   Frequency of the following problems over the past two weeks:      Little interest or pleasure in doing things: 1 - several days  Feeling down, depressed, or hopeless: 3 - nearly every day  Trouble falling or staying asleep, or sleeping too much: 3 - nearly every day  Feeling tired or having little energy: 3 - nearly every day  Poor appetite or overeating: 3 - nearly every day  Feeling bad about yourself - or that you are a failure or have let yourself or your family down: 0 - not at all  Trouble concentrating on things, such as reading the newspaper or watching television: 2 - more than half the days  Moving or speaking so slowly that other people could have noticed   Or the opposite - being so fidgety or restless that you have been moving around a lot more than usual: 0 - not at all  Thoughts that you would be better off dead, or of hurting yourself in some way: 0 - not at all  PHQ-2 Score: 4  PHQ-9 Score: 15

## 2021-07-08 ENCOUNTER — TELEPHONE (OUTPATIENT)
Dept: FAMILY MEDICINE CLINIC | Facility: CLINIC | Age: 41
End: 2021-07-08

## 2021-07-08 ENCOUNTER — HOSPITAL ENCOUNTER (OUTPATIENT)
Dept: ULTRASOUND IMAGING | Facility: HOSPITAL | Age: 41
Discharge: HOME | End: 2021-07-08
Payer: COMMERCIAL

## 2021-07-08 DIAGNOSIS — K76.0 HEPATIC STEATOSIS: ICD-10-CM

## 2021-07-08 DIAGNOSIS — K52.9 COLITIS: ICD-10-CM

## 2021-07-08 PROCEDURE — 76705 ECHO EXAM OF ABDOMEN: CPT

## 2021-07-08 RX ORDER — ONDANSETRON 4 MG/1
4 TABLET, ORALLY DISINTEGRATING ORAL EVERY 6 HOURS PRN
Qty: 30 TABLET | Refills: 1 | Status: SHIPPED | OUTPATIENT
Start: 2021-07-08 | End: 2021-07-29 | Stop reason: SDUPTHER

## 2021-07-08 RX ORDER — DICYCLOMINE HCL 20 MG
20 TABLET ORAL 2 TIMES DAILY
Qty: 60 TABLET | Refills: 1 | Status: SHIPPED | OUTPATIENT
Start: 2021-07-08 | End: 2021-07-29 | Stop reason: SDUPTHER

## 2021-07-08 NOTE — TELEPHONE ENCOUNTER
Patient is requesting a refill for dicyclomine (BENTYL) 20 mg tablet &   ondansetron (ZOFRAN-ODT) 4 mg disintegrating tablet

## 2021-07-10 PROBLEM — K76.0 HEPATIC STEATOSIS: Status: ACTIVE | Noted: 2021-07-10

## 2021-07-11 PROBLEM — F33.9 DEPRESSION, RECURRENT (HCC): Status: ACTIVE | Noted: 2021-07-11

## 2021-07-11 PROBLEM — N39.46 URGE AND STRESS INCONTINENCE: Status: ACTIVE | Noted: 2021-07-11

## 2021-07-11 PROBLEM — R10.84 GENERALIZED ABDOMINAL PAIN: Status: ACTIVE | Noted: 2021-07-11

## 2021-07-12 ENCOUNTER — TELEPHONE (OUTPATIENT)
Dept: GASTROENTEROLOGY | Facility: MEDICAL CENTER | Age: 41
End: 2021-07-12

## 2021-07-13 ENCOUNTER — TELEPHONE (OUTPATIENT)
Dept: FAMILY MEDICINE CLINIC | Facility: CLINIC | Age: 41
End: 2021-07-13

## 2021-07-13 NOTE — TELEPHONE ENCOUNTER
Pt called stating she received results of ultrasound on my chart but does not understand them  Wants clarification

## 2021-07-16 ENCOUNTER — TELEPHONE (OUTPATIENT)
Dept: FAMILY MEDICINE CLINIC | Facility: CLINIC | Age: 41
End: 2021-07-16

## 2021-07-16 NOTE — TELEPHONE ENCOUNTER
US is now read, please review and send back to me so I can call her   No need to send result notes to pool Thanks

## 2021-07-16 NOTE — TELEPHONE ENCOUNTER
PT called again today 07/16/21 Requesting A Called Back from the Nurse about her Results  PT states that she has called and Left a Message on the Nurse Line and also sent a Message through her [address], and she still yet! have not gotten a call back  PT is requesting "PLEASE" to get a CALL BACK with a Full Clear Explanation of her Results (BW and Radiology)  PT is worry because she doesn't understand the meaning of her Results when she read them on her MY CHART    PT is worry that she might have something to worry about  Please Advice    Thank You

## 2021-07-18 NOTE — TELEPHONE ENCOUNTER
Please advise patient her US showed moderate fatty liver which was expected  Reviewed with patient the causes at last appointment  Referral to GI was placed at last appointment and patient can follow up with them when scheduled  If she has yet to schedule, please advise to call central scheduling to schedule the appointment  Thanks!

## 2021-07-28 ENCOUNTER — OFFICE VISIT (OUTPATIENT)
Dept: UROLOGY | Facility: MEDICAL CENTER | Age: 41
End: 2021-07-28
Payer: COMMERCIAL

## 2021-07-28 VITALS
SYSTOLIC BLOOD PRESSURE: 124 MMHG | DIASTOLIC BLOOD PRESSURE: 70 MMHG | WEIGHT: 230 LBS | HEIGHT: 61 IN | BODY MASS INDEX: 43.43 KG/M2

## 2021-07-28 DIAGNOSIS — N39.46 URGE AND STRESS INCONTINENCE: Primary | ICD-10-CM

## 2021-07-28 LAB
SL AMB  POCT GLUCOSE, UA: ABNORMAL
SL AMB LEUKOCYTE ESTERASE,UA: ABNORMAL
SL AMB POCT BILIRUBIN,UA: ABNORMAL
SL AMB POCT BLOOD,UA: ABNORMAL
SL AMB POCT CLARITY,UA: CLEAR
SL AMB POCT COLOR,UA: YELLOW
SL AMB POCT KETONES,UA: ABNORMAL
SL AMB POCT NITRITE,UA: ABNORMAL
SL AMB POCT PH,UA: 5
SL AMB POCT SPECIFIC GRAVITY,UA: 1.02
SL AMB POCT URINE PROTEIN: ABNORMAL
SL AMB POCT UROBILINOGEN: ABNORMAL

## 2021-07-28 PROCEDURE — 81002 URINALYSIS NONAUTO W/O SCOPE: CPT | Performed by: UROLOGY

## 2021-07-28 PROCEDURE — 99213 OFFICE O/P EST LOW 20 MIN: CPT | Performed by: UROLOGY

## 2021-07-28 PROCEDURE — 1036F TOBACCO NON-USER: CPT | Performed by: UROLOGY

## 2021-07-28 PROCEDURE — 3008F BODY MASS INDEX DOCD: CPT | Performed by: PHYSICIAN ASSISTANT

## 2021-07-28 PROCEDURE — 3008F BODY MASS INDEX DOCD: CPT | Performed by: UROLOGY

## 2021-07-28 NOTE — PROGRESS NOTES
Assessment/Plan:    Urge and stress incontinence  Refer to Uro-Gyn  Diagnoses and all orders for this visit:    Urge and stress incontinence  -     POCT urine dip  -     Ambulatory referral to Urogynecology; Future          Subjective:      Patient ID: Deepali Rapp is a 41 y o  female  HPI  Urinary Incontinence: This has been a problem for a year and is getting worse  Pt has severe urgency and has had one episode of gross incontinence  She also barely makes it to [place] or leaks on the way to he [place]  Pt also notes frequency and nocturia 5-6 times  Moderate PARRISH also  Wears a pad when she goes out  Has used 3-4 pads per day  Not sure she empties  Double vds for small amts  No changes in health or meds to account for this  The following portions of the patient's history were reviewed and updated as appropriate: allergies, current medications, past family history, past medical history, past social history, past surgical history and problem list     Review of Systems   Constitutional: Negative for activity change and fatigue  Respiratory: Negative for shortness of breath and wheezing  Cardiovascular: Negative for chest pain  Gastrointestinal: Negative for abdominal pain  Genitourinary: Negative for difficulty urinating, dysuria, frequency, hematuria and urgency  Musculoskeletal: Negative for back pain and gait problem  Skin: Negative  Allergic/Immunologic: Negative  Neurological: Negative  Psychiatric/Behavioral: Negative  Depression  Objective:      /70   Ht 5' 1" (1 549 m)   Wt 104 kg (230 lb)   BMI 43 46 kg/m²          Physical Exam  Constitutional:       Appearance: She is well-developed  HENT:      Head: Normocephalic and atraumatic  Pulmonary:      Effort: Pulmonary effort is normal    Abdominal:      Tenderness: There is right CVA tenderness and left CVA tenderness  Comments:  The patient is tender in the right upper quadrant and in the anterior axillary line shelter to the iliac crest in the region of her liver  She is known to have a fatty liver and she has a gastroenterology consultation pending  The patient had only mild tenderness in the left lower quadrant  Mild symmetrical bilateral CVA tenderness noted  Musculoskeletal:         General: Normal range of motion  Cervical back: Normal range of motion and neck supple  Neurological:      Mental Status: She is alert and oriented to person, place, and time  Deep Tendon Reflexes: Reflexes are normal and symmetric  Psychiatric:         Behavior: Behavior normal          Thought Content:  Thought content normal          Judgment: Judgment normal

## 2021-07-29 ENCOUNTER — OFFICE VISIT (OUTPATIENT)
Dept: FAMILY MEDICINE CLINIC | Facility: CLINIC | Age: 41
End: 2021-07-29

## 2021-07-29 VITALS
WEIGHT: 229 LBS | HEART RATE: 86 BPM | OXYGEN SATURATION: 98 % | SYSTOLIC BLOOD PRESSURE: 110 MMHG | DIASTOLIC BLOOD PRESSURE: 72 MMHG | TEMPERATURE: 97.8 F | RESPIRATION RATE: 18 BRPM | BODY MASS INDEX: 43.27 KG/M2

## 2021-07-29 DIAGNOSIS — K76.0 HEPATIC STEATOSIS: ICD-10-CM

## 2021-07-29 DIAGNOSIS — R10.84 GENERALIZED ABDOMINAL PAIN: Primary | ICD-10-CM

## 2021-07-29 PROCEDURE — 1036F TOBACCO NON-USER: CPT | Performed by: PHYSICIAN ASSISTANT

## 2021-07-29 PROCEDURE — 99213 OFFICE O/P EST LOW 20 MIN: CPT | Performed by: PHYSICIAN ASSISTANT

## 2021-07-29 RX ORDER — DICYCLOMINE HCL 20 MG
20 TABLET ORAL 2 TIMES DAILY
Qty: 60 TABLET | Refills: 1 | Status: SHIPPED | OUTPATIENT
Start: 2021-07-29 | End: 2021-08-04

## 2021-07-29 RX ORDER — ONDANSETRON 4 MG/1
4 TABLET, ORALLY DISINTEGRATING ORAL EVERY 6 HOURS PRN
Qty: 30 TABLET | Refills: 1 | Status: SHIPPED | OUTPATIENT
Start: 2021-07-29 | End: 2021-11-28

## 2021-07-29 NOTE — PROGRESS NOTES
Assessment/Plan:    Hepatic steatosis  - Reviewed liver US from 7/8/21 which revealed moderate fatty liver  - Today will be patient's 30th day without drinking alcohol  Congratulated patient for this and encouraged to keep up the great work      Generalized abdominal pain  - Symptoms have improved, but still persist   Continue Bentyl 20 mg twice daily as needed and Zofran 4 mg, every 6 hours as needed for nausea/ vomiting     - Advised to continue to avoid trigger foods such as greasy, fried foods and soda  - Advised to establish care with gastroenterology as scheduled on 08/02/2021        Diagnoses and all orders for this visit:    Generalized abdominal pain  -     ondansetron (ZOFRAN-ODT) 4 mg disintegrating tablet; Take 1 tablet (4 mg total) by mouth every 6 (six) hours as needed for nausea or vomiting  -     dicyclomine (BENTYL) 20 mg tablet; Take 1 tablet (20 mg total) by mouth 2 (two) times a day    Hepatic steatosis          All of patients questions were answered  Patient understands and agrees with the above plan  Return as needed  Mary Jane Daigle PA-C  07/29/21  [address] [facility]          Subjective:     Patient ID: Keiry Wan  is a 41 y o  female  has a past medical history of Anxiety and depression, Asthma, Chlamydia, GERD (gastroesophageal reflux disease), and Migraine  who presents today in office for   Abdominal pain follow-up  - Patient is a 41 y o  female who presents today for  Abdominal pain follow-up  Patient notes she continues to experience random abdominal cramping and nausea  Patient notes she has been taking the Bentyl and Zofran which have been working well  Patient notes soda and greasy, fried foods make her symptoms worse, so she has been staying away from those drinks and foods  Patient notes she did have constipation for few days, but denies any diarrhea    Patient notes she has decreased appetite and often will not eat anything all day but then will eat something at night  Patient notes today will be her 30th day without drinking alcohol and she is very happy about this  Patient notes she quit cold turkey after the emergency room visit  Patient notes she has shared this with her therapist and she is also very happy  Patient notes she has had issues with her stomach in the past   Patient is scheduled to be seen by Gastroenterology on 08/02/2021  Patient did have recent liver ultrasound completed which revealed moderate fatty liver  Of note, patient notes she is interested in receiving COVID-19 vaccine  The following portions of the patient's history were reviewed and updated as appropriate: allergies, current medications, past family history, past medical history, past social history, past surgical history and problem list         Review of Systems   Constitutional: Negative for chills and fever  HENT: Negative for congestion and sore throat  Eyes: Negative for pain  Respiratory: Negative for cough, chest tightness and shortness of breath  Cardiovascular: Negative for chest pain and palpitations  Gastrointestinal: Positive for abdominal pain, constipation (occasionally) and nausea  Negative for diarrhea and vomiting  Genitourinary: Negative for difficulty urinating and dysuria  Musculoskeletal: Negative for arthralgias  Skin: Negative for rash  Neurological: Negative for dizziness and headaches  Psychiatric/Behavioral: Negative for dysphoric mood  The patient is not nervous/anxious  Objective:   Vitals:    07/29/21 1553   BP: 110/72   BP Location: Left arm   Patient Position: Sitting   Cuff Size: Adult   Pulse: 86   Resp: 18   Temp: 97 8 °F (36 6 °C)   TempSrc: Temporal   SpO2: 98%   Weight: 104 kg (229 lb)         Physical Exam  Vitals and nursing note reviewed  Constitutional:       Appearance: She is well-developed  HENT:      Head: Normocephalic and atraumatic        Right Ear: External ear normal       Left Ear: External ear normal       Nose: Nose normal    Eyes:      Conjunctiva/sclera: Conjunctivae normal    Cardiovascular:      Rate and Rhythm: Normal rate and regular rhythm  Heart sounds: Normal heart sounds  No murmur heard  Pulmonary:      Effort: Pulmonary effort is normal       Breath sounds: Normal breath sounds  No wheezing  Abdominal:      General: Bowel sounds are normal       Palpations: Abdomen is soft  There is no mass  Tenderness: There is no abdominal tenderness  There is no guarding  Musculoskeletal:         General: Normal range of motion  Cervical back: Normal range of motion and neck supple  Skin:     General: Skin is warm and dry  Neurological:      Mental Status: She is alert and oriented to person, place, and time     Psychiatric:         Speech: Speech normal          Behavior: Behavior normal

## 2021-08-01 ENCOUNTER — HOSPITAL ENCOUNTER (EMERGENCY)
Facility: HOSPITAL | Age: 41
Discharge: HOME | End: 2021-08-01
Attending: EMERGENCY MEDICINE | Admitting: EMERGENCY MEDICINE
Payer: COMMERCIAL

## 2021-08-01 VITALS
SYSTOLIC BLOOD PRESSURE: 92 MMHG | RESPIRATION RATE: 14 BRPM | WEIGHT: 231.48 LBS | BODY MASS INDEX: 43.74 KG/M2 | OXYGEN SATURATION: 100 % | TEMPERATURE: 98 F | HEART RATE: 77 BPM | DIASTOLIC BLOOD PRESSURE: 71 MMHG

## 2021-08-01 DIAGNOSIS — R19.7 NAUSEA VOMITING AND DIARRHEA: ICD-10-CM

## 2021-08-01 DIAGNOSIS — R11.2 NAUSEA VOMITING AND DIARRHEA: ICD-10-CM

## 2021-08-01 DIAGNOSIS — R10.9 ABDOMINAL PAIN: Primary | ICD-10-CM

## 2021-08-01 DIAGNOSIS — K76.0 HEPATIC STEATOSIS: ICD-10-CM

## 2021-08-01 LAB
ALBUMIN SERPL BCP-MCNC: 3.6 G/DL (ref 3.5–5)
ALP SERPL-CCNC: 65 U/L (ref 46–116)
ALT SERPL W P-5'-P-CCNC: 23 U/L (ref 12–78)
ANION GAP SERPL CALCULATED.3IONS-SCNC: 6 MMOL/L (ref 4–13)
AST SERPL W P-5'-P-CCNC: 17 U/L (ref 5–45)
BACTERIA UR QL AUTO: ABNORMAL /HPF
BASOPHILS # BLD AUTO: 0.06 THOUSANDS/ΜL (ref 0–0.1)
BASOPHILS NFR BLD AUTO: 1 % (ref 0–1)
BILIRUB DIRECT SERPL-MCNC: 0.05 MG/DL (ref 0–0.2)
BILIRUB SERPL-MCNC: 0.13 MG/DL (ref 0.2–1)
BILIRUB UR QL STRIP: NEGATIVE
BUN SERPL-MCNC: 13 MG/DL (ref 5–25)
CALCIUM SERPL-MCNC: 8.6 MG/DL (ref 8.3–10.1)
CHLORIDE SERPL-SCNC: 104 MMOL/L (ref 100–108)
CLARITY UR: ABNORMAL
CO2 SERPL-SCNC: 28 MMOL/L (ref 21–32)
COLOR UR: YELLOW
CREAT SERPL-MCNC: 1.05 MG/DL (ref 0.6–1.3)
EOSINOPHIL # BLD AUTO: 0.33 THOUSAND/ΜL (ref 0–0.61)
EOSINOPHIL NFR BLD AUTO: 3 % (ref 0–6)
ERYTHROCYTE [DISTWIDTH] IN BLOOD BY AUTOMATED COUNT: 15.7 % (ref 11.6–15.1)
EXT PREG TEST URINE: NEGATIVE
EXT. CONTROL ED NAV: NORMAL
GFR SERPL CREATININE-BSD FRML MDRD: 66 ML/MIN/1.73SQ M
GLUCOSE SERPL-MCNC: 112 MG/DL (ref 65–140)
GLUCOSE UR STRIP-MCNC: NEGATIVE MG/DL
HCT VFR BLD AUTO: 38.5 % (ref 34.8–46.1)
HGB BLD-MCNC: 12.5 G/DL (ref 11.5–15.4)
HGB UR QL STRIP.AUTO: ABNORMAL
IMM GRANULOCYTES # BLD AUTO: 0.03 THOUSAND/UL (ref 0–0.2)
IMM GRANULOCYTES NFR BLD AUTO: 0 % (ref 0–2)
KETONES UR STRIP-MCNC: NEGATIVE MG/DL
LEUKOCYTE ESTERASE UR QL STRIP: NEGATIVE
LIPASE SERPL-CCNC: 88 U/L (ref 73–393)
LYMPHOCYTES # BLD AUTO: 1.54 THOUSANDS/ΜL (ref 0.6–4.47)
LYMPHOCYTES NFR BLD AUTO: 13 % (ref 14–44)
MCH RBC QN AUTO: 28.9 PG (ref 26.8–34.3)
MCHC RBC AUTO-ENTMCNC: 32.5 G/DL (ref 31.4–37.4)
MCV RBC AUTO: 89 FL (ref 82–98)
MONOCYTES # BLD AUTO: 0.88 THOUSAND/ΜL (ref 0.17–1.22)
MONOCYTES NFR BLD AUTO: 8 % (ref 4–12)
MUCOUS THREADS UR QL AUTO: ABNORMAL
NEUTROPHILS # BLD AUTO: 8.65 THOUSANDS/ΜL (ref 1.85–7.62)
NEUTS SEG NFR BLD AUTO: 75 % (ref 43–75)
NITRITE UR QL STRIP: NEGATIVE
NON-SQ EPI CELLS URNS QL MICRO: ABNORMAL /HPF
NRBC BLD AUTO-RTO: 0 /100 WBCS
PH UR STRIP.AUTO: 6 [PH] (ref 4.5–8)
PLATELET # BLD AUTO: 243 THOUSANDS/UL (ref 149–390)
PMV BLD AUTO: 10.1 FL (ref 8.9–12.7)
POTASSIUM SERPL-SCNC: 4.4 MMOL/L (ref 3.5–5.3)
PROT SERPL-MCNC: 7.6 G/DL (ref 6.4–8.2)
PROT UR STRIP-MCNC: NEGATIVE MG/DL
RBC # BLD AUTO: 4.33 MILLION/UL (ref 3.81–5.12)
RBC #/AREA URNS AUTO: ABNORMAL /HPF
SODIUM SERPL-SCNC: 138 MMOL/L (ref 136–145)
SP GR UR STRIP.AUTO: >=1.03 (ref 1–1.03)
UROBILINOGEN UR QL STRIP.AUTO: 0.2 E.U./DL
WBC # BLD AUTO: 11.49 THOUSAND/UL (ref 4.31–10.16)
WBC #/AREA URNS AUTO: ABNORMAL /HPF

## 2021-08-01 PROCEDURE — 81001 URINALYSIS AUTO W/SCOPE: CPT

## 2021-08-01 PROCEDURE — 80076 HEPATIC FUNCTION PANEL: CPT | Performed by: EMERGENCY MEDICINE

## 2021-08-01 PROCEDURE — 83690 ASSAY OF LIPASE: CPT | Performed by: EMERGENCY MEDICINE

## 2021-08-01 PROCEDURE — 99285 EMERGENCY DEPT VISIT HI MDM: CPT | Performed by: EMERGENCY MEDICINE

## 2021-08-01 PROCEDURE — 96361 HYDRATE IV INFUSION ADD-ON: CPT

## 2021-08-01 PROCEDURE — 81025 URINE PREGNANCY TEST: CPT | Performed by: EMERGENCY MEDICINE

## 2021-08-01 PROCEDURE — 82248 BILIRUBIN DIRECT: CPT

## 2021-08-01 PROCEDURE — 80048 BASIC METABOLIC PNL TOTAL CA: CPT | Performed by: EMERGENCY MEDICINE

## 2021-08-01 PROCEDURE — 85025 COMPLETE CBC W/AUTO DIFF WBC: CPT | Performed by: EMERGENCY MEDICINE

## 2021-08-01 PROCEDURE — 36415 COLL VENOUS BLD VENIPUNCTURE: CPT | Performed by: EMERGENCY MEDICINE

## 2021-08-01 PROCEDURE — 96375 TX/PRO/DX INJ NEW DRUG ADDON: CPT

## 2021-08-01 PROCEDURE — 96374 THER/PROPH/DIAG INJ IV PUSH: CPT

## 2021-08-01 PROCEDURE — 99283 EMERGENCY DEPT VISIT LOW MDM: CPT

## 2021-08-01 RX ORDER — OXYCODONE HYDROCHLORIDE 5 MG/1
5 TABLET ORAL EVERY 4 HOURS PRN
Qty: 10 TABLET | Refills: 0 | Status: SHIPPED | OUTPATIENT
Start: 2021-08-01 | End: 2021-08-04

## 2021-08-01 RX ORDER — ONDANSETRON 2 MG/ML
4 INJECTION INTRAMUSCULAR; INTRAVENOUS ONCE
Status: COMPLETED | OUTPATIENT
Start: 2021-08-01 | End: 2021-08-01

## 2021-08-01 RX ORDER — ONDANSETRON 4 MG/1
4 TABLET, ORALLY DISINTEGRATING ORAL EVERY 6 HOURS PRN
Qty: 20 TABLET | Refills: 0 | Status: SHIPPED | OUTPATIENT
Start: 2021-08-01 | End: 2021-08-02

## 2021-08-01 RX ORDER — MORPHINE SULFATE 4 MG/ML
8 INJECTION, SOLUTION INTRAMUSCULAR; INTRAVENOUS ONCE
Status: COMPLETED | OUTPATIENT
Start: 2021-08-01 | End: 2021-08-01

## 2021-08-01 RX ADMIN — MORPHINE SULFATE 8 MG: 4 INJECTION INTRAVENOUS at 11:11

## 2021-08-01 RX ADMIN — ONDANSETRON 4 MG: 2 INJECTION INTRAMUSCULAR; INTRAVENOUS at 10:43

## 2021-08-01 RX ADMIN — SODIUM CHLORIDE 1000 ML: 0.9 INJECTION, SOLUTION INTRAVENOUS at 10:42

## 2021-08-01 NOTE — ED PROVIDER NOTES
History  Chief Complaint   Patient presents with    Vomiting     Began last PM, appt with GI on  for similar event from 21  This is a 64-year-old female who presents with abdominal pain  Starting at 6:00 a m  This morning, the patient started to experience a generalized abdominal pain described as cramping, constant, nonradiating, associated with innumerable episodes of nonbloody, nonbilious vomiting and 2 episodes of nonbloody, nonmelanotic diarrhea  Denies any sick contacts  Denies any history of gallstones, kidney stones, alcohol abuse  Patient was seen on 21 for a similar complaint  She had a CT abdomen/pelvis which revealed colitis and hepatic steatosis  She had a follow-up ultrasound of the liver as an outpatient which also revealed hepatic steatosis  She does have a follow-up appoint with GI tomorrow  Denies fever/chills, lightheadedness/dizziness, numbness/weakness, headache, change in vision, URI symptoms, neck pain, chest pain, palpitations, shortness of breath, cough, back pain, flank pain, hematochezia, melena, dysuria, hematuria, abnormal vaginal discharge/bleeding  Prior to Admission Medications   Prescriptions Last Dose Informant Patient Reported?  Taking?   citalopram (CeleXA) 20 mg tablet 2021 at Unknown time  Yes Yes   Sig: Take 20 mg by mouth daily   clonazePAM (KlonoPIN) 0 5 mg tablet 2021 at Unknown time  Yes Yes   Sig: Take 0 5 mg by mouth 2 (two) times a day   dicyclomine (BENTYL) 20 mg tablet 2021 at Unknown time  No Yes   Sig: Take 1 tablet (20 mg total) by mouth 2 (two) times a day   ipratropium (ATROVENT) 0 03 % nasal spray Not Taking at Unknown time  No No   Si-2 sprays each nostril twice a day as needed for rhinorrhea   Patient not taking: Reported on 2021   ondansetron (ZOFRAN-ODT) 4 mg disintegrating tablet Past Week at Unknown time  No Yes   Sig: Take 1 tablet (4 mg total) by mouth every 6 (six) hours as needed for nausea or vomiting   traZODone (DESYREL) 50 mg tablet 7/31/2021 at Unknown time  Yes Yes   Sig: Take 50 mg by mouth daily at bedtime      Facility-Administered Medications: None       Past Medical History:   Diagnosis Date    Anxiety and depression     Asthma     has not req'd tx since 2016    Chlamydia 2000    GERD (gastroesophageal reflux disease)     no meds    Migraine     manages with OTC remedies       Past Surgical History:   Procedure Laterality Date    NASAL/SINUS ENDOSCOPY Right 6/25/2020    Procedure: FUNCTIONAL ENDOSCOPIC SINUS SURGERY WITH RIGHT MAXILLARY ANTROSTOMY REMOVALOF CYST, RIGHT SPHENOIDOTOMY, RIGHT POLYPECTOMY (LARGE ANTRAL CHOANAL POLYP)  ;  Surgeon: Arlin Shepherd MD;  Location: [address] [place] [address] MAIN OR;  Service: ENT    UT LAP,RMV  ADNEXAL STRUCTURE Right 10/10/2018    Procedure: SALPINGECTOMY, LAPAROSCOPIC;  Surgeon: Berny Bunch MD;  Location: [facility];  Service: Gynecology    SALPINGOSTOMY  2015    tubal reversal    SCALP EXCISION      x4    TUBAL LIGATION  2002    WISDOM TOOTH EXTRACTION  1998       Family History   Problem Relation Age of Onset    Cancer Maternal Aunt         lung    Breast cancer Neg Hx      I have reviewed and agree with the history as documented  E-Cigarette/Vaping    E-Cigarette Use Never User      E-Cigarette/Vaping Substances    Nicotine No     THC No     CBD No     Flavoring No      Social History     Tobacco Use    Smoking status: Never Smoker    Smokeless tobacco: Never Used   Vaping Use    Vaping Use: Never used   Substance Use Topics    Alcohol use: Yes    Drug use: Not Currently     Types: Marijuana     Comment: last used marijuana 2014       Review of Systems   Constitutional: Negative for chills and fever  HENT: Negative for rhinorrhea, sore throat and trouble swallowing  Eyes: Negative for photophobia and visual disturbance  Respiratory: Negative for cough, chest tightness and shortness of breath      Cardiovascular: Negative for chest pain, palpitations and leg swelling  Gastrointestinal: Positive for abdominal pain, diarrhea, nausea and vomiting  Negative for blood in stool  Endocrine: Negative for polyuria  Genitourinary: Negative for dysuria, flank pain, hematuria, vaginal bleeding and vaginal discharge  Musculoskeletal: Negative for back pain and neck pain  Skin: Negative for color change and rash  Allergic/Immunologic: Negative for immunocompromised state  Neurological: Negative for dizziness, weakness, light-headedness, numbness and headaches  All other systems reviewed and are negative  Physical Exam  Physical Exam  Constitutional:       General: She is not in acute distress  Appearance: Normal appearance  She is well-developed  HENT:      Mouth/Throat:      Pharynx: Uvula midline  Eyes:      Conjunctiva/sclera: Conjunctivae normal       Pupils: Pupils are equal, round, and reactive to light  Neck:      Thyroid: No thyroid mass or thyromegaly  Trachea: Trachea normal    Cardiovascular:      Rate and Rhythm: Normal rate and regular rhythm  Pulses: Normal pulses  Heart sounds: Normal heart sounds  No murmur heard  Pulmonary:      Effort: Pulmonary effort is normal       Breath sounds: Normal breath sounds  Abdominal:      General: Bowel sounds are normal       Palpations: Abdomen is soft  Tenderness: There is generalized abdominal tenderness and tenderness in the epigastric area  There is no guarding or rebound  Skin:     General: Skin is warm and dry  Neurological:      Mental Status: She is alert  Psychiatric:         Speech: Speech normal          Behavior: Behavior normal  Behavior is cooperative  Thought Content:  Thought content normal          Vital Signs  ED Triage Vitals [08/01/21 1004]   Temperature Pulse Respirations Blood Pressure SpO2   98 °F (36 7 °C) 77 18 115/59 96 %      Temp Source Heart Rate Source Patient Position - Orthostatic VS BP Location FiO2 (%) Oral -- Lying Right arm --      Pain Score       Worst Possible Pain           Vitals:    08/01/21 1004 08/01/21 1116   BP: 115/59 142/91   Pulse: 77 83   Patient Position - Orthostatic VS: Lying Sitting         Visual Acuity      ED Medications  Medications   sodium chloride 0 9 % bolus 1,000 mL (1,000 mL Intravenous New Bag 8/1/21 1042)   ondansetron (ZOFRAN) injection 4 mg (4 mg Intravenous Given 8/1/21 1043)   morphine (PF) 4 mg/mL injection 8 mg (8 mg Intravenous Given 8/1/21 1111)       Diagnostic Studies  Results Reviewed     Procedure Component Value Units Date/Time    Hepatic function panel [[id number]]  (Abnormal) Collected: 08/01/21 1042    Lab Status: Final result Specimen: Blood from Arm, Left Updated: 08/01/21 1105     Total Bilirubin 0 13 mg/dL      Bilirubin, Direct 0 05 mg/dL      Alkaline Phosphatase 65 U/L      AST 17 U/L      ALT 23 U/L      Total Protein 7 6 g/dL      Albumin 3 6 g/dL     Lipase [[id number]]  (Normal) Collected: 08/01/21 1042    Lab Status: Final result Specimen: Blood from Arm, Left Updated: 08/01/21 1105     Lipase 88 u/L     Basic metabolic panel [[id number]] Collected: 08/01/21 1042    Lab Status: Final result Specimen: Blood from Arm, Left Updated: 08/01/21 1105     Sodium 138 mmol/L      Potassium 4 4 mmol/L      Chloride 104 mmol/L      CO2 28 mmol/L      ANION GAP 6 mmol/L      BUN 13 mg/dL      Creatinine 1 05 mg/dL      Glucose 112 mg/dL      Calcium 8 6 mg/dL      eGFR 66 ml/min/1 73sq m     Narrative:      [place] guidelines for Chronic Kidney Disease (CKD):     Stage 1 with normal or high GFR (GFR > 90 mL/min/1 73 square meters)    Stage 2 Mild CKD (GFR = 60-89 mL/min/1 73 square meters)    Stage 3A Moderate CKD (GFR = 45-59 mL/min/1 73 square meters)    Stage 3B Moderate CKD (GFR = 30-44 mL/min/1 73 square meters)    Stage 4 Severe CKD (GFR = 15-29 mL/min/1 73 square meters)    Stage 5 End Stage CKD (GFR <15 mL/min/1 73 square meters)  Note: GFR calculation is accurate only with a steady state creatinine    Urine Microscopic [[id number]]  (Abnormal) Collected: 08/01/21 1016    Lab Status: Final result Specimen: Urine, Clean Catch Updated: 08/01/21 1049     RBC, UA 0-1 /hpf      WBC, UA 0-1 /hpf      Epithelial Cells Occasional /hpf      Bacteria, UA Occasional /hpf      MUCUS THREADS Moderate    Narrative:      Concentrated Microscopic on low volume urine    CBC and differential [[id number]]  (Abnormal) Collected: 08/01/21 1042    Lab Status: Final result Specimen: Blood from Arm, Left Updated: 08/01/21 1049     WBC 11 49 Thousand/uL      RBC 4 33 Million/uL      Hemoglobin 12 5 g/dL      Hematocrit 38 5 %      MCV 89 fL      MCH 28 9 pg      MCHC 32 5 g/dL      RDW 15 7 %      MPV 10 1 fL      Platelets 516 Thousands/uL      nRBC 0 /100 WBCs      Neutrophils Relative 75 %      Immat GRANS % 0 %      Lymphocytes Relative 13 %      Monocytes Relative 8 %      Eosinophils Relative 3 %      Basophils Relative 1 %      Neutrophils Absolute 8 65 Thousands/µL      Immature Grans Absolute 0 03 Thousand/uL      Lymphocytes Absolute 1 54 Thousands/µL      Monocytes Absolute 0 88 Thousand/µL      Eosinophils Absolute 0 33 Thousand/µL      Basophils Absolute 0 06 Thousands/µL     POCT pregnancy, urine [[id number]]  (Normal) Resulted: 08/01/21 1036    Lab Status: Final result Updated: 08/01/21 1036     EXT PREG TEST UR (Ref: Negative) Negative     Control valid    Urine Macroscopic, POC [[id number]]  (Abnormal) Collected: 08/01/21 1016    Lab Status: Final result Specimen: Urine Updated: 08/01/21 1018     Color, UA Yellow     Clarity, UA Cloudy     pH, UA 6 0     Leukocytes, UA Negative     Nitrite, UA Negative     Protein, UA Negative mg/dl      Glucose, UA Negative mg/dl      Ketones, UA Negative mg/dl      Urobilinogen, UA 0 2 E U /dl      Bilirubin, UA Negative     Blood, UA Trace     Specific Gravity, UA >=1 030    Narrative:      [facility] RESULT No orders to display              Procedures  Procedures         ED Course                             SBIRT 22yo+      Most Recent Value   SBIRT (22 yo +)   In order to provide better care to our patients, we are screening all of our patients for alcohol and drug use  Would it be okay to ask you these screening questions? No Filed at: 08/01/2021 1006                    [facility]  Number of Diagnoses or Management Options  Diagnosis management comments: Will check labs  IV fluids and Zofran  IV analgesia  Disposition pending results  Disposition  Final diagnoses:   Abdominal pain   Nausea vomiting and diarrhea     Time reflects when diagnosis was documented in both MDM as applicable and the Disposition within this note     Time User Action Codes Description Comment    8/1/2021 12:11 PM Dwight Ouch Add [R10 9] Abdominal pain     8/1/2021 12:11 PM Dwight Ouch Add [R11 2,  R19 7] Nausea vomiting and diarrhea       ED Disposition     ED Disposition Condition Date/Time Comment    Discharge Stable Sun Aug 1, 2021 12:10 PM Makayla Velez discharge to home/self care  Follow-up Information     Follow up With Specialties Details Why Contact Info Additional Information    GI doctor  Go to  regular scheduled appointment tomorrow        [address]  Emergency Department Emergency Medicine Go to  If symptoms worsen [facility] [id number]  [facility] Emergency Department, [address] [place] [address], [id number]          Patient's Medications   Discharge Prescriptions    ONDANSETRON (ZOFRAN-ODT) 4 MG DISINTEGRATING TABLET    Take 1 tablet (4 mg total) by mouth every 6 (six) hours as needed for nausea       Start Date: 8/1/2021  End Date: --       Order Dose: 4 mg       Quantity: 20 tablet    Refills: 0    OXYCODONE (ROXICODONE) 5 MG IMMEDIATE RELEASE TABLET    Take 1 tablet (5 mg total) by mouth every 4 (four) hours as needed for moderate pain for up to 3 daysMax Daily Amount: 30 mg       Start Date: 8/1/2021  End Date: 8/4/2021       Order Dose: 5 mg       Quantity: 10 tablet    Refills: 0     No discharge procedures on file      PDMP Review       Value Time User    PDMP Reviewed  Yes 6/25/2020 12:02 PM Haley Rodriguez MD          ED Provider  Electronically Signed by           Dorina Lassiter MD  08/01/21 8353

## 2021-08-02 ENCOUNTER — OFFICE VISIT (OUTPATIENT)
Dept: GASTROENTEROLOGY | Facility: CLINIC | Age: 41
End: 2021-08-02

## 2021-08-02 ENCOUNTER — OFFICE VISIT (OUTPATIENT)
Dept: GASTROENTEROLOGY | Facility: CLINIC | Age: 41
End: 2021-08-02
Payer: COMMERCIAL

## 2021-08-02 VITALS
SYSTOLIC BLOOD PRESSURE: 130 MMHG | DIASTOLIC BLOOD PRESSURE: 80 MMHG | HEIGHT: 61 IN | HEART RATE: 97 BPM | BODY MASS INDEX: 43.43 KG/M2 | TEMPERATURE: 98.9 F | WEIGHT: 230 LBS

## 2021-08-02 DIAGNOSIS — K76.0 HEPATIC STEATOSIS: Primary | ICD-10-CM

## 2021-08-02 DIAGNOSIS — R10.84 GENERALIZED ABDOMINAL PAIN: ICD-10-CM

## 2021-08-02 DIAGNOSIS — K76.0 HEPATIC STEATOSIS: ICD-10-CM

## 2021-08-02 DIAGNOSIS — R19.7 DIARRHEA OF PRESUMED INFECTIOUS ORIGIN: Primary | ICD-10-CM

## 2021-08-02 DIAGNOSIS — K52.9 COLITIS: ICD-10-CM

## 2021-08-02 LAB — BILIRUB DIRECT SERPL-MCNC: 0.05 MG/DL (ref 0–0.2)

## 2021-08-02 PROCEDURE — 99204 OFFICE O/P NEW MOD 45 MIN: CPT | Performed by: INTERNAL MEDICINE

## 2021-08-02 RX ORDER — PANTOPRAZOLE SODIUM 20 MG/1
20 TABLET, DELAYED RELEASE ORAL DAILY
Qty: 30 TABLET | Refills: 3 | Status: SHIPPED | OUTPATIENT
Start: 2021-08-02 | End: 2021-10-28

## 2021-08-02 RX ORDER — ONDANSETRON 4 MG/1
4 TABLET, FILM COATED ORAL EVERY 8 HOURS PRN
Qty: 20 TABLET | Refills: 2 | Status: SHIPPED | OUTPATIENT
Start: 2021-08-02 | End: 2021-11-28

## 2021-08-02 NOTE — PROGRESS NOTES
[facility] - Outpatient Consultation  Radha Morales 41 y o  female MRN: [id number]  Encounter: [id number]    ASSESSMENT AND PLAN:    Radha Morales is a 41 y o  old female with PMH:  Severe obesity, depression, urge incontinence who presents for:     #Generalized Abdominal Pain  #Alternating Intermittent Diarrhea/Constipation   Patient presenting with acute on chronic abdominal discomfort associated with diarrhea with alternating constipation  Is possible patient had infectious etiology for her symptoms however she may also have a functional component of her symptoms  Will get stool studies to rule out infectious etiology  Given her symptoms have not been going on for a prolonged course will hold off on checking for Giardia  Will check fecal calprotectin to make sure there is no inflammatory cause for her change in bowel habits  TSH was noted to be normal late last year  Patient Hgb currently stable and no GI bleeding less likely to portend occult GI malignancy  Will get colonoscopy for patient as well as rule out H  Pylori, celiac disease  Plan:  -EGD/Colonoscopy    -please get H pylori biopsies               -please get duodenal biopsies to rule out celiac disease  -f/u TTG  -f/u IgA levels  -f/u H  Pylori stool Ag  -avoid NSAIDs  -fecal calprotectin  -stool studies, bacterial, C diff  -refilled Zofran  -Protonix 20 mg q day started    #Hepatic steatosis  #Obesity  Patient with BMI of 43, big component of her hepatic steatosis  Underwent thorough education on importance of weight loss and exercise  Patient encouraged to get on exercise regimen    Plan:  -recommended at least 30 minutes of 5x weekly exercise  -asked patient to keep food journal and calorie count, encourage using phone apps  -underwent teaching session on reading food labels focusing on sugar and fat content  -educated patient on healthy dietary changes:   -recommended decreasing soda consumption with plan to stop or switch to diet   -increased vegetable intake   -increased fiber intake   -abstain from fried foods      ______________________________________________________________________    HPI:    Patient reffered by Reji Anderson PA-C  Patient underwent CT scan on 06/30/2021 which showed evidence of mild colonic mural fatty infiltration which may indicate a sequela of colitis  Patient now presented to the ED twice in the last month for similar symptoms of abdominal discomfort with associated diarrhea  States in between these episodes she will have to be constipated and she has alternation of constipation and diarrhea  She was discharged with nausea medication  Patient denies any hematemesis, melena, or hematochezia  Does not endorse any weight loss, recent N/V/F/C  Denies any change in bowel habits, no new constipation or diarrhea  Patient denies any significant use of NSAIDs, alcohol abuse, tobacco use  Family history:  Kelli Olguin had colon cancer  No IBD    Last EGD: Never  Last colonoscopy: Never     REVIEW OF SYSTEMS:    CONSTITUTIONAL: Denies any fever, chills, rigors, and weight loss  HEENT: No earache or tinnitus  Denies hearing loss or visual disturbances  CARDIOVASCULAR: No chest pain or palpitations  RESPIRATORY: Denies any cough, hemoptysis, shortness of breath or dyspnea on exertion  GASTROINTESTINAL: As noted in the History of Present Illness  GENITOURINARY: No problems with urination  Denies any hematuria or dysuria  NEUROLOGIC: No dizziness or vertigo, denies headaches  MUSCULOSKELETAL: Denies any muscle or joint pain  SKIN: Denies skin rashes or itching  ENDOCRINE: Denies excessive thirst  Denies intolerance to heat or cold  PSYCHOSOCIAL: Denies depression or anxiety  Denies any recent memory loss       Historical Information   Past Medical History:   Diagnosis Date    Anxiety and depression     Asthma     has not req'd tx since 2016    Chlamydia 2000  GERD (gastroesophageal reflux disease)     no meds    Migraine     manages with OTC remedies     Past Surgical History:   Procedure Laterality Date    NASAL/SINUS ENDOSCOPY Right 6/25/2020    Procedure: FUNCTIONAL ENDOSCOPIC SINUS SURGERY WITH RIGHT MAXILLARY ANTROSTOMY REMOVALOF CYST, RIGHT SPHENOIDOTOMY, RIGHT POLYPECTOMY (LARGE ANTRAL CHOANAL POLYP)  ;  Surgeon: Emili Graff MD;  Location: [address] [place] [address] MAIN OR;  Service: ENT    OR LAP,RMV  ADNEXAL STRUCTURE Right 10/10/2018    Procedure: SALPINGECTOMY, LAPAROSCOPIC;  Surgeon: Vangie Muñiz MD;  Location: [facility];  Service: Gynecology    SALPINGOSTOMY  2015    tubal reversal    SCALP EXCISION      x4    TUBAL LIGATION  2002    WISDOM TOOTH EXTRACTION  1998     Social History   Social History     Substance and Sexual Activity   Alcohol Use Yes     Social History     Substance and Sexual Activity   Drug Use Not Currently    Types: Marijuana    Comment: last used marijuana 2014     Social History     Tobacco Use   Smoking Status Never Smoker   Smokeless Tobacco Never Used     Family History   Problem Relation Age of Onset    Cancer Maternal Aunt         lung    Breast cancer Neg Hx        Meds/Allergies       Current Outpatient Medications:     citalopram (CeleXA) 20 mg tablet    clonazePAM (KlonoPIN) 0 5 mg tablet    dicyclomine (BENTYL) 20 mg tablet    ipratropium (ATROVENT) 0 03 % nasal spray    ondansetron (ZOFRAN-ODT) 4 mg disintegrating tablet    ondansetron (ZOFRAN-ODT) 4 mg disintegrating tablet    oxyCODONE (ROXICODONE) 5 mg immediate release tablet    traZODone (DESYREL) 50 mg tablet  No Known Allergies    Objective     Blood pressure 130/80, pulse 97, temperature 98 9 °F (37 2 °C), temperature source Tympanic, height 5' 1" (1 549 m), weight 104 kg (230 lb), last menstrual period 07/15/2021, not currently breastfeeding  Body mass index is 43 46 kg/m²      PHYSICAL EXAM:      General Appearance:   Well-appearing female who is alert, cooperative, no distress   HEENT:   Normocephalic, atraumatic, anicteric   Neck:  Supple, symmetrical, trachea midline   Lungs:   Clear to auscultation bilaterally; no rales, rhonchi or wheezing; respirations unlabored    Heart:   Regular rate and rhythm; no murmur, rub, or gallop  Abdomen:   Soft, non-tender, non-distended; normal bowel sounds; no masses, no organomegaly    Genitalia:   Deferred    Rectal:   Deferred    Extremities:  No cyanosis, clubbing or edema    Pulses:  2+ and symmetric    Skin:  No jaundice, rashes, or lesions    Lymph nodes:  No palpable cervical lymphadenopathy        Lab Results:   No visits with results within 1 Day(s) from this visit     Latest known visit with results is:   Admission on 08/01/2021, Discharged on 08/01/2021   Component Date Value    Color, UA 08/01/2021 Yellow     Clarity, UA 08/01/2021 Cloudy     pH, UA 08/01/2021 6 0     Leukocytes, UA 08/01/2021 Negative     Nitrite, UA 08/01/2021 Negative     Protein, UA 08/01/2021 Negative     Glucose, UA 08/01/2021 Negative     Ketones, UA 08/01/2021 Negative     Urobilinogen, UA 08/01/2021 0 2     Bilirubin, UA 08/01/2021 Negative     Blood, UA 08/01/2021 Trace*    Specific Gravity, UA 08/01/2021 >=1 030     RBC, UA 08/01/2021 0-1*    WBC, UA 08/01/2021 0-1*    Epithelial Cells 08/01/2021 Occasional     Bacteria, UA 08/01/2021 Occasional     MUCUS THREADS 08/01/2021 Moderate*    WBC 08/01/2021 11 49*    RBC 08/01/2021 4 33     Hemoglobin 08/01/2021 12 5     Hematocrit 08/01/2021 38 5     MCV 08/01/2021 89     MCH 08/01/2021 28 9     MCHC 08/01/2021 32 5     RDW 08/01/2021 15 7*    MPV 08/01/2021 10 1     Platelets 20/15/2635 243     nRBC 08/01/2021 0     Neutrophils Relative 08/01/2021 75     Immat GRANS % 08/01/2021 0     Lymphocytes Relative 08/01/2021 13*    Monocytes Relative 08/01/2021 8     Eosinophils Relative 08/01/2021 3     Basophils Relative 08/01/2021 1     Neutrophils Absolute 08/01/2021 8 65*    Immature Grans Absolute 08/01/2021 0 03     Lymphocytes Absolute 08/01/2021 1 54     Monocytes Absolute 08/01/2021 0 88     Eosinophils Absolute 08/01/2021 0 33     Basophils Absolute 08/01/2021 0 06     Sodium 08/01/2021 138     Potassium 08/01/2021 4 4     Chloride 08/01/2021 104     CO2 08/01/2021 28     ANION GAP 08/01/2021 6     BUN 08/01/2021 13     Creatinine 08/01/2021 1 05     Glucose 08/01/2021 112     Calcium 08/01/2021 8 6     eGFR 08/01/2021 66     Total Bilirubin 08/01/2021 0 13*    Bilirubin, Direct 08/01/2021 0 05     Alkaline Phosphatase 08/01/2021 65     AST 08/01/2021 17     ALT 08/01/2021 23     Total Protein 08/01/2021 7 6     Albumin 08/01/2021 3 6     Lipase 08/01/2021 88     EXT PREG TEST UR (Ref: N* 08/01/2021 Negative     Control 08/01/2021 valid        Radiology Results:   US liver    Result Date: 7/15/2021  Narrative: RIGHT UPPER QUADRANT ULTRASOUND INDICATION:     K76 0: Fatty (change of) liver, not elsewhere classified  COMPARISON:  None TECHNIQUE:   Real-time ultrasound of the right upper quadrant was performed with a curvilinear transducer with both volumetric sweeps and still imaging techniques  FINDINGS: PANCREAS: Partial obscuration by bowel gas and body habitus  Visualized portions of the pancreas are within normal limits  AORTA AND IVC:  Partial obscuration  Visualized portions are normal for patient age  LIVER: Evaluation mildly limited by bowel gas  Size:  Within upper normal range  The liver measures 16 0 cm in the midclavicular line  Contour:  Surface contour is smooth  Parenchyma:  Increased echogenicity, posterior acoustic attenuation, decreased periportal echogenicity No evidence of suspicious mass  Limited imaging of the main portal vein shows it to be patent and hepatopetal   BILIARY: No gallbladder findings  No intrahepatic biliary dilatation  CBD measures 5 mm  No choledocholithiasis   Negative Borja's sign KIDNEY: Evaluation mildly limited by bowel gas  Right kidney measures 10 1 x 4 0 cm  Within normal limits  ASCITES:   None       Impression: Hepatic moderate steatosis Workstation performed: [id number]       ---------------------------------------------------  Note Electronically Signed By:    MD [facility] PGY-5  PI #: [id number]

## 2021-08-04 DIAGNOSIS — R10.84 GENERALIZED ABDOMINAL PAIN: ICD-10-CM

## 2021-08-04 PROCEDURE — 99284 EMERGENCY DEPT VISIT MOD MDM: CPT

## 2021-08-04 RX ORDER — DICYCLOMINE HCL 20 MG
TABLET ORAL
Qty: 60 TABLET | Refills: 1 | Status: SHIPPED | OUTPATIENT
Start: 2021-08-04 | End: 2021-11-28

## 2021-08-05 ENCOUNTER — HOSPITAL ENCOUNTER (EMERGENCY)
Facility: HOSPITAL | Age: 41
Discharge: HOME | End: 2021-08-05
Attending: EMERGENCY MEDICINE | Admitting: EMERGENCY MEDICINE
Payer: COMMERCIAL

## 2021-08-05 VITALS
SYSTOLIC BLOOD PRESSURE: 119 MMHG | HEART RATE: 87 BPM | TEMPERATURE: 98.2 F | DIASTOLIC BLOOD PRESSURE: 58 MMHG | OXYGEN SATURATION: 97 % | RESPIRATION RATE: 18 BRPM

## 2021-08-05 DIAGNOSIS — R11.2 NAUSEA AND VOMITING: ICD-10-CM

## 2021-08-05 DIAGNOSIS — R10.9 CHRONIC ABDOMINAL PAIN: Primary | ICD-10-CM

## 2021-08-05 DIAGNOSIS — G89.29 CHRONIC ABDOMINAL PAIN: Primary | ICD-10-CM

## 2021-08-05 LAB
ALBUMIN SERPL BCP-MCNC: 3.5 G/DL (ref 3.5–5)
ALP SERPL-CCNC: 55 U/L (ref 46–116)
ALT SERPL W P-5'-P-CCNC: 16 U/L (ref 12–78)
ANION GAP SERPL CALCULATED.3IONS-SCNC: 12 MMOL/L (ref 4–13)
AST SERPL W P-5'-P-CCNC: 13 U/L (ref 5–45)
BASOPHILS # BLD AUTO: 0.07 THOUSANDS/ΜL (ref 0–0.1)
BASOPHILS NFR BLD AUTO: 1 % (ref 0–1)
BILIRUB SERPL-MCNC: <0.1 MG/DL (ref 0.2–1)
BILIRUB UR QL STRIP: NEGATIVE
BUN SERPL-MCNC: 10 MG/DL (ref 5–25)
CALCIUM SERPL-MCNC: 8.9 MG/DL (ref 8.3–10.1)
CHLORIDE SERPL-SCNC: 104 MMOL/L (ref 100–108)
CLARITY UR: CLEAR
CO2 SERPL-SCNC: 25 MMOL/L (ref 21–32)
COLOR UR: YELLOW
CREAT SERPL-MCNC: 0.86 MG/DL (ref 0.6–1.3)
EOSINOPHIL # BLD AUTO: 0.37 THOUSAND/ΜL (ref 0–0.61)
EOSINOPHIL NFR BLD AUTO: 3 % (ref 0–6)
ERYTHROCYTE [DISTWIDTH] IN BLOOD BY AUTOMATED COUNT: 15.9 % (ref 11.6–15.1)
EXT PREG TEST URINE: NEGATIVE
EXT. CONTROL ED NAV: NORMAL
GFR SERPL CREATININE-BSD FRML MDRD: 84 ML/MIN/1.73SQ M
GLUCOSE SERPL-MCNC: 111 MG/DL (ref 65–140)
GLUCOSE UR STRIP-MCNC: NEGATIVE MG/DL
HCT VFR BLD AUTO: 37.8 % (ref 34.8–46.1)
HGB BLD-MCNC: 12.4 G/DL (ref 11.5–15.4)
HGB UR QL STRIP.AUTO: NEGATIVE
IMM GRANULOCYTES # BLD AUTO: 0.05 THOUSAND/UL (ref 0–0.2)
IMM GRANULOCYTES NFR BLD AUTO: 0 % (ref 0–2)
KETONES UR STRIP-MCNC: NEGATIVE MG/DL
LEUKOCYTE ESTERASE UR QL STRIP: NEGATIVE
LIPASE SERPL-CCNC: 67 U/L (ref 73–393)
LYMPHOCYTES # BLD AUTO: 1.88 THOUSANDS/ΜL (ref 0.6–4.47)
LYMPHOCYTES NFR BLD AUTO: 13 % (ref 14–44)
MCH RBC QN AUTO: 29.5 PG (ref 26.8–34.3)
MCHC RBC AUTO-ENTMCNC: 32.8 G/DL (ref 31.4–37.4)
MCV RBC AUTO: 90 FL (ref 82–98)
MONOCYTES # BLD AUTO: 0.92 THOUSAND/ΜL (ref 0.17–1.22)
MONOCYTES NFR BLD AUTO: 6 % (ref 4–12)
NEUTROPHILS # BLD AUTO: 11.21 THOUSANDS/ΜL (ref 1.85–7.62)
NEUTS SEG NFR BLD AUTO: 77 % (ref 43–75)
NITRITE UR QL STRIP: NEGATIVE
NRBC BLD AUTO-RTO: 0 /100 WBCS
PH UR STRIP.AUTO: 6.5 [PH] (ref 4.5–8)
PLATELET # BLD AUTO: 236 THOUSANDS/UL (ref 149–390)
PMV BLD AUTO: 10.1 FL (ref 8.9–12.7)
POTASSIUM SERPL-SCNC: 3.8 MMOL/L (ref 3.5–5.3)
PROT SERPL-MCNC: 7.6 G/DL (ref 6.4–8.2)
PROT UR STRIP-MCNC: NEGATIVE MG/DL
RBC # BLD AUTO: 4.21 MILLION/UL (ref 3.81–5.12)
SODIUM SERPL-SCNC: 141 MMOL/L (ref 136–145)
SP GR UR STRIP.AUTO: >=1.03 (ref 1–1.03)
UROBILINOGEN UR QL STRIP.AUTO: 0.2 E.U./DL
WBC # BLD AUTO: 14.5 THOUSAND/UL (ref 4.31–10.16)

## 2021-08-05 PROCEDURE — 96374 THER/PROPH/DIAG INJ IV PUSH: CPT

## 2021-08-05 PROCEDURE — 85025 COMPLETE CBC W/AUTO DIFF WBC: CPT | Performed by: EMERGENCY MEDICINE

## 2021-08-05 PROCEDURE — 81003 URINALYSIS AUTO W/O SCOPE: CPT

## 2021-08-05 PROCEDURE — 99284 EMERGENCY DEPT VISIT MOD MDM: CPT | Performed by: EMERGENCY MEDICINE

## 2021-08-05 PROCEDURE — C9113 INJ PANTOPRAZOLE SODIUM, VIA: HCPCS | Performed by: EMERGENCY MEDICINE

## 2021-08-05 PROCEDURE — 80053 COMPREHEN METABOLIC PANEL: CPT | Performed by: EMERGENCY MEDICINE

## 2021-08-05 PROCEDURE — 96375 TX/PRO/DX INJ NEW DRUG ADDON: CPT

## 2021-08-05 PROCEDURE — 96376 TX/PRO/DX INJ SAME DRUG ADON: CPT

## 2021-08-05 PROCEDURE — 83690 ASSAY OF LIPASE: CPT | Performed by: EMERGENCY MEDICINE

## 2021-08-05 PROCEDURE — 96361 HYDRATE IV INFUSION ADD-ON: CPT

## 2021-08-05 PROCEDURE — 81025 URINE PREGNANCY TEST: CPT | Performed by: EMERGENCY MEDICINE

## 2021-08-05 PROCEDURE — 36415 COLL VENOUS BLD VENIPUNCTURE: CPT

## 2021-08-05 RX ORDER — FENTANYL CITRATE 50 UG/ML
50 INJECTION, SOLUTION INTRAMUSCULAR; INTRAVENOUS ONCE
Status: COMPLETED | OUTPATIENT
Start: 2021-08-05 | End: 2021-08-05

## 2021-08-05 RX ORDER — ONDANSETRON 2 MG/ML
4 INJECTION INTRAMUSCULAR; INTRAVENOUS ONCE
Status: COMPLETED | OUTPATIENT
Start: 2021-08-05 | End: 2021-08-05

## 2021-08-05 RX ORDER — KETOROLAC TROMETHAMINE 30 MG/ML
15 INJECTION, SOLUTION INTRAMUSCULAR; INTRAVENOUS ONCE
Status: COMPLETED | OUTPATIENT
Start: 2021-08-05 | End: 2021-08-05

## 2021-08-05 RX ORDER — PANTOPRAZOLE SODIUM 40 MG/1
40 INJECTION, POWDER, FOR SOLUTION INTRAVENOUS ONCE
Status: COMPLETED | OUTPATIENT
Start: 2021-08-05 | End: 2021-08-05

## 2021-08-05 RX ADMIN — FENTANYL CITRATE 50 MCG: 50 INJECTION INTRAMUSCULAR; INTRAVENOUS at 01:32

## 2021-08-05 RX ADMIN — FENTANYL CITRATE 50 MCG: 50 INJECTION INTRAMUSCULAR; INTRAVENOUS at 02:56

## 2021-08-05 RX ADMIN — ONDANSETRON 4 MG: 2 INJECTION INTRAMUSCULAR; INTRAVENOUS at 01:34

## 2021-08-05 RX ADMIN — FAMOTIDINE 20 MG: 10 INJECTION INTRAVENOUS at 01:37

## 2021-08-05 RX ADMIN — ONDANSETRON 4 MG: 2 INJECTION INTRAMUSCULAR; INTRAVENOUS at 00:37

## 2021-08-05 RX ADMIN — PANTOPRAZOLE SODIUM 40 MG: 40 INJECTION, POWDER, FOR SOLUTION INTRAVENOUS at 01:41

## 2021-08-05 RX ADMIN — KETOROLAC TROMETHAMINE 15 MG: 30 INJECTION, SOLUTION INTRAMUSCULAR; INTRAVENOUS at 01:37

## 2021-08-05 RX ADMIN — SODIUM CHLORIDE 1000 ML: 0.9 INJECTION, SOLUTION INTRAVENOUS at 01:42

## 2021-08-05 NOTE — Clinical Note
Gela Thompson accompanied Atultiffany Veda to the emergency department on 8/4/2021  Return date if applicable: 44/07/3354        If you have any questions or concerns, please don't hesitate to call        Parul Andrew RN

## 2021-08-05 NOTE — ED PROVIDER NOTES
History  Chief Complaint   Patient presents with    Abdominal Pain     Abd pain, vomiting, diaphoretic  denies diarrhea  states has scope in about 1 month     Patient is a 75-year-old female that presents for recurrent episodes epigastric abdominal pain associated with numerous episodes of nonbloody, nonbilious vomiting  Patient denies any diarrhea, fevers or urinary complaints tonight  Patient was seen on 21 for a similar complaint  She had a CT abdomen/pelvis which revealed colitis and hepatic steatosis  She had a follow-up ultrasound of the liver as an outpatient which also revealed hepatic steatosis  Patient did follow-up with Gastroenterology yesterday and is being scheduled for an EGD and colonoscopy to evaluate for her underlying abnormalities  Patient denies any recent alcohol use and does not know of any family history of HUANG  History provided by:  Patient   used: No    Abdominal Pain  Pain location:  Epigastric  Pain radiates to:  Does not radiate  Pain severity:  Severe  Onset quality:  Gradual  Duration:  1 day  Timing:  Constant  Progression:  Worsening  Chronicity:  Chronic  Context: not alcohol use    Relieved by:  Nothing  Worsened by:  Nothing  Ineffective treatments: Prescription medications  Associated symptoms: nausea and vomiting    Associated symptoms: no chest pain, no chills, no constipation, no cough, no diarrhea, no dysuria, no fever, no hematuria and no shortness of breath        Prior to Admission Medications   Prescriptions Last Dose Informant Patient Reported?  Taking?   citalopram (CeleXA) 20 mg tablet   Yes No   Sig: Take 20 mg by mouth daily   clonazePAM (KlonoPIN) 0 5 mg tablet   Yes No   Sig: Take 0 5 mg by mouth 2 (two) times a day   dicyclomine (BENTYL) 20 mg tablet   No No   Sig: TAKE 1 TABLET BY MOUTH TWICE A DAY   ipratropium (ATROVENT) 0 03 % nasal spray   No No   Si-2 sprays each nostril twice a day as needed for rhinorrhea Patient not taking: Reported on 8/1/2021   ondansetron (ZOFRAN) 4 mg tablet   No No   Sig: Take 1 tablet (4 mg total) by mouth every 8 (eight) hours as needed for nausea or vomiting   ondansetron (ZOFRAN-ODT) 4 mg disintegrating tablet   No No   Sig: Take 1 tablet (4 mg total) by mouth every 6 (six) hours as needed for nausea or vomiting   oxyCODONE (ROXICODONE) 5 mg immediate release tablet   No No   Sig: Take 1 tablet (5 mg total) by mouth every 4 (four) hours as needed for moderate pain for up to 3 daysMax Daily Amount: 30 mg   pantoprazole (PROTONIX) 20 mg tablet   No No   Sig: Take 1 tablet (20 mg total) by mouth daily   traZODone (DESYREL) 50 mg tablet   Yes No   Sig: Take 50 mg by mouth daily at bedtime      Facility-Administered Medications: None       Past Medical History:   Diagnosis Date    Anxiety and depression     Asthma     has not req'd tx since 2016    Chlamydia 2000    GERD (gastroesophageal reflux disease)     no meds    Migraine     manages with OTC remedies       Past Surgical History:   Procedure Laterality Date    NASAL/SINUS ENDOSCOPY Right 6/25/2020    Procedure: FUNCTIONAL ENDOSCOPIC SINUS SURGERY WITH RIGHT MAXILLARY ANTROSTOMY REMOVALOF CYST, RIGHT SPHENOIDOTOMY, RIGHT POLYPECTOMY (LARGE ANTRAL CHOANAL POLYP)  ;  Surgeon: Johnathon Aldana MD;  Location: [facility] MAIN OR;  Service: ENT    [facility]  ADNEXAL STRUCTURE Right 10/10/2018    Procedure: SALPINGECTOMY, LAPAROSCOPIC;  Surgeon: Ilia Lawson MD;  Location: [facility] Main OR;  Service: Gynecology    SALPINGOSTOMY  2015    tubal reversal    SCALP EXCISION      x4    TUBAL LIGATION  2002    WISDOM TOOTH EXTRACTION  1998       Family History   Problem Relation Age of Onset    Cancer Maternal Aunt         lung    Breast cancer Neg Hx      I have reviewed and agree with the history as documented      E-Cigarette/Vaping    E-Cigarette Use Never User      E-Cigarette/Vaping Substances    Nicotine No     THC No     CBD No     Flavoring No      Social History     Tobacco Use    Smoking status: Never Smoker    Smokeless tobacco: Never Used   Vaping Use    Vaping Use: Never used   Substance Use Topics    Alcohol use: Yes    Drug use: Not Currently     Types: Marijuana     Comment: last used marijuana 2014       Review of Systems   Constitutional: Negative for chills, diaphoresis and fever  Respiratory: Negative for cough, chest tightness and shortness of breath  Cardiovascular: Negative for chest pain  Gastrointestinal: Positive for abdominal pain, nausea and vomiting  Negative for abdominal distention, blood in stool, constipation and diarrhea  Genitourinary: Negative for difficulty urinating, dysuria, flank pain, frequency, hematuria and urgency  Skin: Negative for color change, pallor, rash and wound  Allergic/Immunologic: Negative for immunocompromised state  Neurological: Negative for dizziness, light-headedness and headaches  All other systems reviewed and are negative  Physical Exam  Physical Exam  Vitals and nursing note reviewed  Constitutional:       General: She is in acute distress  Appearance: She is well-developed  She is not toxic-appearing or diaphoretic  HENT:      Head: Normocephalic and atraumatic  Right Ear: External ear normal       Left Ear: External ear normal    Eyes:      General: No scleral icterus  Right eye: No discharge  Left eye: No discharge  Neck:      Trachea: No tracheal deviation  Cardiovascular:      Rate and Rhythm: Normal rate  Pulses: Normal pulses  Pulmonary:      Effort: Pulmonary effort is normal  No tachypnea, accessory muscle usage or respiratory distress  Breath sounds: No stridor  Abdominal:      Tenderness: There is abdominal tenderness in the epigastric area and left upper quadrant  There is no guarding or rebound  Negative signs include Borja's sign  Musculoskeletal:      Right lower leg: No edema        Left lower leg: No edema  Skin:     General: Skin is warm and dry  Neurological:      Mental Status: She is alert and oriented to person, place, and time  She is not disoriented        Gait: Gait normal    Psychiatric:         Speech: Speech normal          Behavior: Behavior normal          Vital Signs  ED Triage Vitals   Temperature Pulse Respirations Blood Pressure SpO2   08/04/21 2345 08/04/21 2345 08/04/21 2345 08/04/21 2345 08/04/21 2345   98 2 °F (36 8 °C) 62 18 (!) 171/101 98 %      Temp Source Heart Rate Source Patient Position - Orthostatic VS BP Location FiO2 (%)   08/04/21 2345 08/04/21 2345 08/04/21 2345 08/04/21 2345 --   Oral Monitor Sitting Right arm       Pain Score       08/05/21 0132       Worst Possible Pain           Vitals:    08/04/21 2345 08/05/21 0255   BP: (!) 171/101 119/58   Pulse: 62 87   Patient Position - Orthostatic VS: Sitting Lying         Visual Acuity      ED Medications  Medications   ondansetron (ZOFRAN) injection 4 mg (4 mg Intravenous Given 8/5/21 0037)   sodium chloride 0 9 % bolus 1,000 mL (0 mL Intravenous Stopped 8/5/21 0324)   ketorolac (TORADOL) injection 15 mg (15 mg Intravenous Given 8/5/21 0137)   fentanyl citrate (PF) 100 MCG/2ML 50 mcg (50 mcg Intravenous Given 8/5/21 0132)   pantoprazole (PROTONIX) injection 40 mg (40 mg Intravenous Given 8/5/21 0141)   famotidine (PEPCID) injection 20 mg (20 mg Intravenous Given 8/5/21 0137)   ondansetron (ZOFRAN) injection 4 mg (4 mg Intravenous Given 8/5/21 0134)   fentanyl citrate (PF) 100 MCG/2ML 50 mcg (50 mcg Intravenous Given 8/5/21 0256)       Diagnostic Studies  Results Reviewed     Procedure Component Value Units Date/Time    POCT pregnancy, urine [[id number]]  (Normal) Resulted: 08/05/21 0130    Lab Status: Final result Specimen: Urine Updated: 08/05/21 0145     EXT PREG TEST UR (Ref: Negative) NEGATIVE     Control valid    Urine Macroscopic, POC [[id number]] Collected: 08/05/21 0129    Lab Status: Final result Specimen: Urine Updated: 08/05/21 0130     Color, UA Yellow     Clarity, UA Clear     pH, UA 6 5     Leukocytes, UA Negative     Nitrite, UA Negative     Protein, UA Negative mg/dl      Glucose, UA Negative mg/dl      Ketones, UA Negative mg/dl      Urobilinogen, UA 0 2 E U /dl      Bilirubin, UA Negative     Blood, UA Negative     Specific Gravity, UA >=1 030    Narrative:      CLINITEK RESULT    Comprehensive metabolic panel [[id number]]  (Abnormal) Collected: 08/05/21 0037    Lab Status: Final result Specimen: Blood from Arm, Right Updated: 08/05/21 0106     Sodium 141 mmol/L      Potassium 3 8 mmol/L      Chloride 104 mmol/L      CO2 25 mmol/L      ANION GAP 12 mmol/L      BUN 10 mg/dL      Creatinine 0 86 mg/dL      Glucose 111 mg/dL      Calcium 8 9 mg/dL      AST 13 U/L      ALT 16 U/L      Alkaline Phosphatase 55 U/L      Total Protein 7 6 g/dL      Albumin 3 5 g/dL      Total Bilirubin <0 10 mg/dL      eGFR 84 ml/min/1 73sq m     Narrative:      Alan guidelines for Chronic Kidney Disease (CKD):     Stage 1 with normal or high GFR (GFR > 90 mL/min/1 73 square meters)    Stage 2 Mild CKD (GFR = 60-89 mL/min/1 73 square meters)    Stage 3A Moderate CKD (GFR = 45-59 mL/min/1 73 square meters)    Stage 3B Moderate CKD (GFR = 30-44 mL/min/1 73 square meters)    Stage 4 Severe CKD (GFR = 15-29 mL/min/1 73 square meters)    Stage 5 End Stage CKD (GFR <15 mL/min/1 73 square meters)  Note: GFR calculation is accurate only with a steady state creatinine    Lipase [[id number]]  (Abnormal) Collected: 08/05/21 0037    Lab Status: Final result Specimen: Blood from Arm, Right Updated: 08/05/21 0057     Lipase 67 u/L     CBC and differential [[id number]]  (Abnormal) Collected: 08/05/21 0037    Lab Status: Final result Specimen: Blood from Arm, Right Updated: 08/05/21 0042     WBC 14 50 Thousand/uL      RBC 4 21 Million/uL      Hemoglobin 12 4 g/dL      Hematocrit 37 8 %      MCV 90 fL      MCH 29 5 pg MCHC 32 8 g/dL      RDW 15 9 %      MPV 10 1 fL      Platelets 581 Thousands/uL      nRBC 0 /100 WBCs      Neutrophils Relative 77 %      Immat GRANS % 0 %      Lymphocytes Relative 13 %      Monocytes Relative 6 %      Eosinophils Relative 3 %      Basophils Relative 1 %      Neutrophils Absolute 11 21 Thousands/µL      Immature Grans Absolute 0 05 Thousand/uL      Lymphocytes Absolute 1 88 Thousands/µL      Monocytes Absolute 0 92 Thousand/µL      Eosinophils Absolute 0 37 Thousand/µL      Basophils Absolute 0 07 Thousands/µL                  No orders to display              Procedures  Procedures         ED Course                                           MDM  Number of Diagnoses or Management Options  Chronic abdominal pain: new and requires workup  Nausea and vomiting: new and requires workup  Diagnosis management comments: Patient presents for acute on chronic symptoms of abdominal pain with recurrent nausea and vomiting  No diarrhea or urinary complaints tonight  No fevers, chills or recent procedures  Patient saw Gastroenterology yesterday and is being scheduled for outpatient colonoscopy and EGD  Patient states that symptoms started again without any reason yesterday  Patient now doubled over in pain  Patient had a recent CT scan in June  Will try to hold off on radiation at this time since this is a chronic issue but will start with labs, treat her pain with IV medications and IV fluids  Patient improved with IV medications  Does have a leukocytosis but is feeling better  Will give her strict return ER precautions  Patient does have a history of colitis but has no diarrhea tonight  If she does develop worsening symptoms she will return for further evaluation         Amount and/or Complexity of Data Reviewed  Clinical lab tests: ordered and reviewed  Tests in the medicine section of CPT®: ordered and reviewed  Review and summarize past medical records: yes    Patient Progress  Patient progress: improved      Disposition  Final diagnoses:   Chronic abdominal pain   Nausea and vomiting     Time reflects when diagnosis was documented in both MDM as applicable and the Disposition within this note     Time User Action Codes Description Comment    8/5/2021  2:56 AM Josi Murillo Add [R10 9,  G89 29] Chronic abdominal pain     8/5/2021  2:56 AM Josi Murillo Add [R11 2] Nausea and vomiting       ED Disposition     ED Disposition Condition Date/Time Comment    Discharge Stable Thu Aug 5, 2021  2:56 AM Omkar Goff discharge to home/self care              Follow-up Information     Follow up With Specialties Details Why Contact Info Additional Information    [facility] Call today If symptoms persist [address]  [facility]  [address]  [address] [place] [address] Emergency Department Emergency Medicine Go to  If symptoms worsen [facility] [id number]  [address] [place] [address] Emergency Department, [address] [place] [address] Coral Ramirez , Heber Mccollum MD Gastroenterology  As Scheduled [address]  [phone]             Discharge Medication List as of 8/5/2021  2:57 AM      CONTINUE these medications which have NOT CHANGED    Details   citalopram (CeleXA) 20 mg tablet Take 20 mg by mouth daily, Historical Med      clonazePAM (KlonoPIN) 0 5 mg tablet Take 0 5 mg by mouth 2 (two) times a day, Historical Med      ondansetron (ZOFRAN-ODT) 4 mg disintegrating tablet Take 1 tablet (4 mg total) by mouth every 6 (six) hours as needed for nausea or vomiting, Starting Thu 7/29/2021, Normal      traZODone (DESYREL) 50 mg tablet Take 50 mg by mouth daily at bedtime, Historical Med      dicyclomine (BENTYL) 20 mg tablet TAKE 1 TABLET BY MOUTH TWICE A DAY, Normal      ipratropium (ATROVENT) 0 03 % nasal spray 1-2 sprays each nostril twice a day as needed for rhinorrhea, Normal      ondansetron (ZOFRAN) 4 mg tablet Take 1 tablet (4 mg total) by mouth every 8 (eight) hours as needed for nausea or vomiting, Starting Mon 8/2/2021, Normal      pantoprazole (PROTONIX) 20 mg tablet Take 1 tablet (20 mg total) by mouth daily, Starting Mon 8/2/2021, Normal         STOP taking these medications       oxyCODONE (ROXICODONE) 5 mg immediate release tablet Comments:   Reason for Stopping:             No discharge procedures on file      PDMP Review       Value Time User    PDMP Reviewed  Yes 6/25/2020 12:02 PM Nikolas Yates MD          ED Provider  Electronically Signed by           Melanie Hart DO  08/05/21 6431

## 2021-08-05 NOTE — Clinical Note
Guille Rubio accompanied Joanna Mohsen to the emergency department on 8/4/2021  Return date if applicable: 19/90/1697        If you have any questions or concerns, please don't hesitate to call        Aziza Perez RN

## 2021-08-16 ENCOUNTER — OFFICE VISIT (OUTPATIENT)
Dept: BARIATRICS | Facility: CLINIC | Age: 41
End: 2021-08-16
Payer: COMMERCIAL

## 2021-08-16 VITALS
TEMPERATURE: 97.5 F | SYSTOLIC BLOOD PRESSURE: 104 MMHG | WEIGHT: 227.9 LBS | HEART RATE: 75 BPM | BODY MASS INDEX: 43.03 KG/M2 | HEIGHT: 61 IN | DIASTOLIC BLOOD PRESSURE: 76 MMHG

## 2021-08-16 DIAGNOSIS — F33.9 DEPRESSION, RECURRENT (HCC): ICD-10-CM

## 2021-08-16 DIAGNOSIS — F31.9 BIPOLAR DISORDER (HCC): ICD-10-CM

## 2021-08-16 DIAGNOSIS — K76.0 HEPATIC STEATOSIS: ICD-10-CM

## 2021-08-16 DIAGNOSIS — E66.01 CLASS 3 SEVERE OBESITY DUE TO EXCESS CALORIES WITHOUT SERIOUS COMORBIDITY WITH BODY MASS INDEX (BMI) OF 40.0 TO 44.9 IN ADULT (HCC): Primary | ICD-10-CM

## 2021-08-16 DIAGNOSIS — R10.84 GENERALIZED ABDOMINAL PAIN: ICD-10-CM

## 2021-08-16 PROCEDURE — 99203 OFFICE O/P NEW LOW 30 MIN: CPT | Performed by: FAMILY MEDICINE

## 2021-08-16 PROCEDURE — 3008F BODY MASS INDEX DOCD: CPT | Performed by: PHYSICIAN ASSISTANT

## 2021-08-16 PROCEDURE — 3008F BODY MASS INDEX DOCD: CPT | Performed by: FAMILY MEDICINE

## 2021-08-16 NOTE — PROGRESS NOTES
Assessment/Plan:    Diagnoses and all orders for this visit:    Class 3 severe obesity due to excess calories without serious comorbidity with body mass index (BMI) of 40 0 to 44 9 in adult [facility])    Hepatic steatosis    Generalized abdominal pain    Depression, recurrent (HCC)    Bipolar disorder ([facility] [address] )      -Discussed options of HealthyCORE-Intensive Lifestyle Intervention Program, Very Low Calorie Diet-VLCD, Conservative Program, Christiane-En-Y Gastric Bypass and Vertical Sleeve Gastrectomy and the role of weight loss medications   -Initial weight loss goal of 5-10% weight loss for improved health  -Screening labs  -Patient is interested in pursuing Christiane-En-Y Gastric Bypass and Vertical Sleeve Gastrectomy   - discussed meds options of Wellbutrin but cautioned with abdomina pain issues  Discussed topamax as another option but currently not on any contraception and sexually active  Her insurance lacks coverage for other AOMs  AVOID phentermine due to bipolar and anxiety disorder    - she states she was only interested in a pill and not interested in any of our MWM program or to see RD   - she is interested in surgery as she states wants to lose weight faster because is depressing her  Goals:  Food log (ie ) www myfitnesspal com,sparkpeople  com,loseit com,calorieking  com,etc  baritastic  No sugary beverages  At least 64oz of water daily  Increase physical activity by 10 minutes daily  Gradually increase physical activity to a goal of 5 days per week for 30 minutes of MODERATE intensity PLUS 2 days per week of FULL BODY resistance training  45 minute visit, >50% face-to-face time spent counseling patient on surgical and nonsurgical interventions for the treatment of excess weight  Discussed the advantages and long-term outcomes with regards to bariatric surgery    Discussed in detail nonsurgical options including intensive lifestyle intervention program, very low-calorie diet program and conservative program  Discussed the role of weight loss medications  Counseled patient on diet behavior and exercise modification for weight loss  Follow up in approximately 1 month with Surgical Dietician, Surgical  and Surgical   Subjective:   Chief Complaint   Patient presents with    Consult     mwm consult       Patient ID: Deepali Rapp  is a 41 y o  female with excess weight/obesity here to pursue weight management  Past Medical History:   Diagnosis Date    Anxiety and depression     Asthma     has not req'd tx since 2016    Chlamydia 2000    GERD (gastroesophageal reflux disease)     no meds    Migraine     manages with OTC remedies       HPI:  Obesity/Excess Weight:  Severity: class 3  Onset:  years    Modifiers: Diet and Exercise and Over the Counter Weight Loss Supplements  Contributing factors: Poor Food Choices, Stress/Emotional Eating, Medications and Depression  Associated symptoms: comorbid conditions and depression    Goals: 150lbs  Hydration:  Alcohol: sober for 2 months  Used to be a heavy drinker, stopped alcohol after HUANG dx  Smoking: no  Exercise: no  Sleep: 5-7hrs  STOP ban/8    Social:  Lives with daughter 20y/o  Disability for mental health    The following portions of the patient's history were reviewed and updated as appropriate: allergies, current medications, past family history, past medical history, past social history, past surgical history and problem list     Review of Systems   Constitutional: Negative for activity change and appetite change  Respiratory: Negative  Cardiovascular: Negative  Gastrointestinal: Negative  Genitourinary: Negative  Musculoskeletal: Negative for arthralgias  Skin: Negative for rash  Psychiatric/Behavioral: Negative          Objective:    /76 (BP Location: Left arm, Patient Position: Sitting, Cuff Size: Large)   Pulse 75   Temp 97 5 °F (36 4 °C)   Ht 5' 1" (1 549 m)   Wt 103 kg (227 lb 14 4 oz)   BMI 43 06 kg/m²     Physical Exam    Constitutional   General appearance: Abnormal   well developed and morbidly obese  Eyes No conjunctival pallor     Musculoskeletal   Gait and station: Normal     Psychiatric   Orientation to person, place and time: Normal     Affect: appropriate

## 2021-09-02 ENCOUNTER — TELEPHONE (OUTPATIENT)
Dept: PREADMISSION TESTING | Facility: HOSPITAL | Age: 41
End: 2021-09-02

## 2021-09-08 ENCOUNTER — HOSPITAL ENCOUNTER (EMERGENCY)
Facility: HOSPITAL | Age: 41
Discharge: HOME | End: 2021-09-08
Attending: EMERGENCY MEDICINE | Admitting: EMERGENCY MEDICINE
Payer: COMMERCIAL

## 2021-09-08 ENCOUNTER — APPOINTMENT (EMERGENCY)
Dept: RADIOLOGY | Facility: HOSPITAL | Age: 41
End: 2021-09-08
Payer: COMMERCIAL

## 2021-09-08 VITALS
HEART RATE: 82 BPM | WEIGHT: 228.84 LBS | OXYGEN SATURATION: 97 % | BODY MASS INDEX: 43.24 KG/M2 | SYSTOLIC BLOOD PRESSURE: 114 MMHG | TEMPERATURE: 98.3 F | RESPIRATION RATE: 18 BRPM | DIASTOLIC BLOOD PRESSURE: 80 MMHG

## 2021-09-08 DIAGNOSIS — M25.561 ACUTE PAIN OF RIGHT KNEE: Primary | ICD-10-CM
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
## 2021-09-08 PROCEDURE — 99284 EMERGENCY DEPT VISIT MOD MDM: CPT | Performed by: EMERGENCY MEDICINE

## 2021-09-08 PROCEDURE — 99283 EMERGENCY DEPT VISIT LOW MDM: CPT

## 2021-09-08 PROCEDURE — 73564 X-RAY EXAM KNEE 4 OR MORE: CPT

## 2021-09-08 PROCEDURE — 96372 THER/PROPH/DIAG INJ SC/IM: CPT

## 2021-09-08 RX ORDER — KETOROLAC TROMETHAMINE 30 MG/ML
15 INJECTION, SOLUTION INTRAMUSCULAR; INTRAVENOUS ONCE
Status: COMPLETED | OUTPATIENT
Start: 2021-09-08 | End: 2021-09-08

## 2021-09-08 RX ORDER — IBUPROFEN 400 MG/1
400 TABLET ORAL EVERY 6 HOURS PRN
Qty: 56 TABLET | Refills: 0 | Status: SHIPPED | OUTPATIENT
Start: 2021-09-08 | End: 2021-10-15 | Stop reason: ALTCHOICE

## 2021-09-08 RX ORDER — ACETAMINOPHEN 325 MG/1
650 TABLET ORAL ONCE
Status: DISCONTINUED | OUTPATIENT
Start: 2021-09-08 | End: 2021-09-08 | Stop reason: HOSPADM

## 2021-09-08 RX ADMIN — KETOROLAC TROMETHAMINE 15 MG: 30 INJECTION, SOLUTION INTRAMUSCULAR; INTRAVENOUS at 14:41

## 2021-09-08 NOTE — ED PROVIDER NOTES
History  Chief Complaint   Patient presents with    Knee Injury     Pt reports falling and twisting R knee yesterday  Reports no relief from advil  Last dose 10am     HPI    26-year-old female presenting for evaluation of right knee pain  Patient states yesterday she fell backward onto her back, felt her knee twist, has had pain in the right knee since then  Pain is unrelieved with Advil and Aleve  Patient is able to ambulate but states that is painful and difficult  Patient states that the pain is better with external rotation of the knee  Denies any other injuries  Prior to Admission Medications   Prescriptions Last Dose Informant Patient Reported?  Taking?   citalopram (CeleXA) 20 mg tablet  Self Yes No   Sig: Take 20 mg by mouth daily   clonazePAM (KlonoPIN) 0 5 mg tablet  Self Yes No   Sig: Take 0 5 mg by mouth 2 (two) times a day   dicyclomine (BENTYL) 20 mg tablet  Self No No   Sig: TAKE 1 TABLET BY MOUTH TWICE A DAY   ipratropium (ATROVENT) 0 03 % nasal spray  Self No No   Si-2 sprays each nostril twice a day as needed for rhinorrhea   Patient not taking: Reported on 2021   ondansetron (ZOFRAN) 4 mg tablet  Self No No   Sig: Take 1 tablet (4 mg total) by mouth every 8 (eight) hours as needed for nausea or vomiting   ondansetron (ZOFRAN-ODT) 4 mg disintegrating tablet  Self No No   Sig: Take 1 tablet (4 mg total) by mouth every 6 (six) hours as needed for nausea or vomiting   pantoprazole (PROTONIX) 20 mg tablet  Self No No   Sig: Take 1 tablet (20 mg total) by mouth daily   traZODone (DESYREL) 50 mg tablet  Self Yes No   Sig: Take 50 mg by mouth daily at bedtime      Facility-Administered Medications: None       Past Medical History:   Diagnosis Date    Anxiety and depression     Asthma     has not req'd tx since 2016    Chlamydia 2000    Fatty liver     GERD (gastroesophageal reflux disease)     no meds    Migraine     manages with OTC remedies       Past Surgical History: Procedure Laterality Date    NASAL/SINUS ENDOSCOPY Right 6/25/2020    Procedure: FUNCTIONAL ENDOSCOPIC SINUS SURGERY WITH RIGHT MAXILLARY ANTROSTOMY REMOVALOF CYST, RIGHT SPHENOIDOTOMY, RIGHT POLYPECTOMY (LARGE ANTRAL CHOANAL POLYP)  ;  Surgeon: Paris Page MD;  Location: [address] [place] [address] MAIN OR;  Service: ENT    [place] LAP,RMV  ADNEXAL STRUCTURE Right 10/10/2018    Procedure: SALPINGECTOMY, LAPAROSCOPIC;  Surgeon: Fish Stephens MD;  [facility];  Service: Gynecology    SALPINGOSTOMY  2015    tubal reversal    SCALP EXCISION      x4    TUBAL LIGATION  2002    WISDOM TOOTH EXTRACTION  1998       Family History   Problem Relation Age of Onset    Cancer Maternal Aunt         lung    Breast cancer Neg Hx      I have reviewed and agree with the history as documented  E-Cigarette/Vaping    E-Cigarette Use Never User      E-Cigarette/Vaping Substances    Nicotine No     THC No     CBD No     Flavoring No      Social History     Tobacco Use    Smoking status: Never Smoker    Smokeless tobacco: Never Used   Vaping Use    Vaping Use: Never used   Substance Use Topics    Alcohol use: Yes    Drug use: Not Currently     Types: Marijuana     Comment: last used marijuana 2014        Review of Systems   Constitutional: Negative for chills and fever  HENT: Negative for sore throat  Respiratory: Negative for cough and shortness of breath  Cardiovascular: Negative for chest pain, palpitations and leg swelling  Gastrointestinal: Negative for abdominal pain, diarrhea, nausea and vomiting  Genitourinary: Negative for dysuria and frequency  Musculoskeletal: Positive for gait problem  Negative for myalgias  Skin: Negative for rash and wound  Neurological: Negative for dizziness, light-headedness and headaches         Physical Exam  ED Triage Vitals   Temperature Pulse Respirations Blood Pressure SpO2   09/08/21 1223 09/08/21 1223 09/08/21 1221 09/08/21 1223 09/08/21 1223   98 3 °F (36 8 °C) 82 18 114/80 97 %      Temp Source Heart Rate Source Patient Position - Orthostatic VS BP Location FiO2 (%)   09/08/21 1221 09/08/21 1221 09/08/21 1221 09/08/21 1221 --   Oral Monitor Sitting Right arm       Pain Score       09/08/21 1441       7             Orthostatic Vital Signs  Vitals:    09/08/21 1221 09/08/21 1223   BP:  114/80   Pulse:  82   Patient Position - Orthostatic VS: Sitting        Physical Exam  Vitals and nursing note reviewed  Constitutional:       General: She is not in acute distress  Appearance: Normal appearance  She is not ill-appearing or toxic-appearing  HENT:      Head: Normocephalic and atraumatic  Right Ear: External ear normal       Left Ear: External ear normal       Nose: Nose normal       Mouth/Throat:      Mouth: Mucous membranes are moist       Pharynx: Oropharynx is clear  Eyes:      General: No scleral icterus  Extraocular Movements: Extraocular movements intact  Cardiovascular:      Rate and Rhythm: Normal rate and regular rhythm  Pulses: Normal pulses  Pulmonary:      Effort: Pulmonary effort is normal  No respiratory distress  Breath sounds: Normal breath sounds  Abdominal:      Palpations: Abdomen is soft  Tenderness: There is no abdominal tenderness  Musculoskeletal:      Cervical back: Normal range of motion and neck supple  Right knee: No swelling or deformity  Decreased range of motion  Tenderness present  Instability Tests: Anterior drawer test negative  Posterior drawer test negative  Medial Yudy test negative and lateral Yudy test negative  Skin:     General: Skin is warm  Capillary Refill: Capillary refill takes less than 2 seconds  Neurological:      General: No focal deficit present  Mental Status: She is alert and oriented to person, place, and time           ED Medications  Medications   ketorolac (TORADOL) injection 15 mg (15 mg Intramuscular Given 9/8/21 1441)       Diagnostic Studies  Results Reviewed     None                 XR knee 4+ vw right injury   ED Interpretation by Rayne Gaffney MD (09/08 9520)   Primary reviewed: no acute abnormality      Final Result by Franklyn Almonte MD (09/08 9476)      No acute osseous abnormality  Workstation performed: [id number]               Procedures  Procedures      ED Course                                       MDM  Number of Diagnoses or Management Options  Acute pain of right knee  Fall, initial encounter  Diagnosis management comments: 58-year-old female presenting for evaluation of right knee pain after a twisting injury  Physical exam shows no obvious deformity to the knee, tenderness palpation around the knee, no laxity appreciated  Will x-ray, symptomatic treatment, discharge with sports medicine follow-up  Patient was placed in a knee immobilizer  Knee immobilizer was placed by RN  Examined by me post splint placement  Splint is in good position and neurovascular exam intact after splint placement  I reviewed all testing with the patient: Xray  I gave oral return precautions for what to return for in addition to the written return precautions  The patient (and any family present: n/a) verbalized understanding of the discharge instructions and warnings that would necessitate return to the Emergency Department  I specifically highlighted areas of special concern regarding the written and verbal discharge instructions and return precautions  All questions were answered prior to discharge  Disposition  Final diagnoses:   Acute pain of right knee   Fall, initial encounter     Time reflects when diagnosis was documented in both MDM as applicable and the Disposition within this note     Time User Action Codes Description Comment    9/8/2021  3:11 PM Gretta Ch Add [M25 561] Acute pain of right knee     9/8/2021  3:16 PM Gretta Bourne [W19  XXXA] Fall, initial encounter       ED Disposition     ED Disposition Condition Date/Time Comment    Discharge Stable Wed Sep 8, 2021  3:17 PM Kellee Walters discharge to home/self care  Follow-up Information     Follow up With Specialties Details Why Contact Info Additional [address]   For Emergency Department Follow-up [address]  [address] [facility],[address], [address] [place] [address], [id number]   [phone]          Discharge Medication List as of 9/8/2021  3:18 PM      CONTINUE these medications which have NOT CHANGED    Details   citalopram (CeleXA) 20 mg tablet Take 20 mg by mouth daily, Historical Med      clonazePAM (KlonoPIN) 0 5 mg tablet Take 0 5 mg by mouth 2 (two) times a day, Historical Med      dicyclomine (BENTYL) 20 mg tablet TAKE 1 TABLET BY MOUTH TWICE A DAY, Normal      ipratropium (ATROVENT) 0 03 % nasal spray 1-2 sprays each nostril twice a day as needed for rhinorrhea, Normal      ondansetron (ZOFRAN) 4 mg tablet Take 1 tablet (4 mg total) by mouth every 8 (eight) hours as needed for nausea or vomiting, Starting Mon 8/2/2021, Normal      ondansetron (ZOFRAN-ODT) 4 mg disintegrating tablet Take 1 tablet (4 mg total) by mouth every 6 (six) hours as needed for nausea or vomiting, Starting u 7/29/2021, Normal      pantoprazole (PROTONIX) 20 mg tablet Take 1 tablet (20 mg total) by mouth daily, Starting Mon 8/2/2021, Normal      traZODone (DESYREL) 50 mg tablet Take 50 mg by mouth daily at bedtime, Historical Med           No discharge procedures on file  PDMP Review       Value Time User    PDMP Reviewed  Yes 6/25/2020 12:02 PM Michael Jasmine MD           ED Provider  Attending physically available and evaluated Kellee Walters  I managed the patient along with the ED Attending      Electronically Signed by         Luz Jimenez DO  09/16/21 1896

## 2021-09-08 NOTE — Clinical Note
Jona Bhandari was seen and treated in our emergency department on 9/8/2021  Diagnosis:     Alisia Lamar  may return to work on return date  She may return on this date: 09/11/2021         If you have any questions or concerns, please don't hesitate to call        Tanja Sosa DO    ______________________________           _______________          _______________  Hospital Representative                              Date                                Time

## 2021-09-08 NOTE — ED ATTENDING ATTESTATION
9/8/2021  IAnna MD, saw and evaluated the patient  I have discussed the patient with the resident/non-physician practitioner and agree with the resident's/non-physician practitioner's findings, Plan of Care, and MDM as documented in the resident's/non-physician practitioner's note, except where noted  All available labs and Radiology studies were reviewed  I was present for key portions of any procedure(s) performed by the resident/non-physician practitioner and I was immediately available to provide assistance  At this point I agree with the current assessment done in the Emergency Department  I have conducted an independent evaluation of this patient a history and physical is as follows:    36 y/o F Presents for evaluation of pain over the medial and posterior aspect of her right knee x 2 days  Pt states that she twisted her knee yesterday causing her to fall  Denies other traumatic injuries  Pt complains of pain in the R knee which is worse with movement  Pt states it feels like I cough give out at times  10 systems reviewed and otherwise neg   On exam no distress, lungs nml, cardiac nml, abd nml, R hip/ankle exam wnl, R knee with contusion over the medial aspect, no effusion/warmth,  FROM with pain,  Pain with stressing MCL w/o laxity, neg ant/post drawers sign, nvi distal> MDM: R knee injury-will do xray to r/o fx/dislocation, rices, cruthmau, ortho f/u    ED Course         Critical Care Time  Procedures

## 2021-09-08 NOTE — DISCHARGE INSTRUCTIONS
There is concern for a ligamentous injury to your medial collateral ligament (MCL)  Please use the following pain medications as prescribed:  - Acetaminophen 650mg every 6 hours  - Ibuprofen 400mg every 6 hours  They work in different ways so can be used together at the same time  In addition, you can use ice as needed for your symptoms  Please follow up with sports medicine

## 2021-09-08 NOTE — ED NOTES
Crutches and knee immobilizer given to patient understand care and use of both        Markos Nunez, NICHOLAS  09/08/21 1656

## 2021-09-10 ENCOUNTER — VBI (OUTPATIENT)
Dept: ADMINISTRATIVE | Facility: OTHER | Age: 41
End: 2021-09-10

## 2021-09-17 ENCOUNTER — TELEPHONE (OUTPATIENT)
Dept: GASTROENTEROLOGY | Facility: HOSPITAL | Age: 41
End: 2021-09-17

## 2021-09-20 ENCOUNTER — ANESTHESIA EVENT (OUTPATIENT)
Dept: GASTROENTEROLOGY | Facility: HOSPITAL | Age: 41
End: 2021-09-20

## 2021-09-20 ENCOUNTER — ANESTHESIA (OUTPATIENT)
Dept: GASTROENTEROLOGY | Facility: HOSPITAL | Age: 41
End: 2021-09-20

## 2021-09-20 ENCOUNTER — HOSPITAL ENCOUNTER (OUTPATIENT)
Dept: GASTROENTEROLOGY | Facility: HOSPITAL | Age: 41
Setting detail: OUTPATIENT SURGERY
Discharge: HOME | End: 2021-09-20
Attending: INTERNAL MEDICINE | Admitting: INTERNAL MEDICINE
Payer: COMMERCIAL

## 2021-09-20 VITALS
OXYGEN SATURATION: 98 % | HEART RATE: 68 BPM | SYSTOLIC BLOOD PRESSURE: 122 MMHG | TEMPERATURE: 97.8 F | RESPIRATION RATE: 18 BRPM | DIASTOLIC BLOOD PRESSURE: 64 MMHG | BODY MASS INDEX: 43.05 KG/M2 | WEIGHT: 228 LBS | HEIGHT: 61 IN

## 2021-09-20 DIAGNOSIS — K52.9 COLITIS: ICD-10-CM

## 2021-09-20 DIAGNOSIS — R10.84 GENERALIZED ABDOMINAL PAIN: ICD-10-CM

## 2021-09-20 LAB
EXT PREGNANCY TEST URINE: NEGATIVE
EXT. CONTROL: NORMAL

## 2021-09-20 PROCEDURE — 43239 EGD BIOPSY SINGLE/MULTIPLE: CPT | Performed by: INTERNAL MEDICINE

## 2021-09-20 PROCEDURE — 45380 COLONOSCOPY AND BIOPSY: CPT | Performed by: INTERNAL MEDICINE

## 2021-09-20 PROCEDURE — 88305 TISSUE EXAM BY PATHOLOGIST: CPT | Performed by: PATHOLOGY

## 2021-09-20 PROCEDURE — NC001 PR NO CHARGE: Performed by: INTERNAL MEDICINE

## 2021-09-20 PROCEDURE — 81025 URINE PREGNANCY TEST: CPT | Performed by: INTERNAL MEDICINE

## 2021-09-20 RX ORDER — LIDOCAINE HYDROCHLORIDE 10 MG/ML
INJECTION, SOLUTION EPIDURAL; INFILTRATION; INTRACAUDAL; PERINEURAL AS NEEDED
Status: DISCONTINUED | OUTPATIENT
Start: 2021-09-20 | End: 2021-09-20

## 2021-09-20 RX ORDER — SODIUM CHLORIDE 9 MG/ML
INJECTION, SOLUTION INTRAVENOUS CONTINUOUS PRN
Status: DISCONTINUED | OUTPATIENT
Start: 2021-09-20 | End: 2021-09-20

## 2021-09-20 RX ORDER — SODIUM CHLORIDE 9 MG/ML
75 INJECTION, SOLUTION INTRAVENOUS CONTINUOUS
Status: CANCELLED | OUTPATIENT
Start: 2021-09-20

## 2021-09-20 RX ORDER — PROPOFOL 10 MG/ML
INJECTION, EMULSION INTRAVENOUS AS NEEDED
Status: DISCONTINUED | OUTPATIENT
Start: 2021-09-20 | End: 2021-09-20

## 2021-09-20 RX ORDER — SODIUM CHLORIDE 9 MG/ML
100 INJECTION, SOLUTION INTRAVENOUS CONTINUOUS
Status: DISCONTINUED | OUTPATIENT
Start: 2021-09-20 | End: 2021-09-24 | Stop reason: HOSPADM

## 2021-09-20 RX ADMIN — PROPOFOL 50 MG: 10 INJECTION, EMULSION INTRAVENOUS at 10:55

## 2021-09-20 RX ADMIN — SODIUM CHLORIDE 100 ML/HR: 0.9 INJECTION, SOLUTION INTRAVENOUS at 09:26

## 2021-09-20 RX ADMIN — PROPOFOL 50 MG: 10 INJECTION, EMULSION INTRAVENOUS at 11:07

## 2021-09-20 RX ADMIN — PROPOFOL 50 MG: 10 INJECTION, EMULSION INTRAVENOUS at 11:11

## 2021-09-20 RX ADMIN — PROPOFOL 200 MG: 10 INJECTION, EMULSION INTRAVENOUS at 10:48

## 2021-09-20 RX ADMIN — SODIUM CHLORIDE: 0.9 INJECTION, SOLUTION INTRAVENOUS at 10:02

## 2021-09-20 RX ADMIN — LIDOCAINE HYDROCHLORIDE 50 MG: 10 INJECTION, SOLUTION EPIDURAL; INFILTRATION; INTRACAUDAL; PERINEURAL at 10:48

## 2021-09-20 RX ADMIN — PROPOFOL 50 MG: 10 INJECTION, EMULSION INTRAVENOUS at 11:02

## 2021-09-20 RX ADMIN — PROPOFOL 50 MG: 10 INJECTION, EMULSION INTRAVENOUS at 10:52

## 2021-09-20 RX ADMIN — PROPOFOL 50 MG: 10 INJECTION, EMULSION INTRAVENOUS at 11:17

## 2021-09-20 RX ADMIN — SODIUM CHLORIDE: 0.9 INJECTION, SOLUTION INTRAVENOUS at 11:17

## 2021-09-20 RX ADMIN — PROPOFOL 50 MG: 10 INJECTION, EMULSION INTRAVENOUS at 10:59

## 2021-09-20 NOTE — ANESTHESIA PREPROCEDURE EVALUATION
Medical History  History Comments   Asthma has not req'd tx since 2016   Chlamydia    GERD (gastroesophageal reflux disease) no meds   Anxiety and depression    Migraine manages with OTC remedies   Fatty liver    Depression    Anxiety    Bipolar disorder ([facility] [address] )    History of transfusion    Liver disease      Procedure:  EGD  COLONOSCOPY    Relevant Problems   ANESTHESIA (within normal limits)      GI/HEPATIC   (+) Hepatic steatosis      NEURO/PSYCH   (+) Depression, recurrent (HCC)        Physical Exam    Airway    Mallampati score: III  TM Distance: >3 FB  Neck ROM: full     Dental   No notable dental hx     Cardiovascular  Rate: normal,     Pulmonary  Pulmonary exam normal     Other Findings        Anesthesia Plan  ASA Score- 3     Anesthesia Type- IV sedation with anesthesia with ASA Monitors  Additional Monitors:   Airway Plan:     Comment: Per patient, appropriately NPO, denies active CP/SOB/wheezing/symptoms related to heartburn/nausea/vomiting  Plan Factors-Exercise tolerance (METS): >4 METS  Chart reviewed  Patient summary reviewed  Patient is not a current smoker  Induction- intravenous  Postoperative Plan- Plan for postoperative opioid use  Informed Consent- Anesthetic plan and risks discussed with patient  I personally reviewed this patient with the CRNA  Discussed and agreed on the Anesthesia Plan with the CRNA  Kylah Chin

## 2021-09-20 NOTE — H&P
History and Physical -  [facility]  Missy Guevara 35 y o  female MRN: [id number]                  HPI: Missy Guevara is a 35y o  year old female who presents for abdominal pain with alternating diarrhea and constipation  REVIEW OF SYSTEMS: Per the HPI, and otherwise unremarkable  Historical Information   Past Medical History:   Diagnosis Date    Anxiety     Anxiety and depression     Asthma     has not req'd tx since 2016    Bipolar disorder ([facility] [address] )     Chlamydia 2000    Depression     Fatty liver     GERD (gastroesophageal reflux disease)     no meds    History of transfusion     Liver disease     Migraine     manages with OTC remedies     Past Surgical History:   Procedure Laterality Date    NASAL/SINUS ENDOSCOPY Right 6/25/2020    Procedure: FUNCTIONAL ENDOSCOPIC SINUS SURGERY WITH RIGHT MAXILLARY ANTROSTOMY REMOVALOF CYST, RIGHT SPHENOIDOTOMY, RIGHT POLYPECTOMY (LARGE ANTRAL CHOANAL POLYP)  ;  Surgeon: Lily Tenorio MD;  Location: [address] [place] [address] MAIN OR;  Service: ENT    [place] LAP,RMV  ADNEXAL STRUCTURE Right 10/10/2018    Procedure: SALPINGECTOMY, LAPAROSCOPIC;  Surgeon: Jessica Uribe MD;  Location[facility];  Service: Gynecology    SALPINGOSTOMY  2015    tubal reversal    SCALP EXCISION      x4    TUBAL LIGATION  2002    WISDOM TOOTH EXTRACTION  1998     Social History   Social History     Substance and Sexual Activity   Alcohol Use Yes     Social History     Substance and Sexual Activity   Drug Use Not Currently    Types: Marijuana    Comment: last used marijuana 2014     Social History     Tobacco Use   Smoking Status Never Smoker   Smokeless Tobacco Never Used     Family History   Problem Relation Age of Onset    Cancer Maternal Aunt         lung    Breast cancer Neg Hx        Meds/Allergies       Current Outpatient Medications:     citalopram (CeleXA) 20 mg tablet    clonazePAM (KlonoPIN) 0 5 mg tablet    dicyclomine (BENTYL) 20 mg tablet    ibuprofen (MOTRIN) 400 mg tablet    ondansetron (ZOFRAN-ODT) 4 mg disintegrating tablet    traZODone (DESYREL) 50 mg tablet    ipratropium (ATROVENT) 0 03 % nasal spray    ondansetron (ZOFRAN) 4 mg tablet    pantoprazole (PROTONIX) 20 mg tablet    Current Facility-Administered Medications:     sodium chloride 0 9 % infusion, 100 mL/hr, Intravenous, Continuous, 100 mL/hr at 09/20/21 5254    Facility-Administered Medications Ordered in Other Encounters:     sodium chloride 0 9 % infusion, , Intravenous, Continuous PRN, New Bag at 09/20/21 1002    No Known Allergies    Objective     /73   Pulse 80   Temp 97 8 °F (36 6 °C) (Temporal)   Resp 16   Ht 5' 1" (1 549 m)   Wt 103 kg (228 lb)   LMP  (LMP Unknown)   SpO2 98%   BMI 43 08 kg/m²       PHYSICAL EXAM    Gen: NAD  Head: NCAT  CV: RRR  CHEST: Clear  ABD: soft, NT/ND  EXT: no edema      ASSESSMENT/PLAN:  This is a 35y o  year old female here for upper endoscopy and colonoscopy, and she is stable and optimized for her procedure

## 2021-09-20 NOTE — DISCHARGE INSTRUCTIONS
Upper Endoscopy   WHAT YOU NEED TO KNOW:   An upper endoscopy is also called an upper gastrointestinal (GI) endoscopy, or an esophagogastroduodenoscopy (EGD)  You may feel bloated, gassy, or have some abdominal discomfort after your procedure  Your throat may be sore for 24 to 36 hours  You may burp or pass gas from air that is still inside your body  DISCHARGE INSTRUCTIONS:   Call 911 if:   · You have sudden chest pain or trouble breathing  Seek care immediately if:   · You feel dizzy or faint  · You have trouble swallowing  · You have severe throat pain  · Your bowel movements are very dark or black  · Your abdomen is hard and firm and you have severe pain  · You vomit blood  Contact your healthcare provider if:   · You feel full or bloated and cannot burp or pass gas  · You have not had a bowel movement for 3 days after your procedure  · You have neck pain  · You have a fever or chills  · You have nausea or are vomiting  · You have a rash or hives  · You have questions or concerns about your endoscopy  Relieve a sore throat:  Suck on throat lozenges or crushed ice  Gargle with a small amount of warm salt water  Mix 1 teaspoon of salt and 1 cup of warm water to make salt water  Relieve gas and discomfort from bloating:  Lie on your right side with a heating pad on your abdomen  Take short walks to help pass gas  Eat small meals until bloating is relieved  Rest after your procedure:  Do not drive or make important decisions until the day after your procedure  Return to your normal activity as directed  You can usually return to work the day after your procedure  Follow up with your healthcare provider as directed:  Write down your questions so you remember to ask them during your visits  © Copyright [employer] 2021 Information is for End User's use only and may not be sold, redistributed or otherwise used for commercial purposes   All illustrations and images included in CareNotes® are the copyrighted property of A D A M , Inc  or [employer]   The above information is an  only  It is not intended as medical advice for individual conditions or treatments  Talk to your doctor, nurse or pharmacist before following any medical regimen to see if it is safe and effective for you  Colonoscopy   WHAT YOU NEED TO KNOW:   A colonoscopy is a procedure to examine the inside of your colon (intestine) with a scope  Polyps or tissue growths may have been removed during your colonoscopy  It is normal to feel bloated and to have some abdominal discomfort  You should be passing gas  If you have hemorrhoids or you had polyps removed, you may have a small amount of bleeding  DISCHARGE INSTRUCTIONS:   Call your doctor if:   · You have a large amount of bright red blood in your bowel movements  · Your abdomen is hard and firm and you have severe pain  · You have sudden trouble breathing  · You develop a rash or hives  · You have a fever within 24 hours of your procedure  · You have not had a bowel movement for 3 days after your procedure  · You have questions or concerns about your condition or care  After your colonoscopy:   · Do not lift, strain, or run  for 3 days  · Rest as much as possible  You have been given medicine to relax you  Do not  drive or make important decisions for at least 24 hours  Return to your normal activity as directed  · Relieve gas and discomfort from bloating  by lying on your left side with a heating pad on your abdomen  You may need to take short walks to help the gas move out  Eat small meals until bloating is relieved  If you had polyps removed: For 7 days after your procedure:  · Do not  take aspirin  · Do not  go on long car rides  Help prevent constipation:   · Eat a variety of healthy foods    Healthy foods include fruit, vegetables, whole-grain breads, low-fat dairy products, beans, lean meat, and fish  Ask if you need to be on a special diet  Your healthcare provider may recommend that you eat high-fiber foods such as cooked beans  Fiber helps you have regular bowel movements  · Drink liquids as directed  Adults should drink between 9 and 13 eight-ounce cups of liquid every day  Ask what amount is best for you  For most people, good liquids to drink are water, juice, and milk  · Exercise as directed  Talk to your healthcare provider about the best exercise plan for you  Exercise can help prevent constipation, decrease your blood pressure and improve your health  Follow up with your healthcare provider as directed:  Write down your questions so you remember to ask them during your visits  © Copyright [employer] 2021 Information is for End User's use only and may not be sold, redistributed or otherwise used for commercial purposes  All illustrations and images included in CareNotes® are the copyrighted property of [employer] Inc  or [facility] Rudi [employer]vel[employer]   The above information is an  only  It is not intended as medical advice for individual conditions or treatments  Talk to your doctor, nurse or pharmacist before following any medical regimen to see if it is safe and effective for you

## 2021-09-30 ENCOUNTER — OFFICE VISIT (OUTPATIENT)
Dept: OBGYN CLINIC | Facility: MEDICAL CENTER | Age: 41
End: 2021-09-30
Payer: COMMERCIAL

## 2021-09-30 ENCOUNTER — APPOINTMENT (OUTPATIENT)
Dept: RADIOLOGY | Facility: MEDICAL CENTER | Age: 41
End: 2021-09-30
Payer: COMMERCIAL

## 2021-09-30 VITALS
HEART RATE: 78 BPM | DIASTOLIC BLOOD PRESSURE: 84 MMHG | SYSTOLIC BLOOD PRESSURE: 133 MMHG | WEIGHT: 228 LBS | BODY MASS INDEX: 43.05 KG/M2 | HEIGHT: 61 IN

## 2021-09-30 DIAGNOSIS — M23.91 INTERNAL DERANGEMENT OF RIGHT KNEE: ICD-10-CM

## 2021-09-30 DIAGNOSIS — Z01.89 ENCOUNTER FOR LOWER EXTREMITY COMPARISON IMAGING STUDY: ICD-10-CM

## 2021-09-30 DIAGNOSIS — M17.11 ARTHRITIS OF RIGHT KNEE: ICD-10-CM

## 2021-09-30 DIAGNOSIS — M17.11 ARTHRITIS OF RIGHT KNEE: Primary | ICD-10-CM

## 2021-09-30 DIAGNOSIS — M25.561 ACUTE PAIN OF RIGHT KNEE: ICD-10-CM

## 2021-09-30 PROCEDURE — 1036F TOBACCO NON-USER: CPT | Performed by: ORTHOPAEDIC SURGERY

## 2021-09-30 PROCEDURE — 73560 X-RAY EXAM OF KNEE 1 OR 2: CPT

## 2021-09-30 PROCEDURE — 73564 X-RAY EXAM KNEE 4 OR MORE: CPT

## 2021-09-30 PROCEDURE — 3008F BODY MASS INDEX DOCD: CPT | Performed by: ORTHOPAEDIC SURGERY

## 2021-09-30 PROCEDURE — 99203 OFFICE O/P NEW LOW 30 MIN: CPT | Performed by: ORTHOPAEDIC SURGERY

## 2021-09-30 RX ORDER — DICLOFENAC SODIUM 75 MG/1
75 TABLET, DELAYED RELEASE ORAL 2 TIMES DAILY PRN
Qty: 60 TABLET | Refills: 2 | Status: SHIPPED | OUTPATIENT
Start: 2021-09-30 | End: 2021-10-15 | Stop reason: ALTCHOICE

## 2021-09-30 NOTE — PROGRESS NOTES
Assessment/Plan     1  Arthritis of right knee    2  Encounter for lower extremity comparison imaging study    3  Acute pain of right knee    4  Internal derangement of right knee      Orders Placed This Encounter   Procedures    Cane    XR knee 4+ vw right injury    XR knee 1 or 2 vw left    MRI knee right  wo contrast     Ms Caralee Essex has persistent pain after injury  MRI right knee to evaluate for soft tissue injury  Diclofenac prn pain, medications warnings were reviewed with patient, patient aware not to take with other NSAIDs  Can take Tylenol 1,000mg by mouth every 8 hours as needed for pain  Do not exceed 3,000mg per day  Cane for assistance  Declined crutches  KI at night    Return for appt after MRI  I answered all of the patient's questions during the visit and provided education of the patient's condition during the visit  The patient verbalized understanding of the information given and agrees with the plan  This note was dictated using [employer] software  It may contain errors including improperly dictated words  Please contact physician directly for any questions  History of Present Illness   Chief complaint:   Chief Complaint   Patient presents with    Right Knee - Pain       HPI: Regis Enriquez is a 35 y o  female that c/o right knee pain  She was referred by Daniela Ann PA-C  She had a fall and twisting injury on 9/7/21  She states she was squatting and fell backward with her legs going underneath her  She thinks she twisted her right knee  She had trouble walking  She went to the ED on 9/8/21 and had x-rays of the right knee which showed in acute fractures,  mild arthritis , ligament tear   And was prescribed ibuprofen 800 mg  She states she has also was placed into a knee immobilizer  She states she is having constant sharp pain over the posteromedial and  anteromedial aspect of the right knee  She does feel her knee is unstable when walking    She states she does limp when she walks  Pain is worse with weight-bearing, pivoting, and kneeling  She has been taking ibuprofen 800 mg with some relief  She tried taking over-the-counter Motrin with no relief  She has been icing and elevating  She has no history having injections, physical therapy or surgeries on the right knee  She is not a diabetic and she has had her COVID vaccination  ROS:    See HPI for musculoskeletal review  All other systems reviewed are negative     Historical Information   Past Medical History:   Diagnosis Date    Anxiety     Anxiety and depression     Asthma     has not req'd tx since 2016    Bipolar disorder ([facility] [address] )     Chlamydia 2000    Depression     Fatty liver     GERD (gastroesophageal reflux disease)     no meds    History of transfusion     Liver disease     Migraine     manages with OTC remedies     Past Surgical History:   Procedure Laterality Date    NASAL/SINUS ENDOSCOPY Right 6/25/2020    Procedure: FUNCTIONAL ENDOSCOPIC SINUS SURGERY WITH RIGHT MAXILLARY ANTROSTOMY REMOVALOF CYST, RIGHT SPHENOIDOTOMY, RIGHT POLYPECTOMY (LARGE ANTRAL CHOANAL POLYP)  ;  Surgeon: Shahid Taylor MD;  Location: [address] [place] [address] OR;  Service: ENT    MI LAP,RMV  ADNEXAL STRUCTURE Right 10/10/2018    Procedure: SALPINGECTOMY, LAPAROSCOPIC;  Surgeon: Sarah Ruiz MD;  Location: [facility] OR;  Service: Gynecology    SALPINGOSTOMY  2015    tubal reversal    SCALP EXCISION      x4    TUBAL LIGATION  2002    WISDOM TOOTH EXTRACTION  1998     Social History   Social History     Substance and Sexual Activity   Alcohol Use Yes     Social History     Substance and Sexual Activity   Drug Use Not Currently    Types: Marijuana    Comment: last used marijuana 2014     Social History     Tobacco Use   Smoking Status Never Smoker   Smokeless Tobacco Never Used     Family History:   Family History   Problem Relation Age of Onset    Cancer Maternal Aunt         lung    Breast cancer Neg Hx        Current Outpatient Medications on File Prior to Visit   Medication Sig Dispense Refill    citalopram (CeleXA) 20 mg tablet Take 20 mg by mouth daily      clonazePAM (KlonoPIN) 0 5 mg tablet Take 0 5 mg by mouth 2 (two) times a day      dicyclomine (BENTYL) 20 mg tablet TAKE 1 TABLET BY MOUTH TWICE A DAY 60 tablet 1    ibuprofen (MOTRIN) 400 mg tablet Take 1 tablet (400 mg total) by mouth every 6 (six) hours as needed for mild pain for up to 14 days 56 tablet 0    ipratropium (ATROVENT) 0 03 % nasal spray 1-2 sprays each nostril twice a day as needed for rhinorrhea (Patient not taking: Reported on 8/16/2021) 30 mL 6    ondansetron (ZOFRAN) 4 mg tablet Take 1 tablet (4 mg total) by mouth every 8 (eight) hours as needed for nausea or vomiting 20 tablet 2    ondansetron (ZOFRAN-ODT) 4 mg disintegrating tablet Take 1 tablet (4 mg total) by mouth every 6 (six) hours as needed for nausea or vomiting 30 tablet 1    pantoprazole (PROTONIX) 20 mg tablet Take 1 tablet (20 mg total) by mouth daily 30 tablet 3    traZODone (DESYREL) 50 mg tablet Take 50 mg by mouth daily at bedtime       No current facility-administered medications on file prior to visit       No Known Allergies    Current Outpatient Medications on File Prior to Visit   Medication Sig Dispense Refill    citalopram (CeleXA) 20 mg tablet Take 20 mg by mouth daily      clonazePAM (KlonoPIN) 0 5 mg tablet Take 0 5 mg by mouth 2 (two) times a day      dicyclomine (BENTYL) 20 mg tablet TAKE 1 TABLET BY MOUTH TWICE A DAY 60 tablet 1    ibuprofen (MOTRIN) 400 mg tablet Take 1 tablet (400 mg total) by mouth every 6 (six) hours as needed for mild pain for up to 14 days 56 tablet 0    ipratropium (ATROVENT) 0 03 % nasal spray 1-2 sprays each nostril twice a day as needed for rhinorrhea (Patient not taking: Reported on 8/16/2021) 30 mL 6    ondansetron (ZOFRAN) 4 mg tablet Take 1 tablet (4 mg total) by mouth every 8 (eight) hours as needed for nausea or vomiting 20 tablet 2    ondansetron (ZOFRAN-ODT) 4 mg disintegrating tablet Take 1 tablet (4 mg total) by mouth every 6 (six) hours as needed for nausea or vomiting 30 tablet 1    pantoprazole (PROTONIX) 20 mg tablet Take 1 tablet (20 mg total) by mouth daily 30 tablet 3    traZODone (DESYREL) 50 mg tablet Take 50 mg by mouth daily at bedtime       No current facility-administered medications on file prior to visit  Objective   Vitals: Blood pressure 133/84, pulse 78, height 5' 1" (1 549 m), weight 103 kg (228 lb), not currently breastfeeding  ,Body mass index is 43 08 kg/m²      PE:  AAOx 3  WDWN  Hearing intact, no drainage from eyes  Regular rate  no audible wheezing  no abdominal distension  LE compartments soft, skin intact    rightknee:    Appearance:  Mild generalized swelling   No ecchymosis  no obvious joint deformity   No effusion  Palpation/Tenderness:  +TTP over medial joint line  TTP over lateral joint line   TTP over patella  TTP over patellar tendon  TTP over pes anserine bursa  Active Range of Motion:  AROM: 20-90  PROM: 10-90  Special Tests:  Unable to perform tests due to pain    bilateralLE:    Sensation grossly intact  Palpable  pulse  AT/GS/EHL intact    Imaging Studies: I have personally reviewed pertinent films in PACS  rightknee:  Mild DJD       Scribe Attestation    I,:  Mohinder Moreno am acting as a scribe while in the presence of the attending physician :       I,:  Patricia Hammonds DO personally performed the services described in this documentation    as scribed in my presence :

## 2021-09-30 NOTE — LETTER
September 30, 2021     Patient: Cindy Tran   YOB: 1980   Date of Visit: 9/30/2021       To Whom it May Concern:    Alvarado Lamas is under my professional care  She was seen in my office on 9/30/2021  She may return to work with limitations   She is on sedentary duty only until follow up       If you have any questions or concerns, please don't hesitate to call           Sincerely,          Linn Hall, DO        CC: No Recipients

## 2021-10-04 ENCOUNTER — TELEPHONE (OUTPATIENT)
Dept: GASTROENTEROLOGY | Facility: AMBULARY SURGERY CENTER | Age: 41
End: 2021-10-04

## 2021-10-11 ENCOUNTER — HOSPITAL ENCOUNTER (OUTPATIENT)
Dept: MRI IMAGING | Facility: HOSPITAL | Age: 41
Discharge: HOME | End: 2021-10-11
Attending: ORTHOPAEDIC SURGERY
Payer: COMMERCIAL

## 2021-10-11 DIAGNOSIS — M23.91 INTERNAL DERANGEMENT OF RIGHT KNEE: ICD-10-CM

## 2021-10-11 DIAGNOSIS — M25.561 ACUTE PAIN OF RIGHT KNEE: ICD-10-CM

## 2021-10-11 DIAGNOSIS — M17.11 ARTHRITIS OF RIGHT KNEE: ICD-10-CM

## 2021-10-11 PROCEDURE — 73721 MRI JNT OF LWR EXTRE W/O DYE: CPT

## 2021-10-11 PROCEDURE — G1004 CDSM NDSC: HCPCS

## 2021-10-13 ENCOUNTER — TELEPHONE (OUTPATIENT)
Dept: OBGYN CLINIC | Facility: HOSPITAL | Age: 41
End: 2021-10-13

## 2021-10-15 ENCOUNTER — OFFICE VISIT (OUTPATIENT)
Dept: OBGYN CLINIC | Facility: MEDICAL CENTER | Age: 41
End: 2021-10-15
Payer: COMMERCIAL

## 2021-10-15 VITALS
HEIGHT: 61 IN | SYSTOLIC BLOOD PRESSURE: 99 MMHG | HEART RATE: 101 BPM | WEIGHT: 238.2 LBS | BODY MASS INDEX: 44.97 KG/M2 | DIASTOLIC BLOOD PRESSURE: 66 MMHG

## 2021-10-15 DIAGNOSIS — S83.411A TEAR OF MCL (MEDIAL COLLATERAL LIGAMENT) OF KNEE, RIGHT, INITIAL ENCOUNTER: Primary | ICD-10-CM

## 2021-10-15 DIAGNOSIS — R10.84 GENERALIZED ABDOMINAL PAIN: ICD-10-CM

## 2021-10-15 DIAGNOSIS — M17.11 PRIMARY OSTEOARTHRITIS OF RIGHT KNEE: ICD-10-CM

## 2021-10-15 PROCEDURE — 99213 OFFICE O/P EST LOW 20 MIN: CPT | Performed by: ORTHOPAEDIC SURGERY

## 2021-10-15 PROCEDURE — 3008F BODY MASS INDEX DOCD: CPT | Performed by: ORTHOPAEDIC SURGERY

## 2021-10-15 PROCEDURE — 1036F TOBACCO NON-USER: CPT | Performed by: ORTHOPAEDIC SURGERY

## 2021-10-15 RX ORDER — MELOXICAM 7.5 MG/1
7.5 TABLET ORAL EVERY 12 HOURS PRN
Qty: 60 TABLET | Refills: 1 | Status: SHIPPED | OUTPATIENT
Start: 2021-10-15 | End: 2021-11-28

## 2021-10-24 DIAGNOSIS — R10.84 GENERALIZED ABDOMINAL PAIN: ICD-10-CM

## 2021-10-28 RX ORDER — PANTOPRAZOLE SODIUM 20 MG/1
TABLET, DELAYED RELEASE ORAL
Qty: 90 TABLET | Refills: 1 | Status: SHIPPED | OUTPATIENT
Start: 2021-10-28 | End: 2021-11-28

## 2021-11-28 ENCOUNTER — HOSPITAL ENCOUNTER (EMERGENCY)
Facility: HOSPITAL | Age: 41
Discharge: HOME | End: 2021-11-28
Attending: EMERGENCY MEDICINE
Payer: COMMERCIAL

## 2021-11-28 ENCOUNTER — APPOINTMENT (EMERGENCY)
Dept: CT IMAGING | Facility: HOSPITAL | Age: 41
End: 2021-11-28
Payer: COMMERCIAL

## 2021-11-28 VITALS
DIASTOLIC BLOOD PRESSURE: 55 MMHG | TEMPERATURE: 97.7 F | HEART RATE: 64 BPM | BODY MASS INDEX: 44.15 KG/M2 | OXYGEN SATURATION: 98 % | RESPIRATION RATE: 18 BRPM | WEIGHT: 233.69 LBS | SYSTOLIC BLOOD PRESSURE: 108 MMHG

## 2021-11-28 DIAGNOSIS — N92.6 IRREGULAR MENSES: Primary | ICD-10-CM

## 2021-11-28 DIAGNOSIS — R10.9 ABDOMINAL PAIN: ICD-10-CM

## 2021-11-28 DIAGNOSIS — M54.50 BILATERAL LOW BACK PAIN: ICD-10-CM

## 2021-11-28 DIAGNOSIS — K59.00 CONSTIPATION: ICD-10-CM

## 2021-11-28 LAB
ALBUMIN SERPL BCP-MCNC: 3.2 G/DL (ref 3.5–5)
ALP SERPL-CCNC: 53 U/L (ref 46–116)
ALT SERPL W P-5'-P-CCNC: 21 U/L (ref 12–78)
ANION GAP SERPL CALCULATED.3IONS-SCNC: 8 MMOL/L (ref 4–13)
AST SERPL W P-5'-P-CCNC: 16 U/L (ref 5–45)
BASOPHILS # BLD AUTO: 0.05 THOUSANDS/ΜL (ref 0–0.1)
BASOPHILS NFR BLD AUTO: 1 % (ref 0–1)
BILIRUB SERPL-MCNC: 0.12 MG/DL (ref 0.2–1)
BILIRUB UR QL STRIP: NEGATIVE
BUN SERPL-MCNC: 15 MG/DL (ref 5–25)
CALCIUM ALBUM COR SERPL-MCNC: 8.9 MG/DL (ref 8.3–10.1)
CALCIUM SERPL-MCNC: 8.3 MG/DL (ref 8.3–10.1)
CHLORIDE SERPL-SCNC: 107 MMOL/L (ref 100–108)
CLARITY UR: CLEAR
CO2 SERPL-SCNC: 26 MMOL/L (ref 21–32)
COLOR UR: YELLOW
CREAT SERPL-MCNC: 0.99 MG/DL (ref 0.6–1.3)
EOSINOPHIL # BLD AUTO: 0.53 THOUSAND/ΜL (ref 0–0.61)
EOSINOPHIL NFR BLD AUTO: 9 % (ref 0–6)
ERYTHROCYTE [DISTWIDTH] IN BLOOD BY AUTOMATED COUNT: 15.2 % (ref 11.6–15.1)
EXT PREG TEST URINE: NORMAL
EXT. CONTROL ED NAV: NORMAL
GFR SERPL CREATININE-BSD FRML MDRD: 70 ML/MIN/1.73SQ M
GLUCOSE SERPL-MCNC: 92 MG/DL (ref 65–140)
GLUCOSE UR STRIP-MCNC: NEGATIVE MG/DL
HCT VFR BLD AUTO: 36 % (ref 34.8–46.1)
HGB BLD-MCNC: 11.3 G/DL (ref 11.5–15.4)
HGB UR QL STRIP.AUTO: NEGATIVE
IMM GRANULOCYTES # BLD AUTO: 0 THOUSAND/UL (ref 0–0.2)
IMM GRANULOCYTES NFR BLD AUTO: 0 % (ref 0–2)
KETONES UR STRIP-MCNC: NEGATIVE MG/DL
LEUKOCYTE ESTERASE UR QL STRIP: NEGATIVE
LIPASE SERPL-CCNC: 209 U/L (ref 73–393)
LYMPHOCYTES # BLD AUTO: 1.78 THOUSANDS/ΜL (ref 0.6–4.47)
LYMPHOCYTES NFR BLD AUTO: 32 % (ref 14–44)
MCH RBC QN AUTO: 28.9 PG (ref 26.8–34.3)
MCHC RBC AUTO-ENTMCNC: 31.4 G/DL (ref 31.4–37.4)
MCV RBC AUTO: 92 FL (ref 82–98)
MONOCYTES # BLD AUTO: 0.49 THOUSAND/ΜL (ref 0.17–1.22)
MONOCYTES NFR BLD AUTO: 9 % (ref 4–12)
NEUTROPHILS # BLD AUTO: 2.79 THOUSANDS/ΜL (ref 1.85–7.62)
NEUTS SEG NFR BLD AUTO: 49 % (ref 43–75)
NITRITE UR QL STRIP: NEGATIVE
NRBC BLD AUTO-RTO: 0 /100 WBCS
PH UR STRIP.AUTO: 5.5 [PH] (ref 4.5–8)
PLATELET # BLD AUTO: 230 THOUSANDS/UL (ref 149–390)
PMV BLD AUTO: 9.6 FL (ref 8.9–12.7)
POTASSIUM SERPL-SCNC: 4.2 MMOL/L (ref 3.5–5.3)
PROT SERPL-MCNC: 6.7 G/DL (ref 6.4–8.2)
PROT UR STRIP-MCNC: NEGATIVE MG/DL
RBC # BLD AUTO: 3.91 MILLION/UL (ref 3.81–5.12)
SODIUM SERPL-SCNC: 141 MMOL/L (ref 136–145)
SP GR UR STRIP.AUTO: >=1.03 (ref 1–1.03)
UROBILINOGEN UR QL STRIP.AUTO: 0.2 E.U./DL
WBC # BLD AUTO: 5.64 THOUSAND/UL (ref 4.31–10.16)

## 2021-11-28 PROCEDURE — 81025 URINE PREGNANCY TEST: CPT | Performed by: EMERGENCY MEDICINE

## 2021-11-28 PROCEDURE — 85025 COMPLETE CBC W/AUTO DIFF WBC: CPT | Performed by: EMERGENCY MEDICINE

## 2021-11-28 PROCEDURE — 80053 COMPREHEN METABOLIC PANEL: CPT | Performed by: EMERGENCY MEDICINE

## 2021-11-28 PROCEDURE — 83690 ASSAY OF LIPASE: CPT | Performed by: EMERGENCY MEDICINE

## 2021-11-28 PROCEDURE — 99285 EMERGENCY DEPT VISIT HI MDM: CPT | Performed by: EMERGENCY MEDICINE

## 2021-11-28 PROCEDURE — 99284 EMERGENCY DEPT VISIT MOD MDM: CPT

## 2021-11-28 PROCEDURE — 96361 HYDRATE IV INFUSION ADD-ON: CPT

## 2021-11-28 PROCEDURE — 81003 URINALYSIS AUTO W/O SCOPE: CPT

## 2021-11-28 PROCEDURE — 96375 TX/PRO/DX INJ NEW DRUG ADDON: CPT

## 2021-11-28 PROCEDURE — 96374 THER/PROPH/DIAG INJ IV PUSH: CPT

## 2021-11-28 PROCEDURE — 36415 COLL VENOUS BLD VENIPUNCTURE: CPT | Performed by: EMERGENCY MEDICINE

## 2021-11-28 PROCEDURE — 74177 CT ABD & PELVIS W/CONTRAST: CPT

## 2021-11-28 RX ORDER — NAPROXEN 500 MG/1
500 TABLET ORAL 2 TIMES DAILY WITH MEALS
Qty: 20 TABLET | Refills: 0 | Status: SHIPPED | OUTPATIENT
Start: 2021-11-28 | End: 2021-12-08

## 2021-11-28 RX ORDER — MORPHINE SULFATE 4 MG/ML
4 INJECTION, SOLUTION INTRAMUSCULAR; INTRAVENOUS ONCE
Status: COMPLETED | OUTPATIENT
Start: 2021-11-28 | End: 2021-11-28

## 2021-11-28 RX ORDER — METHOCARBAMOL 500 MG/1
500 TABLET, FILM COATED ORAL 2 TIMES DAILY
Qty: 20 TABLET | Refills: 0 | Status: SHIPPED | OUTPATIENT
Start: 2021-11-28

## 2021-11-28 RX ORDER — DOCUSATE SODIUM 100 MG/1
100 CAPSULE, LIQUID FILLED ORAL EVERY 12 HOURS
Qty: 60 CAPSULE | Refills: 0 | Status: SHIPPED | OUTPATIENT
Start: 2021-11-28

## 2021-11-28 RX ORDER — ACETAMINOPHEN 325 MG/1
975 TABLET ORAL ONCE
Status: COMPLETED | OUTPATIENT
Start: 2021-11-28 | End: 2021-11-28

## 2021-11-28 RX ORDER — KETOROLAC TROMETHAMINE 30 MG/ML
15 INJECTION, SOLUTION INTRAMUSCULAR; INTRAVENOUS ONCE
Status: COMPLETED | OUTPATIENT
Start: 2021-11-28 | End: 2021-11-28

## 2021-11-28 RX ADMIN — IOHEXOL 100 ML: 350 INJECTION, SOLUTION INTRAVENOUS at 17:28

## 2021-11-28 RX ADMIN — ACETAMINOPHEN 975 MG: 325 TABLET, FILM COATED ORAL at 18:26

## 2021-11-28 RX ADMIN — KETOROLAC TROMETHAMINE 15 MG: 30 INJECTION, SOLUTION INTRAMUSCULAR at 16:43

## 2021-11-28 RX ADMIN — SODIUM CHLORIDE 1000 ML: 0.9 INJECTION, SOLUTION INTRAVENOUS at 16:40

## 2021-11-28 RX ADMIN — MORPHINE SULFATE 4 MG: 4 INJECTION INTRAVENOUS at 18:27

## 2021-12-02 ENCOUNTER — TELEPHONE (OUTPATIENT)
Dept: OBGYN CLINIC | Facility: CLINIC | Age: 41
End: 2021-12-02

## 2021-12-08 ENCOUNTER — OFFICE VISIT (OUTPATIENT)
Dept: OBGYN CLINIC | Facility: CLINIC | Age: 41
End: 2021-12-08

## 2021-12-08 ENCOUNTER — OFFICE VISIT (OUTPATIENT)
Dept: LAB | Facility: HOSPITAL | Age: 41
End: 2021-12-08
Payer: COMMERCIAL

## 2021-12-08 ENCOUNTER — APPOINTMENT (OUTPATIENT)
Dept: LAB | Facility: HOSPITAL | Age: 41
End: 2021-12-08
Attending: NURSE PRACTITIONER
Payer: COMMERCIAL

## 2021-12-08 ENCOUNTER — TELEPHONE (OUTPATIENT)
Dept: OBGYN CLINIC | Facility: CLINIC | Age: 41
End: 2021-12-08

## 2021-12-08 VITALS
BODY MASS INDEX: 44.02 KG/M2 | WEIGHT: 233 LBS | DIASTOLIC BLOOD PRESSURE: 76 MMHG | HEART RATE: 79 BPM | SYSTOLIC BLOOD PRESSURE: 103 MMHG

## 2021-12-08 DIAGNOSIS — Z11.3 SCREEN FOR STD (SEXUALLY TRANSMITTED DISEASE): ICD-10-CM

## 2021-12-08 DIAGNOSIS — Z79.899 ENCOUNTER FOR LONG-TERM (CURRENT) USE OF OTHER MEDICATIONS: Primary | ICD-10-CM

## 2021-12-08 DIAGNOSIS — F31.30 BIPOLAR I DISORDER, MOST RECENT EPISODE DEPRESSED (HCC): ICD-10-CM

## 2021-12-08 DIAGNOSIS — Z79.899 ENCOUNTER FOR LONG-TERM (CURRENT) USE OF OTHER MEDICATIONS: ICD-10-CM

## 2021-12-08 DIAGNOSIS — F41.1 GENERALIZED ANXIETY DISORDER: ICD-10-CM

## 2021-12-08 DIAGNOSIS — R10.2 PELVIC CRAMPING: ICD-10-CM

## 2021-12-08 DIAGNOSIS — K76.0 HEPATIC STEATOSIS: ICD-10-CM

## 2021-12-08 DIAGNOSIS — K52.9 COLITIS: ICD-10-CM

## 2021-12-08 DIAGNOSIS — N76.0 BV (BACTERIAL VAGINOSIS): ICD-10-CM

## 2021-12-08 DIAGNOSIS — B96.89 BV (BACTERIAL VAGINOSIS): ICD-10-CM

## 2021-12-08 DIAGNOSIS — N92.0 MENORRHAGIA WITH REGULAR CYCLE: ICD-10-CM

## 2021-12-08 DIAGNOSIS — Z31.9 PATIENT DESIRES PREGNANCY: Primary | ICD-10-CM

## 2021-12-08 LAB
ALBUMIN SERPL BCP-MCNC: 4 G/DL (ref 3–5.2)
ALP SERPL-CCNC: 59 U/L (ref 43–122)
ALT SERPL W P-5'-P-CCNC: 20 U/L
ANION GAP SERPL CALCULATED.3IONS-SCNC: 2 MMOL/L (ref 5–14)
AST SERPL W P-5'-P-CCNC: 25 U/L (ref 14–36)
BASOPHILS # BLD AUTO: 0.1 THOUSANDS/ΜL (ref 0–0.1)
BASOPHILS NFR BLD AUTO: 1 % (ref 0–1)
BILIRUB DIRECT SERPL-MCNC: 0.06 MG/DL
BILIRUB SERPL-MCNC: 0.46 MG/DL
BUN SERPL-MCNC: 9 MG/DL (ref 5–25)
CALCIUM SERPL-MCNC: 8.6 MG/DL (ref 8.4–10.2)
CHLORIDE SERPL-SCNC: 106 MMOL/L (ref 97–108)
CHOLEST SERPL-MCNC: 204 MG/DL
CO2 SERPL-SCNC: 29 MMOL/L (ref 22–30)
CREAT SERPL-MCNC: 0.97 MG/DL (ref 0.6–1.2)
EOSINOPHIL # BLD AUTO: 0.3 THOUSAND/ΜL (ref 0–0.4)
EOSINOPHIL NFR BLD AUTO: 5 % (ref 0–6)
ERYTHROCYTE [DISTWIDTH] IN BLOOD BY AUTOMATED COUNT: 15.9 %
GFR SERPL CREATININE-BSD FRML MDRD: 72 ML/MIN/1.73SQ M
GLUCOSE P FAST SERPL-MCNC: 91 MG/DL (ref 70–99)
HCT VFR BLD AUTO: 36.1 % (ref 36–46)
HDLC SERPL-MCNC: 50 MG/DL
HGB BLD-MCNC: 11.8 G/DL (ref 12–16)
LDLC SERPL CALC-MCNC: 134 MG/DL
LYMPHOCYTES # BLD AUTO: 1.7 THOUSANDS/ΜL (ref 0.5–4)
LYMPHOCYTES NFR BLD AUTO: 27 % (ref 25–45)
MCH RBC QN AUTO: 28.9 PG (ref 26–34)
MCHC RBC AUTO-ENTMCNC: 32.6 G/DL (ref 31–36)
MCV RBC AUTO: 88 FL (ref 80–100)
MONOCYTES # BLD AUTO: 0.5 THOUSAND/ΜL (ref 0.2–0.9)
MONOCYTES NFR BLD AUTO: 8 % (ref 1–10)
NEUTROPHILS # BLD AUTO: 3.8 THOUSANDS/ΜL (ref 1.8–7.8)
NEUTS SEG NFR BLD AUTO: 59 % (ref 45–65)
NONHDLC SERPL-MCNC: 154 MG/DL
PLATELET # BLD AUTO: 272 THOUSANDS/UL (ref 150–450)
PMV BLD AUTO: 8.6 FL (ref 8.9–12.7)
POTASSIUM SERPL-SCNC: 4 MMOL/L (ref 3.6–5)
PROT SERPL-MCNC: 7.6 G/DL (ref 5.9–8.4)
RBC # BLD AUTO: 4.08 MILLION/UL (ref 4–5.2)
SODIUM SERPL-SCNC: 137 MMOL/L (ref 137–147)
TRIGL SERPL-MCNC: 100 MG/DL
WBC # BLD AUTO: 6.4 THOUSAND/UL (ref 4.5–11)

## 2021-12-08 PROCEDURE — 87340 HEPATITIS B SURFACE AG IA: CPT

## 2021-12-08 PROCEDURE — 85025 COMPLETE CBC W/AUTO DIFF WBC: CPT

## 2021-12-08 PROCEDURE — 82784 ASSAY IGA/IGD/IGG/IGM EACH: CPT

## 2021-12-08 PROCEDURE — 82248 BILIRUBIN DIRECT: CPT

## 2021-12-08 PROCEDURE — 99213 OFFICE O/P EST LOW 20 MIN: CPT | Performed by: OBSTETRICS & GYNECOLOGY

## 2021-12-08 PROCEDURE — 93005 ELECTROCARDIOGRAM TRACING: CPT

## 2021-12-08 PROCEDURE — 36415 COLL VENOUS BLD VENIPUNCTURE: CPT

## 2021-12-08 PROCEDURE — 1036F TOBACCO NON-USER: CPT | Performed by: OBSTETRICS & GYNECOLOGY

## 2021-12-08 PROCEDURE — 86592 SYPHILIS TEST NON-TREP QUAL: CPT

## 2021-12-08 PROCEDURE — 86803 HEPATITIS C AB TEST: CPT

## 2021-12-08 PROCEDURE — 87389 HIV-1 AG W/HIV-1&-2 AB AG IA: CPT

## 2021-12-08 PROCEDURE — 80053 COMPREHEN METABOLIC PANEL: CPT

## 2021-12-08 PROCEDURE — 80061 LIPID PANEL: CPT

## 2021-12-08 PROCEDURE — 83516 IMMUNOASSAY NONANTIBODY: CPT

## 2021-12-08 RX ORDER — PNV NO.95/FERROUS FUM/FOLIC AC 28MG-0.8MG
1 TABLET ORAL DAILY
Qty: 90 TABLET | Refills: 3 | Status: SHIPPED | OUTPATIENT
Start: 2021-12-08

## 2021-12-08 RX ORDER — METRONIDAZOLE 500 MG/1
500 TABLET ORAL EVERY 12 HOURS SCHEDULED
Qty: 14 TABLET | Refills: 0 | Status: SHIPPED | OUTPATIENT
Start: 2021-12-08 | End: 2021-12-15

## 2021-12-08 RX ORDER — NAPROXEN 500 MG/1
500 TABLET ORAL 2 TIMES DAILY WITH MEALS
Qty: 30 TABLET | Refills: 0 | Status: SHIPPED | OUTPATIENT
Start: 2021-12-08 | End: 2022-05-20

## 2021-12-09 LAB
HBV SURFACE AG SER QL: NORMAL
HCV AB SER QL: NORMAL
HIV 1+2 AB+HIV1 P24 AG SERPL QL IA: NORMAL
IGA SERPL-MCNC: 460 MG/DL (ref 70–400)
RPR SER QL: NORMAL

## 2021-12-10 LAB — TTG IGA SER-ACNC: <2 U/ML (ref 0–3)

## 2021-12-13 LAB
ATRIAL RATE: 65 BPM
P AXIS: 55 DEGREES
PR INTERVAL: 158 MS
QRS AXIS: 15 DEGREES
QRSD INTERVAL: 84 MS
QT INTERVAL: 424 MS
QTC INTERVAL: 440 MS
T WAVE AXIS: 37 DEGREES
VENTRICULAR RATE: 65 BPM

## 2021-12-13 PROCEDURE — 93010 ELECTROCARDIOGRAM REPORT: CPT | Performed by: INTERNAL MEDICINE

## 2021-12-21 ENCOUNTER — TELEPHONE (OUTPATIENT)
Dept: OBGYN CLINIC | Facility: CLINIC | Age: 41
End: 2021-12-21

## 2021-12-22 ENCOUNTER — HOSPITAL ENCOUNTER (OUTPATIENT)
Dept: ULTRASOUND IMAGING | Facility: HOSPITAL | Age: 41
Discharge: HOME | End: 2021-12-22
Attending: NURSE PRACTITIONER
Payer: COMMERCIAL

## 2021-12-22 DIAGNOSIS — N92.0 MENORRHAGIA WITH REGULAR CYCLE: ICD-10-CM

## 2021-12-22 PROCEDURE — 76856 US EXAM PELVIC COMPLETE: CPT

## 2021-12-22 PROCEDURE — 76830 TRANSVAGINAL US NON-OB: CPT

## 2021-12-30 ENCOUNTER — HOSPITAL ENCOUNTER (EMERGENCY)
Facility: HOSPITAL | Age: 41
Discharge: HOME | End: 2021-12-30
Attending: EMERGENCY MEDICINE | Admitting: EMERGENCY MEDICINE
Payer: COMMERCIAL

## 2021-12-30 VITALS
HEART RATE: 70 BPM | TEMPERATURE: 97.8 F | DIASTOLIC BLOOD PRESSURE: 99 MMHG | SYSTOLIC BLOOD PRESSURE: 136 MMHG | OXYGEN SATURATION: 94 % | WEIGHT: 230.82 LBS | BODY MASS INDEX: 43.61 KG/M2 | RESPIRATION RATE: 18 BRPM

## 2021-12-30 DIAGNOSIS — R11.2 NAUSEA AND VOMITING: ICD-10-CM

## 2021-12-30 DIAGNOSIS — R19.7 DIARRHEA: ICD-10-CM

## 2021-12-30 DIAGNOSIS — R10.84 GENERALIZED ABDOMINAL PAIN: Primary | ICD-10-CM

## 2021-12-30 LAB
ALBUMIN SERPL BCP-MCNC: 3.8 G/DL (ref 3.5–5)
ALP SERPL-CCNC: 55 U/L (ref 46–116)
ALT SERPL W P-5'-P-CCNC: 20 U/L (ref 12–78)
ANION GAP SERPL CALCULATED.3IONS-SCNC: 11 MMOL/L (ref 4–13)
AST SERPL W P-5'-P-CCNC: 18 U/L (ref 5–45)
BACTERIA UR QL AUTO: ABNORMAL /HPF
BASOPHILS # BLD AUTO: 0.04 THOUSANDS/ΜL (ref 0–0.1)
BASOPHILS NFR BLD AUTO: 0 % (ref 0–1)
BILIRUB SERPL-MCNC: 0.34 MG/DL (ref 0.2–1)
BILIRUB UR QL STRIP: NEGATIVE
BUN SERPL-MCNC: 13 MG/DL (ref 5–25)
CALCIUM SERPL-MCNC: 8.8 MG/DL (ref 8.3–10.1)
CHLORIDE SERPL-SCNC: 108 MMOL/L (ref 100–108)
CLARITY UR: CLEAR
CO2 SERPL-SCNC: 23 MMOL/L (ref 21–32)
COLOR UR: YELLOW
CREAT SERPL-MCNC: 0.86 MG/DL (ref 0.6–1.3)
EOSINOPHIL # BLD AUTO: 0.02 THOUSAND/ΜL (ref 0–0.61)
EOSINOPHIL NFR BLD AUTO: 0 % (ref 0–6)
ERYTHROCYTE [DISTWIDTH] IN BLOOD BY AUTOMATED COUNT: 15.6 % (ref 11.6–15.1)
EXT PREG TEST URINE: NEGATIVE
EXT. CONTROL ED NAV: NORMAL
GFR SERPL CREATININE-BSD FRML MDRD: 83 ML/MIN/1.73SQ M
GLUCOSE SERPL-MCNC: 122 MG/DL (ref 65–140)
GLUCOSE UR STRIP-MCNC: NEGATIVE MG/DL
HCT VFR BLD AUTO: 37.7 % (ref 34.8–46.1)
HGB BLD-MCNC: 12.4 G/DL (ref 11.5–15.4)
HGB UR QL STRIP.AUTO: NEGATIVE
IMM GRANULOCYTES # BLD AUTO: 0.03 THOUSAND/UL (ref 0–0.2)
IMM GRANULOCYTES NFR BLD AUTO: 0 % (ref 0–2)
KETONES UR STRIP-MCNC: ABNORMAL MG/DL
LEUKOCYTE ESTERASE UR QL STRIP: NEGATIVE
LIPASE SERPL-CCNC: 53 U/L (ref 73–393)
LYMPHOCYTES # BLD AUTO: 0.74 THOUSANDS/ΜL (ref 0.6–4.47)
LYMPHOCYTES NFR BLD AUTO: 8 % (ref 14–44)
MCH RBC QN AUTO: 29 PG (ref 26.8–34.3)
MCHC RBC AUTO-ENTMCNC: 32.9 G/DL (ref 31.4–37.4)
MCV RBC AUTO: 88 FL (ref 82–98)
MONOCYTES # BLD AUTO: 0.34 THOUSAND/ΜL (ref 0.17–1.22)
MONOCYTES NFR BLD AUTO: 4 % (ref 4–12)
NEUTROPHILS # BLD AUTO: 8.55 THOUSANDS/ΜL (ref 1.85–7.62)
NEUTS SEG NFR BLD AUTO: 88 % (ref 43–75)
NITRITE UR QL STRIP: NEGATIVE
NON-SQ EPI CELLS URNS QL MICRO: ABNORMAL /HPF
NRBC BLD AUTO-RTO: 0 /100 WBCS
PH UR STRIP.AUTO: 8.5 [PH]
PLATELET # BLD AUTO: 242 THOUSANDS/UL (ref 149–390)
PMV BLD AUTO: 10.1 FL (ref 8.9–12.7)
POTASSIUM SERPL-SCNC: 4.1 MMOL/L (ref 3.5–5.3)
PROT SERPL-MCNC: 7.7 G/DL (ref 6.4–8.2)
PROT UR STRIP-MCNC: ABNORMAL MG/DL
RBC # BLD AUTO: 4.28 MILLION/UL (ref 3.81–5.12)
RBC #/AREA URNS AUTO: ABNORMAL /HPF
SODIUM SERPL-SCNC: 142 MMOL/L (ref 136–145)
SP GR UR STRIP.AUTO: 1.01 (ref 1–1.03)
UROBILINOGEN UR QL STRIP.AUTO: 0.2 E.U./DL
WBC # BLD AUTO: 9.72 THOUSAND/UL (ref 4.31–10.16)
WBC #/AREA URNS AUTO: ABNORMAL /HPF

## 2021-12-30 PROCEDURE — 99284 EMERGENCY DEPT VISIT MOD MDM: CPT

## 2021-12-30 PROCEDURE — 36415 COLL VENOUS BLD VENIPUNCTURE: CPT | Performed by: EMERGENCY MEDICINE

## 2021-12-30 PROCEDURE — 81025 URINE PREGNANCY TEST: CPT | Performed by: EMERGENCY MEDICINE

## 2021-12-30 PROCEDURE — 85025 COMPLETE CBC W/AUTO DIFF WBC: CPT | Performed by: EMERGENCY MEDICINE

## 2021-12-30 PROCEDURE — 96375 TX/PRO/DX INJ NEW DRUG ADDON: CPT

## 2021-12-30 PROCEDURE — 96366 THER/PROPH/DIAG IV INF ADDON: CPT

## 2021-12-30 PROCEDURE — 83690 ASSAY OF LIPASE: CPT | Performed by: EMERGENCY MEDICINE

## 2021-12-30 PROCEDURE — 80053 COMPREHEN METABOLIC PANEL: CPT | Performed by: EMERGENCY MEDICINE

## 2021-12-30 PROCEDURE — 96372 THER/PROPH/DIAG INJ SC/IM: CPT

## 2021-12-30 PROCEDURE — 81001 URINALYSIS AUTO W/SCOPE: CPT | Performed by: EMERGENCY MEDICINE

## 2021-12-30 PROCEDURE — 96365 THER/PROPH/DIAG IV INF INIT: CPT

## 2021-12-30 PROCEDURE — 99284 EMERGENCY DEPT VISIT MOD MDM: CPT | Performed by: EMERGENCY MEDICINE

## 2021-12-30 RX ORDER — DICYCLOMINE HCL 20 MG
20 TABLET ORAL 2 TIMES DAILY
Qty: 20 TABLET | Refills: 0 | Status: SHIPPED | OUTPATIENT
Start: 2021-12-30 | End: 2022-03-14

## 2021-12-30 RX ORDER — KETOROLAC TROMETHAMINE 30 MG/ML
15 INJECTION, SOLUTION INTRAMUSCULAR; INTRAVENOUS ONCE
Status: COMPLETED | OUTPATIENT
Start: 2021-12-30 | End: 2021-12-30

## 2021-12-30 RX ORDER — HALOPERIDOL 5 MG/ML
5 INJECTION INTRAMUSCULAR ONCE
Status: COMPLETED | OUTPATIENT
Start: 2021-12-30 | End: 2021-12-30

## 2021-12-30 RX ORDER — METOCLOPRAMIDE HYDROCHLORIDE 5 MG/ML
10 INJECTION INTRAMUSCULAR; INTRAVENOUS ONCE
Status: COMPLETED | OUTPATIENT
Start: 2021-12-30 | End: 2021-12-30

## 2021-12-30 RX ORDER — ONDANSETRON 4 MG/1
4 TABLET, ORALLY DISINTEGRATING ORAL ONCE
Status: COMPLETED | OUTPATIENT
Start: 2021-12-30 | End: 2021-12-30

## 2021-12-30 RX ORDER — SODIUM CHLORIDE, SODIUM GLUCONATE, SODIUM ACETATE, POTASSIUM CHLORIDE, MAGNESIUM CHLORIDE, SODIUM PHOSPHATE, DIBASIC, AND POTASSIUM PHOSPHATE .53; .5; .37; .037; .03; .012; .00082 G/100ML; G/100ML; G/100ML; G/100ML; G/100ML; G/100ML; G/100ML
1000 INJECTION, SOLUTION INTRAVENOUS ONCE
Status: COMPLETED | OUTPATIENT
Start: 2021-12-30 | End: 2021-12-30

## 2021-12-30 RX ORDER — HALOPERIDOL 5 MG/ML
5 INJECTION INTRAMUSCULAR ONCE
Status: DISCONTINUED | OUTPATIENT
Start: 2021-12-30 | End: 2021-12-30

## 2021-12-30 RX ORDER — ONDANSETRON 4 MG/1
4 TABLET, ORALLY DISINTEGRATING ORAL EVERY 6 HOURS PRN
Qty: 10 TABLET | Refills: 0 | Status: SHIPPED | OUTPATIENT
Start: 2021-12-30

## 2021-12-30 RX ADMIN — ONDANSETRON 4 MG: 4 TABLET, ORALLY DISINTEGRATING ORAL at 16:10

## 2021-12-30 RX ADMIN — SODIUM CHLORIDE, SODIUM GLUCONATE, SODIUM ACETATE, POTASSIUM CHLORIDE, MAGNESIUM CHLORIDE, SODIUM PHOSPHATE, DIBASIC, AND POTASSIUM PHOSPHATE 1000 ML: .53; .5; .37; .037; .03; .012; .00082 INJECTION, SOLUTION INTRAVENOUS at 19:09

## 2021-12-30 RX ADMIN — FAMOTIDINE 20 MG: 10 INJECTION INTRAVENOUS at 19:16

## 2021-12-30 RX ADMIN — METOCLOPRAMIDE 10 MG: 5 INJECTION, SOLUTION INTRAMUSCULAR; INTRAVENOUS at 19:16

## 2021-12-30 RX ADMIN — KETOROLAC TROMETHAMINE 15 MG: 30 INJECTION, SOLUTION INTRAMUSCULAR; INTRAVENOUS at 19:16

## 2021-12-30 RX ADMIN — HALOPERIDOL LACTATE 5 MG: 5 INJECTION, SOLUTION INTRAMUSCULAR at 20:35

## 2021-12-30 NOTE — ED PROVIDER NOTES
History  Chief Complaint   Patient presents with    Abdominal Pain     N/V/D since 0900  Vomitted numerous times with episodes of diarrhea  Patient reports dizziness with chills  Patient is a 67-year-old female with past medical history of chronic abdominal pain, fatty liver, and psychiatric disorders who presents for evaluation of abdominal pain, nausea, vomiting, and diarrhea starting this morning  Patient states that symptoms began around 9 o'clock this morning  Patient describes diffuse, sharp abdominal pain  Patient has had multiple episodes of emesis as well as diarrhea  Patient states there was some blood noted on the toilet paper when she wiped  Patient states that she was given Zofran on arrival to the emergency department, and this did not help and she had an episode of emesis after this  Patient denies any fevers, but states that she does feel chilled  Denies any chest pain or difficulty breathing  Denies any dysuria  Patient denies any drug or alcohol use  History provided by:  Patient and medical records   used: No    Abdominal Pain  Pain location:  Generalized  Pain quality: sharp    Duration:  10 hours  Chronicity:  Recurrent (patient has chronic issues with abdominal pain, sees GI doctor)  Associated symptoms: chills, diarrhea, nausea and vomiting    Associated symptoms: no chest pain, no dysuria, no fever and no shortness of breath        Prior to Admission Medications   Prescriptions Last Dose Informant Patient Reported? Taking?    Prenatal Vit-Fe Fumarate-FA (Prenatal Vitamin) 27-0 8 MG TABS   No No   Sig: Take 1 tablet by mouth daily   citalopram (CeleXA) 20 mg tablet  Self Yes No   Sig: Take 20 mg by mouth daily   clonazePAM (KlonoPIN) 0 5 mg tablet  Self Yes No   Sig: Take 0 5 mg by mouth 2 (two) times a day   docusate sodium (COLACE) 100 mg capsule   No No   Sig: Take 1 capsule (100 mg total) by mouth every 12 (twelve) hours   ipratropium (ATROVENT) 0 03 % nasal spray   No No   Si-2 SPRAYS EACH NOSTRIL TWICE A DAY AS NEEDED FOR RHINORRHEA   methocarbamol (ROBAXIN) 500 mg tablet   No No   Sig: Take 1 tablet (500 mg total) by mouth 2 (two) times a day   naproxen (EC NAPROSYN) 500 MG EC tablet   No No   Sig: Take 1 tablet (500 mg total) by mouth 2 (two) times a day with meals   naproxen (NAPROSYN) 500 mg tablet   No No   Sig: Take 1 tablet (500 mg total) by mouth 2 (two) times a day with meals for 10 days   traZODone (DESYREL) 50 mg tablet  Self Yes No   Sig: Take 50 mg by mouth daily at bedtime      Facility-Administered Medications: None       Past Medical History:   Diagnosis Date    Anxiety     Anxiety and depression     Asthma     has not req'd tx since     [facility] disorder ([facility] [address] )     Chlamydia     Depression     Fatty liver     GERD (gastroesophageal reflux disease)     no meds    History of transfusion     Liver disease     Migraine     manages with OTC remedies       Past Surgical History:   Procedure Laterality Date    NASAL/SINUS ENDOSCOPY Right 2020    Procedure: FUNCTIONAL ENDOSCOPIC SINUS SURGERY WITH RIGHT MAXILLARY ANTROSTOMY REMOVALOF CYST, RIGHT SPHENOIDOTOMY, RIGHT POLYPECTOMY (LARGE ANTRAL CHOANAL POLYP)  ;  Surgeon: Zach Sanon MD;  Location: [facility] MAIN OR;  Service: ENT    SC LAP,V  ADNEXAL STRUCTURE Right 10/10/2018    Procedure: SALPINGECTOMY, LAPAROSCOPIC;  Surgeon: Piero Benson MD;  Location: [facility] OR;  Service: Gynecology    SALPINGOSTOMY  2015    tubal reversal    SCALP EXCISION      x4    TUBAL LIGATION  2002    TUBAL REANASTOMOSIS      WISDOM TOOTH EXTRACTION  1998       Family History   Problem Relation Age of Onset    Cancer Maternal Aunt         lung    Breast cancer Neg Hx      I have reviewed and agree with the history as documented      E-Cigarette/Vaping    E-Cigarette Use Never User      E-Cigarette/Vaping Substances    Nicotine No     THC No     CBD No     Flavoring No      Social History     Tobacco Use    Smoking status: Never Smoker    Smokeless tobacco: Never Used   Vaping Use    Vaping Use: Never used   Substance Use Topics    Alcohol use: Not Currently    Drug use: Not Currently     Types: Marijuana     Comment: last used marijuana 2014        Review of Systems   Constitutional: Positive for chills  Negative for fever  HENT: Negative  Respiratory: Negative for shortness of breath  Cardiovascular: Negative for chest pain  Gastrointestinal: Positive for abdominal pain, blood in stool, diarrhea, nausea and vomiting  Genitourinary: Negative for dysuria  Musculoskeletal: Negative  Skin: Negative  Neurological: Negative  All other systems reviewed and are negative  Physical Exam  ED Triage Vitals   Temperature Pulse Respirations Blood Pressure SpO2   12/30/21 1605 12/30/21 1601 12/30/21 1601 12/30/21 1601 12/30/21 1601   97 8 °F (36 6 °C) 70 18 136/99 94 %      Temp Source Heart Rate Source Patient Position - Orthostatic VS BP Location FiO2 (%)   12/30/21 1605 12/30/21 1601 12/30/21 1601 12/30/21 1601 --   Oral Monitor Sitting Right arm       Pain Score       12/30/21 1601       10 - Worst Possible Pain             Orthostatic Vital Signs  Vitals:    12/30/21 1601   BP: 136/99   Pulse: 70   Patient Position - Orthostatic VS: Sitting       Physical Exam  Vitals and nursing note reviewed  Constitutional:       General: She is awake  She is not in acute distress  Appearance: She is ill-appearing  She is not diaphoretic  HENT:      Head: Normocephalic and atraumatic  Eyes:      General: Vision grossly intact  Gaze aligned appropriately  Cardiovascular:      Rate and Rhythm: Normal rate and regular rhythm  Heart sounds: Normal heart sounds  Pulmonary:      Effort: Pulmonary effort is normal  No respiratory distress  Breath sounds: Normal breath sounds  Abdominal:      Palpations: Abdomen is soft  Tenderness:  There is generalized abdominal tenderness  There is no rebound  Comments: Voluntary guarding throughout   Musculoskeletal:      Cervical back: Full passive range of motion without pain and neck supple  Skin:     General: Skin is warm and dry  Neurological:      General: No focal deficit present  Mental Status: She is alert and oriented to person, place, and time           ED Medications  Medications   ondansetron (ZOFRAN-ODT) dispersible tablet 4 mg (4 mg Oral Given 12/30/21 1610)   ketorolac (TORADOL) injection 15 mg (15 mg Intravenous Given 12/30/21 1916)   metoclopramide (REGLAN) injection 10 mg (10 mg Intravenous Given 12/30/21 1916)   famotidine (PEPCID) injection 20 mg (20 mg Intravenous Given 12/30/21 1916)   multi-electrolyte (ISOLYTE-S PH 7 4) bolus 1,000 mL (1,000 mL Intravenous New Bag 12/30/21 1909)   haloperidol lactate (HALDOL) injection 5 mg (5 mg Intramuscular Given 12/30/21 2035)       Diagnostic Studies  Results Reviewed     Procedure Component Value Units Date/Time    Comprehensive metabolic panel [[id number]] Collected: 12/30/21 1913    Lab Status: Final result Specimen: Blood from Arm, Right Updated: 12/30/21 1936     Sodium 142 mmol/L      Potassium 4 1 mmol/L      Chloride 108 mmol/L      CO2 23 mmol/L      ANION GAP 11 mmol/L      BUN 13 mg/dL      Creatinine 0 86 mg/dL      Glucose 122 mg/dL      Calcium 8 8 mg/dL      AST 18 U/L      ALT 20 U/L      Alkaline Phosphatase 55 U/L      Total Protein 7 7 g/dL      Albumin 3 8 g/dL      Total Bilirubin 0 34 mg/dL      eGFR 83 ml/min/1 73sq m     Narrative:      [place] guidelines for Chronic Kidney Disease (CKD):     Stage 1 with normal or high GFR (GFR > 90 mL/min/1 73 square meters)    Stage 2 Mild CKD (GFR = 60-89 mL/min/1 73 square meters)    Stage 3A Moderate CKD (GFR = 45-59 mL/min/1 73 square meters)    Stage 3B Moderate CKD (GFR = 30-44 mL/min/1 73 square meters)    Stage 4 Severe CKD (GFR = 15-29 mL/min/1 73 square meters)    Stage 5 End Stage CKD (GFR <15 mL/min/1 73 square meters)  Note: GFR calculation is accurate only with a steady state creatinine    Lipase [[id number]]  (Abnormal) Collected: 12/30/21 1913    Lab Status: Final result Specimen: Blood from Arm, Right Updated: 12/30/21 1936     Lipase 53 u/L     POCT pregnancy, urine [[id number]]  (Normal) Resulted: 12/30/21 1928    Lab Status: Final result Updated: 12/30/21 1928     EXT PREG TEST UR (Ref: Negative) negative     Control valid    Urine Microscopic [[id number]]  (Abnormal) Collected: 12/30/21 1912    Lab Status: Final result Specimen: Urine, Clean Catch Updated: 12/30/21 1927     RBC, UA 0-1 /hpf      WBC, UA 1-2 /hpf      Epithelial Cells Occasional /hpf      Bacteria, UA Occasional /hpf     UA w Reflex to Microscopic w Reflex to Culture [[id number]]  (Abnormal) Collected: 12/30/21 1912    Lab Status: Final result Specimen: Urine, Clean Catch Updated: 12/30/21 1919     Color, UA Yellow     Clarity, UA Clear     Specific Gravity, UA 1 010     pH, UA 8 5     Leukocytes, UA Negative     Nitrite, UA Negative     Protein, UA 30 (1+) mg/dl      Glucose, UA Negative mg/dl      Ketones, UA Trace mg/dl      Urobilinogen, UA 0 2 E U /dl      Bilirubin, UA Negative     Blood, UA Negative    CBC and differential [[id number]]  (Abnormal) Collected: 12/30/21 1913    Lab Status: Final result Specimen: Blood from Arm, Right Updated: 12/30/21 1919     WBC 9 72 Thousand/uL      RBC 4 28 Million/uL      Hemoglobin 12 4 g/dL      Hematocrit 37 7 %      MCV 88 fL      MCH 29 0 pg      MCHC 32 9 g/dL      RDW 15 6 %      MPV 10 1 fL      Platelets 955 Thousands/uL      nRBC 0 /100 WBCs      Neutrophils Relative 88 %      Immat GRANS % 0 %      Lymphocytes Relative 8 %      Monocytes Relative 4 %      Eosinophils Relative 0 %      Basophils Relative 0 %      Neutrophils Absolute 8 55 Thousands/µL      Immature Grans Absolute 0 03 Thousand/uL      Lymphocytes Absolute 0 74 Thousands/µL Monocytes Absolute 0 34 Thousand/µL      Eosinophils Absolute 0 02 Thousand/µL      Basophils Absolute 0 04 Thousands/µL                  No orders to display         Procedures  Procedures      ED Course  ED Course as of 12/30/21 2226   Thu Dec 30, 2021   2031 On re-evaluation, patient was sleeping  When patient awoke, she stated the pain is still 8/10 in severity  IM Haldol ordered at this time  2222 Patient reports improvement in symptoms, would like to go home  SBIRT 20yo+      Most Recent Value   SBIRT (22 yo +)    In order to provide better care to our patients, we are screening all of our patients for alcohol and drug use  Would it be okay to ask you these screening questions? Unable to answer at this time Filed at: 12/30/2021 1844                MDM  Number of Diagnoses or Management Options  Diarrhea: new and requires workup  Generalized abdominal pain: new and requires workup  Nausea and vomiting: new and requires workup  Diagnosis management comments: 20-year-old female with history recurrent pain presents for evaluation abdominal pain, nausea, vomiting, and diarrhea starting this morning  On exam, patient appears uncomfortable but in no acute distress  Abdomen diffusely tender with voluntary guarding, which appears consistent with her previous presentations  Patient given Zofran on arrival to the emergency department, states this did not help with her symptoms  IV placed, a basic labs obtained, and patient given IV fluids, IV Reglan, Toradol, and Pepcid for symptom relief  Patient's lab work did not reveal any acute abnormalities  Patient states improvement in nausea, but still has abdominal pain after above medications  Patient given dose of IM Haldol  On re-evaluation, patient states that symptoms have resolved  Patient discharged with prescription for Bentyl and Zofran    Patient told to follow up with her GI doctor and discharged in stable condition with strict ED return precautions  Amount and/or Complexity of Data Reviewed  Clinical lab tests: ordered and reviewed    Patient Progress  Patient progress: stable      Disposition  Final diagnoses:   Generalized abdominal pain   Nausea and vomiting   Diarrhea     Time reflects when diagnosis was documented in both MDM as applicable and the Disposition within this note     Time User Action Codes Description Comment    12/30/2021  7:54 PM Maggi Finders Add [R10 84] Generalized abdominal pain     12/30/2021  7:54 PM Maggi Finders Add [R11 2] Nausea and vomiting     12/30/2021  7:54 PM Maggi Finders Add [R19 7] Diarrhea       ED Disposition     ED Disposition Condition Date/Time Comment    Discharge Stable Thu Dec 30, 2021 10:24 PM Christelle Hernandez discharge to home/self care              Follow-up Information     Follow up With Specialties Details Why Contact Info Additional Information    Ananda Lozano PA-C Family Medicine Schedule an appointment as soon as possible for a visit in 1 week  [address]  [address] [facility]  [address]  [facility] [address]        Fransico Avalos MD Gastroenterology Schedule an appointment as soon as possible for a visit in 1 week  [address] [facility] [address]       [address] [place] [address] Emergency Department Emergency Medicine Go to  If symptoms worsen [facility] [id number]  [address] [facility] Emergency Department, [address] [place] [address], [id number]          Patient's Medications   Discharge Prescriptions    DICYCLOMINE (BENTYL) 20 MG TABLET    Take 1 tablet (20 mg total) by mouth 2 (two) times a day       Start Date: 12/30/2021End Date: --       Order Dose: 20 mg       Quantity: 20 tablet    Refills: 0    ONDANSETRON (ZOFRAN ODT) 4 MG DISINTEGRATING TABLET    Take 1 tablet (4 mg total) by mouth every 6 (six) hours as needed for nausea or vomiting       Start Date: 12/30/2021End Date: -- Order Dose: 4 mg       Quantity: 10 tablet    Refills: 0     No discharge procedures on file  PDMP Review       Value Time User    PDMP Reviewed  Yes 6/25/2020 12:02 PM Rafi Holloway MD           ED Provider  Attending physically available and evaluated Richard Gallo I managed the patient along with the ED Attending      Electronically Signed by         [employer] Jerry DO  12/30/21 7360

## 2021-12-31 NOTE — ED ATTENDING ATTESTATION
12/30/2021  IVira DO, saw and evaluated the patient  I have discussed the patient with the resident/non-physician practitioner and agree with the resident's/non-physician practitioner's findings, Plan of Care, and MDM as documented in the resident's/non-physician practitioner's note, except where noted  All available labs and Radiology studies were reviewed  I was present for key portions of any procedure(s) performed by the resident/non-physician practitioner and I was immediately available to provide assistance  At this point I agree with the current assessment done in the Emergency Department  I have conducted an independent evaluation of this patient a history and physical is as follows:    ED Course         Critical Care Time  Procedures    56-year-old female presents to the ED with diffuse abdominal pain, nausea, vomiting and diarrhea since about 9 this morning  She used Zofran 0 DT without relief  She does have a history of this and has seen GI without a definite cause being found  She denies fevers  On exam she is alert no acute distress  She does not appear acutely dehydrated on exam   She has diffuse abdominal tenderness without peritoneal signs  She has had imaging of her abdomen in the past which has been negative  Will check basic labs, lipase, give IV fluids, Reglan and reassess

## 2022-01-05 ENCOUNTER — OFFICE VISIT (OUTPATIENT)
Dept: OBGYN CLINIC | Facility: CLINIC | Age: 42
End: 2022-01-05

## 2022-01-05 VITALS
HEART RATE: 79 BPM | DIASTOLIC BLOOD PRESSURE: 79 MMHG | BODY MASS INDEX: 42.89 KG/M2 | WEIGHT: 227 LBS | SYSTOLIC BLOOD PRESSURE: 112 MMHG

## 2022-01-05 DIAGNOSIS — Z87.42 HISTORY OF OVARIAN CYST: Primary | ICD-10-CM

## 2022-01-05 DIAGNOSIS — Z71.2 ENCOUNTER TO DISCUSS TEST RESULTS: ICD-10-CM

## 2022-01-05 PROCEDURE — 99212 OFFICE O/P EST SF 10 MIN: CPT | Performed by: NURSE PRACTITIONER

## 2022-01-05 NOTE — PROGRESS NOTES
Shawn More presents today to review results of pelvic ultrasound performed 12/22/2021  Explained that thickened endometrium is typically a normal finding in women who are premenopausal, however, there is a small possibility given her age of pathology and I therefore recommend EMB be performed  She reports that menses have always been heavy, but have become heavier and heavier over the past few years  Patient is questioning ovarian cysts because she had CT scan performed 11/28/21 which noted a 2cm L paraovarian cyst   I explained to her that the 12/22/21 ultrasound noted no abnormality of the L ovary  Additionally, no R adnexal masses were noted  She is very adamant that she would like another ultrasound to evaluate for ovarian cyst because she has a history of "multiple cysts on my head which had to be removed with surgery"  Discussed that cysts of the ovaries typically come and go on their own regularly  She is insistent on having another pelvic ultrasound to re-evaluate for ovarian cysts  Order placed but advised her that it may not be covered by her insurance  She desires pregnancy and is concerned that an ovarian cyst or thickened endometrium may be the reason she is not becoming pregnant  She is taking prenatal vitamins already and had a R ectopic pregnancy in 2018  Discussed with patient that neither of those scenarios would influence her likelihood for conceiving  The most likely reason she has not become pregnant is her age  Return 1 week after ultrasound performed for EMB and to review repeat pelvic ultrasound results

## 2022-01-06 ENCOUNTER — HOSPITAL ENCOUNTER (EMERGENCY)
Facility: HOSPITAL | Age: 42
Discharge: HOME | End: 2022-01-06
Attending: EMERGENCY MEDICINE | Admitting: EMERGENCY MEDICINE
Payer: MEDICARE

## 2022-01-06 ENCOUNTER — APPOINTMENT (EMERGENCY)
Dept: ULTRASOUND IMAGING | Facility: HOSPITAL | Age: 42
End: 2022-01-06
Payer: MEDICARE

## 2022-01-06 ENCOUNTER — APPOINTMENT (EMERGENCY)
Dept: CT IMAGING | Facility: HOSPITAL | Age: 42
End: 2022-01-06
Payer: MEDICARE

## 2022-01-06 VITALS
TEMPERATURE: 96.3 F | DIASTOLIC BLOOD PRESSURE: 83 MMHG | SYSTOLIC BLOOD PRESSURE: 119 MMHG | HEART RATE: 72 BPM | RESPIRATION RATE: 18 BRPM | OXYGEN SATURATION: 99 %

## 2022-01-06 DIAGNOSIS — K51.00 PANCOLITIS (HCC): Primary | ICD-10-CM

## 2022-01-06 LAB
ALBUMIN SERPL BCP-MCNC: 3.6 G/DL (ref 3–5.2)
ALP SERPL-CCNC: 53 U/L (ref 43–122)
ALT SERPL W P-5'-P-CCNC: 17 U/L
ANION GAP SERPL CALCULATED.3IONS-SCNC: 3 MMOL/L (ref 5–14)
AST SERPL W P-5'-P-CCNC: 24 U/L (ref 14–36)
BACTERIA UR QL AUTO: ABNORMAL /HPF
BASOPHILS # BLD AUTO: 0 THOUSANDS/ΜL (ref 0–0.1)
BASOPHILS NFR BLD AUTO: 1 % (ref 0–1)
BILIRUB SERPL-MCNC: 0.29 MG/DL
BILIRUB UR QL STRIP: NEGATIVE
BUN SERPL-MCNC: 8 MG/DL (ref 5–25)
CALCIUM SERPL-MCNC: 8.1 MG/DL (ref 8.4–10.2)
CHLORIDE SERPL-SCNC: 107 MMOL/L (ref 97–108)
CLARITY UR: CLEAR
CO2 SERPL-SCNC: 28 MMOL/L (ref 22–30)
COLOR UR: YELLOW
CREAT SERPL-MCNC: 0.85 MG/DL (ref 0.6–1.2)
EOSINOPHIL # BLD AUTO: 0.1 THOUSAND/ΜL (ref 0–0.4)
EOSINOPHIL NFR BLD AUTO: 2 % (ref 0–6)
ERYTHROCYTE [DISTWIDTH] IN BLOOD BY AUTOMATED COUNT: 17.4 %
EXT PREG TEST URINE: NEGATIVE
EXT. CONTROL ED NAV: NORMAL
GFR SERPL CREATININE-BSD FRML MDRD: 84 ML/MIN/1.73SQ M
GLUCOSE SERPL-MCNC: 118 MG/DL (ref 70–99)
GLUCOSE UR STRIP-MCNC: NEGATIVE MG/DL
HCT VFR BLD AUTO: 35.1 % (ref 36–46)
HGB BLD-MCNC: 11.5 G/DL (ref 12–16)
HGB UR QL STRIP.AUTO: 10
KETONES UR STRIP-MCNC: NEGATIVE MG/DL
LEUKOCYTE ESTERASE UR QL STRIP: NEGATIVE
LIPASE SERPL-CCNC: 37 U/L (ref 23–300)
LYMPHOCYTES # BLD AUTO: 0.8 THOUSANDS/ΜL (ref 0.5–4)
LYMPHOCYTES NFR BLD AUTO: 9 % (ref 25–45)
MAGNESIUM SERPL-MCNC: 2 MG/DL (ref 1.6–2.3)
MCH RBC QN AUTO: 28.5 PG (ref 26–34)
MCHC RBC AUTO-ENTMCNC: 32.8 G/DL (ref 31–36)
MCV RBC AUTO: 87 FL (ref 80–100)
MONOCYTES # BLD AUTO: 0.5 THOUSAND/ΜL (ref 0.2–0.9)
MONOCYTES NFR BLD AUTO: 5 % (ref 1–10)
MUCOUS THREADS UR QL AUTO: ABNORMAL
NEUTROPHILS # BLD AUTO: 7.2 THOUSANDS/ΜL (ref 1.8–7.8)
NEUTS SEG NFR BLD AUTO: 83 % (ref 45–65)
NITRITE UR QL STRIP: NEGATIVE
NON-SQ EPI CELLS URNS QL MICRO: ABNORMAL /HPF
PH UR STRIP.AUTO: 7 [PH]
PLATELET # BLD AUTO: 234 THOUSANDS/UL (ref 150–450)
PMV BLD AUTO: 8.9 FL (ref 8.9–12.7)
POTASSIUM SERPL-SCNC: 3.5 MMOL/L (ref 3.6–5)
PROT SERPL-MCNC: 6.9 G/DL (ref 5.9–8.4)
PROT UR STRIP-MCNC: NEGATIVE MG/DL
RBC # BLD AUTO: 4.02 MILLION/UL (ref 4–5.2)
RBC #/AREA URNS AUTO: ABNORMAL /HPF
SODIUM SERPL-SCNC: 138 MMOL/L (ref 137–147)
SP GR UR STRIP.AUTO: 1.01 (ref 1–1.04)
UROBILINOGEN UA: NEGATIVE MG/DL
WBC # BLD AUTO: 8.7 THOUSAND/UL (ref 4.5–11)
WBC #/AREA URNS AUTO: ABNORMAL /HPF

## 2022-01-06 PROCEDURE — 81003 URINALYSIS AUTO W/O SCOPE: CPT | Performed by: EMERGENCY MEDICINE

## 2022-01-06 PROCEDURE — 96361 HYDRATE IV INFUSION ADD-ON: CPT

## 2022-01-06 PROCEDURE — 81025 URINE PREGNANCY TEST: CPT | Performed by: EMERGENCY MEDICINE

## 2022-01-06 PROCEDURE — 81001 URINALYSIS AUTO W/SCOPE: CPT | Performed by: EMERGENCY MEDICINE

## 2022-01-06 PROCEDURE — 96374 THER/PROPH/DIAG INJ IV PUSH: CPT

## 2022-01-06 PROCEDURE — 76705 ECHO EXAM OF ABDOMEN: CPT

## 2022-01-06 PROCEDURE — 83690 ASSAY OF LIPASE: CPT | Performed by: EMERGENCY MEDICINE

## 2022-01-06 PROCEDURE — 96376 TX/PRO/DX INJ SAME DRUG ADON: CPT

## 2022-01-06 PROCEDURE — 85025 COMPLETE CBC W/AUTO DIFF WBC: CPT | Performed by: EMERGENCY MEDICINE

## 2022-01-06 PROCEDURE — 99285 EMERGENCY DEPT VISIT HI MDM: CPT

## 2022-01-06 PROCEDURE — 96375 TX/PRO/DX INJ NEW DRUG ADDON: CPT

## 2022-01-06 PROCEDURE — G1004 CDSM NDSC: HCPCS

## 2022-01-06 PROCEDURE — 99283 EMERGENCY DEPT VISIT LOW MDM: CPT | Performed by: EMERGENCY MEDICINE

## 2022-01-06 PROCEDURE — 36415 COLL VENOUS BLD VENIPUNCTURE: CPT | Performed by: EMERGENCY MEDICINE

## 2022-01-06 PROCEDURE — 83735 ASSAY OF MAGNESIUM: CPT | Performed by: EMERGENCY MEDICINE

## 2022-01-06 PROCEDURE — 80053 COMPREHEN METABOLIC PANEL: CPT | Performed by: EMERGENCY MEDICINE

## 2022-01-06 PROCEDURE — 74177 CT ABD & PELVIS W/CONTRAST: CPT

## 2022-01-06 RX ORDER — MORPHINE SULFATE 4 MG/ML
4 INJECTION, SOLUTION INTRAMUSCULAR; INTRAVENOUS ONCE
Status: COMPLETED | OUTPATIENT
Start: 2022-01-06 | End: 2022-01-06

## 2022-01-06 RX ORDER — FAMOTIDINE 20 MG/1
20 TABLET, FILM COATED ORAL 2 TIMES DAILY
Qty: 30 TABLET | Refills: 0 | Status: SHIPPED | OUTPATIENT
Start: 2022-01-06

## 2022-01-06 RX ORDER — TRAMADOL HYDROCHLORIDE 50 MG/1
50 TABLET ORAL EVERY 6 HOURS PRN
Qty: 10 TABLET | Refills: 0 | Status: SHIPPED | OUTPATIENT
Start: 2022-01-06 | End: 2022-01-11

## 2022-01-06 RX ORDER — ONDANSETRON 2 MG/ML
4 INJECTION INTRAMUSCULAR; INTRAVENOUS ONCE
Status: COMPLETED | OUTPATIENT
Start: 2022-01-06 | End: 2022-01-06

## 2022-01-06 RX ORDER — METRONIDAZOLE 500 MG/1
500 TABLET ORAL EVERY 8 HOURS SCHEDULED
Qty: 21 TABLET | Refills: 0 | Status: SHIPPED | OUTPATIENT
Start: 2022-01-06 | End: 2022-01-13

## 2022-01-06 RX ORDER — CIPROFLOXACIN 500 MG/1
500 TABLET, FILM COATED ORAL EVERY 12 HOURS
Qty: 14 TABLET | Refills: 0 | Status: SHIPPED | OUTPATIENT
Start: 2022-01-06 | End: 2022-01-13

## 2022-01-06 RX ORDER — ONDANSETRON 4 MG/1
4 TABLET, FILM COATED ORAL EVERY 6 HOURS
Qty: 12 TABLET | Refills: 0 | Status: SHIPPED | OUTPATIENT
Start: 2022-01-06

## 2022-01-06 RX ORDER — ONDANSETRON 2 MG/ML
INJECTION INTRAMUSCULAR; INTRAVENOUS
Status: COMPLETED
Start: 2022-01-06 | End: 2022-01-06

## 2022-01-06 RX ADMIN — SODIUM CHLORIDE 1000 ML: 0.9 INJECTION, SOLUTION INTRAVENOUS at 15:40

## 2022-01-06 RX ADMIN — IOHEXOL 100 ML: 350 INJECTION, SOLUTION INTRAVENOUS at 17:27

## 2022-01-06 RX ADMIN — MORPHINE SULFATE 4 MG: 4 INJECTION INTRAVENOUS at 16:37

## 2022-01-06 RX ADMIN — MORPHINE SULFATE 4 MG: 4 INJECTION INTRAVENOUS at 15:35

## 2022-01-06 RX ADMIN — ONDANSETRON 4 MG: 2 INJECTION INTRAMUSCULAR; INTRAVENOUS at 15:32

## 2022-01-06 NOTE — ED PROVIDER NOTES
History  Chief Complaint   Patient presents with    Vomiting Blood     Pt brought in by EMS, reports vomiting a lot of blood, severe abdominal pain and diarrhea    Diarrhea    Abdominal Pain     75-year-old female with history of colitis and asthma presented emergency room with 3 hours of abdominal pain nausea vomiting diarrhea  Patient notes there was blood in her throughout the 1st time she threw up  She was not with specks of blood in her diarrhea  Has never had this before  Has had multiple episodes abdominal pain nausea vomiting diarrhea has never had blood in there before  Says has not drank alcohol in 6 months  Denies fevers chills cough congestion rhinorrhea  Denies chest pain or shortness of breath  Denies headache, vision changes  Denies dysuria hematuria  Prior to Admission Medications   Prescriptions Last Dose Informant Patient Reported? Taking?    Prenatal Vit-Fe Fumarate-FA (Prenatal Vitamin) 27-0 8 MG TABS   No No   Sig: Take 1 tablet by mouth daily   citalopram (CeleXA) 20 mg tablet  Self Yes No   Sig: Take 20 mg by mouth daily   clonazePAM (KlonoPIN) 0 5 mg tablet  Self Yes No   Sig: Take 0 5 mg by mouth 2 (two) times a day   dicyclomine (BENTYL) 20 mg tablet   No No   Sig: Take 1 tablet (20 mg total) by mouth 2 (two) times a day   docusate sodium (COLACE) 100 mg capsule   No No   Sig: Take 1 capsule (100 mg total) by mouth every 12 (twelve) hours   ipratropium (ATROVENT) 0 03 % nasal spray   No No   Si-2 SPRAYS EACH NOSTRIL TWICE A DAY AS NEEDED FOR RHINORRHEA   methocarbamol (ROBAXIN) 500 mg tablet   No No   Sig: Take 1 tablet (500 mg total) by mouth 2 (two) times a day   naproxen (EC NAPROSYN) 500 MG EC tablet   No No   Sig: Take 1 tablet (500 mg total) by mouth 2 (two) times a day with meals   naproxen (NAPROSYN) 500 mg tablet   No No   Sig: Take 1 tablet (500 mg total) by mouth 2 (two) times a day with meals for 10 days   ondansetron (Zofran ODT) 4 mg disintegrating tablet   No No   Sig: Take 1 tablet (4 mg total) by mouth every 6 (six) hours as needed for nausea or vomiting   traZODone (DESYREL) 50 mg tablet  Self Yes No   Sig: Take 50 mg by mouth daily at bedtime      Facility-Administered Medications: None       Past Medical History:   Diagnosis Date    Anxiety     Anxiety and depression     Asthma     has not req'd tx since 2016    Bipolar disorder ([facility] [address] )     Chlamydia 2000    Depression     Fatty liver     GERD (gastroesophageal reflux disease)     no meds    History of transfusion     Liver disease     Migraine     manages with OTC remedies       Past Surgical History:   Procedure Laterality Date    NASAL/SINUS ENDOSCOPY Right 06/25/2020    Procedure: FUNCTIONAL ENDOSCOPIC SINUS SURGERY WITH RIGHT MAXILLARY ANTROSTOMY REMOVALOF CYST, RIGHT SPHENOIDOTOMY, RIGHT POLYPECTOMY (LARGE ANTRAL CHOANAL POLYP)  ;  Surgeon: Richard Larsen MD;  Location: [address] [place] [address] MAIN OR;  Service: ENT    [place] LAP,RMV  ADNEXAL STRUCTURE Right 10/10/2018    Procedure: SALPINGECTOMY, LAPAROSCOPIC;  Surgeon: Bri Lora MD;  Location: [facility] Main [facility];  Service: Gynecology    SALPINGOSTOMY  2015    tubal reversal    SCALP EXCISION      x4    TUBAL LIGATION  2002    TUBAL REANASTOMOSIS      WISDOM TOOTH EXTRACTION  1998       Family History   Problem Relation Age of Onset    Cancer Maternal Aunt         lung    Breast cancer Neg Hx      I have reviewed and agree with the history as documented  E-Cigarette/Vaping    E-Cigarette Use Never User      E-Cigarette/Vaping Substances    Nicotine No     THC No     CBD No     Flavoring No      Social History     Tobacco Use    Smoking status: Never Smoker    Smokeless tobacco: Never Used   Vaping Use    Vaping Use: Never used   Substance Use Topics    Alcohol use: Not Currently    Drug use: Not Currently     Types: Marijuana     Comment: last used marijuana 2014       Review of Systems   Constitutional: Negative for chills and fever  HENT: Negative for ear pain and sore throat  Eyes: Negative for pain and visual disturbance  Respiratory: Negative for cough and shortness of breath  Cardiovascular: Negative for chest pain and palpitations  Gastrointestinal: Positive for abdominal pain, blood in stool, nausea and vomiting  Genitourinary: Negative for dysuria and hematuria  Musculoskeletal: Negative for arthralgias and back pain  Skin: Negative for color change and rash  Neurological: Negative for seizures and syncope  All other systems reviewed and are negative  Physical Exam  Physical Exam  Vitals and nursing note reviewed  Constitutional:       General: She is not in acute distress  Appearance: She is well-developed  HENT:      Head: Normocephalic and atraumatic  Eyes:      Conjunctiva/sclera: Conjunctivae normal    Cardiovascular:      Rate and Rhythm: Normal rate and regular rhythm  Heart sounds: No murmur heard  Pulmonary:      Effort: Pulmonary effort is normal  No respiratory distress  Breath sounds: Normal breath sounds  Abdominal:      Palpations: Abdomen is soft  Tenderness: There is generalized abdominal tenderness  There is guarding  There is no rebound  Positive signs include Borja's sign  Musculoskeletal:      Cervical back: Neck supple  Skin:     General: Skin is warm and dry  Neurological:      Mental Status: She is alert           Vital Signs  ED Triage Vitals   Temperature Pulse Respirations Blood Pressure SpO2   01/06/22 1507 01/06/22 1507 01/06/22 1507 01/06/22 1507 01/06/22 1507   (!) 96 3 °F (35 7 °C) 81 20 144/91 100 %      Temp Source Heart Rate Source Patient Position - Orthostatic VS BP Location FiO2 (%)   01/06/22 1507 01/06/22 1507 01/06/22 1507 01/06/22 1507 --   Tympanic Monitor Sitting Right arm       Pain Score       01/06/22 1535       10 - Worst Possible Pain           Vitals:    01/06/22 1507 01/06/22 1800   BP: 144/91 119/83   Pulse: 81 72   Patient Position - Orthostatic VS: Sitting Sitting         Visual Acuity      ED Medications  Medications   ondansetron (ZOFRAN) 4 mg/2 mL injection **ADS Override Pull** (  Given to EMS 1/6/22 1526)   sodium chloride 0 9 % bolus 1,000 mL (0 mL Intravenous Stopped 1/6/22 1829)   ondansetron (ZOFRAN) injection 4 mg (4 mg Intravenous Given 1/6/22 1532)   morphine (PF) 4 mg/mL injection 4 mg (4 mg Intravenous Given 1/6/22 1535)   morphine (PF) 4 mg/mL injection 4 mg (4 mg Intravenous Given 1/6/22 1637)   iohexol (OMNIPAQUE) 350 MG/ML injection (SINGLE-DOSE) 100 mL (100 mL Intravenous Given 1/6/22 1727)       Diagnostic Studies  Results Reviewed     Procedure Component Value Units Date/Time    Magnesium [[id number]]  (Normal) Collected: 01/06/22 1626    Lab Status: Final result Specimen: Blood from Line, Venous Updated: 01/06/22 1709     Magnesium 2 0 mg/dL     CMP [[id number]]  (Abnormal) Collected: 01/06/22 1626    Lab Status: Final result Specimen: Blood from Line, Venous Updated: 01/06/22 1709     Sodium 138 mmol/L      Potassium 3 5 mmol/L      Chloride 107 mmol/L      CO2 28 mmol/L      ANION GAP 3 mmol/L      BUN 8 mg/dL      Creatinine 0 85 mg/dL      Glucose 118 mg/dL      Calcium 8 1 mg/dL      AST 24 U/L      ALT 17 U/L      Alkaline Phosphatase 53 U/L      Total Protein 6 9 g/dL      Albumin 3 6 g/dL      Total Bilirubin 0 29 mg/dL      eGFR 84 ml/min/1 73sq m     Narrative:      [place] guidelines for Chronic Kidney Disease (CKD):     Stage 1 with normal or high GFR (GFR > 90 mL/min/1 73 square meters)    Stage 2 Mild CKD (GFR = 60-89 mL/min/1 73 square meters)    Stage 3A Moderate CKD (GFR = 45-59 mL/min/1 73 square meters)    Stage 3B Moderate CKD (GFR = 30-44 mL/min/1 73 square meters)    Stage 4 Severe CKD (GFR = 15-29 mL/min/1 73 square meters)    Stage 5 End Stage CKD (GFR <15 mL/min/1 73 square meters)  Note: GFR calculation is accurate only with a steady state creatinine Lipase [[id number]]  (Normal) Collected: 01/06/22 1626    Lab Status: Final result Specimen: Blood from Line, Venous Updated: 01/06/22 1708     Lipase 37 u/L     Urine Microscopic [[id number]]  (Abnormal) Collected: 01/06/22 1633    Lab Status: Final result Specimen: Urine, Other Updated: 01/06/22 1707     RBC, UA 0-1 /hpf      WBC, UA 0-1 /hpf      Epithelial Cells Moderate /hpf      Bacteria, UA Moderate /hpf      MUCUS THREADS Moderate    CBC and differential [[id number]]  (Abnormal) Collected: 01/06/22 1626    Lab Status: Final result Specimen: Blood from Line, Venous Updated: 01/06/22 1654     WBC 8 70 Thousand/uL      RBC 4 02 Million/uL      Hemoglobin 11 5 g/dL      Hematocrit 35 1 %      MCV 87 fL      MCH 28 5 pg      MCHC 32 8 g/dL      RDW 17 4 %      MPV 8 9 fL      Platelets 184 Thousands/uL      Neutrophils Relative 83 %      Lymphocytes Relative 9 %      Monocytes Relative 5 %      Eosinophils Relative 2 %      Basophils Relative 1 %      Neutrophils Absolute 7 20 Thousands/µL      Lymphocytes Absolute 0 80 Thousands/µL      Monocytes Absolute 0 50 Thousand/µL      Eosinophils Absolute 0 10 Thousand/µL      Basophils Absolute 0 00 Thousands/µL     UA w Reflex to Microscopic w Reflex to Culture [[id number]]  (Abnormal) Collected: 01/06/22 1633    Lab Status: Final result Specimen: Urine, Other Updated: 01/06/22 1641     Color, UA Yellow     Clarity, UA Clear     Specific Gravity, UA 1 015     pH, UA 7 0     Leukocytes, UA Negative     Nitrite, UA Negative     Protein, UA Negative mg/dl      Glucose, UA Negative mg/dl      Ketones, UA Negative mg/dl      Bilirubin, UA Negative     Blood, UA 10 0     UROBILINOGEN UA Negative mg/dL     POCT pregnancy, urine [[id number]]  (Normal) Resulted: 01/06/22 1634    Lab Status: Final result Specimen: Urine Updated: 01/06/22 1635     EXT PREG TEST UR (Ref: Negative) negative     Control valid                 CT abdomen pelvis with contrast   Final Result by Stewart Christy Thomas Jones MD (01/06 0296)      Findings of a nonspecific pancolitis and proctitis  Otherwise no acute findings in the abdomen or pelvis  Workstation performed: [id number]         US right upper quadrant   Final Result by Collette Life, MD (01/06 1625)      The gallbladder was normal in appearance  No gallstones are seen  No abnormal gallbladder wall thickening  The ultrasonographer indicates a positive sonographic Borja's sign which is of uncertain significance  If there is continued suspicion,    consider nuclear medicine HIDA scan  Workstation performed: [id number]                    Procedures  Procedures         ED Course  ED Course as of 01/06/22 1933   Thu Jan 06, 2022   1624 US right upper quadrant  Appears unremarkable, will order ct abd pelvis                               SBIRT 22yo+      Most Recent Value   SBIRT (23 yo +)    In order to provide better care to our patients, we are screening all of our patients for alcohol and drug use  Would it be okay to ask you these screening questions? Yes Filed at: 01/06/2022 1658   Initial Alcohol Screen: US AUDIT-C     1  How often do you have a drink containing alcohol? 0 Filed at: 01/06/2022 1658   2  How many drinks containing alcohol do you have on a typical day you are drinking? 0 Filed at: 01/06/2022 1658   3b  FEMALE Any Age, or MALE 65+: How often do you have 4 or more drinks on one occassion? 0 Filed at: 01/06/2022 1658   Audit-C Score 0 Filed at: 01/06/2022 1658   NEHA: How many times in the past year have you    Used an illegal drug or used a prescription medication for non-medical reasons? Never Filed at: 01/06/2022 1658                    MDM  Number of Diagnoses or Management Options  Pancolitis ([facility] [address] )  Diagnosis management comments: Patient with hematemesis, abdominal pain  Hemoglobin around baseline  Patient has known history of colitis with an unremarkable colonoscopy recently    Patient had no further vomiting in the emergency department  Right upper quadrant ultrasound unremarkable  CT abdomen pelvis shows pancolitis and proctitis  Will start patient on pain medication nausea medication as well as antibiotics- Cipro Flagyl  Will discharge  Disposition  Final diagnoses:   Pancolitis ([address] )     Time reflects when diagnosis was documented in both MDM as applicable and the Disposition within this note     Time User Action Codes Description Comment    1/6/2022  5:52 PM Reena Hyde Add [K51 00] Pancolitis [facility])       ED Disposition     ED Disposition Condition Date/Time Comment    Discharge Stable u Jan 6, 2022  5:52 PM Ruben Cortes discharge to home/self care              Follow-up Information     Follow up With Specialties Details Why Contact Info    Keyona Starr [address], [address]  [address] [place] [address]  [phone]            Discharge Medication List as of 1/6/2022  5:55 PM      START taking these medications    Details   famotidine (PEPCID) 20 mg tablet Take 1 tablet (20 mg total) by mouth 2 (two) times a day, Starting u 1/6/2022, Normal      ondansetron (ZOFRAN) 4 mg tablet Take 1 tablet (4 mg total) by mouth every 6 (six) hours, Starting u 1/6/2022, Normal      traMADol (Ultram) 50 mg tablet Take 1 tablet (50 mg total) by mouth every 6 (six) hours as needed for moderate pain for up to 5 days, Starting u 1/6/2022, Until Tue 1/11/2022 at 2359, Normal         CONTINUE these medications which have NOT CHANGED    Details   citalopram (CeleXA) 20 mg tablet Take 20 mg by mouth daily, Historical Med      clonazePAM (KlonoPIN) 0 5 mg tablet Take 0 5 mg by mouth 2 (two) times a day, Historical Med      dicyclomine (BENTYL) 20 mg tablet Take 1 tablet (20 mg total) by mouth 2 (two) times a day, Starting Thu 12/30/2021, Normal      docusate sodium (COLACE) 100 mg capsule Take 1 capsule (100 mg total) by mouth every 12 (twelve) hours, Starting Sun 11/28/2021, Normal ipratropium (ATROVENT) 0 03 % nasal spray 1-2 SPRAYS EACH NOSTRIL TWICE A DAY AS NEEDED FOR RHINORRHEA, Normal      methocarbamol (ROBAXIN) 500 mg tablet Take 1 tablet (500 mg total) by mouth 2 (two) times a day, Starting Sun 11/28/2021, Normal      naproxen (EC NAPROSYN) 500 MG EC tablet Take 1 tablet (500 mg total) by mouth 2 (two) times a day with meals, Starting Wed 12/8/2021, Normal      naproxen (NAPROSYN) 500 mg tablet Take 1 tablet (500 mg total) by mouth 2 (two) times a day with meals for 10 days, Starting Sun 11/28/2021, Until Wed 12/8/2021, Normal      ondansetron (Zofran ODT) 4 mg disintegrating tablet Take 1 tablet (4 mg total) by mouth every 6 (six) hours as needed for nausea or vomiting, Starting u 12/30/2021, Normal      Prenatal Vit-Fe Fumarate-FA (Prenatal Vitamin) 27-0 8 MG TABS Take 1 tablet by mouth daily, Starting Wed 12/8/2021, Normal      traZODone (DESYREL) 50 mg tablet Take 50 mg by mouth daily at bedtime, Historical Med             No discharge procedures on file      PDMP Review       Value Time User    PDMP Reviewed  Yes 6/25/2020 12:02 PM Alek Alex MD          ED Provider  Electronically Signed by           Pinky Rangel MD  01/06/22 9871

## 2022-03-12 DIAGNOSIS — J30.0 VASOMOTOR RHINITIS: ICD-10-CM

## 2022-03-14 RX ORDER — DICYCLOMINE HCL 20 MG
TABLET ORAL
Qty: 60 TABLET | Refills: 1 | Status: SHIPPED | OUTPATIENT
Start: 2022-03-14 | End: 2022-04-08

## 2022-03-17 RX ORDER — IPRATROPIUM BROMIDE 21 UG/1
SPRAY, METERED NASAL
Qty: 30 ML | Refills: 2 | Status: SHIPPED | OUTPATIENT
Start: 2022-03-17

## 2022-03-26 ENCOUNTER — TELEPHONE (OUTPATIENT)
Dept: OBGYN CLINIC | Facility: CLINIC | Age: 42
End: 2022-03-26

## 2022-04-08 DIAGNOSIS — R10.84 GENERALIZED ABDOMINAL PAIN: ICD-10-CM

## 2022-04-08 RX ORDER — DICYCLOMINE HCL 20 MG
TABLET ORAL
Qty: 60 TABLET | Refills: 1 | Status: SHIPPED | OUTPATIENT
Start: 2022-04-08

## 2022-05-05 ENCOUNTER — HOSPITAL ENCOUNTER (EMERGENCY)
Facility: HOSPITAL | Age: 42
Discharge: HOME | End: 2022-05-05
Attending: EMERGENCY MEDICINE | Admitting: EMERGENCY MEDICINE
Payer: MEDICARE

## 2022-05-05 VITALS
TEMPERATURE: 99.2 F | BODY MASS INDEX: 42.66 KG/M2 | DIASTOLIC BLOOD PRESSURE: 71 MMHG | HEART RATE: 88 BPM | OXYGEN SATURATION: 99 % | SYSTOLIC BLOOD PRESSURE: 117 MMHG | RESPIRATION RATE: 18 BRPM | WEIGHT: 225.75 LBS

## 2022-05-05 DIAGNOSIS — Z20.822 SUSPECTED COVID-19 VIRUS INFECTION: Primary | ICD-10-CM

## 2022-05-05 LAB
FLUAV RNA RESP QL NAA+PROBE: NEGATIVE
FLUBV RNA RESP QL NAA+PROBE: NEGATIVE
RSV RNA RESP QL NAA+PROBE: NEGATIVE
SARS-COV-2 RNA RESP QL NAA+PROBE: POSITIVE

## 2022-05-05 PROCEDURE — 99283 EMERGENCY DEPT VISIT LOW MDM: CPT

## 2022-05-05 PROCEDURE — 99284 EMERGENCY DEPT VISIT MOD MDM: CPT | Performed by: PHYSICIAN ASSISTANT

## 2022-05-05 PROCEDURE — 0241U HB NFCT DS VIR RESP RNA 4 TRGT: CPT | Performed by: PHYSICIAN ASSISTANT

## 2022-05-05 NOTE — Clinical Note
Henry Rodartear was seen and treated in our emergency department on 5/5/2022  Diagnosis:     Neftaly Franzarita    She may return on this date: 05/09/2022         If you have any questions or concerns, please don't hesitate to call        Rolan Jc PA-C    ______________________________           _______________          _______________  Hospital Representative                              Date                                Time

## 2022-05-05 NOTE — ED PROVIDER NOTES
HPI: Patient is a 35 y o  female who presents with 2 days of cough, headache, fatigue and myalgias which the patient describes at mild The patient has had contact with people with similar symptoms  The patient has not taken any medication  No Known Allergies    Past Medical History:   Diagnosis Date    Anxiety     Anxiety and depression     Asthma     has not req'd tx since 2016    Bipolar disorder ([facility] [address] )     Chlamydia 2000    Depression     Fatty liver     GERD (gastroesophageal reflux disease)     no meds    History of transfusion     Liver disease     Migraine     manages with OTC remedies      Past Surgical History:   Procedure Laterality Date    NASAL/SINUS ENDOSCOPY Right 06/25/2020    Procedure: FUNCTIONAL ENDOSCOPIC SINUS SURGERY WITH RIGHT MAXILLARY ANTROSTOMY REMOVALOF CYST, RIGHT SPHENOIDOTOMY, RIGHT POLYPECTOMY (LARGE ANTRAL CHOANAL POLYP)  ;  Surgeon: Kadie Sheppard MD;  [facility] MAIN OR;  Service: ENT    [place] LAP,RMV  ADNEXAL STRUCTURE Right 10/10/2018    Procedure: SALPINGECTOMY, LAPAROSCOPIC;  Surgeon: Bobby Boyce MD;  Location[facility];  Service: Gynecology    SALPINGOSTOMY  2015    tubal reversal    SCALP EXCISION      x4    TUBAL LIGATION  2002    TUBAL REANASTOMOSIS      WISDOM TOOTH EXTRACTION  1998     Social History     Tobacco Use    Smoking status: Never Smoker    Smokeless tobacco: Never Used   Vaping Use    Vaping Use: Never used   Substance Use Topics    Alcohol use: Not Currently    Drug use: Not Currently     Types: Marijuana     Comment: last used marijuana 2014       Nursing notes reviewed  Physical Exam:  ED Triage Vitals [05/05/22 1339]   Temperature Pulse Respirations Blood Pressure SpO2   99 2 °F (37 3 °C) 88 18 117/71 99 %      Temp src Heart Rate Source Patient Position - Orthostatic VS BP Location FiO2 (%)   -- Monitor -- -- --      Pain Score       10 - Worst Possible Pain           ROS: Positive per HPI, the remainder of a 10 organ system ROS was otherwise unremarkable  General: awake, alert, no acute distress    Head: normocephalic, atraumatic    Eyes: no scleral icterus  Ears: external ears normal, hearing grossly intact  Nose: external exam grossly normal, positive nasal discharge  Neck: symmetric, No JVD noted, trachea midline  Pulmonary: no respiratory distress, no tachypnea noted  Cardiovascular: appears well perfused  Abdomen: no distention noted  Musculoskeletal: no deformities noted, tone normal  Neuro: grossly non-focal  Psych: mood and affect appropriate    The patient is stable and has a history and physical exam consistent with a viral illness  COVID19 testing has been performed  I considered the patient's other medical conditions as applicable/noted above in my medical decision making  The patient is stable upon discharge  The plan is for supportive care at home  The patient (and any family present) verbalized understanding of the discharge instructions and warnings that would necessitate return to the Emergency Department  All questions were answered prior to discharge  Medications - No data to display  Final diagnoses:   Suspected COVID-19 virus infection     Time reflects when diagnosis was documented in both MDM as applicable and the Disposition within this note     Time User Action Codes Description Comment    5/5/2022  1:54 PM Suzie Zelaya Add [[id number] [id number]] Suspected COVID-19 virus infection       ED Disposition     ED Disposition Condition Date/Time Comment    Discharge Stable Thu May 5, 2022  1:54 PM Karina Grew discharge to home/self care              Follow-up Information     Follow up With Specialties Details Why Contact Info Additional Information    [facility]   [address]  [address]  [address] [place] [facility] Emergency Medicine  If symptoms worsen [address] [place] [address]  [phone] ANSLEY FERRER  [facility], [address] [facility] [address]  , [place], [place], [address]        Patient's Medications   Discharge Prescriptions    No medications on file     No discharge procedures on file      Electronically Signed by       Johanny Hollins PA-C  05/05/22 1400

## 2022-05-19 DIAGNOSIS — R10.2 PELVIC CRAMPING: ICD-10-CM

## 2022-05-20 RX ORDER — NAPROXEN 500 MG/1
TABLET ORAL
Qty: 30 TABLET | Refills: 0 | Status: SHIPPED | OUTPATIENT
Start: 2022-05-20

## 2022-07-13 ENCOUNTER — HOSPITAL ENCOUNTER (EMERGENCY)
Facility: HOSPITAL | Age: 42
Discharge: HOME | End: 2022-07-13
Attending: EMERGENCY MEDICINE | Admitting: EMERGENCY MEDICINE
Payer: COMMERCIAL

## 2022-07-13 ENCOUNTER — TELEPHONE (OUTPATIENT)
Dept: FAMILY MEDICINE CLINIC | Facility: CLINIC | Age: 42
End: 2022-07-13

## 2022-07-13 VITALS
TEMPERATURE: 98.3 F | HEART RATE: 68 BPM | SYSTOLIC BLOOD PRESSURE: 127 MMHG | WEIGHT: 223.99 LBS | BODY MASS INDEX: 42.29 KG/M2 | RESPIRATION RATE: 18 BRPM | HEIGHT: 61 IN | DIASTOLIC BLOOD PRESSURE: 59 MMHG | OXYGEN SATURATION: 100 %

## 2022-07-13 DIAGNOSIS — R21 RASH AND NONSPECIFIC SKIN ERUPTION: Primary | ICD-10-CM

## 2022-07-13 DIAGNOSIS — L03.113 CELLULITIS OF RIGHT ARM: ICD-10-CM

## 2022-07-13 PROCEDURE — 86618 LYME DISEASE ANTIBODY: CPT | Performed by: PHYSICIAN ASSISTANT

## 2022-07-13 PROCEDURE — 36415 COLL VENOUS BLD VENIPUNCTURE: CPT | Performed by: PHYSICIAN ASSISTANT

## 2022-07-13 PROCEDURE — 99284 EMERGENCY DEPT VISIT MOD MDM: CPT | Performed by: PHYSICIAN ASSISTANT

## 2022-07-13 PROCEDURE — 99283 EMERGENCY DEPT VISIT LOW MDM: CPT

## 2022-07-13 RX ORDER — PREDNISONE 20 MG/1
60 TABLET ORAL ONCE
Status: COMPLETED | OUTPATIENT
Start: 2022-07-13 | End: 2022-07-13

## 2022-07-13 RX ORDER — CEPHALEXIN 500 MG/1
500 CAPSULE ORAL EVERY 8 HOURS SCHEDULED
Qty: 21 CAPSULE | Refills: 0 | Status: SHIPPED | OUTPATIENT
Start: 2022-07-13 | End: 2022-07-20

## 2022-07-13 RX ORDER — PREDNISONE 20 MG/1
40 TABLET ORAL DAILY
Qty: 8 TABLET | Refills: 0 | Status: SHIPPED | OUTPATIENT
Start: 2022-07-14 | End: 2022-07-18

## 2022-07-13 RX ORDER — HYDROXYZINE HYDROCHLORIDE 25 MG/1
25 TABLET, FILM COATED ORAL EVERY 6 HOURS PRN
Qty: 12 TABLET | Refills: 0 | Status: SHIPPED | OUTPATIENT
Start: 2022-07-13 | End: 2022-10-05 | Stop reason: ALTCHOICE

## 2022-07-13 RX ADMIN — PREDNISONE 60 MG: 20 TABLET ORAL at 19:50

## 2022-07-13 NOTE — TELEPHONE ENCOUNTER
Left voicemail to call back to r/s appt, there is openings with Tran the same day just in the PM (which was put on hold) please either move down for 07/20 or r/s for another day

## 2022-07-14 LAB — B BURGDOR IGG+IGM SER-ACNC: 0.2 AI

## 2022-07-14 NOTE — ED ATTENDING ATTESTATION
7/13/2022  IAvi MD, saw and evaluated the patient  I have discussed the patient with the resident/non-physician practitioner and agree with the resident's/non-physician practitioner's findings, Plan of Care, and MDM as documented in the resident's/non-physician practitioner's note, except where noted  All available labs and Radiology studies were reviewed  I was present for key portions of any procedure(s) performed by the resident/non-physician practitioner and I was immediately available to provide assistance  At this point I agree with the current assessment done in the Emergency Department  I have conducted an independent evaluation of this patient a history and physical is as follows:    57-year-old female presents for evaluation pruritic rash which started gradually 5 days ago is spreading to different parts of her body and and not relieved with Benadryl  No known new exposures  No flu symptoms or systemic complaints  No exposure to similar rash  No history of similar symptoms  Ten systems reviewed otherwise negative  On exam no distress, lungs normal cardiac illness skin exam patient with multiple areas of erythema, patient is very on the right elbow which appears to be superinfected any target like lesion    Mouth swelling;-will cover for acute cellulitis, erythema multiforme check Lyme titer    ED Course         Critical Care Time  Procedures

## 2022-07-14 NOTE — ED PROVIDER NOTES
History  Chief Complaint   Patient presents with    Rash     Generalized itchy, painful rash for 5 days  Use of benadryl cream without relief  Olvin Jimenez is a 36 yo F presenting with generalized rash for the past 5 days  She notes the rash began on b/l lower legs and now involves legs/thighs, arms  It is very pruritic but is largely non-painful with exception of rash over R upper arm  She notes over the past 1-2 days she has had increased redness, slight swelling, and burning sensation to rash to R upper arm with two "bubbles" in skin  No drainage/discharge  She also notes onset of target like lesion to R forearm while in ED  She denies systemic symptoms including fevers/chills, myalgias  Denies intraoral pain or rashes/lesions  No prior history of similar  No sick contacts with similar  Denies new medications or specific allergen exposure  History provided by:  Patient   used: No        Prior to Admission Medications   Prescriptions Last Dose Informant Patient Reported? Taking?    Prenatal Vit-Fe Fumarate-FA (Prenatal Vitamin) 27-0 8 MG TABS   No No   Sig: Take 1 tablet by mouth daily   citalopram (CeleXA) 20 mg tablet  Self Yes No   Sig: Take 20 mg by mouth daily   clonazePAM (KlonoPIN) 0 5 mg tablet  Self Yes No   Sig: Take 0 5 mg by mouth 2 (two) times a day   dicyclomine (BENTYL) 20 mg tablet   No No   Sig: TAKE 1 TABLET BY MOUTH TWICE A DAY   docusate sodium (COLACE) 100 mg capsule   No No   Sig: Take 1 capsule (100 mg total) by mouth every 12 (twelve) hours   famotidine (PEPCID) 20 mg tablet   No No   Sig: Take 1 tablet (20 mg total) by mouth 2 (two) times a day   ipratropium (ATROVENT) 0 03 % nasal spray   No No   Si-2 SPRAYS EACH NOSTRIL TWICE A DAY AS NEEDED FOR RHINORRHEA   methocarbamol (ROBAXIN) 500 mg tablet   No No   Sig: Take 1 tablet (500 mg total) by mouth 2 (two) times a day   naproxen (NAPROSYN) 500 mg tablet   No No   Sig: Take 1 tablet (500 mg total) by mouth 2 (two) times a day with meals for 10 days   naproxen (NAPROSYN) 500 mg tablet   No No   Sig: TAKE 1 TABLET BY MOUTH TWICE A DAY WITH MEALS   ondansetron (ZOFRAN) 4 mg tablet   No No   Sig: Take 1 tablet (4 mg total) by mouth every 6 (six) hours   ondansetron (Zofran ODT) 4 mg disintegrating tablet   No No   Sig: Take 1 tablet (4 mg total) by mouth every 6 (six) hours as needed for nausea or vomiting   traZODone (DESYREL) 50 mg tablet  Self Yes No   Sig: Take 50 mg by mouth daily at bedtime      Facility-Administered Medications: None       Past Medical History:   Diagnosis Date    Anxiety     Anxiety and depression     Asthma     has not req'd tx since 2016    Bipolar disorder ([facility] [address] )     Chlamydia 2000    Depression     Fatty liver     GERD (gastroesophageal reflux disease)     no meds    History of transfusion     Liver disease     Migraine     manages with OTC remedies       Past Surgical History:   Procedure Laterality Date    NASAL/SINUS ENDOSCOPY Right 06/25/2020    Procedure: FUNCTIONAL ENDOSCOPIC SINUS SURGERY WITH RIGHT MAXILLARY ANTROSTOMY REMOVALOF CYST, RIGHT SPHENOIDOTOMY, RIGHT POLYPECTOMY (LARGE ANTRAL CHOANAL POLYP)  ;  Surgeon: Kristine Tanner MD;  Location: [address] [place] [address] MAIN OR;  Service: ENT    AL LAP,RMV  ADNEXAL STRUCTURE Right 10/10/2018    Procedure: SALPINGECTOMY, LAPAROSCOPIC;  Surgeon: Javy Paul MD;  Location: [facility] OR;  Service: Gynecology    SALPINGOSTOMY  2015    tubal reversal    SCALP EXCISION      x4    TUBAL LIGATION  2002    TUBAL REANASTOMOSIS      WISDOM TOOTH EXTRACTION  1998       Family History   Problem Relation Age of Onset    Cancer Maternal Aunt         lung    Breast cancer Neg Hx      I have reviewed and agree with the history as documented      E-Cigarette/Vaping    E-Cigarette Use Never User      E-Cigarette/Vaping Substances    Nicotine No     THC No     CBD No     Flavoring No      Social History     Tobacco Use    Smoking status: Never Smoker  Smokeless tobacco: Never Used   Vaping Use    Vaping Use: Never used   Substance Use Topics    Alcohol use: Not Currently    Drug use: Not Currently     Types: Marijuana     Comment: last used marijuana 2014       Review of Systems   Constitutional: Negative for chills and fever  HENT: Negative for congestion, rhinorrhea and sore throat  Eyes: Negative for pain and visual disturbance  Respiratory: Negative for cough, shortness of breath and wheezing  Cardiovascular: Negative for chest pain and palpitations  Gastrointestinal: Negative for abdominal pain, nausea and vomiting  Genitourinary: Negative for dysuria, frequency and urgency  Musculoskeletal: Negative for back pain, neck pain and neck stiffness  Skin: Positive for rash  Negative for wound  Neurological: Negative for dizziness, weakness, light-headedness and numbness  Physical Exam  Physical Exam  Constitutional:       General: She is not in acute distress  Appearance: She is well-developed  She is not diaphoretic  HENT:      Head: Normocephalic and atraumatic  Right Ear: External ear normal       Left Ear: External ear normal    Eyes:      Conjunctiva/sclera: Conjunctivae normal       Pupils: Pupils are equal, round, and reactive to light  Cardiovascular:      Rate and Rhythm: Normal rate and regular rhythm  Heart sounds: Normal heart sounds  No murmur heard  No friction rub  No gallop  Pulmonary:      Effort: Pulmonary effort is normal  No respiratory distress  Breath sounds: Normal breath sounds  No wheezing  Abdominal:      General: There is no distension  Palpations: Abdomen is soft  Tenderness: There is no abdominal tenderness  Musculoskeletal:      Cervical back: Normal range of motion and neck supple  Lymphadenopathy:      Cervical: No cervical adenopathy  Skin:     General: Skin is warm and dry  Capillary Refill: Capillary refill takes less than 2 seconds  Findings: Erythema and rash present  Comments: Scattered maculopapular dermatitic appearing rash to b/l UE, LE  Neurological:      Mental Status: She is alert and oriented to person, place, and time  Motor: No abnormal muscle tone  Coordination: Coordination normal    Psychiatric:         Behavior: Behavior normal          Thought Content: Thought content normal          Judgment: Judgment normal          Vital Signs  ED Triage Vitals [07/13/22 1858]   Temperature Pulse Respirations Blood Pressure SpO2   98 3 °F (36 8 °C) 68 18 127/59 100 %      Temp Source Heart Rate Source Patient Position - Orthostatic VS BP Location FiO2 (%)   Oral Monitor Sitting Left arm --      Pain Score       8           Vitals:    07/13/22 1858   BP: 127/59   Pulse: 68   Patient Position - Orthostatic VS: Sitting         Visual Acuity      ED Medications  Medications   predniSONE tablet 60 mg (60 mg Oral Given 7/13/22 1950)       Diagnostic Studies  Results Reviewed     Procedure Component Value Units Date/Time    Lyme Antibody Profile with reflex to [facility] [[id number]] Collected: 07/13/22 2046    Lab Status: In process Specimen: Blood from Arm, Left Updated: 07/13/22 2049                 No orders to display              Procedures  Procedures         ED Course                               SBIRT 20yo+    Flowsheet Row Most Recent Value   SBIRT (25 yo +)    In order to provide better care to our patients, we are screening all of our patients for alcohol and drug use  Would it be okay to ask you these screening questions? No Filed at: 07/13/2022 2000                    [facility]  Number of Diagnoses or Management Options  Cellulitis of right arm  Rash and nonspecific skin eruption  Diagnosis management comments: Generalized rash over the past 5 days now with burning sensation, increased redness to R upper arm and lesion to R forearm with onset while in ED   Concern for developing cellulitis to R upper arm - will treat with keflex  No clear allergen or sick contacts elicited by history for rash  Ddx includes not limited to allergic/contact dermatitis, less likely erythema multiforme  No mucosal involvement or systemic symptoms  Given somewhat target like lesion of R forearm will check lyme titers  Plan for prednisone course  Referral to derm  F/u with PCP recommended  Return to ED indications reviewed  Amount and/or Complexity of Data Reviewed  Clinical lab tests: ordered  Discuss the patient with other providers: yes    Patient Progress  Patient progress: stable      Disposition  Final diagnoses:   Rash and nonspecific skin eruption   Cellulitis of right arm     Time reflects when diagnosis was documented in both MDM as applicable and the Disposition within this note     Time User Action Codes Description Comment    7/13/2022  7:56 PM Vania Bourne [R21] Rash and nonspecific skin eruption     7/13/2022  7:57 PM Vania Bourne [[id number]] Cellulitis of right arm       ED Disposition     ED Disposition   Discharge    Condition   Stable    Date/Time   Wed Jul 13, 2022  7:56 PM    [address] discharge to home/self care                 Follow-up Information     Follow up With Specialties Details Why Contact Info Additional Information    [facility] Schedule an appointment as soon as possible for a visit   [address]  [address] [facility]  [phone]  [phone]  [address] [facility] Emergency Department Emergency Medicine  If symptoms worsen [facility] [id number]  [address] [place] [address] Emergency Department, [address] [place] [address], [id number]          Discharge Medication List as of 7/13/2022  7:59 PM      START taking these medications    Details   cephalexin (KEFLEX) 500 mg capsule Take 1 capsule (500 mg total) by mouth every 8 (eight) hours for 7 days, Starting Wed 7/13/2022, Until Wed 7/20/2022, Normal      hydrOXYzine HCL (ATARAX) 25 mg tablet Take 1 tablet (25 mg total) by mouth every 6 (six) hours as needed for itching, Starting Wed 7/13/2022, Normal      predniSONE 20 mg tablet Take 2 tablets (40 mg total) by mouth daily for 4 days, Starting Thu 7/14/2022, Until Mon 7/18/2022, Normal         CONTINUE these medications which have NOT CHANGED    Details   citalopram (CeleXA) 20 mg tablet Take 20 mg by mouth daily, Historical Med      clonazePAM (KlonoPIN) 0 5 mg tablet Take 0 5 mg by mouth 2 (two) times a day, Historical Med      dicyclomine (BENTYL) 20 mg tablet TAKE 1 TABLET BY MOUTH TWICE A DAY, Normal      docusate sodium (COLACE) 100 mg capsule Take 1 capsule (100 mg total) by mouth every 12 (twelve) hours, Starting Sun 11/28/2021, Normal      famotidine (PEPCID) 20 mg tablet Take 1 tablet (20 mg total) by mouth 2 (two) times a day, Starting Thu 1/6/2022, Normal      ipratropium (ATROVENT) 0 03 % nasal spray 1-2 SPRAYS EACH NOSTRIL TWICE A DAY AS NEEDED FOR RHINORRHEA, Normal      methocarbamol (ROBAXIN) 500 mg tablet Take 1 tablet (500 mg total) by mouth 2 (two) times a day, Starting Sun 11/28/2021, Normal      naproxen (NAPROSYN) 500 mg tablet TAKE 1 TABLET BY MOUTH TWICE A DAY WITH MEALS, Normal      ondansetron (Zofran ODT) 4 mg disintegrating tablet Take 1 tablet (4 mg total) by mouth every 6 (six) hours as needed for nausea or vomiting, Starting Thu 12/30/2021, Normal      ondansetron (ZOFRAN) 4 mg tablet Take 1 tablet (4 mg total) by mouth every 6 (six) hours, Starting Thu 1/6/2022, Normal      Prenatal Vit-Fe Fumarate-FA (Prenatal Vitamin) 27-0 8 MG TABS Take 1 tablet by mouth daily, Starting Wed 12/8/2021, Normal      traZODone (DESYREL) 50 mg tablet Take 50 mg by mouth daily at bedtime, Historical Med                 PDMP Review       Value Time User    PDMP Reviewed  Yes 6/25/2020 12:02 PM Alek Alex MD          ED Provider  Electronically Signed by           Andrea Henry, BRENDA  07/14/22 0048

## 2022-07-18 NOTE — TELEPHONE ENCOUNTER
I have attempted to contact this patient by phone with the following results: left message to return my call on answering machine  Appointment now cancelled, letter sent

## 2022-10-04 ENCOUNTER — TELEPHONE (OUTPATIENT)
Dept: PSYCHIATRY | Facility: CLINIC | Age: 42
End: 2022-10-04

## 2022-10-04 NOTE — TELEPHONE ENCOUNTER
Pt was added to the non referral wait list for talk therapy and med mgmt  Pt will work on obtaining a referral from their pcp

## 2022-10-05 ENCOUNTER — OFFICE VISIT (OUTPATIENT)
Dept: FAMILY MEDICINE CLINIC | Facility: CLINIC | Age: 42
End: 2022-10-05

## 2022-10-05 VITALS
HEART RATE: 73 BPM | TEMPERATURE: 97 F | DIASTOLIC BLOOD PRESSURE: 62 MMHG | RESPIRATION RATE: 20 BRPM | HEIGHT: 61 IN | BODY MASS INDEX: 42.2 KG/M2 | OXYGEN SATURATION: 93 % | WEIGHT: 223.5 LBS | SYSTOLIC BLOOD PRESSURE: 112 MMHG

## 2022-10-05 DIAGNOSIS — J45.20 MILD INTERMITTENT ASTHMA WITHOUT COMPLICATION: ICD-10-CM

## 2022-10-05 DIAGNOSIS — S83.411A TEAR OF MEDIAL COLLATERAL LIGAMENT OF RIGHT KNEE, INITIAL ENCOUNTER: ICD-10-CM

## 2022-10-05 DIAGNOSIS — M17.11 PRIMARY OSTEOARTHRITIS OF RIGHT KNEE: ICD-10-CM

## 2022-10-05 DIAGNOSIS — E66.01 CLASS 3 SEVERE OBESITY DUE TO EXCESS CALORIES WITHOUT SERIOUS COMORBIDITY WITH BODY MASS INDEX (BMI) OF 40.0 TO 44.9 IN ADULT (HCC): ICD-10-CM

## 2022-10-05 DIAGNOSIS — Z59.41 FOOD INSECURITY: ICD-10-CM

## 2022-10-05 DIAGNOSIS — F33.9 DEPRESSION, RECURRENT (HCC): ICD-10-CM

## 2022-10-05 DIAGNOSIS — F31.9 BIPOLAR AFFECTIVE DISORDER, REMISSION STATUS UNSPECIFIED (HCC): ICD-10-CM

## 2022-10-05 DIAGNOSIS — J30.0 VASOMOTOR RHINITIS: ICD-10-CM

## 2022-10-05 DIAGNOSIS — Z59.82 INABILITY TO ACQUIRE TRANSPORTATION: ICD-10-CM

## 2022-10-05 DIAGNOSIS — M79.645 PAIN OF FINGER OF LEFT HAND: Primary | ICD-10-CM

## 2022-10-05 DIAGNOSIS — Z59.9 FINANCIAL DIFFICULTIES: ICD-10-CM

## 2022-10-05 PROCEDURE — 99215 OFFICE O/P EST HI 40 MIN: CPT | Performed by: PHYSICIAN ASSISTANT

## 2022-10-05 RX ORDER — TOPIRAMATE 25 MG/1
TABLET ORAL
Qty: 53 TABLET | Refills: 1 | Status: SHIPPED | OUTPATIENT
Start: 2022-10-05 | End: 2022-10-25

## 2022-10-05 RX ORDER — IPRATROPIUM BROMIDE 21 UG/1
1 SPRAY, METERED NASAL 2 TIMES DAILY PRN
Qty: 30 ML | Refills: 2 | Status: SHIPPED | OUTPATIENT
Start: 2022-10-05

## 2022-10-05 RX ORDER — ALBUTEROL SULFATE 90 UG/1
2 AEROSOL, METERED RESPIRATORY (INHALATION) EVERY 6 HOURS PRN
Qty: 18 G | Refills: 1 | Status: SHIPPED | OUTPATIENT
Start: 2022-10-05

## 2022-10-05 RX ORDER — IPRATROPIUM BROMIDE AND ALBUTEROL SULFATE 2.5; .5 MG/3ML; MG/3ML
3 SOLUTION RESPIRATORY (INHALATION) EVERY 6 HOURS PRN
Qty: 60 ML | Refills: 1 | Status: SHIPPED | OUTPATIENT
Start: 2022-10-05

## 2022-10-05 SDOH — ECONOMIC STABILITY - FOOD INSECURITY: FOOD INSECURITY: Z59.41

## 2022-10-05 SDOH — ECONOMIC STABILITY - INCOME SECURITY: PROBLEM RELATED TO HOUSING AND ECONOMIC CIRCUMSTANCES, UNSPECIFIED: Z59.9

## 2022-10-05 SDOH — ECONOMIC STABILITY - TRANSPORTATION SECURITY: TRANSPORTATION INSECURITY: Z59.82

## 2022-10-05 NOTE — PROGRESS NOTES
Assessment/Plan:    Mild intermittent asthma without complication  - Stable  Continue albuterol inhaler and nebulizer as needed  Tear of medial collateral ligament of right knee  - Patient would like to reestablish care with orthopedics  Referral placed today  - Continue daily exercises and stretches  Depression, recurrent ([address] )  - Currently following with [facility], [facility], but they are closing in December and now she needs to find a new psychiatric office  Patient is currently on wait list with [facility]  - Advised patient we could provide refills of her medications until she is able to establish with a new psychiatric office  Pain of finger of left hand  Will refer to Hand surgery for further evaluation  Class 3 severe obesity due to excess calories without serious comorbidity with body mass index (BMI) of 40 0 to 44 9 in adult [facility])  - Patient did previously see weight management, but does not wish to follow with them again  Patient notes she is unable to afford their programs   - Patient has tried different diet pills, diet, but has not had success with losing weight  - Will start Topamax 25 mg daily x7 days, then increase to 50 mg daily   - Patient is going to start exercising regularly since she has now joined a gym   - Encouraged patient to continue following healthy diet  - Will follow-up in 1 month  Diagnoses and all orders for this visit:    Pain of finger of left hand  -     Ambulatory Referral to Hand Surgery; Future    Tear of medial collateral ligament of right knee, initial encounter  -     Ambulatory Referral to Orthopedic Surgery; Future    Primary osteoarthritis of right knee  -     Ambulatory Referral to Orthopedic Surgery; Future    Class 3 severe obesity due to excess calories without serious comorbidity with body mass index (BMI) of 40 0 to 44 9 in adult ([facility])  -     topiramate (Topamax) 25 mg tablet;  Take 1 tablet (25 mg total) by mouth daily for 7 days, THEN 2 tablets (50 mg total) daily for 23 days  Mild intermittent asthma without complication  -     albuterol (Ventolin HFA) 90 mcg/act inhaler; Inhale 2 puffs every 6 (six) hours as needed for wheezing or shortness of breath  -     ipratropium-albuterol (DUO-NEB) 0 5-2 5 mg/3 mL nebulizer solution; Take 3 mL by nebulization every 6 (six) hours as needed for wheezing or shortness of breath    Vasomotor rhinitis  -     ipratropium (ATROVENT) 0 03 % nasal spray; 1 spray into each nostril 2 (two) times a day as needed for rhinitis    Bipolar affective disorder, remission status unspecified ([place] [address] )  -     Ambulatory Referral to Psychiatry; Future    Depression, recurrent ([place] [address] )  -     Ambulatory Referral to Psychiatry; Future    Financial difficulties  -     Ambulatory referral to social work care management program; Future    Inability to acquire transportation  -     Ambulatory referral to social work care management program; Future    Food insecurity  -     Ambulatory referral to social work care management program; Future          All of patients questions were answered  Patient understands and agrees with the above plan  Return in about 4 weeks (around 11/2/2022) for Next scheduled follow up anxiety, weight  Alice Prost A Kylerko  10/05/22  [address] [facility]          Subjective:     Patient ID: Alejandro Wagner  is a 38 y o  female with known PMH of bipolar disorder, anxiety, depression, asthma, hepatic steatosis who presents today in office for psychiatry referral      - Patient is a 38 y o  female who presents today for psychiatry referral   Patient notes she did start following with [facility], [facility], a few months ago  Patient notes she has started taking some medications, but still trying to figure out which medications at which doses helps the best   However, patient notes she has now found out that [facility] will be closing in December and now she has to find a new psychiatric office    Patient notes she did contact [facility] and is now on the wait list   Patient notes she believes she is taking Depakote, trazodone, BuSpar, and Klonopin only when absolutely necessary  Patient notes she is trying to wean off this  Patient notes he does smoke marijuana sometimes to help with her anxiety but has now completely stop drinking alcohol  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  Patient notes she would like to lose weight  Patient notes she did go to weight management, but she does not have the money to follow any other plans  Patient notes he does not want to see a nutritionist because she is aware which foods and how much she should be eating  Patient notes she would like to try taking weight loss medication  Patient notes she has tried many different diets, shakes, diet pills with no success  Patient notes she has joined a gym and is going to start exercising regularly  Patient notes she has torn ligament of her right knee  Patient notes she did have her brace and she did want to complete physical therapy, but she was unable to go due to her work schedule  Patient notes her knee pain did improve for a while, but now it has returned  Patient notes she was doing her own exercises and stretches  Patient notes she has been told she has arthritis in the pointer finger of her left hand  Patient notes in the past she had injury where her finger "went backward"  Patient notes she did go to emergency room at the time and her finger was "put back in place"  Patient notes now she is unable to bend her finger all the way  Patient notes he does experience some occasional numbness in the area as well        The following portions of the patient's history were reviewed and updated as appropriate: allergies, current medications, past family history, past medical history, past social history, past surgical history and problem list         Review of Systems   Constitutional: Negative for chills and fever    HENT: Negative for congestion and sore throat  Eyes: Negative for pain  Respiratory: Negative for cough, chest tightness and shortness of breath  Cardiovascular: Negative for chest pain and palpitations  Gastrointestinal: Negative for abdominal pain, constipation, diarrhea, nausea and vomiting  Genitourinary: Negative for difficulty urinating and dysuria  Musculoskeletal: Positive for arthralgias  Negative for back pain  Skin: Negative for rash  Neurological: Negative for dizziness and headaches  Psychiatric/Behavioral: Positive for dysphoric mood and sleep disturbance  Negative for self-injury and suicidal ideas  The patient is nervous/anxious  BMI Counseling: Body mass index is 42 23 kg/m²  The BMI is above normal  Nutrition recommendations include decreasing portion sizes, encouraging healthy choices of fruits and vegetables, decreasing fast food intake, consuming healthier snacks, limiting drinks that contain sugar, moderation in carbohydrate intake, increasing intake of lean protein, reducing intake of saturated and trans fat and reducing intake of cholesterol  Exercise recommendations include moderate physical activity 150 minutes/week  Pharmacotherapy was ordered to help aid in weight loss  Rationale for BMI follow-up plan is due to patient being overweight or obese  Objective:   Vitals:    10/05/22 1128   BP: 112/62   BP Location: Left arm   Patient Position: Sitting   Cuff Size: Adult   Pulse: 73   Resp: 20   Temp: (!) 97 °F (36 1 °C)   TempSrc: Temporal   SpO2: 93%   Weight: 101 kg (223 lb 8 oz)   Height: 5' 1" (1 549 m)         Physical Exam  Vitals and nursing note reviewed  Constitutional:       General: She is not in acute distress  Appearance: She is well-developed  HENT:      Head: Normocephalic and atraumatic        Right Ear: External ear normal       Left Ear: External ear normal       Nose: Nose normal    Eyes:      Conjunctiva/sclera: Conjunctivae normal    Cardiovascular:      Rate and Rhythm: Normal rate and regular rhythm  Pulses: Normal pulses  Heart sounds: Normal heart sounds  Pulmonary:      Effort: Pulmonary effort is normal  No respiratory distress  Breath sounds: Normal breath sounds  No wheezing  Musculoskeletal:      Cervical back: Normal range of motion and neck supple  Skin:     General: Skin is warm and dry  Neurological:      Mental Status: She is alert and oriented to person, place, and time     Psychiatric:         Behavior: Behavior normal

## 2022-10-06 ENCOUNTER — TELEPHONE (OUTPATIENT)
Dept: OBGYN CLINIC | Facility: HOSPITAL | Age: 42
End: 2022-10-06

## 2022-10-07 NOTE — ASSESSMENT & PLAN NOTE
- Patient did previously see weight management, but does not wish to follow with them again  Patient notes she is unable to afford their programs   - Patient has tried different diet pills, diet, but has not had success with losing weight  - Will start Topamax 25 mg daily x7 days, then increase to 50 mg daily   - Patient is going to start exercising regularly since she has now joined a gym   - Encouraged patient to continue following healthy diet  - Will follow-up in 1 month

## 2022-10-07 NOTE — ASSESSMENT & PLAN NOTE
- Patient would like to reestablish care with orthopedics  Referral placed today  - Continue daily exercises and stretches

## 2022-10-07 NOTE — ASSESSMENT & PLAN NOTE
- Currently following with [facility], [facility], but they are closing in December and now she needs to find a new psychiatric office  Patient is currently on wait list with [facility]  - Advised patient we could provide refills of her medications until she is able to establish with a new psychiatric office

## 2022-10-25 DIAGNOSIS — E66.01 CLASS 3 SEVERE OBESITY DUE TO EXCESS CALORIES WITHOUT SERIOUS COMORBIDITY WITH BODY MASS INDEX (BMI) OF 40.0 TO 44.9 IN ADULT (HCC): ICD-10-CM

## 2022-10-25 RX ORDER — TOPIRAMATE 25 MG/1
TABLET ORAL
Qty: 159 TABLET | Refills: 1 | Status: SHIPPED | OUTPATIENT
Start: 2022-10-25

## 2022-10-26 ENCOUNTER — PATIENT OUTREACH (OUTPATIENT)
Dept: FAMILY MEDICINE CLINIC | Facility: CLINIC | Age: 42
End: 2022-10-26

## 2022-10-26 NOTE — PROGRESS NOTES
LANETTE received a new referral in regard to pt with concerns in regard to financial strain, transportation needs, and food insecurity  Pt is also looking for a new psychiatry office as she was seen at [facility], which is now closing  I attempted to reach pt today and was unable  I left a message to please return [facility] call  LANETTE will remain available

## 2022-11-02 ENCOUNTER — PATIENT OUTREACH (OUTPATIENT)
Dept: FAMILY MEDICINE CLINIC | Facility: CLINIC | Age: 42
End: 2022-11-02

## 2022-11-02 NOTE — PROGRESS NOTES
Eugene Mckeon Caro made initial outreach to patient regarding positive SDOH/financial difficulties/transportation  Also indicated in referral note was that patient is currently being seen at [facility] for mental health care which is closing in December  Patient was told [facility] wait list is long  TATE RINALDI did place follow up call to the Devon who confirmed being on wait list for [facility]  She is hoping the wait list will be shorter now that she has referral and will reach out to them  Devon spoke about an incident regarding [employer]  When she contacted them she was told it would cost $39 99/mo  She advised cannot afford that at this time and then they waived the first two months and then begin being billed in December  She had called them back and cancelled it because she will have medication refills and did not want to have to worry about the $39 99/mo  She then noted that $300 was taken out of her bank account  She called back again and emailed screenshot of it on 10/6  They have stopped returning her calls  Devon has disputed it with bank and is waiting on her reimbursement  Her card is currently cancelled as well and is waiting on the new card  Devon reported receiving SSDI  She recently started a new job with flexible hours  Devon confirmed her insurance as [employer] Medicare plan  She drives  TATE RINALDI and [employer] in [place] as an option  She had gone there in the past but missed appointment and owes $30  She was told in past if pays the $30 can return but worries about accidentally missing another appointment  She will give them a call once she receives her new card to explore re-establishing care  Jacki's therapist at [address] [facility] left and only psychiatrist is still seeing her  She does have access to [facility] staff if needs support  Devon is currently watching her grandchildren and was unable to continue conversation at this time   SW CM will continue to remain available for psychosocial support as needed

## 2022-11-04 DIAGNOSIS — F33.9 DEPRESSION, RECURRENT (HCC): Primary | ICD-10-CM

## 2022-11-04 DIAGNOSIS — J45.20 MILD INTERMITTENT ASTHMA WITHOUT COMPLICATION: ICD-10-CM

## 2022-11-04 DIAGNOSIS — F31.9 BIPOLAR AFFECTIVE DISORDER, REMISSION STATUS UNSPECIFIED (HCC): ICD-10-CM

## 2022-11-04 RX ORDER — DIVALPROEX SODIUM 500 MG/1
TABLET, DELAYED RELEASE ORAL
COMMUNITY
Start: 2022-10-22 | End: 2022-11-04 | Stop reason: SDUPTHER

## 2022-11-04 RX ORDER — CLONAZEPAM 0.5 MG/1
0.5 TABLET ORAL 2 TIMES DAILY
Qty: 60 TABLET | Refills: 0 | Status: SHIPPED | OUTPATIENT
Start: 2022-11-04

## 2022-11-04 RX ORDER — DIVALPROEX SODIUM 500 MG/1
TABLET, DELAYED RELEASE ORAL
Qty: 42 TABLET | Refills: 0 | Status: SHIPPED | OUTPATIENT
Start: 2022-11-04

## 2022-11-04 RX ORDER — BUSPIRONE HYDROCHLORIDE 7.5 MG/1
TABLET ORAL
COMMUNITY
Start: 2022-10-19

## 2022-11-04 NOTE — TELEPHONE ENCOUNTER
Pt called in on the nurse line requesting med refills on her mental health meds  States her current mental health doctor is in the process of closing down so they are not answering the phones like they use to  Pt is requesting refills of depakote and clonopin   States she will be out of her meds this weekend please advise thank you

## 2022-11-07 RX ORDER — ALBUTEROL SULFATE 90 UG/1
AEROSOL, METERED RESPIRATORY (INHALATION)
Qty: 18 G | Refills: 1 | Status: SHIPPED | OUTPATIENT
Start: 2022-11-07

## 2022-11-18 ENCOUNTER — TELEPHONE (OUTPATIENT)
Dept: FAMILY MEDICINE CLINIC | Facility: CLINIC | Age: 42
End: 2022-11-18

## 2022-11-18 NOTE — TELEPHONE ENCOUNTER
PT CONTACTED OFFICE REQUESTING TO RESCHEDULE HER APPT    PT ALSO REQUESTED FOR A PHONE CALL FROM PCP, PT DIDN'T WANT TO PROVIDE REASON OF THE PHONE CALL, PT ONLY STATED "SHE WILL KNOW ONCE SHE SEE'S THAT I'M REQUESTING HER PHONE CALL"

## 2022-11-29 DIAGNOSIS — E66.01 CLASS 3 SEVERE OBESITY DUE TO EXCESS CALORIES WITHOUT SERIOUS COMORBIDITY WITH BODY MASS INDEX (BMI) OF 40.0 TO 44.9 IN ADULT (HCC): Primary | ICD-10-CM

## 2022-11-29 RX ORDER — TOPIRAMATE 50 MG/1
50 TABLET, FILM COATED ORAL 2 TIMES DAILY
Qty: 60 TABLET | Refills: 1 | Status: SHIPPED | OUTPATIENT
Start: 2022-11-29 | End: 2022-12-30 | Stop reason: DRUGHIGH

## 2022-12-01 ENCOUNTER — TELEPHONE (OUTPATIENT)
Dept: FAMILY MEDICINE CLINIC | Facility: CLINIC | Age: 42
End: 2022-12-01

## 2022-12-02 ENCOUNTER — APPOINTMENT (OUTPATIENT)
Dept: LAB | Facility: CLINIC | Age: 42
End: 2022-12-02

## 2022-12-02 ENCOUNTER — OFFICE VISIT (OUTPATIENT)
Dept: FAMILY MEDICINE CLINIC | Facility: CLINIC | Age: 42
End: 2022-12-02

## 2022-12-02 VITALS
OXYGEN SATURATION: 98 % | TEMPERATURE: 97.8 F | RESPIRATION RATE: 18 BRPM | BODY MASS INDEX: 39.68 KG/M2 | HEART RATE: 78 BPM | WEIGHT: 210 LBS

## 2022-12-02 DIAGNOSIS — Z00.00 HEALTHCARE MAINTENANCE: ICD-10-CM

## 2022-12-02 DIAGNOSIS — E66.01 CLASS 3 SEVERE OBESITY DUE TO EXCESS CALORIES WITHOUT SERIOUS COMORBIDITY WITH BODY MASS INDEX (BMI) OF 40.0 TO 44.9 IN ADULT (HCC): ICD-10-CM

## 2022-12-02 DIAGNOSIS — F31.9 BIPOLAR AFFECTIVE DISORDER, REMISSION STATUS UNSPECIFIED (HCC): ICD-10-CM

## 2022-12-02 DIAGNOSIS — F33.9 DEPRESSION, RECURRENT (HCC): ICD-10-CM

## 2022-12-02 DIAGNOSIS — Z00.00 ANNUAL PHYSICAL EXAM: Primary | ICD-10-CM

## 2022-12-02 DIAGNOSIS — Z23 ENCOUNTER FOR IMMUNIZATION: ICD-10-CM

## 2022-12-02 DIAGNOSIS — J45.20 MILD INTERMITTENT ASTHMA WITHOUT COMPLICATION: ICD-10-CM

## 2022-12-02 DIAGNOSIS — Z12.31 ENCOUNTER FOR SCREENING MAMMOGRAM FOR MALIGNANT NEOPLASM OF BREAST: ICD-10-CM

## 2022-12-02 DIAGNOSIS — S83.411A TEAR OF MEDIAL COLLATERAL LIGAMENT OF RIGHT KNEE, INITIAL ENCOUNTER: ICD-10-CM

## 2022-12-02 DIAGNOSIS — Z11.1 SCREENING FOR TUBERCULOSIS: ICD-10-CM

## 2022-12-02 LAB
ALBUMIN SERPL BCP-MCNC: 3.7 G/DL (ref 3.5–5)
ALP SERPL-CCNC: 56 U/L (ref 46–116)
ALT SERPL W P-5'-P-CCNC: 21 U/L (ref 12–78)
ANION GAP SERPL CALCULATED.3IONS-SCNC: 6 MMOL/L (ref 4–13)
AST SERPL W P-5'-P-CCNC: 14 U/L (ref 5–45)
BASOPHILS # BLD AUTO: 0.06 THOUSANDS/ÂΜL (ref 0–0.1)
BASOPHILS NFR BLD AUTO: 1 % (ref 0–1)
BILIRUB SERPL-MCNC: 0.45 MG/DL (ref 0.2–1)
BUN SERPL-MCNC: 14 MG/DL (ref 5–25)
CALCIUM SERPL-MCNC: 9.1 MG/DL (ref 8.3–10.1)
CHLORIDE SERPL-SCNC: 112 MMOL/L (ref 96–108)
CHOLEST SERPL-MCNC: 165 MG/DL
CO2 SERPL-SCNC: 18 MMOL/L (ref 21–32)
CREAT SERPL-MCNC: 0.9 MG/DL (ref 0.6–1.3)
EOSINOPHIL # BLD AUTO: 0.25 THOUSAND/ÂΜL (ref 0–0.61)
EOSINOPHIL NFR BLD AUTO: 3 % (ref 0–6)
ERYTHROCYTE [DISTWIDTH] IN BLOOD BY AUTOMATED COUNT: 17.9 % (ref 11.6–15.1)
EST. AVERAGE GLUCOSE BLD GHB EST-MCNC: 94 MG/DL
GFR SERPL CREATININE-BSD FRML MDRD: 78 ML/MIN/1.73SQ M
GLUCOSE P FAST SERPL-MCNC: 97 MG/DL (ref 65–99)
HBA1C MFR BLD: 4.9 %
HCT VFR BLD AUTO: 40 % (ref 34.8–46.1)
HDLC SERPL-MCNC: 60 MG/DL
HGB BLD-MCNC: 12.7 G/DL (ref 11.5–15.4)
IMM GRANULOCYTES # BLD AUTO: 0.02 THOUSAND/UL (ref 0–0.2)
IMM GRANULOCYTES NFR BLD AUTO: 0 % (ref 0–2)
LDLC SERPL CALC-MCNC: 91 MG/DL (ref 0–100)
LYMPHOCYTES # BLD AUTO: 2.45 THOUSANDS/ÂΜL (ref 0.6–4.47)
LYMPHOCYTES NFR BLD AUTO: 28 % (ref 14–44)
MCH RBC QN AUTO: 28.1 PG (ref 26.8–34.3)
MCHC RBC AUTO-ENTMCNC: 31.8 G/DL (ref 31.4–37.4)
MCV RBC AUTO: 89 FL (ref 82–98)
MONOCYTES # BLD AUTO: 0.92 THOUSAND/ÂΜL (ref 0.17–1.22)
MONOCYTES NFR BLD AUTO: 11 % (ref 4–12)
NEUTROPHILS # BLD AUTO: 4.97 THOUSANDS/ÂΜL (ref 1.85–7.62)
NEUTS SEG NFR BLD AUTO: 57 % (ref 43–75)
NONHDLC SERPL-MCNC: 105 MG/DL
NRBC BLD AUTO-RTO: 0 /100 WBCS
PLATELET # BLD AUTO: 232 THOUSANDS/UL (ref 149–390)
PMV BLD AUTO: 11.6 FL (ref 8.9–12.7)
POTASSIUM SERPL-SCNC: 3.4 MMOL/L (ref 3.5–5.3)
PROT SERPL-MCNC: 7.8 G/DL (ref 6.4–8.4)
RBC # BLD AUTO: 4.52 MILLION/UL (ref 3.81–5.12)
SODIUM SERPL-SCNC: 136 MMOL/L (ref 135–147)
TRIGL SERPL-MCNC: 71 MG/DL
TSH SERPL DL<=0.05 MIU/L-ACNC: 3 UIU/ML (ref 0.45–4.5)
WBC # BLD AUTO: 8.67 THOUSAND/UL (ref 4.31–10.16)

## 2022-12-02 NOTE — PROGRESS NOTES
[address] [facility]    NAME: Arcelia Terry  AGE: 38 y o  SEX: female  : 1980     DATE: 2022     Assessment and Plan:     Problem List Items Addressed This Visit        Respiratory    Mild intermittent asthma without complication     - Stable  Continue albuterol inhaler and nebulizer as needed  Musculoskeletal and Integument    Tear of medial collateral ligament of right knee     - Patient was referred back to Orthopedics, but she missed the recent appointment  Advised to call to reschedule  - Continue daily exercises and stretches  Other    Class 3 severe obesity due to excess calories without serious comorbidity with body mass index (BMI) of 40 0 to 44 9 in [facility])     - Patient has now lost about 13 lb since starting Topamax  Her gradually patient on her efforts and encouraged to keep up the great work  - Continue Topamax 50 mg twice daily  - Continue following healthy diet and regular exercise  Wt Readings from Last 3 Encounters:   22 95 3 kg (210 lb)   10/05/22 101 kg (223 lb 8 oz)   22 102 kg (223 lb 15 8 oz)              Depression, recurrent ([facility] )     - Patient had been following with [facility], but they are now closed  Patient was on waiting list with [facility], but has yet to hear anything back  In the meantime, patient reached out to preventative measures and is able to reestablish with them  Patient is happy because she will be following with the same therapist she was previously with a few years ago  - Patient does not believe her current medications are effective  Recommended for patient to continue with current regimen until she is able to establish with psychiatrist   - Continue Depakote 500 mg in the morning and 250 mg at bedtime    Continue Klonopin 0 5 mg twice daily as needed for anxiety, BuSpar 10 mg 3 times daily, and trazodone 50 mg daily bedtime   - PDMP reviewed  No red flags noted  Bipolar disorder ([facility] [address] )     - See plan for "depression"  Other Visit Diagnoses     Annual physical exam    -  Primary    Encounter for screening mammogram for malignant neoplasm of breast        Relevant Orders    Mammo screening bilateral w 3d & cad    Screening for tuberculosis        Relevant Orders    Quantiferon TB Gold Plus (Completed)    Healthcare maintenance        Relevant Orders    CBC and differential (Completed)    Comprehensive metabolic panel (Completed)    Lipid panel (Completed)    TSH, 3rd generation with Free T4 reflex (Completed)    Hemoglobin A1C (Completed)    Encounter for immunization        Relevant Orders    influenza vaccine, quadrivalent, recombinant, PF, 0 5 mL, for patients 18 yr+ (FLUBLOK) (Completed)    Age 15 y+, BOOSTER (BIVALENT): COVID-19 Pfizer vac bivalent cricket-sucr (Completed)          Immunizations and preventive care screenings were discussed with patient today  Appropriate education was printed on patient's after visit summary  Counseling:  Alcohol/drug use: discussed moderation in alcohol intake, the recommendations for healthy alcohol use, and avoidance of illicit drug use  Dental Health: discussed importance of regular tooth brushing, flossing, and dental visits  Injury prevention: discussed safety/seat belts, safety helmets, smoke detectors, carbon dioxide detectors, and smoking near bedding or upholstery  Sexual health: discussed sexually transmitted diseases, partner selection, use of condoms, avoidance of unintended pregnancy, and contraceptive alternatives  Exercise: the importance of regular exercise/physical activity was discussed  Recommend exercise 3-5 times per week for at least 30 minutes            Return in about 4 weeks (around 12/30/2022) for Next scheduled follow up weight     Chief Complaint:     Chief Complaint   Patient presents with   • Physical Exam      History of Present Illness:     Adult Annual Physical   Patient here for a comprehensive physical exam      - Patient had been following with [facility], but they have now closed  Patient was on the wait list for [address] [place] [address] Psychiatry, but she has yet to hear anything back  Patient notes she was previously going to preventative measures but had stopped going because he no longer takes her insurance  However, patient notes since [facility] has closed, they will now except her back at preventive measures, despite her insurance  Patient notes she is happy about this because she will get to follow with the therapist she was previously with in whom she really liked  Patient notes talk therapy is really helpful for her  Patient notes her anxiety continues to be bad and she does not believe her current medications have been working  Patient denies any suicidal or homicidal ideations  - Patient notes she has been able to lose weight since starting Topamax and she is very happy about that  Patient notes she has started walking some days for exercise  Patient notes she is planning to go up and down her stairs for exercise when she has to stay at home  Patient notes 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  Patient notes that has now been 14 months  Patient notes Topamax has also been helping to have regular bowel movements and has also been helping with her migraines  Diet and Physical Activity  Diet/Nutrition: well balanced diet  Exercise: walking and moderate cardiovascular exercise  General Health  Sleep: sleeps well  Hearing: normal - bilateral   Vision: goes for regular eye exams and wears glasses  Dental: regular dental visits  /GYN Health  Last menstrual period: 11/10/22  Contraceptive method: tubal ligation  Review of Systems:     Review of Systems   Constitutional: Negative for chills and fever  HENT: Negative for congestion and sore throat  Eyes: Negative for pain  Respiratory: Negative for cough, chest tightness and shortness of breath  Cardiovascular: Negative for chest pain and palpitations  Gastrointestinal: Negative for abdominal pain, constipation, diarrhea, nausea and vomiting  Genitourinary: Negative for difficulty urinating and dysuria  Musculoskeletal: Positive for arthralgias  Negative for back pain  Skin: Negative for rash  Neurological: Negative for dizziness and headaches  Psychiatric/Behavioral: Positive for dysphoric mood and sleep disturbance  Negative for self-injury and suicidal ideas  The patient is nervous/anxious  Past Medical History:     Past Medical History:   Diagnosis Date   • Anxiety    • Anxiety and depression    • Asthma     has not req'd tx since 2016   • Bipolar disorder ([facility] [address] )    • Chlamydia 2000   • Depression    • Fatty liver    • GERD (gastroesophageal reflux disease)     no meds   • History of transfusion    • Liver disease    • Migraine     manages with OTC remedies      Past Surgical History:     Past Surgical History:   Procedure Laterality Date   • NASAL/SINUS ENDOSCOPY Right 06/25/2020    Procedure: FUNCTIONAL ENDOSCOPIC SINUS SURGERY WITH RIGHT MAXILLARY ANTROSTOMY REMOVALOF CYST, RIGHT SPHENOIDOTOMY, RIGHT POLYPECTOMY (LARGE ANTRAL CHOANAL POLYP)  ;  Surgeon: Romana Childers MD;  Location: [address] [place] [address] MAIN OR;  Service: ENT   • ND LAP,RMV  ADNEXAL STRUCTURE Right 10/10/2018    Procedure: SALPINGECTOMY, LAPAROSCOPIC;  Surgeon: He Carpenter MD;  Location: [facility] Main OR;  Service: Gynecology   • SALPINGOSTOMY  2015    tubal reversal   • SCALP EXCISION      x4   • TUBAL LIGATION  2002   • TUBAL REANASTOMOSIS     • WISDOM TOOTH EXTRACTION  1998      Social History:     Social History     Socioeconomic History   • Marital status: Single     Spouse name: None   • Number of children: None   • Years of education: None   • Highest education level: None   Occupational History   • None   Tobacco Use   • Smoking status: Never   • Smokeless tobacco: Never   Vaping Use   • Vaping Use: Never used   Substance and Sexual Activity   • Alcohol use: Not Currently   • Drug use: Not Currently     Types: Marijuana     Comment: last used marijuana 2014   • Sexual activity: Yes     Partners: Female     Birth control/protection: Female Sterilization   Other Topics Concern   • None   Social History Narrative   • None     Social Determinants of Health     Financial Resource Strain: High Risk   • Difficulty of Paying Living Expenses: Hard   Food Insecurity: Food Insecurity Present   • Worried About Running Out of Food in the Last Year: Often true   • Ran Out of Food in the Last Year: Often true   Transportation Needs: Unmet Transportation Needs   • Lack of Transportation (Medical): Yes   • Lack of Transportation (Non-Medical): Yes   Physical Activity: Not on file   Stress: Stress Concern Present   • Feeling of Stress : To some extent   Social Connections: Not on file   Intimate Partner Violence: Not on file   Housing Stability: Not on file      Family History:     Family History   Problem Relation Age of Onset   • Cancer Maternal Aunt         lung   • Breast cancer Neg Hx       Current Medications:     Current Outpatient Medications   Medication Sig Dispense Refill   • albuterol (PROVENTIL HFA,VENTOLIN HFA) 90 mcg/act inhaler TAKE 2 PUFFS BY MOUTH EVERY 6 HOURS AS NEEDED FOR WHEEZE OR FOR SHORTNESS OF BREATH 18 g 1   • busPIRone (BUSPAR) 7 5 mg tablet TAKE 1 TABLET BY MOUTH 3 TIMES A DAY FOR ANXIETY     • citalopram (CeleXA) 20 mg tablet Take 20 mg by mouth daily     • clonazePAM (KlonoPIN) 0 5 mg tablet TAKE 1 TABLET BY MOUTH 2 TIMES A DAY  60 tablet 0   • divalproex sodium (DEPAKOTE) 500 mg EC tablet Take 1 tablet (total 500 mg) by mouth in AM and take 1/2 tablet (total 250 mg) by mouth at bedtime   42 tablet 0   • ipratropium (ATROVENT) 0 03 % nasal spray 1 spray into each nostril 2 (two) times a day as needed for rhinitis 30 mL 2   • ipratropium-albuterol (DUO-NEB) 0 5-2 5 mg/3 mL nebulizer solution Take 3 mL by nebulization every 6 (six) hours as needed for wheezing or shortness of breath 60 mL 1   • topiramate (Topamax) 50 MG tablet Take 1 tablet (50 mg total) by mouth 2 (two) times a day 60 tablet 1   • traZODone (DESYREL) 50 mg tablet Take 50 mg by mouth daily at bedtime       No current facility-administered medications for this visit  Allergies:     No Known Allergies   Physical Exam:     Pulse 78   Temp 97 8 °F (36 6 °C)   Resp 18   Wt 95 3 kg (210 lb)   LMP 11/10/2022 (Exact Date)   SpO2 98%   BMI 39 68 kg/m²     Physical Exam  Vitals and nursing note reviewed  Constitutional:       General: She is not in acute distress  Appearance: She is well-developed  HENT:      Head: Normocephalic and atraumatic  Right Ear: External ear normal       Left Ear: External ear normal       Nose: Nose normal    Eyes:      Conjunctiva/sclera: Conjunctivae normal    Cardiovascular:      Rate and Rhythm: Normal rate and regular rhythm  Pulses: Normal pulses  Heart sounds: Normal heart sounds  Pulmonary:      Effort: Pulmonary effort is normal  No respiratory distress  Breath sounds: Normal breath sounds  No wheezing  Abdominal:      General: Bowel sounds are normal       Palpations: Abdomen is soft  Tenderness: There is no abdominal tenderness  Musculoskeletal:      Cervical back: Normal range of motion and neck supple  Skin:     General: Skin is warm and dry  Neurological:      Mental Status: She is alert and oriented to person, place, and time     Psychiatric:         Behavior: Behavior normal          BRENDA Kenney

## 2022-12-05 ENCOUNTER — TELEPHONE (OUTPATIENT)
Dept: FAMILY MEDICINE CLINIC | Facility: CLINIC | Age: 42
End: 2022-12-05

## 2022-12-05 LAB
GAMMA INTERFERON BACKGROUND BLD IA-ACNC: 0.22 IU/ML
M TB IFN-G BLD-IMP: NEGATIVE
M TB IFN-G CD4+ BCKGRND COR BLD-ACNC: 0.08 IU/ML
M TB IFN-G CD4+ BCKGRND COR BLD-ACNC: 0.1 IU/ML
MITOGEN IGNF BCKGRD COR BLD-ACNC: 9.5 IU/ML

## 2022-12-05 RX ORDER — CLONAZEPAM 0.5 MG/1
TABLET ORAL
Qty: 60 TABLET | Refills: 0 | Status: SHIPPED | OUTPATIENT
Start: 2022-12-05

## 2022-12-06 NOTE — ASSESSMENT & PLAN NOTE
- Patient has now lost about 13 lb since starting Topamax  Her gradually patient on her efforts and encouraged to keep up the great work  - Continue Topamax 50 mg twice daily  - Continue following healthy diet and regular exercise      Wt Readings from Last 3 Encounters:   12/02/22 95 3 kg (210 lb)   10/05/22 101 kg (223 lb 8 oz)   07/13/22 102 kg (223 lb 15 8 oz)

## 2022-12-06 NOTE — ASSESSMENT & PLAN NOTE
- Patient had been following with [facility], but they are now closed  Patient was on waiting list with [facility], but has yet to hear anything back  In the meantime, patient reached out to preventative measures and is able to reestablish with them  Patient is happy because she will be following with the same therapist she was previously with a few years ago  - Patient does not believe her current medications are effective  Recommended for patient to continue with current regimen until she is able to establish with psychiatrist   - Continue Depakote 500 mg in the morning and 250 mg at bedtime  Continue Klonopin 0 5 mg twice daily as needed for anxiety, BuSpar 10 mg 3 times daily, and trazodone 50 mg daily bedtime   - PDMP reviewed  No red flags noted

## 2022-12-06 NOTE — ASSESSMENT & PLAN NOTE
- Patient was referred back to Orthopedics, but she missed the recent appointment  Advised to call to reschedule  - Continue daily exercises and stretches

## 2022-12-15 ENCOUNTER — TELEPHONE (OUTPATIENT)
Dept: OBGYN CLINIC | Facility: HOSPITAL | Age: 42
End: 2022-12-15

## 2022-12-15 NOTE — TELEPHONE ENCOUNTER
Caller: Patient    Doctor: Rita Perez    Reason for call: Appt for index finger, left hand  Referral in chart from PCP      Call back#: [phone]

## 2022-12-23 DIAGNOSIS — J45.20 MILD INTERMITTENT ASTHMA WITHOUT COMPLICATION: ICD-10-CM

## 2022-12-27 ENCOUNTER — RA CDI HCC (OUTPATIENT)
Dept: OTHER | Facility: HOSPITAL | Age: 42
End: 2022-12-27

## 2022-12-27 RX ORDER — ALBUTEROL SULFATE 90 UG/1
AEROSOL, METERED RESPIRATORY (INHALATION)
Qty: 18 G | Refills: 0 | Status: SHIPPED | OUTPATIENT
Start: 2022-12-27

## 2022-12-27 NOTE — PROGRESS NOTES
[facility]  coding opportunities       Chart reviewed, no opportunity found:   [address]        Patients Insurance     Medicare Insurance: [employer] Advantage

## 2022-12-29 ENCOUNTER — TELEPHONE (OUTPATIENT)
Dept: FAMILY MEDICINE CLINIC | Facility: CLINIC | Age: 42
End: 2022-12-29

## 2022-12-30 ENCOUNTER — OFFICE VISIT (OUTPATIENT)
Dept: FAMILY MEDICINE CLINIC | Facility: CLINIC | Age: 42
End: 2022-12-30

## 2022-12-30 VITALS
HEIGHT: 61 IN | RESPIRATION RATE: 16 BRPM | BODY MASS INDEX: 40.59 KG/M2 | OXYGEN SATURATION: 99 % | SYSTOLIC BLOOD PRESSURE: 122 MMHG | WEIGHT: 215 LBS | TEMPERATURE: 97.1 F | DIASTOLIC BLOOD PRESSURE: 80 MMHG | HEART RATE: 77 BPM

## 2022-12-30 DIAGNOSIS — F33.9 DEPRESSION, RECURRENT (HCC): ICD-10-CM

## 2022-12-30 DIAGNOSIS — E66.01 CLASS 3 SEVERE OBESITY DUE TO EXCESS CALORIES WITHOUT SERIOUS COMORBIDITY WITH BODY MASS INDEX (BMI) OF 40.0 TO 44.9 IN ADULT (HCC): ICD-10-CM

## 2022-12-30 DIAGNOSIS — R22.0 LUMP OF SCALP: Primary | ICD-10-CM

## 2022-12-30 DIAGNOSIS — F31.9 BIPOLAR AFFECTIVE DISORDER, REMISSION STATUS UNSPECIFIED (HCC): ICD-10-CM

## 2022-12-30 RX ORDER — DIVALPROEX SODIUM 500 MG/1
TABLET, DELAYED RELEASE ORAL
Qty: 11 TABLET | Refills: 0 | Status: SHIPPED | OUTPATIENT
Start: 2022-12-30

## 2022-12-30 RX ORDER — TRAZODONE HYDROCHLORIDE 100 MG/1
100 TABLET ORAL
Qty: 7 TABLET | Refills: 0 | Status: SHIPPED | OUTPATIENT
Start: 2022-12-30

## 2022-12-30 RX ORDER — TOPIRAMATE 100 MG/1
100 TABLET, FILM COATED ORAL 2 TIMES DAILY
Qty: 60 TABLET | Refills: 1 | Status: SHIPPED | OUTPATIENT
Start: 2022-12-30

## 2022-12-30 NOTE — PROGRESS NOTES
Assessment/Plan:    Lump of scalp  - Per patient, has history of removal of multiple lumps of scalp  - Will refer to general surgery  Depression, recurrent [facility])  - Patient had been following with [facility], but they are now closed  Patient was on waiting list with [facility], but has yet to hear anything back  In the meantime, patient reached out to preventative measures and at first was told she could re-establish with them  She was very happy about this because she would be with her same therapist from a few years ago  - However, patient has now been told she cannot establish with preventive measures due to her insurance  Patient notes she is now scheduled to establish with Jared on 1/3/23    - Patient does not believe her current medications are effective  Recommended for patient to continue with current regimen until she is able to establish with psychiatrist   - Continue Depakote 500 mg in the morning and 250 mg at bedtime  Continue Klonopin 0 5 mg twice daily as needed for anxiety, BuSpar 10 mg 3 times daily, and trazodone 100 mg daily bedtime   - PDMP reviewed  No red flags noted  Class 3 severe obesity due to excess calories without serious comorbidity with body mass index (BMI) of 40 0 to 44 9 in adult [facility])  - Patient has gained some weight back due to increased anxiety  - Will increase Topamax to 100 mg twice daily  - Continue following healthy diet and regular exercise  Wt Readings from Last 3 Encounters:   12/30/22 97 5 kg (215 lb)   12/02/22 95 3 kg (210 lb)   10/05/22 101 kg (223 lb 8 oz)         Bipolar disorder ([facility] [address] )  - See plan for "depression"  Diagnoses and all orders for this visit:    Lump of scalp  -     Ambulatory Referral to General Surgery; Future    Bipolar affective disorder, remission status unspecified (HCC)  -     divalproex sodium (DEPAKOTE) 500 mg EC tablet;  Take 1 tablet (total 500 mg) by mouth in AM and take 1/2 tablet (total 250 mg) by mouth at bedtime  Depression, recurrent ([address] )  -     traZODone (DESYREL) 100 mg tablet; Take 1 tablet (100 mg total) by mouth daily at bedtime  -     divalproex sodium (DEPAKOTE) 500 mg EC tablet; Take 1 tablet (total 500 mg) by mouth in AM and take 1/2 tablet (total 250 mg) by mouth at bedtime  Class 3 severe obesity due to excess calories without serious comorbidity with body mass index (BMI) of 40 0 to 44 9 in adult (HCC)  -     topiramate (Topamax) 100 mg tablet; Take 1 tablet (100 mg total) by mouth 2 (two) times a day        All of patients questions were answered  Patient understands and agrees with the above plan  Return in about 4 weeks (around 1/27/2023) for Next scheduled follow up depression  Deeanna Apley, PA-C  12/30/22  [address] [facility]          Subjective:     Patient ID: Herb Ayon  is a 38 y o  female with known PMH of bipolar disorder, anxiety, depression, asthma, hepatic steatosis who presents today in office for weight follow up  - Patient is a 38 y o  female who presents today for weight follow up  Patient notes currently her depression has been very bad  Patient notes she is no longer able to establish with preventative measures due to her insurance  Patient notes she has now scheduled an appointment to establish with Jared on 1/3/23  Patient notes she does not believe her current medications have been working, but she also has been struggling without having therapy weekly  Patient denies any suicidal or homicidal ideations  - Patient notes she has lump located on her scalp  Patient notes she has history of several lumps taken out in the past, about 5-6  The following portions of the patient's history were reviewed and updated as appropriate: allergies, current medications, past family history, past medical history, past social history, past surgical history and problem list         Review of Systems   Constitutional: Negative for chills and fever  HENT: Negative for congestion and sore throat  Eyes: Negative for pain  Respiratory: Negative for cough, chest tightness and shortness of breath  Cardiovascular: Negative for chest pain and palpitations  Gastrointestinal: Negative for abdominal pain, constipation, diarrhea, nausea and vomiting  Genitourinary: Negative for difficulty urinating and dysuria  Musculoskeletal: Positive for arthralgias  Negative for back pain  Skin: Negative for rash  Lump of scalp   Neurological: Negative for dizziness and headaches  Psychiatric/Behavioral: Positive for dysphoric mood and sleep disturbance  Negative for self-injury and suicidal ideas  The patient is nervous/anxious  Objective:   Vitals:    12/30/22 0844   BP: 122/80   BP Location: Left arm   Patient Position: Sitting   Cuff Size: Adult   Pulse: 77   Resp: 16   Temp: (!) 97 1 °F (36 2 °C)   TempSrc: Temporal   SpO2: 99%   Weight: 97 5 kg (215 lb)   Height: 5' 1" (1 549 m)         Physical Exam  Vitals and nursing note reviewed  Constitutional:       General: She is not in acute distress  Appearance: She is well-developed  HENT:      Head: Normocephalic and atraumatic  Right Ear: External ear normal       Left Ear: External ear normal       Nose: Nose normal    Eyes:      Conjunctiva/sclera: Conjunctivae normal    Cardiovascular:      Rate and Rhythm: Normal rate and regular rhythm  Pulses: Normal pulses  Heart sounds: Normal heart sounds  Pulmonary:      Effort: Pulmonary effort is normal  No respiratory distress  Breath sounds: Normal breath sounds  No wheezing  Musculoskeletal:      Cervical back: Normal range of motion and neck supple  Skin:     General: Skin is warm and dry  Neurological:      Mental Status: She is alert and oriented to person, place, and time  Psychiatric:         Mood and Affect: Mood is anxious  Affect is tearful           Behavior: Behavior normal          Thought Content: Thought content does not include homicidal or suicidal ideation

## 2023-01-02 PROBLEM — R22.0 LUMP OF SCALP: Status: ACTIVE | Noted: 2023-01-02

## 2023-01-03 NOTE — ASSESSMENT & PLAN NOTE
- Patient has gained some weight back due to increased anxiety  - Will increase Topamax to 100 mg twice daily  - Continue following healthy diet and regular exercise      Wt Readings from Last 3 Encounters:   12/30/22 97 5 kg (215 lb)   12/02/22 95 3 kg (210 lb)   10/05/22 101 kg (223 lb 8 oz)

## 2023-01-03 NOTE — ASSESSMENT & PLAN NOTE
- Patient had been following with [facility], but they are now closed  Patient was on waiting list with [facility], but has yet to hear anything back  In the meantime, patient reached out to preventative measures and at first was told she could re-establish with them  She was very happy about this because she would be with her same therapist from a few years ago  - However, patient has now been told she cannot establish with preventive measures due to her insurance  Patient notes she is now scheduled to establish with Jared on 1/3/23    - Patient does not believe her current medications are effective  Recommended for patient to continue with current regimen until she is able to establish with psychiatrist   - Continue Depakote 500 mg in the morning and 250 mg at bedtime  Continue Klonopin 0 5 mg twice daily as needed for anxiety, BuSpar 10 mg 3 times daily, and trazodone 100 mg daily bedtime   - PDMP reviewed  No red flags noted

## 2023-01-13 ENCOUNTER — TELEPHONE (OUTPATIENT)
Dept: FAMILY MEDICINE CLINIC | Facility: CLINIC | Age: 43
End: 2023-01-13

## 2023-01-13 NOTE — TELEPHONE ENCOUNTER
[facility] looking for copy of recent labs      I see documentation from SW that she had appt with them 1/3

## 2023-01-18 ENCOUNTER — TELEPHONE (OUTPATIENT)
Dept: FAMILY MEDICINE CLINIC | Facility: CLINIC | Age: 43
End: 2023-01-18

## 2023-01-23 DIAGNOSIS — J45.20 MILD INTERMITTENT ASTHMA WITHOUT COMPLICATION: ICD-10-CM

## 2023-01-23 RX ORDER — IPRATROPIUM BROMIDE AND ALBUTEROL SULFATE 2.5; .5 MG/3ML; MG/3ML
3 SOLUTION RESPIRATORY (INHALATION) EVERY 6 HOURS PRN
Qty: 90 ML | Refills: 1 | Status: SHIPPED | OUTPATIENT
Start: 2023-01-23

## 2023-01-27 ENCOUNTER — OFFICE VISIT (OUTPATIENT)
Dept: FAMILY MEDICINE CLINIC | Facility: CLINIC | Age: 43
End: 2023-01-27

## 2023-01-27 ENCOUNTER — APPOINTMENT (OUTPATIENT)
Dept: LAB | Facility: HOSPITAL | Age: 43
End: 2023-01-27

## 2023-01-27 VITALS
WEIGHT: 210.9 LBS | SYSTOLIC BLOOD PRESSURE: 120 MMHG | TEMPERATURE: 97.2 F | HEIGHT: 61 IN | RESPIRATION RATE: 16 BRPM | DIASTOLIC BLOOD PRESSURE: 70 MMHG | BODY MASS INDEX: 39.82 KG/M2

## 2023-01-27 DIAGNOSIS — F32.1 MAJOR DEPRESSIVE DISORDER, SINGLE EPISODE, MODERATE (HCC): ICD-10-CM

## 2023-01-27 DIAGNOSIS — Z51.81 ENCOUNTER FOR THERAPEUTIC DRUG MONITORING: ICD-10-CM

## 2023-01-27 DIAGNOSIS — Z79.899 ENCOUNTER FOR LONG-TERM (CURRENT) USE OF OTHER MEDICATIONS: ICD-10-CM

## 2023-01-27 DIAGNOSIS — R22.0 LUMP OF SCALP: ICD-10-CM

## 2023-01-27 DIAGNOSIS — E66.01 CLASS 3 SEVERE OBESITY DUE TO EXCESS CALORIES WITHOUT SERIOUS COMORBIDITY WITH BODY MASS INDEX (BMI) OF 40.0 TO 44.9 IN ADULT (HCC): ICD-10-CM

## 2023-01-27 DIAGNOSIS — F33.9 DEPRESSION, RECURRENT (HCC): Primary | ICD-10-CM

## 2023-01-27 LAB — VALPROATE SERPL-MCNC: 74.9 UG/ML (ref 50–120)

## 2023-01-27 NOTE — PROGRESS NOTES
Assessment/Plan:    Lump of scalp  - Referral was placed at last visit to general surgery  Advised to call to schedule an appointment  Class 3 severe obesity due to excess calories without serious comorbidity with body mass index (BMI) of 40 0 to 44 9 in adult [facility])  - Patient has gained some weight back due to increased anxiety  - Will increase Topamax to 100 mg 3 times daily  - Continue following healthy diet and regular exercise   -Patient is scheduled to establish with weight management next week  Patient is unsure if she would like to try weight loss surgery  Patient is mostly concerned about excess skin after the surgery  Wt Readings from Last 3 Encounters:   01/27/23 95 7 kg (210 lb 14 4 oz)   12/30/22 97 5 kg (215 lb)   12/02/22 95 3 kg (210 lb)         Depression, recurrent ([facility] )  - Patient has now established with her new mental health outpatient clinic- \[facility]\""    -Recently, some of patient's medications have been adjusted  Patient has now been taking Depakote 500 mg twice daily, BuSpar 15 mg 3 times daily, trazodone 50 mg, 1-2 tablets at bedtime, and Klonopin 0 5 mg twice daily as needed  - Continue close follow-up with psychiatry for therapy and medication management  Diagnoses and all orders for this visit:    Depression, recurrent ([facility] )    Class 3 severe obesity due to excess calories without serious comorbidity with body mass index (BMI) of 40 0 to 44 9 in adult ([facility])  -     topiramate (Topamax) 100 mg tablet; Take 1 tablet (100 mg total) by mouth 3 (three) times a day    Lump of scalp        All of patients questions were answered  Patient understands and agrees with the above plan  Return in about 4 weeks (around 2/24/2023) for Next scheduled follow up RUTH Garcia PA-C  01/27/23  [facility]          Subjective:     Patient ID: Fide Baker  is a 38 y o  female with known PMH of bipolar disorder, anxiety, depression, asthma, hepatic steatosis  who presents today in office for weight follow-up  - Patient is a 38 y o  female who presents today for weight follow-up  Patient notes she has now established at [facility] and has seen a psychiatrist   Patient notes some of her medications have been adjusted  Patient notes she does feel better now that she is settled in with a new psychiatrist and therapist   Patient notes she is unsure if she would like to proceed with weight loss surgery  Patient notes she is worried about excess skin afterwards  Patient notes she may be trying slim fast or other nutritional shakes for meal replacements  Patient notes she does have a gym membership but has not started using it yet  The following portions of the patient's history were reviewed and updated as appropriate: allergies, current medications, past family history, past medical history, past social history, past surgical history and problem list         Review of Systems   Constitutional: Negative for chills and fever  HENT: Negative for congestion and sore throat  Eyes: Negative for pain  Respiratory: Negative for cough, chest tightness and shortness of breath  Cardiovascular: Negative for chest pain and palpitations  Gastrointestinal: Negative for abdominal pain, constipation, diarrhea, nausea and vomiting  Genitourinary: Negative for difficulty urinating and dysuria  Musculoskeletal: Positive for arthralgias  Negative for back pain  Skin: Negative for rash  Lump of scalp   Neurological: Negative for dizziness and headaches  Psychiatric/Behavioral: Positive for dysphoric mood and sleep disturbance  Negative for self-injury and suicidal ideas  The patient is nervous/anxious  BMI Counseling: Body mass index is 39 85 kg/m²   The BMI is above normal  Nutrition recommendations include decreasing portion sizes, encouraging healthy choices of fruits and vegetables, consuming healthier snacks, limiting drinks that contain sugar, moderation in carbohydrate intake, increasing intake of lean protein and reducing intake of cholesterol  Exercise recommendations include moderate physical activity 150 minutes/week  No pharmacotherapy was ordered  Patient referred to weight management  Rationale for BMI follow-up plan is due to patient being overweight or obese  Objective:   Vitals:    01/27/23 1506   BP: 120/70   BP Location: Left arm   Patient Position: Sitting   Cuff Size: Adult   Pulse: (P) 89   Resp: 16   Temp: (!) 97 2 °F (36 2 °C)   TempSrc: Temporal   SpO2: (P) 98%   Weight: 95 7 kg (210 lb 14 4 oz)   Height: 5' 1" (1 549 m)         Physical Exam  Vitals and nursing note reviewed  Constitutional:       General: She is not in acute distress  Appearance: She is well-developed  HENT:      Head: Normocephalic and atraumatic  Right Ear: External ear normal       Left Ear: External ear normal       Nose: Nose normal    Eyes:      Conjunctiva/sclera: Conjunctivae normal    Cardiovascular:      Rate and Rhythm: Normal rate and regular rhythm  Pulses: Normal pulses  Heart sounds: Normal heart sounds  Pulmonary:      Effort: Pulmonary effort is normal  No respiratory distress  Breath sounds: Normal breath sounds  No wheezing  Musculoskeletal:      Cervical back: Normal range of motion and neck supple  Skin:     General: Skin is warm and dry  Neurological:      Mental Status: She is alert and oriented to person, place, and time  Psychiatric:         Mood and Affect: Mood normal          Behavior: Behavior normal          Thought Content: Thought content does not include homicidal or suicidal ideation

## 2023-01-27 NOTE — PATIENT INSTRUCTIONS
[address], [place], [address] [facility]  [phone]    [address]   [phone]    [facility]ist  Phone: [phone]

## 2023-01-29 RX ORDER — TOPIRAMATE 100 MG/1
100 TABLET, FILM COATED ORAL 3 TIMES DAILY
Qty: 90 TABLET | Refills: 1 | Status: SHIPPED | OUTPATIENT
Start: 2023-01-29

## 2023-01-30 NOTE — ASSESSMENT & PLAN NOTE
- Patient has gained some weight back due to increased anxiety  - Will increase Topamax to 100 mg 3 times daily  - Continue following healthy diet and regular exercise   -Patient is scheduled to establish with weight management next week  Patient is unsure if she would like to try weight loss surgery  Patient is mostly concerned about excess skin after the surgery      Wt Readings from Last 3 Encounters:   01/27/23 95 7 kg (210 lb 14 4 oz)   12/30/22 97 5 kg (215 lb)   12/02/22 95 3 kg (210 lb)

## 2023-01-30 NOTE — ASSESSMENT & PLAN NOTE
- Patient has now established with her new mental health outpatient clinic- [facility]    -Recently, some of patient's medications have been adjusted  Patient has now been taking Depakote 500 mg twice daily, BuSpar 15 mg 3 times daily, trazodone 50 mg, 1-2 tablets at bedtime, and Klonopin 0 5 mg twice daily as needed  - Continue close follow-up with psychiatry for therapy and medication management

## 2023-02-06 ENCOUNTER — OFFICE VISIT (OUTPATIENT)
Dept: BARIATRICS | Facility: CLINIC | Age: 43
End: 2023-02-06

## 2023-02-06 ENCOUNTER — OFFICE VISIT (OUTPATIENT)
Dept: FAMILY MEDICINE CLINIC | Facility: CLINIC | Age: 43
End: 2023-02-06

## 2023-02-06 VITALS
WEIGHT: 216 LBS | TEMPERATURE: 97.8 F | HEIGHT: 62 IN | SYSTOLIC BLOOD PRESSURE: 104 MMHG | DIASTOLIC BLOOD PRESSURE: 66 MMHG | HEART RATE: 74 BPM | BODY MASS INDEX: 39.75 KG/M2

## 2023-02-06 VITALS
TEMPERATURE: 97 F | DIASTOLIC BLOOD PRESSURE: 68 MMHG | WEIGHT: 219 LBS | HEART RATE: 93 BPM | RESPIRATION RATE: 18 BRPM | HEIGHT: 61 IN | SYSTOLIC BLOOD PRESSURE: 110 MMHG | OXYGEN SATURATION: 99 % | BODY MASS INDEX: 41.35 KG/M2

## 2023-02-06 DIAGNOSIS — E66.9 OBESITY, CLASS II, BMI 35-39.9: Primary | ICD-10-CM

## 2023-02-06 DIAGNOSIS — K21.9 GASTROESOPHAGEAL REFLUX DISEASE, UNSPECIFIED WHETHER ESOPHAGITIS PRESENT: Primary | ICD-10-CM

## 2023-02-06 RX ORDER — OMEPRAZOLE 20 MG/1
20 CAPSULE, DELAYED RELEASE ORAL DAILY
Qty: 30 CAPSULE | Refills: 0 | Status: SHIPPED | OUTPATIENT
Start: 2023-02-06

## 2023-02-06 RX ORDER — SUCRALFATE ORAL 1 G/10ML
1 SUSPENSION ORAL
Qty: 414 ML | Refills: 0 | Status: SHIPPED | OUTPATIENT
Start: 2023-02-06

## 2023-02-06 RX ORDER — BUSPIRONE HYDROCHLORIDE 15 MG/1
15 TABLET ORAL 3 TIMES DAILY
COMMUNITY
Start: 2023-01-13

## 2023-02-06 NOTE — PROGRESS NOTES
Nutrition evaluation deferred as patient does not meet indications for bariatric surgery at this time

## 2023-02-06 NOTE — ASSESSMENT & PLAN NOTE
No medications in one year with recent reoccurrence of symptoms   - Omeprazole 20 mg daily  - Carafate QID  - Avoid trigger foods, large meals, and lying down within 2 hours after eating    - Follow up in the office or go to the emergency department if your symptoms worsen, change, or fail to improve  Discussed red flags requiring emergency treatment

## 2023-02-06 NOTE — PROGRESS NOTES
Name: Jenny Sosa      : 1980      MRN: [id number]  Encounter Provider: JONATHON Ernandez  Encounter Date: 2023   Encounter department: [address] [place] [address]     1  Gastroesophageal reflux disease, unspecified whether esophagitis present  Assessment & Plan:  No medications in one year with recent reoccurrence of symptoms   - Omeprazole 20 mg daily  - Carafate QID  - Avoid trigger foods, large meals, and lying down within 2 hours after eating    - Follow up in the office or go to the emergency department if your symptoms worsen, change, or fail to improve  Discussed red flags requiring emergency treatment  Orders:  -     omeprazole (PriLOSEC) 20 mg delayed release capsule; Take 1 capsule (20 mg total) by mouth daily  -     sucralfate (CARAFATE) 1 g/10 mL suspension; Take 10 mL (1 g total) by mouth 4 (four) times a day (with meals and at bedtime)         Subjective     HPI     Emily Alicia presented to the office c/o a flare up of acid reflux  Pt states she stopped drinking 1 5 years ago and symptoms have been fairly well controlled since that time  Over the past several days, pt has been again experiencing burning epigastric pain and has vomited x3  No blood in vomit  Pain is all day long, not necessarily related to meals, but is worse when lying down at night  Greasy foods and orange juice are especially bothersome  Pt also had diarrhea several days ago but states stools are now normal, daily to every other day and normal consistency  No blood in stool  No fever or other symptoms  Pt took two Aleve this morning in an attempt to treat  No other medications tried  Per records, pt was on famotidine and pantoprazole in past        Review of Systems   Constitutional: Negative for appetite change, chills, fatigue, fever and unexpected weight change  Respiratory: Negative for cough, chest tightness, shortness of breath and wheezing      Cardiovascular: Negative for chest pain, palpitations and leg swelling  Gastrointestinal: Positive for abdominal pain (epigastric), diarrhea (resolved) and vomiting  Negative for blood in stool, constipation and nausea  Genitourinary: Negative for dysuria  Musculoskeletal: Negative for back pain  Neurological: Negative for dizziness and headaches  All other systems reviewed and are negative  Past Medical History:   Diagnosis Date   • Anxiety    • Anxiety and depression    • Asthma     has not req'd tx since 2016   • Bipolar disorder ([facility] )    • Chlamydia 2000   • Depression    • Fatty liver    • GERD (gastroesophageal reflux disease)     no meds   • History of transfusion    • Liver disease    • Migraine     manages with OTC remedies     Past Surgical History:   Procedure Laterality Date   • NASAL/SINUS ENDOSCOPY Right 06/25/2020    Procedure: FUNCTIONAL ENDOSCOPIC SINUS SURGERY WITH RIGHT MAXILLARY ANTROSTOMY REMOVALOF CYST, RIGHT SPHENOIDOTOMY, RIGHT POLYPECTOMY (LARGE ANTRAL CHOANAL POLYP)  ;  Surgeon: Christin Sepulveda MD;  Location: [facility] MAIN OR;  Service: ENT   • [place] LAPAROSCOPY W/RMVL ADNEXAL STRUCTURES Right 10/10/2018    Procedure: SALPINGECTOMY, LAPAROSCOPIC;  Surgeon: Brian Byrd MD;  Location: [facility] Main [facility];  Service: Gynecology   • SALPINGOSTOMY  2015    tubal reversal   • SCALP EXCISION      x4   • TUBAL LIGATION  2002   • TUBAL REANASTOMOSIS     • WISDOM TOOTH EXTRACTION  1998     Family History   Problem Relation Age of Onset   • Cancer Maternal Aunt         lung   • Breast cancer Neg Hx      Social History     Socioeconomic History   • Marital status: Single     Spouse name: None   • Number of children: None   • Years of education: None   • Highest education level: None   Occupational History   • None   Tobacco Use   • Smoking status: Never   • Smokeless tobacco: Never   Vaping Use   • Vaping Use: Never used   Substance and Sexual Activity   • Alcohol use: Not Currently   • Drug use: Not Currently Types: Marijuana     Comment: last used marijuana 2014   • Sexual activity: Yes     Partners: Female     Birth control/protection: Female Sterilization   Other Topics Concern   • None   Social History Narrative   • None     Social Determinants of Health     Financial Resource Strain: High Risk   • Difficulty of Paying Living Expenses: Hard   Food Insecurity: Food Insecurity Present   • Worried About Running Out of Food in the Last Year: Often true   • Ran Out of Food in the Last Year: Often true   Transportation Needs: Unmet Transportation Needs   • Lack of Transportation (Medical): Yes   • Lack of Transportation (Non-Medical): Yes   Physical Activity: Not on file   Stress: Stress Concern Present   • Feeling of Stress : To some extent   Social Connections: Not on file   Intimate Partner Violence: Not on file   Housing Stability: Not on file     Current Outpatient Medications on File Prior to Visit   Medication Sig   • albuterol (PROVENTIL HFA,VENTOLIN HFA) 90 mcg/act inhaler TAKE 2 PUFFS BY MOUTH EVERY 6 HOURS AS NEEDED FOR WHEEZE OR FOR SHORTNESS OF BREATH   • busPIRone (BUSPAR) 15 mg tablet Take 15 mg by mouth 3 (three) times a day   • citalopram (CeleXA) 20 mg tablet Take 20 mg by mouth daily (Patient not taking: Reported on 2/6/2023)   • clonazePAM (KlonoPIN) 0 5 mg tablet TAKE 1 TABLET BY MOUTH 2 TIMES A DAY  • divalproex sodium (DEPAKOTE) 500 mg EC tablet Take 1 tablet (total 500 mg) by mouth in AM and take 1/2 tablet (total 250 mg) by mouth at bedtime     • ipratropium-albuterol (DUO-NEB) 0 5-2 5 mg/3 mL nebulizer solution TAKE 3 ML BY NEBULIZATION EVERY 6 (SIX) HOURS AS NEEDED FOR WHEEZING OR SHORTNESS OF BREATH   • topiramate (Topamax) 100 mg tablet Take 1 tablet (100 mg total) by mouth 3 (three) times a day   • traZODone (DESYREL) 100 mg tablet Take 1 tablet (100 mg total) by mouth daily at bedtime   • [DISCONTINUED] busPIRone (BUSPAR) 7 5 mg tablet TAKE 1 TABLET BY MOUTH 3 TIMES A DAY FOR ANXIETY     No Known Allergies  Immunization History   Administered Date(s) Administered   • COVID-19 PFIZER VACCINE 0 3 ML IM 08/19/2021, 09/17/2021, 06/13/2022   • COVID-19 Pfizer Vac BIVALENT Fahad-sucrose 12 Yr+ IM (BOOSTER ONLY) 12/02/2022   • INFLUENZA 12/02/2022   • Influenza, recombinant, quadrivalent,injectable, preservative free 12/02/2022   • Pneumococcal Polysaccharide PPV23 05/02/2008   • Tuberculin Skin Test-PPD Intradermal 05/01/2019       Objective     /68 (BP Location: Left arm, Patient Position: Sitting, Cuff Size: Large)   Pulse 93   Temp (!) 97 °F (36 1 °C) (Temporal)   Resp 18   Ht 5' 1" (1 549 m)   Wt 99 3 kg (219 lb)   LMP 01/12/2023 (Exact Date)   SpO2 99%   BMI 41 38 kg/m²     Physical Exam  Vitals reviewed  Constitutional:       General: She is not in acute distress  Appearance: She is morbidly obese  She is not ill-appearing or diaphoretic  HENT:      Head: Normocephalic and atraumatic  Cardiovascular:      Rate and Rhythm: Normal rate and regular rhythm  Heart sounds: Normal heart sounds  No murmur heard  Pulmonary:      Effort: Pulmonary effort is normal  No tachypnea  Breath sounds: Normal breath sounds  No decreased breath sounds, wheezing or rales  Abdominal:      General: Bowel sounds are increased  There is no distension  Palpations: Abdomen is soft  Tenderness: There is generalized abdominal tenderness  There is no guarding or rebound  Negative signs include Borja's sign and McBurney's sign  Musculoskeletal:      Right lower leg: No edema  Left lower leg: No edema  Skin:     General: Skin is warm and dry  Neurological:      Mental Status: She is alert and oriented to person, place, and time     Psychiatric:         Attention and Perception: Attention normal          Mood and Affect: Mood and affect normal          Speech: Speech normal          Behavior: Behavior normal        JONATHON Olguin

## 2023-02-14 ENCOUNTER — RA CDI HCC (OUTPATIENT)
Dept: OTHER | Facility: HOSPITAL | Age: 43
End: 2023-02-14

## 2023-02-14 NOTE — PROGRESS NOTES
[facility]  coding opportunities       Chart reviewed, no opportunity found:   [address]        Patients Insurance     Medicare Insurance: [employer] Advantage

## 2023-02-15 ENCOUNTER — PATIENT OUTREACH (OUTPATIENT)
Dept: FAMILY MEDICINE CLINIC | Facility: CLINIC | Age: 43
End: 2023-02-15

## 2023-02-15 NOTE — PROGRESS NOTES
TATE RINALDI noted in chart that patient had actively been trying to reconnect with with mental health provider  From office visit on 12/2 patient reported was going to start with preventative measures and was going to see same therapist from few years ago  From office visit 1/27 she reported establishing at [facility] and saw a therapist and psychiatrist which has been helpful  TATE RINALDI placed follow up call to the patient to determine if there are any further needs  TATE CM left message and will close referral due to goals met  TATE CM will remain available for future psychosocial support as needed

## 2023-02-26 DIAGNOSIS — J45.20 MILD INTERMITTENT ASTHMA WITHOUT COMPLICATION: ICD-10-CM

## 2023-02-26 RX ORDER — IPRATROPIUM BROMIDE AND ALBUTEROL SULFATE 2.5; .5 MG/3ML; MG/3ML
SOLUTION RESPIRATORY (INHALATION)
Qty: 90 ML | Refills: 1 | Status: SHIPPED | OUTPATIENT
Start: 2023-02-26

## 2023-03-06 DIAGNOSIS — K21.9 GASTROESOPHAGEAL REFLUX DISEASE, UNSPECIFIED WHETHER ESOPHAGITIS PRESENT: ICD-10-CM

## 2023-03-06 RX ORDER — OMEPRAZOLE 20 MG/1
CAPSULE, DELAYED RELEASE ORAL
Qty: 90 CAPSULE | Refills: 1 | Status: SHIPPED | OUTPATIENT
Start: 2023-03-06

## 2023-03-13 ENCOUNTER — TELEPHONE (OUTPATIENT)
Dept: PSYCHIATRY | Facility: CLINIC | Age: 43
End: 2023-03-13

## 2023-03-15 ENCOUNTER — OFFICE VISIT (OUTPATIENT)
Dept: BARIATRICS | Facility: CLINIC | Age: 43
End: 2023-03-15

## 2023-03-15 VITALS
WEIGHT: 218.8 LBS | HEIGHT: 62 IN | DIASTOLIC BLOOD PRESSURE: 80 MMHG | HEART RATE: 86 BPM | SYSTOLIC BLOOD PRESSURE: 120 MMHG | BODY MASS INDEX: 40.27 KG/M2

## 2023-03-15 DIAGNOSIS — E66.01 OBESITY, CLASS III, BMI 40-49.9 (MORBID OBESITY) (HCC): Primary | ICD-10-CM

## 2023-03-15 RX ORDER — TRAZODONE HYDROCHLORIDE 50 MG/1
50 TABLET ORAL
COMMUNITY
Start: 2023-02-26 | End: 2023-03-17 | Stop reason: SDUPTHER

## 2023-03-15 RX ORDER — BUSPIRONE HYDROCHLORIDE 10 MG/1
TABLET ORAL
COMMUNITY
Start: 2023-02-10

## 2023-03-15 NOTE — PROGRESS NOTES
Bariatric Nutrition Assessment Note    Type of surgery    Preop  Surgery Date: TBD  Surgeon: Dr Bimal Arizmendi  38 y o   female     Wt with BMI of 25: 136#  Pre-Op Excess Wt: 82#  Blood pressure 120/80, pulse 86, height 5' 2" (1 575 m), weight 99 2 kg (218 lb 12 8 oz), not currently breastfeeding  Body mass index is 40 02 kg/m²  [place] [address] [facility] cals to lose 1#/week                                        Estimated protein needs 62-74g (1 0-1 2g/kg)                                        Estimated fluid needs 75 oz    Weight History   Onset of Obesity: Childhood-after first child age 13  Family history of obesity: Yes-mom  Wt Loss Attempts: Counseling with  MD  Fasting  OTC meds/supplements  Self Created Diets (Portion Control, Healthy Food Choices, etc )  Maximum Wt Lost: 20#    Review of History and Medications   Past Medical History:   Diagnosis Date   • Anxiety    • Anxiety and depression    • Asthma     has not req'd tx since 2016   • Bipolar disorder ([facility] )    • Chlamydia 2000   • Depression    • Fatty liver    • GERD (gastroesophageal reflux disease)     no meds   • History of transfusion    • Liver disease    • Migraine     manages with OTC remedies     Past Surgical History:   Procedure Laterality Date   • NASAL/SINUS ENDOSCOPY Right 06/25/2020    Procedure: FUNCTIONAL ENDOSCOPIC SINUS SURGERY WITH RIGHT MAXILLARY ANTROSTOMY REMOVALOF CYST, RIGHT SPHENOIDOTOMY, RIGHT POLYPECTOMY (LARGE ANTRAL CHOANAL POLYP)  ;  Surgeon: Vimal Cardenas MD;  Location: [facility] MAIN OR;  Service: ENT   • [place] LAPAROSCOPY W/RMVL ADNEXAL STRUCTURES Right 10/10/2018    Procedure: SALPINGECTOMY, LAPAROSCOPIC;  Surgeon: Governor Kyleigh MD;  Location[facility];  Service: Gynecology   • SALPINGOSTOMY  2015    tubal reversal   • SCALP EXCISION      x4   • TUBAL LIGATION  2002   • TUBAL REANASTOMOSIS     • [address] EXTRACTION  1998     Social History     Socioeconomic History   • Marital status: Single     Spouse name: None   • Number of children: None   • Years of education: None   • Highest education level: None   Occupational History   • None   Tobacco Use   • Smoking status: Never   • Smokeless tobacco: Never   Vaping Use   • Vaping Use: Never used   Substance and Sexual Activity   • Alcohol use: Not Currently   • Drug use: Not Currently     Types: Marijuana     Comment: last used marijuana 2014   • Sexual activity: Yes     Partners: Female     Birth control/protection: Female Sterilization   Other Topics Concern   • None   Social History Narrative   • None     Social Determinants of Health     Financial Resource Strain: High Risk   • Difficulty of Paying Living Expenses: Hard   Food Insecurity: Food Insecurity Present   • Worried About Running Out of Food in the Last Year: Often true   • Ran Out of Food in the Last Year: Often true   Transportation Needs: Unmet Transportation Needs   • Lack of Transportation (Medical): Yes   • Lack of Transportation (Non-Medical): Yes   Physical Activity: Not on file   Stress: Stress Concern Present   • Feeling of Stress : To some extent   Social Connections: Not on file   Intimate Partner Violence: Not on file   Housing Stability: Not on file       Current Outpatient Medications:   •  albuterol (PROVENTIL HFA,VENTOLIN HFA) 90 mcg/act inhaler, TAKE 2 PUFFS BY MOUTH EVERY 6 HOURS AS NEEDED FOR WHEEZE OR FOR SHORTNESS OF BREATH, Disp: 18 g, Rfl: 2  •  busPIRone (BUSPAR) 15 mg tablet, Take 15 mg by mouth 3 (three) times a day, Disp: , Rfl:   •  clonazePAM (KlonoPIN) 0 5 mg tablet, TAKE 1 TABLET BY MOUTH 2 TIMES A DAY , Disp: 60 tablet, Rfl: 0  •  divalproex sodium (DEPAKOTE) 500 mg EC tablet, Take 1 tablet (total 500 mg) by mouth in AM and take 1/2 tablet (total 250 mg) by mouth at bedtime  , Disp: 11 tablet, Rfl: 0  •  ipratropium-albuterol (DUO-NEB) 0 5-2 5 mg/3 mL nebulizer solution, INHALE 3 ML BY NEBULIZATION EVERY 6 HOURS AS NEEDED FOR WHEEZE OR FOR SHORTNESS OF BREATH, Disp: 90 mL, Rfl: 1  •  omeprazole (PriLOSEC) 20 mg delayed release capsule, TAKE 1 CAPSULE BY MOUTH EVERY DAY, Disp: 90 capsule, Rfl: 1  •  sucralfate (CARAFATE) 1 g/10 mL suspension, Take 10 mL (1 g total) by mouth 4 (four) times a day (with meals and at bedtime), Disp: 414 mL, Rfl: 0  •  topiramate (Topamax) 100 mg tablet, Take 1 tablet (100 mg total) by mouth 3 (three) times a day, Disp: 90 tablet, Rfl: 1  •  traZODone (DESYREL) 100 mg tablet, Take 1 tablet (100 mg total) by mouth daily at bedtime, Disp: 7 tablet, Rfl: 0  •  traZODone (DESYREL) 50 mg tablet, Take 50 mg by mouth daily at bedtime, Disp: , Rfl:   •  busPIRone (BUSPAR) 10 mg tablet, TAKE 1 TABLET BY MOUTH 3 TIMES A DAY FOR ANXIETY (Patient not taking: Reported on 3/15/2023), Disp: , Rfl:   •  citalopram (CeleXA) 20 mg tablet, Take 20 mg by mouth daily (Patient not taking: Reported on 2/6/2023), Disp: , Rfl:   Food Intake and Lifestyle Assessment   Food Intake Assessment completed via usual diet recall  Breakfast: 0  Snack: 0   Lunch: 1-2pm apple cinnamon oatmeal or HB egg  Snack: occ sandwich and chips or fruit, or soup  Dinner: pasta and meatballs, chicken, potatoes and quinton greens or corn  Snack: yogurt or rice pudding  Beverage intake: water and sweetened beverages  Protein supplement: 0  Estimated protein intake per day: 60g  Estimated fluid intake per day: 70 oz  Meals eaten away from home: once/week-diner on a Sunday  Typical meal pattern: 2 meals per day and 1-2 snacks per day  Eating Behaviors: Consumption of high calorie/ high fat foods and Frequent snacking/ grazing  Food allergies or intolerances: No Known Allergies  Cultural or Caodaism considerations: N/A    Physical Assessment  Physical Activity  Types of exercise: None  Current physical limitations: SOB    Psychosocial Assessment   Support systems: spouse and children  Socioeconomic factors: Lives with  and 24year old, other 3 children out of the house    Nutrition Diagnosis  Diagnosis: Overweight / Obesity (NC-3 3)  Related to: Physical inactivity and Excessive energy intake  As Evidenced by: BMI >25     Nutrition Prescription: Recommend the following diet  Low fat, Low sugar, High protein and Regular    Interventions and Teaching   Discussed pre-op and post-op nutrition guidelines  Patient educated and handouts provided  Surgical changes to stomach / GI  Capacity of post-surgery stomach  Diet progression  Adequate hydration  Sugar and fat restriction to decrease "dumping syndrome"  Fat restriction to decrease steatorrhea  Expected weight loss  Weight loss plateaus/ possibility of weight regain  Exercise  Suggestions for pre-op diet  Nutrition considerations after surgery  Protein supplements  Meal planning and preparation  Appropriate carbohydrate, protein, and fat intake, and food/fluid choices to maximize safe weight loss, nutrient intake, and tolerance   Dietary and lifestyle changes  Possible problems with poor eating habits  Intuitive eating  Techniques for self monitoring and keeping daily food journal  Potential for food intolerance after surgery, and ways to deal with them including: lactose intolerance, nausea, reflux, vomiting, diarrhea, food intolerance, appetite changes, gas  Vitamin / Mineral supplementation of Multivitamin with minerals and Vitamin D  Post-operative pregnancy guidelines    Education provided to: patient    Barriers to learning: No barriers identified  Readiness to change: preparation    Prior research on procedure: discussed with provider    Comprehension: verbalizes understanding     Expected Compliance: good  Recommendations  Pt is an appropriate candidate for surgery   Yes    Evaluation / Monitoring  Dietitian to Monitor: Eating pattern as discussed Body weight Lab values Physical activity    Goals  Eliminate sugar sweetened beverages, Food journal, Complete lession plans 1-6, Eat 3 meals per day, Eliminate mindless snacking and Exercise 20 minutes 3 times/week    Time Spent:   1 Hour

## 2023-03-16 DIAGNOSIS — E66.01 MORBID OBESITY (HCC): Primary | ICD-10-CM

## 2023-03-17 DIAGNOSIS — E66.01 CLASS 3 SEVERE OBESITY DUE TO EXCESS CALORIES WITHOUT SERIOUS COMORBIDITY WITH BODY MASS INDEX (BMI) OF 40.0 TO 44.9 IN ADULT (HCC): ICD-10-CM

## 2023-03-17 DIAGNOSIS — F33.9 DEPRESSION, RECURRENT (HCC): ICD-10-CM

## 2023-03-17 DIAGNOSIS — F31.9 BIPOLAR AFFECTIVE DISORDER, REMISSION STATUS UNSPECIFIED (HCC): ICD-10-CM

## 2023-03-17 RX ORDER — TRAZODONE HYDROCHLORIDE 50 MG/1
50 TABLET ORAL
Qty: 30 TABLET | Refills: 0 | Status: SHIPPED | OUTPATIENT
Start: 2023-03-17 | End: 2023-03-26 | Stop reason: DRUGHIGH

## 2023-03-17 RX ORDER — BUSPIRONE HYDROCHLORIDE 15 MG/1
15 TABLET ORAL 3 TIMES DAILY
Qty: 90 TABLET | Refills: 0 | Status: SHIPPED | OUTPATIENT
Start: 2023-03-17 | End: 2023-04-10

## 2023-03-17 RX ORDER — CLONAZEPAM 0.5 MG/1
0.5 TABLET ORAL 2 TIMES DAILY
Qty: 30 TABLET | Refills: 0 | Status: SHIPPED | OUTPATIENT
Start: 2023-03-17 | End: 2023-05-07 | Stop reason: SDUPTHER

## 2023-03-17 RX ORDER — TOPIRAMATE 100 MG/1
100 TABLET, FILM COATED ORAL DAILY
Qty: 30 TABLET | Refills: 0 | Status: SHIPPED | OUTPATIENT
Start: 2023-03-17 | End: 2023-03-26 | Stop reason: SDUPTHER

## 2023-03-21 ENCOUNTER — TELEPHONE (OUTPATIENT)
Dept: BARIATRICS | Facility: CLINIC | Age: 43
End: 2023-03-21

## 2023-03-21 NOTE — TELEPHONE ENCOUNTER
LVM to R/S appt with LILIANA ARREGUIN [facility] 3/21  Left [address] Office info to call back [phone]

## 2023-03-24 ENCOUNTER — TELEPHONE (OUTPATIENT)
Dept: BARIATRICS | Facility: CLINIC | Age: 43
End: 2023-03-24

## 2023-03-24 NOTE — TELEPHONE ENCOUNTER
LVM to R/S an appt that was missed 3/23 with Trina  Also informed pt with this being the 2 No Show that if she was still considering the program to call and let us know  Left office info to call back [phone]

## 2023-03-26 DIAGNOSIS — E66.01 CLASS 3 SEVERE OBESITY DUE TO EXCESS CALORIES WITHOUT SERIOUS COMORBIDITY WITH BODY MASS INDEX (BMI) OF 40.0 TO 44.9 IN ADULT (HCC): ICD-10-CM

## 2023-03-26 DIAGNOSIS — F31.9 BIPOLAR AFFECTIVE DISORDER, REMISSION STATUS UNSPECIFIED (HCC): Primary | ICD-10-CM

## 2023-03-26 RX ORDER — TOPIRAMATE 100 MG/1
100 TABLET, FILM COATED ORAL 3 TIMES DAILY
Qty: 90 TABLET | Refills: 2 | Status: SHIPPED | OUTPATIENT
Start: 2023-03-26 | End: 2023-04-20

## 2023-03-26 RX ORDER — TRAZODONE HYDROCHLORIDE 100 MG/1
100 TABLET ORAL
Qty: 30 TABLET | Refills: 2 | Status: SHIPPED | OUTPATIENT
Start: 2023-03-26 | End: 2023-06-21

## 2023-03-28 ENCOUNTER — TELEPHONE (OUTPATIENT)
Dept: BARIATRICS | Facility: CLINIC | Age: 43
End: 2023-03-28

## 2023-03-28 NOTE — TELEPHONE ENCOUNTER
SHAHAB to see if patient could come in at 11:00 and not 11:30 to see Nancy to complete her SW visit from Sarah Carlton

## 2023-03-29 ENCOUNTER — PATIENT MESSAGE (OUTPATIENT)
Dept: BARIATRICS | Facility: CLINIC | Age: 43
End: 2023-03-29

## 2023-04-19 ENCOUNTER — HOSPITAL ENCOUNTER (EMERGENCY)
Facility: HOSPITAL | Age: 43
Discharge: HOME | End: 2023-04-20
Attending: EMERGENCY MEDICINE

## 2023-04-19 ENCOUNTER — APPOINTMENT (EMERGENCY)
Dept: CT IMAGING | Facility: HOSPITAL | Age: 43
End: 2023-04-19

## 2023-04-19 ENCOUNTER — APPOINTMENT (EMERGENCY)
Dept: RADIOLOGY | Facility: HOSPITAL | Age: 43
End: 2023-04-19

## 2023-04-19 DIAGNOSIS — R07.9 CHEST PAIN, UNSPECIFIED: Primary | ICD-10-CM

## 2023-04-19 LAB
ALBUMIN SERPL BCP-MCNC: 3.7 G/DL (ref 3.5–5)
ALP SERPL-CCNC: 49 U/L (ref 34–104)
ALT SERPL W P-5'-P-CCNC: 15 U/L (ref 7–52)
ANION GAP SERPL CALCULATED.3IONS-SCNC: 8 MMOL/L (ref 4–13)
APTT PPP: 30 SECONDS (ref 23–37)
AST SERPL W P-5'-P-CCNC: 16 U/L (ref 13–39)
BASOPHILS # BLD AUTO: 0.07 THOUSANDS/ΜL (ref 0–0.1)
BASOPHILS NFR BLD AUTO: 1 % (ref 0–1)
BILIRUB SERPL-MCNC: 0.35 MG/DL (ref 0.2–1)
BUN SERPL-MCNC: 10 MG/DL (ref 5–25)
CALCIUM SERPL-MCNC: 8.7 MG/DL (ref 8.4–10.2)
CARDIAC TROPONIN I PNL SERPL HS: 3 NG/L
CHLORIDE SERPL-SCNC: 112 MMOL/L (ref 96–108)
CO2 SERPL-SCNC: 19 MMOL/L (ref 21–32)
CREAT SERPL-MCNC: 0.91 MG/DL (ref 0.6–1.3)
D DIMER PPP FEU-MCNC: 1.08 UG/ML FEU
EOSINOPHIL # BLD AUTO: 0.32 THOUSAND/ΜL (ref 0–0.61)
EOSINOPHIL NFR BLD AUTO: 4 % (ref 0–6)
ERYTHROCYTE [DISTWIDTH] IN BLOOD BY AUTOMATED COUNT: 14.9 % (ref 11.6–15.1)
GFR SERPL CREATININE-BSD FRML MDRD: 76 ML/MIN/1.73SQ M
GLUCOSE SERPL-MCNC: 89 MG/DL (ref 65–140)
HCT VFR BLD AUTO: 39.6 % (ref 34.8–46.1)
HGB BLD-MCNC: 12.5 G/DL (ref 11.5–15.4)
IMM GRANULOCYTES # BLD AUTO: 0.02 THOUSAND/UL (ref 0–0.2)
IMM GRANULOCYTES NFR BLD AUTO: 0 % (ref 0–2)
INR PPP: 1.19 (ref 0.84–1.19)
LYMPHOCYTES # BLD AUTO: 2.72 THOUSANDS/ΜL (ref 0.6–4.47)
LYMPHOCYTES NFR BLD AUTO: 34 % (ref 14–44)
MCH RBC QN AUTO: 29.8 PG (ref 26.8–34.3)
MCHC RBC AUTO-ENTMCNC: 31.6 G/DL (ref 31.4–37.4)
MCV RBC AUTO: 94 FL (ref 82–98)
MONOCYTES # BLD AUTO: 0.84 THOUSAND/ΜL (ref 0.17–1.22)
MONOCYTES NFR BLD AUTO: 11 % (ref 4–12)
NEUTROPHILS # BLD AUTO: 4 THOUSANDS/ΜL (ref 1.85–7.62)
NEUTS SEG NFR BLD AUTO: 50 % (ref 43–75)
NRBC BLD AUTO-RTO: 0 /100 WBCS
PLATELET # BLD AUTO: 220 THOUSANDS/UL (ref 149–390)
PMV BLD AUTO: 11.3 FL (ref 8.9–12.7)
POTASSIUM SERPL-SCNC: 3.6 MMOL/L (ref 3.5–5.3)
PROT SERPL-MCNC: 6.8 G/DL (ref 6.4–8.4)
PROTHROMBIN TIME: 15.4 SECONDS (ref 11.6–14.5)
RBC # BLD AUTO: 4.2 MILLION/UL (ref 3.81–5.12)
SODIUM SERPL-SCNC: 139 MMOL/L (ref 135–147)
WBC # BLD AUTO: 7.97 THOUSAND/UL (ref 4.31–10.16)

## 2023-04-19 RX ORDER — DIPHENHYDRAMINE HYDROCHLORIDE 50 MG/ML
25 INJECTION INTRAMUSCULAR; INTRAVENOUS ONCE
Status: COMPLETED | OUTPATIENT
Start: 2023-04-19 | End: 2023-04-19

## 2023-04-19 RX ORDER — METOCLOPRAMIDE HYDROCHLORIDE 5 MG/ML
10 INJECTION INTRAMUSCULAR; INTRAVENOUS ONCE
Status: COMPLETED | OUTPATIENT
Start: 2023-04-19 | End: 2023-04-19

## 2023-04-19 RX ORDER — KETOROLAC TROMETHAMINE 30 MG/ML
15 INJECTION, SOLUTION INTRAMUSCULAR; INTRAVENOUS ONCE
Status: COMPLETED | OUTPATIENT
Start: 2023-04-19 | End: 2023-04-19

## 2023-04-19 RX ADMIN — DIPHENHYDRAMINE HYDROCHLORIDE 25 MG: 50 INJECTION, SOLUTION INTRAMUSCULAR; INTRAVENOUS at 22:05

## 2023-04-19 RX ADMIN — KETOROLAC TROMETHAMINE 15 MG: 30 INJECTION, SOLUTION INTRAMUSCULAR; INTRAVENOUS at 22:05

## 2023-04-19 RX ADMIN — IOHEXOL 100 ML: 350 INJECTION, SOLUTION INTRAVENOUS at 23:54

## 2023-04-19 RX ADMIN — METOCLOPRAMIDE 10 MG: 5 INJECTION, SOLUTION INTRAMUSCULAR; INTRAVENOUS at 22:05

## 2023-04-19 RX ADMIN — SODIUM CHLORIDE 1000 ML: 0.9 INJECTION, SOLUTION INTRAVENOUS at 22:05

## 2023-04-19 NOTE — Clinical Note
Alyssa Saúl was seen and treated in our emergency department on 4/19/2023  Diagnosis:     Robyn Dawson  may return to work on return date  She may return on this date: 04/24/2023         If you have any questions or concerns, please don't hesitate to call        Prabhjot Mendenhall PA-C    ______________________________           _______________          _______________  Hospital Representative                              Date                                Time

## 2023-04-20 VITALS
HEART RATE: 57 BPM | BODY MASS INDEX: 41.69 KG/M2 | WEIGHT: 227.96 LBS | RESPIRATION RATE: 16 BRPM | TEMPERATURE: 97.5 F | SYSTOLIC BLOOD PRESSURE: 135 MMHG | OXYGEN SATURATION: 97 % | DIASTOLIC BLOOD PRESSURE: 80 MMHG

## 2023-04-20 LAB
2HR DELTA HS TROPONIN: 1 NG/L
ATRIAL RATE: 72 BPM
CARDIAC TROPONIN I PNL SERPL HS: 4 NG/L
P AXIS: 66 DEGREES
PR INTERVAL: 144 MS
QRS AXIS: 11 DEGREES
QRSD INTERVAL: 84 MS
QT INTERVAL: 424 MS
QTC INTERVAL: 464 MS
T WAVE AXIS: 49 DEGREES
VENTRICULAR RATE: 72 BPM

## 2023-04-20 RX ORDER — KETOROLAC TROMETHAMINE 30 MG/ML
15 INJECTION, SOLUTION INTRAMUSCULAR; INTRAVENOUS ONCE
Status: COMPLETED | OUTPATIENT
Start: 2023-04-20 | End: 2023-04-20

## 2023-04-20 RX ORDER — DEXAMETHASONE SODIUM PHOSPHATE 10 MG/ML
8 INJECTION, SOLUTION INTRAMUSCULAR; INTRAVENOUS ONCE
Status: COMPLETED | OUTPATIENT
Start: 2023-04-20 | End: 2023-04-20

## 2023-04-20 RX ORDER — MAGNESIUM SULFATE 1 G/100ML
1 INJECTION INTRAVENOUS ONCE
Status: COMPLETED | OUTPATIENT
Start: 2023-04-20 | End: 2023-04-20

## 2023-04-20 RX ADMIN — KETOROLAC TROMETHAMINE 15 MG: 30 INJECTION, SOLUTION INTRAMUSCULAR; INTRAVENOUS at 00:42

## 2023-04-20 RX ADMIN — MAGNESIUM SULFATE 1 G: 1 INJECTION INTRAVENOUS at 00:42

## 2023-04-20 RX ADMIN — DEXAMETHASONE SODIUM PHOSPHATE 8 MG: 10 INJECTION, SOLUTION INTRAMUSCULAR; INTRAVENOUS at 00:42

## 2023-04-20 NOTE — ED PROVIDER NOTES
"History  Chief Complaint   Patient presents with   • Pain With Breathing     Pt reports that since yesterday she has been getting chest pain while coughing and breathing  \"I have asthma, so it might be that too  \"     Jenna Brand is a 61-year-old female with past medical history including asthma and anxiety arriving ambulatory to the emergency department for evaluation with chief complaint of chest pain  Patient reports episodes of heaviness in the center of her chest, and pain with deep inhalation, since inhalation  She states that she developed a migraine yesterday around similar time of onset for her chest pain  Admits to history of similar migraine headaches in the past, denying headache of sudden onset or of maximal intensity  She denies any chest pain with activity or exertion  She finds that these episodes occur most often while she is at rest, without any appreciable provoking factors  She has not appreciated any chest tightness or wheezing  Denies congestion, cough, fevers, chills, sweats, recent illness or sick contact  She does not find that the symptoms are consistent with prior asthma exacerbations  She denies shortness of breath, exertional dyspnea, calf pain or leg swelling, recent travel, recent surgical procedures, exogenous estrogen use, personal or family history of DVT/PE  She denies history of similar symptoms in the past   She notes that she was started on BuSpar today in discussion with her psychiatrist but otherwise denies any recent medication changes or adjustments  She has no history of hypertension, hyperlipidemia, diabetes, tobacco use or smoking history  She has no significant personal or family history of CAD  She offers no other complaints or concerns at this time  Prior to Admission Medications   Prescriptions Last Dose Informant Patient Reported? Taking?    albuterol (PROVENTIL HFA,VENTOLIN HFA) 90 mcg/act inhaler   No No   Sig: TAKE 2 PUFFS BY MOUTH EVERY 6 HOURS " AS NEEDED FOR WHEEZE OR FOR SHORTNESS OF BREATH   busPIRone (BUSPAR) 10 mg tablet   Yes No   Sig: TAKE 1 TABLET BY MOUTH 3 TIMES A DAY FOR ANXIETY   Patient not taking: Reported on 3/15/2023   busPIRone (BUSPAR) 15 mg tablet   No No   Sig: TAKE 1 TABLET BY MOUTH 3 TIMES A DAY  citalopram (CeleXA) 20 mg tablet   Yes No   Sig: Take 20 mg by mouth daily   Patient not taking: Reported on 2/6/2023   clonazePAM (KlonoPIN) 0 5 mg tablet   No No   Sig: Take 1 tablet (0 5 mg total) by mouth 2 (two) times a day   divalproex sodium (DEPAKOTE) 500 mg EC tablet   No No   Sig: Take 1 tablet (total 500 mg) by mouth in AM and take 1/2 tablet (total 250 mg) by mouth at bedtime  ipratropium-albuterol (DUO-NEB) 0 5-2 5 mg/3 mL nebulizer solution   No No   Sig: INHALE 3 ML BY NEBULIZATION EVERY 6 HOURS AS NEEDED FOR WHEEZE OR FOR SHORTNESS OF BREATH   omeprazole (PriLOSEC) 20 mg delayed release capsule   No No   Sig: TAKE 1 CAPSULE BY MOUTH EVERY DAY   sucralfate (CARAFATE) 1 g/10 mL suspension   No No   Sig: Take 10 mL (1 g total) by mouth 4 (four) times a day (with meals and at bedtime)   traZODone (DESYREL) 100 mg tablet   No No   Sig: Take 1 tablet (100 mg total) by mouth daily at bedtime      Facility-Administered Medications: None       Past Medical History:   Diagnosis Date   • Anxiety    • Anxiety and depression    • Asthma     has not req'd tx since 2016   • Bipolar disorder ([facility] [address] )    • Chlamydia 2000   • Depression    • Fatty liver    • GERD (gastroesophageal reflux disease)     no meds   • History of transfusion    • Liver disease    • Migraine     manages with OTC remedies       Past Surgical History:   Procedure Laterality Date   • NASAL/SINUS ENDOSCOPY Right 06/25/2020    Procedure: FUNCTIONAL ENDOSCOPIC SINUS SURGERY WITH RIGHT MAXILLARY ANTROSTOMY REMOVALOF CYST, RIGHT SPHENOIDOTOMY, RIGHT POLYPECTOMY (LARGE ANTRAL CHOANAL POLYP)  ;  Surgeon: Ita Hines MD;  Location: [address] [place] [address];  Service: ENT   • MO LAPAROSCOPY W/RMVL ADNEXAL STRUCTURES Right 10/10/2018    Procedure: SALPINGECTOMY, LAPAROSCOPIC;  Surgeon: Valdemar Cheung MD;  Location: [place] [facility];  Service: Gynecology   • SALPINGOSTOMY  2015    tubal reversal   • SCALP EXCISION      x4   • TUBAL LIGATION  2002   • TUBAL REANASTOMOSIS     • WISDOM TOOTH EXTRACTION  1998       Family History   Problem Relation Age of Onset   • Cancer Maternal Aunt         lung   • Breast cancer Neg Hx      I have reviewed and agree with the history as documented  E-Cigarette/Vaping   • E-Cigarette Use Never User      E-Cigarette/Vaping Substances   • Nicotine No    • THC No    • CBD No    • Flavoring No      Social History     Tobacco Use   • Smoking status: Never   • Smokeless tobacco: Never   Vaping Use   • Vaping Use: Never used   Substance Use Topics   • Alcohol use: Not Currently   • Drug use: Not Currently     Types: Marijuana     Comment: last used marijuana 2014       Review of Systems   Constitutional: Negative for diaphoresis and fatigue  HENT: Negative for congestion  Respiratory: Negative for cough, chest tightness, shortness of breath and wheezing  Cardiovascular: Positive for chest pain  Gastrointestinal: Negative for nausea  Musculoskeletal: Negative for back pain, neck pain and neck stiffness  Neurological: Positive for headaches  Negative for light-headedness  All other systems reviewed and are negative  Physical Exam  Physical Exam  Vitals and nursing note reviewed  Constitutional:       General: She is not in acute distress  Appearance: Normal appearance  She is well-developed  She is not ill-appearing, toxic-appearing or diaphoretic  HENT:      Head: Normocephalic and atraumatic  Right Ear: External ear normal       Left Ear: External ear normal    Eyes:      Conjunctiva/sclera: Conjunctivae normal    Cardiovascular:      Rate and Rhythm: Normal rate and regular rhythm  Pulses: Normal pulses     Pulmonary:      Effort: Pulmonary effort is normal  No respiratory distress  Breath sounds: Normal breath sounds  No wheezing, rhonchi or rales  Chest:      Chest wall: Tenderness (Tenderness upon palpation to the left anterior chest wall) present  Musculoskeletal:         General: Normal range of motion  Cervical back: Normal range of motion and neck supple  Right lower leg: No edema  Left lower leg: No edema  Skin:     General: Skin is warm and dry  Capillary Refill: Capillary refill takes less than 2 seconds  Neurological:      Mental Status: She is alert and oriented to person, place, and time  GCS: GCS eye subscore is 4  GCS verbal subscore is 5  GCS motor subscore is 6  Sensory: Sensation is intact  Motor: Motor function is intact  No weakness  Gait: Gait is intact           Vital Signs  ED Triage Vitals   Temperature Pulse Respirations Blood Pressure SpO2   04/19/23 2138 04/19/23 2135 04/19/23 2135 04/19/23 2135 04/19/23 2135   97 5 °F (36 4 °C) 70 15 132/74 98 %      Temp Source Heart Rate Source Patient Position - Orthostatic VS BP Location FiO2 (%)   04/19/23 2138 04/19/23 2135 04/19/23 2135 04/19/23 2135 --   Tympanic Monitor Sitting Left arm       Pain Score       --                  Vitals:    04/19/23 2135 04/20/23 0209   BP: 132/74 135/80   Pulse: 70 57   Patient Position - Orthostatic VS: Sitting Lying         Visual Acuity      ED Medications  Medications   sodium chloride 0 9 % bolus 1,000 mL (0 mL Intravenous Stopped 4/19/23 2343)   ketorolac (TORADOL) injection 15 mg (15 mg Intravenous Given 4/19/23 2205)   metoclopramide (REGLAN) injection 10 mg (10 mg Intravenous Given 4/19/23 2205)   diphenhydrAMINE (BENADRYL) injection 25 mg (25 mg Intravenous Given 4/19/23 2205)   iohexol (OMNIPAQUE) 350 MG/ML injection (SINGLE-DOSE) 100 mL (100 mL Intravenous Given 4/19/23 2354)   dexamethasone (PF) (DECADRON) injection 8 mg (8 mg Intravenous Given 4/20/23 0042)   ketorolac (TORADOL) injection 15 mg (15 mg Intravenous Given 4/20/23 0042)   magnesium sulfate IVPB (premix) SOLN 1 g (0 g Intravenous Stopped 4/20/23 0137)       Diagnostic Studies  Results Reviewed     Procedure Component Value Units Date/Time    HS Troponin I 2hr [[id number]]  (Normal) Collected: 04/19/23 2351    Lab Status: Final result Specimen: Blood from Arm, Right Updated: 04/20/23 0122     hs TnI 2hr 4 ng/L      Delta 2hr hsTnI 1 ng/L     D-dimer, quantitative [[id number]]  (Abnormal) Collected: 04/19/23 2156    Lab Status: Final result Specimen: Blood from Arm, Right Updated: 04/19/23 2359     D-Dimer, Quant 1 08 ug/ml FEU     HS Troponin 0hr (reflex protocol) [[id number]]  (Normal) Collected: 04/19/23 2156    Lab Status: Final result Specimen: Blood from Arm, Right Updated: 04/19/23 2231     hs TnI 0hr 3 ng/L     Comprehensive metabolic panel [[id number]]  (Abnormal) Collected: 04/19/23 2156    Lab Status: Final result Specimen: Blood from Arm, Right Updated: 04/19/23 2224     Sodium 139 mmol/L      Potassium 3 6 mmol/L      Chloride 112 mmol/L      CO2 19 mmol/L      ANION GAP 8 mmol/L      BUN 10 mg/dL      Creatinine 0 91 mg/dL      Glucose 89 mg/dL      Calcium 8 7 mg/dL      AST 16 U/L      ALT 15 U/L      Alkaline Phosphatase 49 U/L      Total Protein 6 8 g/dL      Albumin 3 7 g/dL      Total Bilirubin 0 35 mg/dL      eGFR 76 ml/min/1 73sq m     Narrative:      Alan guidelines for Chronic Kidney Disease (CKD):   •  Stage 1 with normal or high GFR (GFR > 90 mL/min/1 73 square meters)  •  Stage 2 Mild CKD (GFR = 60-89 mL/min/1 73 square meters)  •  Stage 3A Moderate CKD (GFR = 45-59 mL/min/1 73 square meters)  •  Stage 3B Moderate CKD (GFR = 30-44 mL/min/1 73 square meters)  •  Stage 4 Severe CKD (GFR = 15-29 mL/min/1 73 square meters)  •  Stage 5 End Stage CKD (GFR <15 mL/min/1 73 square meters)  Note: GFR calculation is accurate only with a steady state creatinine    APTT [[id number]] (Normal) Collected: 04/19/23 2156    Lab Status: Final result Specimen: Blood from Arm, Right Updated: 04/19/23 2222     PTT 30 seconds     Protime-INR [[id number]]  (Abnormal) Collected: 04/19/23 2156    Lab Status: Final result Specimen: Blood from Arm, Right Updated: 04/19/23 2222     Protime 15 4 seconds      INR 1 19    CBC and differential [[id number]] Collected: 04/19/23 2156    Lab Status: Final result Specimen: Blood from Arm, Right Updated: 04/19/23 2207     WBC 7 97 Thousand/uL      RBC 4 20 Million/uL      Hemoglobin 12 5 g/dL      Hematocrit 39 6 %      MCV 94 fL      MCH 29 8 pg      MCHC 31 6 g/dL      RDW 14 9 %      MPV 11 3 fL      Platelets 977 Thousands/uL      nRBC 0 /100 WBCs      Neutrophils Relative 50 %      Immat GRANS % 0 %      Lymphocytes Relative 34 %      Monocytes Relative 11 %      Eosinophils Relative 4 %      Basophils Relative 1 %      Neutrophils Absolute 4 00 Thousands/µL      Immature Grans Absolute 0 02 Thousand/uL      Lymphocytes Absolute 2 72 Thousands/µL      Monocytes Absolute 0 84 Thousand/µL      Eosinophils Absolute 0 32 Thousand/µL      Basophils Absolute 0 07 Thousands/µL                  CTA ED chest PE Study   Final Result by Clint Baker MD (04/20 0046)      No evidence of acute pulmonary embolus, thoracic aortic aneurysm or dissection  No acute cardiopulmonary process  Workstation performed: [id number]         XR chest pa & lateral   Final Result by Rianna Ray MD (04/20 0540)      No acute cardiopulmonary disease        Findings are stable            Workstation performed: [id number]                    Procedures  ECG 12 Lead Documentation Only    Date/Time: 4/19/2023 9:38 PM  Performed by: Benitez Portillo PA-C  Authorized by: Benitez Portillo PA-C     Indications / Diagnosis:  Chest pain  ECG reviewed by me, the ED Provider: yes    Patient location:  ED  Quality:     Tracing quality:  Limited by artifact  Rate:     ECG rate: 72    ECG rate assessment: normal    Rhythm:     Rhythm: sinus rhythm    Ectopy:     Ectopy: none    QRS:     QRS axis:  Normal    QRS intervals:  Normal  Comments:      No ischemic ST/T wave changes             ED Course             HEART Risk Score    Flowsheet Row Most Recent Value   Heart Score Risk Calculator    History 0 Filed at: 04/19/2023 2301   ECG 0 Filed at: 04/19/2023 2301   Age 0 Filed at: 04/19/2023 2301   Risk Factors 0 Filed at: 04/19/2023 2301   Troponin 0 Filed at: 04/19/2023 2301   HEART Score 0 Filed at: 04/19/2023 2301                PERC Rule for PE    Flowsheet Row Most Recent Value   PERC Rule for PE    Age >=50 0 Filed at: 04/19/2023 2327   HR >=100 0 Filed at: 04/19/2023 2327   O2 Sat on room air < 95% 0 Filed at: 04/19/2023 2327   History of PE or DVT 0 Filed at: 04/19/2023 2327   Recent trauma or surgery 0 Filed at: 04/19/2023 2327   Hemoptysis 0 Filed at: 04/19/2023 2327   Exogenous estrogen 0 Filed at: 04/19/2023 2327   Unilateral leg swelling 0 Filed at: 04/19/2023 2327   PERC Rule for PE Results 0 Filed at: 04/19/2023 2327              SBIRT 22yo+    Flowsheet Row Most Recent Value   Initial Alcohol Screen: US AUDIT-C     1  How often do you have a drink containing alcohol? 0 Filed at: 04/19/2023 2134   2  How many drinks containing alcohol do you have on a typical day you are drinking? 0 Filed at: 04/19/2023 2134   3a  Male UNDER 65: How often do you have five or more drinks on one occasion? 0 Filed at: 04/19/2023 2134   3b  FEMALE Any Age, or MALE 65+: How often do you have 4 or more drinks on one occassion? 0 Filed at: 04/19/2023 2134   Audit-C Score 0 Filed at: 04/19/2023 2134   NEHA: How many times in the past year have you    Used an illegal drug or used a prescription medication for non-medical reasons?  Never Filed at: 04/19/2023 2134                    Medical Decision Making  This is a 49-year-old female arriving ambulatory to the emergency department for evaluation with chief complaint of chest pain  Reports episodes of heaviness in the center of her chest and pain with deep inhalation since yesterday  These episodes occur most often while at rest, with the patient denying any chest pain upon exertion  She denies shortness of breath, exertional dyspnea, calf pain or leg swelling  She has no cardiac or PE risk factors  She does not find however that her symptoms are similar to prior exacerbations of her asthma  Differential diagnosis includes but is not limited to: ACS/anginal equivalent, arrhythmia, PE, pneumothorax, asthma exacerbation, allergies, musculoskeletal pain, anxiety    Initial ED plan: ECG, cardiac labs to include D-dimer, chest x-ray    Final ED Assessment: Vital signs reviewed on ED presentation, examination as above which is notable for reproducible chest wall tenderness  All labs and imaging independently reviewed with imaging interpreted by the Radiologist   ECG is without ischemic changes, troponin within normal limits  Heart score 0  D-dimer elevated, and CTA chest obtained which reports no evidence of PE, thoracic aortic aneurysm or dissection  Test results reviewed with patient at bedside  Reported improvement of symptoms following treatments during ED course  Ambulatory pulse ox obtained, with O2 sat of 99% on room air  Patient's stable vitals, examination, and negative work-up today are reassuring  Discussed possibility of musculoskeletal etiology/anxiety, recommending continued supportive care including gentle stretching and avoiding heavy lifting and overexertion, Tylenol and ibuprofen as needed for pain relief  Recommended consideration of outpatient cardiology and pulmonology follow-up if symptoms continue which were included in the patient's discharge paperwork  Parameters for ED return discussed at length  Patient verbalized understanding and was comfortable and agreeable with disposition and care plan    She was discharged in stable condition and had ambulated independently from the emergency department today without issue  Chest pain, unspecified: acute illness or injury  Amount and/or Complexity of Data Reviewed  Labs: ordered  Radiology: ordered  ECG/medicine tests: ordered  Risk  OTC drugs  Disposition  Final diagnoses:   Chest pain, unspecified     Time reflects when diagnosis was documented in both MDM as applicable and the Disposition within this note     Time User Action Codes Description Comment    4/20/2023  2:22 AM Chilo Morton Add [R07 9] Chest pain, unspecified       ED Disposition     ED Disposition   Discharge    Condition   Stable    Date/Time   Thu Apr 20, 2023  2:22 AM    Comment   Sonu Manuel discharge to home/self care                 Follow-up Information     Follow up With Specialties Details Why Contact Info Additional Information    [facility]   [address]  [address] [facility]  [place]   [address]  [phone]  [phone]       [facility] Cardiology   [facility] [address]  [address], [address] [facility], [place], [place], [id number] [phone]    [address] Pulmonology   [address]  [place], [place], [address]  [facility] Heart Emergency Department Emergency Medicine  If symptoms worsen [address] [address]  [facility] Heart Emergency Department          Discharge Medication List as of 4/20/2023  2:43 AM      CONTINUE these medications which have NOT CHANGED    Details   albuterol (PROVENTIL HFA,VENTOLIN HFA) 90 mcg/act inhaler TAKE 2 PUFFS BY MOUTH EVERY 6 HOURS AS NEEDED FOR WHEEZE OR FOR SHORTNESS OF BREATH, Normal      !! busPIRone (BUSPAR) 10 mg tablet TAKE 1 TABLET BY MOUTH 3 TIMES A DAY FOR ANXIETY, Historical Med      !! busPIRone (BUSPAR) 15 mg tablet TAKE 1 TABLET BY MOUTH 3 TIMES A DAY , Normal      citalopram (CeleXA) 20 mg tablet Take 20 mg by mouth daily, Historical Med      clonazePAM (KlonoPIN) 0 5 mg tablet Take 1 tablet (0 5 mg total) by mouth 2 (two) times a day, Starting Fri 3/17/2023, Normal      divalproex sodium (DEPAKOTE) 500 mg EC tablet Take 1 tablet (total 500 mg) by mouth in AM and take 1/2 tablet (total 250 mg) by mouth at bedtime , Normal      ipratropium-albuterol (DUO-NEB) 0 5-2 5 mg/3 mL nebulizer solution INHALE 3 ML BY NEBULIZATION EVERY 6 HOURS AS NEEDED FOR WHEEZE OR FOR SHORTNESS OF BREATH, Normal      omeprazole (PriLOSEC) 20 mg delayed release capsule TAKE 1 CAPSULE BY MOUTH EVERY DAY, Normal      sucralfate (CARAFATE) 1 g/10 mL suspension Take 10 mL (1 g total) by mouth 4 (four) times a day (with meals and at bedtime), Starting Mon 2/6/2023, Normal      traZODone (DESYREL) 100 mg tablet Take 1 tablet (100 mg total) by mouth daily at bedtime, Starting Sun 3/26/2023, Normal      topiramate (Topamax) 100 mg tablet Take 1 tablet (100 mg total) by mouth 3 (three) times a day, Starting Sun 3/26/2023, Normal       !! - Potential duplicate medications found  Please discuss with provider  No discharge procedures on file      PDMP Review       Value Time User    PDMP Reviewed  Yes 3/26/2023 11:48 PM Vargas Alston PA-C          ED Provider  Electronically Signed by           Santi Hendricks PA-C  04/22/23 2631

## 2023-04-20 NOTE — DISCHARGE INSTRUCTIONS
Recommend outpatient Cardiology and Pulmonology follow up  Return to the ED with any new or worsening symptoms as discussed

## 2023-04-20 NOTE — ED NOTES
Patient ambulated length of hallway with continuous pulse oximetry - room air saturation with walking - 99 - 100% but patient did c/o shortness of breath with chest discomfort upon return to room  Provider made aware  Patient also stated that she was hungry and sandwich was provided per okay of provider       Luis Armando Verdin RN  04/20/23 2419

## 2023-05-09 ENCOUNTER — OFFICE VISIT (OUTPATIENT)
Dept: FAMILY MEDICINE CLINIC | Facility: CLINIC | Age: 43
End: 2023-05-09

## 2023-05-09 ENCOUNTER — HOSPITAL ENCOUNTER (OUTPATIENT)
Dept: RADIOLOGY | Facility: HOSPITAL | Age: 43
Discharge: HOME | End: 2023-05-09

## 2023-05-09 VITALS
OXYGEN SATURATION: 98 % | DIASTOLIC BLOOD PRESSURE: 70 MMHG | HEART RATE: 68 BPM | BODY MASS INDEX: 38.46 KG/M2 | RESPIRATION RATE: 18 BRPM | HEIGHT: 62 IN | SYSTOLIC BLOOD PRESSURE: 112 MMHG | TEMPERATURE: 97.8 F | WEIGHT: 209 LBS

## 2023-05-09 DIAGNOSIS — M79.672 LEFT FOOT PAIN: Primary | ICD-10-CM

## 2023-05-09 DIAGNOSIS — M79.672 LEFT FOOT PAIN: ICD-10-CM

## 2023-05-09 RX ORDER — IBUPROFEN 400 MG/1
400 TABLET ORAL EVERY 6 HOURS PRN
Qty: 28 TABLET | Refills: 0 | Status: SHIPPED | OUTPATIENT
Start: 2023-05-09 | End: 2023-05-16

## 2023-05-09 NOTE — PROGRESS NOTES
Name: Kadie Bonilla      : 1980      MRN: [id number]  Encounter Provider: Rhiannon Dubois MD  Encounter Date: 2023   Encounter department: [facility]     1  Left foot pain  Assessment & Plan:  · Pain started 5 days ago  Pain is mainly in the medial longitudinal arch  Reports wearing new shoes 2 days prior symptoms started  · Unable to walk with foot planted  Walks bearing weight in the lateral aspect of the left foot  Denies trauma  · First time experiencing these symptoms  · Pain worsen when walking  Denies that it is worse in the morning  · Took ibuprofen twice a day with improvement  Stopped taking it because she run out of it  · No fever, rashes or lesion in the local  · PE: Tenderness in medial longitudinal arch,  3-5 metatarsus and dorsal lateral aspect of the foot  Plan:  · Continue taking ibuprofen 400 mg q 6 hours PRN  Advised to take with food  · Massage with ice  · Stretching exercise (demonstration and written material provided)  · L foot x-ray to rule out stress fracture    Orders:  -     XR foot 3+ vw left; Future; Expected date: 2023  -     ibuprofen (MOTRIN) 400 mg tablet; Take 1 tablet (400 mg total) by mouth every 6 (six) hours as needed for mild pain for up to 7 days         Subjective      Alanna Chavarria is a 38 y o female who presents today for evaluation of left foot pain present for the past 5 days  Review of Systems   Constitutional: Negative for chills and fever  HENT: Negative for ear pain and sore throat  Eyes: Negative for pain and visual disturbance  Respiratory: Negative for cough and shortness of breath  Cardiovascular: Negative for chest pain and palpitations  Gastrointestinal: Negative for abdominal pain and vomiting  Genitourinary: Negative for dysuria and hematuria  Musculoskeletal: Negative for arthralgias and back pain          Left foot pain   Skin: Negative for color change and "rash    Neurological: Negative for seizures and syncope  All other systems reviewed and are negative  Current Outpatient Medications on File Prior to Visit   Medication Sig   • clonazePAM (KlonoPIN) 0 5 mg tablet Take 1 tablet (0 5 mg total) by mouth 2 (two) times a day   • albuterol (PROVENTIL HFA,VENTOLIN HFA) 90 mcg/act inhaler TAKE 2 PUFFS BY MOUTH EVERY 6 HOURS AS NEEDED FOR WHEEZE OR FOR SHORTNESS OF BREATH   • busPIRone (BUSPAR) 10 mg tablet TAKE 1 TABLET BY MOUTH 3 TIMES A DAY FOR ANXIETY (Patient not taking: Reported on 3/15/2023)   • busPIRone (BUSPAR) 15 mg tablet TAKE 1 TABLET BY MOUTH 3 TIMES A DAY  • citalopram (CeleXA) 20 mg tablet Take 20 mg by mouth daily (Patient not taking: Reported on 2/6/2023)   • divalproex sodium (DEPAKOTE) 500 mg EC tablet Take 1 tablet (total 500 mg) by mouth in AM and take 1/2 tablet (total 250 mg) by mouth at bedtime  • ipratropium-albuterol (DUO-NEB) 0 5-2 5 mg/3 mL nebulizer solution INHALE 3 ML BY NEBULIZATION EVERY 6 HOURS AS NEEDED FOR WHEEZE OR FOR SHORTNESS OF BREATH   • omeprazole (PriLOSEC) 20 mg delayed release capsule TAKE 1 CAPSULE BY MOUTH EVERY DAY   • sucralfate (CARAFATE) 1 g/10 mL suspension Take 10 mL (1 g total) by mouth 4 (four) times a day (with meals and at bedtime)   • topiramate (TOPAMAX) 100 mg tablet TAKE 1 TABLET BY MOUTH 3 TIMES A DAY  • traZODone (DESYREL) 100 mg tablet Take 1 tablet (100 mg total) by mouth daily at bedtime       Objective     /70 (BP Location: Left arm, Patient Position: Sitting, Cuff Size: Large)   Pulse 68   Temp 97 8 °F (36 6 °C) (Temporal)   Resp 18   Ht 5' 2\" (1 575 m)   Wt 94 8 kg (209 lb)   LMP 04/17/2023 (Exact Date)   SpO2 98%   BMI 38 23 kg/m²     Physical Exam  Constitutional:       General: She is not in acute distress  Appearance: Normal appearance  She is obese  She is not toxic-appearing  HENT:      Head: Normocephalic and atraumatic        Right Ear: External ear normal       " Left Ear: External ear normal       Nose: Nose normal  No congestion or rhinorrhea  Eyes:      General: No scleral icterus  Conjunctiva/sclera: Conjunctivae normal    Cardiovascular:      Rate and Rhythm: Normal rate and regular rhythm  Pulses: Normal pulses  Heart sounds: Normal heart sounds  No murmur heard  No gallop  Pulmonary:      Effort: Pulmonary effort is normal  No respiratory distress  Breath sounds: Normal breath sounds  No wheezing or rales  Abdominal:      General: Abdomen is flat  There is no distension  Palpations: Abdomen is soft  Tenderness: There is no abdominal tenderness  There is no guarding  Musculoskeletal:         General: Tenderness present  Cervical back: Normal range of motion and neck supple  Right lower leg: No edema  Left lower leg: No edema  Comments: Tenderness in medial longitudinal arch,  3-5 metatarsus and dorsal lateral aspect of the foot   Skin:     General: Skin is warm and dry  Capillary Refill: Capillary refill takes less than 2 seconds  Coloration: Skin is not jaundiced or pale  Neurological:      General: No focal deficit present  Mental Status: She is alert and oriented to person, place, and time     Psychiatric:         Mood and Affect: Mood normal          Behavior: Behavior normal        Jordi Wilson MD

## 2023-05-09 NOTE — PATIENT INSTRUCTIONS
Plantar Fasciitis   WHAT YOU NEED TO KNOW:   Plantar fasciitis is swelling of the plantar fascia  The plantar fascia is a thick band of tissue that connects your heel bone to your toes  This part of your foot helps support the arch of your foot and absorbs shock  DISCHARGE INSTRUCTIONS:   Call your doctor if:   Your pain or swelling suddenly increases  You develop knee, hip, or back pain  You have questions or concerns about your condition or care  Medicines: You may  need any of the following:  Acetaminophen  decreases pain and fever  It is available without a doctor's order  Ask how much to take and how often to take it  Follow directions  Read the labels of all other medicines you are using to see if they also contain acetaminophen, or ask your doctor or pharmacist  Acetaminophen can cause liver damage if not taken correctly  NSAIDs , such as ibuprofen, help decrease swelling, pain, and fever  NSAIDs can cause stomach bleeding or kidney problems in certain people  If you take blood thinner medicine, always ask your healthcare provider if NSAIDs are safe for you  Always read the medicine label and follow directions  Take your medicine as directed  Contact your healthcare provider if you think your medicine is not helping or if you have side effects  Tell your provider if you are allergic to any medicine  Keep a list of the medicines, vitamins, and herbs you take  Include the amounts, and when and why you take them  Bring the list or the pill bottles to follow-up visits  Carry your medicine list with you in case of an emergency  Self-care:   Wear your splint or shoe inserts as directed  You may need to wear a splint at night to keep your foot stretched while you sleep  This will help prevent sharp pain first thing in the morning  Shoe inserts will help decrease stress on your plantar fascia when you walk or exercise  Apply ice on your plantar fascia    Ice helps decrease swelling and pain  Fill a water bottle with water and freeze it  Wrap a towel around the bottle or cover it with a pillow case  Roll the water bottle under your foot for 10 minutes each morning and evening  Massage your plantar fascia as directed  This may help decrease swelling and pain  Roll a golf ball under your foot for 10 minutes  Repeat 3 times each day  Go to physical therapy as directed  A physical therapist teaches you exercises to help improve movement and strength, and to decrease pain  Prevent plantar fasciitis:   Maintain a healthy weight  This will help decrease stress on your feet  Ask your healthcare provider what a healthy weight is for you  Ask him or her to help you create a weight loss plan, if needed  Do low-impact exercises  Low-impact exercises decrease stress on your plantar fascia  Examples include swimming or bicycling  Start new activities slowly  Increase the intensity and time gradually  Wear shoes that fit well and support your arch  Replace your shoes before the padding or shock absorption wears out  Do not walk or  bare feet or sandals for long periods of time  Follow up with your doctor as directed:  Write down your questions so you remember to ask them during your visits  © Copyright [facility] 2022 Information is for End User's use only and may not be sold, redistributed or otherwise used for commercial purposes  The above information is an  only  It is not intended as medical advice for individual conditions or treatments  Talk to your doctor, nurse or pharmacist before following any medical regimen to see if it is safe and effective for you

## 2023-05-09 NOTE — ASSESSMENT & PLAN NOTE
· Pain started 5 days ago  Pain is mainly in the medial longitudinal arch  Reports wearing new shoes 2 days prior symptoms started  · Unable to walk with foot planted  Walks bearing weight in the lateral aspect of the left foot  Denies trauma  · First time experiencing these symptoms  · Pain worsen when walking  Denies that it is worse in the morning  · Took ibuprofen twice a day with improvement  Stopped taking it because she run out of it  · No fever, rashes or lesion in the local  · PE: Tenderness in medial longitudinal arch,  3-5 metatarsus and dorsal lateral aspect of the foot  Plan:  · Continue taking ibuprofen 400 mg q 6 hours PRN   Advised to take with food  · Massage with ice  · Stretching exercise (demonstration and written material provided)  · L foot x-ray to rule out stress fracture

## 2023-05-12 ENCOUNTER — TELEPHONE (OUTPATIENT)
Dept: FAMILY MEDICINE CLINIC | Facility: CLINIC | Age: 43
End: 2023-05-12

## 2023-05-15 ENCOUNTER — OFFICE VISIT (OUTPATIENT)
Dept: FAMILY MEDICINE CLINIC | Facility: CLINIC | Age: 43
End: 2023-05-15

## 2023-05-15 ENCOUNTER — TELEPHONE (OUTPATIENT)
Dept: OTHER | Facility: HOSPITAL | Age: 43
End: 2023-05-15

## 2023-05-15 ENCOUNTER — TELEPHONE (OUTPATIENT)
Dept: FAMILY MEDICINE CLINIC | Facility: CLINIC | Age: 43
End: 2023-05-15

## 2023-05-15 VITALS
BODY MASS INDEX: 38.46 KG/M2 | OXYGEN SATURATION: 99 % | SYSTOLIC BLOOD PRESSURE: 118 MMHG | TEMPERATURE: 97.8 F | WEIGHT: 209 LBS | HEIGHT: 62 IN | HEART RATE: 67 BPM | RESPIRATION RATE: 18 BRPM | DIASTOLIC BLOOD PRESSURE: 70 MMHG

## 2023-05-15 DIAGNOSIS — R06.02 SHORTNESS OF BREATH: Primary | ICD-10-CM

## 2023-05-15 DIAGNOSIS — M79.672 LEFT FOOT PAIN: ICD-10-CM

## 2023-05-15 NOTE — TELEPHONE ENCOUNTER
Patient called today 5/15/2023 and has an issue with her foot  Had a follow up with Beneidct Moncada on 5/9/2023  Patient called today 5/15 after Benedict Moncada had called and was pretty upset because patient kept wanting to ask questions in the visit she had with Benedict Moncada and kept getting cut off and Dr Butts response was follow up with your PCP so then patient started to get upset then asked what about her bunyan on her foot, and Dr Butts response was contact your PCP  So then patient asked about a referral to a foot specialist and Benedict Moncada didn't say to much about it  Patient is requesting to speak with someone

## 2023-05-16 NOTE — PROGRESS NOTES
Assessment/Plan:    1  Shortness of breath  Comments:  Currently asymptomatic  History of asthma  We will have patient complete PFTs and follow-up with PCP  Orders:  -     Complete PFT with post bronchodilator; Future    2  Left foot pain  Comments: We will have patient follow-up with podiatry  Referral made at this time  Could benefit from injection in painful area  Orders:  -     Ambulatory Referral to Podiatry; Future         Subjective:      Patient ID: Mukund Reed is a 38 y o  female  Patient coming in to same-day appointment with foot pain  She has had previous work-up with foot x-ray that had no significant finding  She has point tenderness in the calcaneus area  She would like to see a podiatrist for referral   Patient denies any new trauma though she does report stepping on the glass when she was very young  Patient also has complaint of shortness of breath  Patient was recently worked up in the ED for chest pain  Patient findings was within normal limits  Patient has remote history of asthma but has no PFTs on file  Patient denies any current shortness of breath  Patient SPO2 is greater than 90's  Patient denies smoking tobacco       The following portions of the patient's history were reviewed and updated as appropriate: allergies, current medications, past family history, past medical history, past social history, past surgical history, and problem list     Review of Systems   Constitutional: Negative for chills and fever  HENT: Negative for ear pain and sore throat  Eyes: Negative for pain and visual disturbance  Respiratory: Positive for shortness of breath  Negative for cough  Cardiovascular: Negative for chest pain and palpitations  Gastrointestinal: Negative for abdominal pain and vomiting  Genitourinary: Negative for dysuria and hematuria  Musculoskeletal: Negative for arthralgias and back pain  Skin: Negative for color change and rash  "  Allergic/Immunologic: Negative for environmental allergies  Neurological: Negative for seizures and syncope  All other systems reviewed and are negative  Objective:      /70 (BP Location: Left arm, Patient Position: Sitting, Cuff Size: Large)   Pulse 67   Temp 97 8 °F (36 6 °C) (Temporal)   Resp 18   Ht 5' 2\" (1 575 m)   Wt 94 8 kg (209 lb)   LMP 04/17/2023 (Exact Date)   SpO2 99%   BMI 38 23 kg/m²          Physical Exam  Constitutional:       General: She is not in acute distress  Appearance: Normal appearance  HENT:      Nose: No congestion or rhinorrhea  Eyes:      Extraocular Movements: Extraocular movements intact  Pupils: Pupils are equal, round, and reactive to light  Cardiovascular:      Rate and Rhythm: Normal rate and regular rhythm  Pulses: Normal pulses  Heart sounds: Normal heart sounds  No murmur heard  No gallop  Pulmonary:      Effort: Pulmonary effort is normal       Breath sounds: Normal breath sounds  No wheezing, rhonchi or rales  Abdominal:      General: Bowel sounds are normal  There is no distension  Palpations: Abdomen is soft  There is no mass  Tenderness: There is no abdominal tenderness  Hernia: No hernia is present  Musculoskeletal:        Feet:    Skin:     General: Skin is warm  Coloration: Skin is not jaundiced  Findings: No bruising  Neurological:      General: No focal deficit present  Mental Status: She is alert and oriented to person, place, and time  Psychiatric:         Mood and Affect: Mood normal          Behavior: Behavior normal          Thought Content:  Thought content normal            Tiffany Rjaput DO   Family Medicine PGY-2  5/16/2023       "

## 2023-06-06 ENCOUNTER — TELEPHONE (OUTPATIENT)
Dept: FAMILY MEDICINE CLINIC | Facility: CLINIC | Age: 43
End: 2023-06-06

## 2023-06-10 DIAGNOSIS — F31.9 BIPOLAR AFFECTIVE DISORDER, REMISSION STATUS UNSPECIFIED (HCC): ICD-10-CM

## 2023-06-12 RX ORDER — BUSPIRONE HYDROCHLORIDE 15 MG/1
TABLET ORAL
Qty: 270 TABLET | Refills: 1 | Status: SHIPPED | OUTPATIENT
Start: 2023-06-12

## 2023-06-21 DIAGNOSIS — F31.9 BIPOLAR AFFECTIVE DISORDER, REMISSION STATUS UNSPECIFIED (HCC): ICD-10-CM

## 2023-06-21 DIAGNOSIS — J45.20 MILD INTERMITTENT ASTHMA WITHOUT COMPLICATION: ICD-10-CM

## 2023-06-21 RX ORDER — ALBUTEROL SULFATE 90 UG/1
AEROSOL, METERED RESPIRATORY (INHALATION)
Qty: 18 G | Refills: 2 | Status: SHIPPED | OUTPATIENT
Start: 2023-06-21

## 2023-06-21 RX ORDER — TRAZODONE HYDROCHLORIDE 100 MG/1
TABLET ORAL
Qty: 90 TABLET | Refills: 1 | Status: SHIPPED | OUTPATIENT
Start: 2023-06-21 | End: 2023-06-28 | Stop reason: SDUPTHER

## 2023-06-28 DIAGNOSIS — F31.9 BIPOLAR AFFECTIVE DISORDER, REMISSION STATUS UNSPECIFIED (HCC): ICD-10-CM

## 2023-06-28 DIAGNOSIS — F33.9 DEPRESSION, RECURRENT (HCC): ICD-10-CM

## 2023-06-28 DIAGNOSIS — E66.01 CLASS 3 SEVERE OBESITY DUE TO EXCESS CALORIES WITHOUT SERIOUS COMORBIDITY WITH BODY MASS INDEX (BMI) OF 40.0 TO 44.9 IN ADULT (HCC): ICD-10-CM

## 2023-06-28 RX ORDER — BUSPIRONE HYDROCHLORIDE 15 MG/1
15 TABLET ORAL 3 TIMES DAILY
Qty: 270 TABLET | Refills: 1 | Status: SHIPPED | OUTPATIENT
Start: 2023-06-28 | End: 2023-08-24 | Stop reason: SDUPTHER

## 2023-06-28 RX ORDER — TRAZODONE HYDROCHLORIDE 100 MG/1
100 TABLET ORAL
Qty: 90 TABLET | Refills: 1 | Status: SHIPPED | OUTPATIENT
Start: 2023-06-28 | End: 2023-08-24 | Stop reason: SDUPTHER

## 2023-06-28 RX ORDER — TOPIRAMATE 100 MG/1
100 TABLET, FILM COATED ORAL 3 TIMES DAILY
Qty: 270 TABLET | Refills: 1 | Status: SHIPPED | OUTPATIENT
Start: 2023-06-28 | End: 2023-08-24 | Stop reason: SDUPTHER

## 2023-06-28 RX ORDER — DIVALPROEX SODIUM 500 MG/1
TABLET, DELAYED RELEASE ORAL
Qty: 11 TABLET | Refills: 0 | Status: SHIPPED | OUTPATIENT
Start: 2023-06-28 | End: 2023-07-06 | Stop reason: SDUPTHER

## 2023-07-03 DIAGNOSIS — K21.9 GASTROESOPHAGEAL REFLUX DISEASE, UNSPECIFIED WHETHER ESOPHAGITIS PRESENT: ICD-10-CM

## 2023-07-05 RX ORDER — OMEPRAZOLE 20 MG/1
CAPSULE, DELAYED RELEASE ORAL
Qty: 90 CAPSULE | Refills: 1 | Status: SHIPPED | OUTPATIENT
Start: 2023-07-05

## 2023-07-06 DIAGNOSIS — F33.9 DEPRESSION, RECURRENT (HCC): ICD-10-CM

## 2023-07-06 DIAGNOSIS — F31.9 BIPOLAR AFFECTIVE DISORDER, REMISSION STATUS UNSPECIFIED (HCC): ICD-10-CM

## 2023-07-06 RX ORDER — DIVALPROEX SODIUM 500 MG/1
500 TABLET, DELAYED RELEASE ORAL EVERY 12 HOURS SCHEDULED
Qty: 60 TABLET | Refills: 2 | Status: SHIPPED | OUTPATIENT
Start: 2023-07-06

## 2023-07-06 RX ORDER — CLONAZEPAM 0.5 MG/1
0.5 TABLET ORAL 2 TIMES DAILY
Qty: 60 TABLET | Refills: 0 | Status: SHIPPED | OUTPATIENT
Start: 2023-07-06

## 2023-07-29 DIAGNOSIS — F33.9 DEPRESSION, RECURRENT (HCC): ICD-10-CM

## 2023-07-29 DIAGNOSIS — F31.9 BIPOLAR AFFECTIVE DISORDER, REMISSION STATUS UNSPECIFIED (HCC): ICD-10-CM

## 2023-08-01 RX ORDER — DIVALPROEX SODIUM 500 MG/1
500 TABLET, DELAYED RELEASE ORAL EVERY 12 HOURS
Qty: 180 TABLET | Refills: 1 | OUTPATIENT
Start: 2023-08-01

## 2023-08-24 ENCOUNTER — APPOINTMENT (OUTPATIENT)
Dept: LAB | Facility: CLINIC | Age: 43
End: 2023-08-24
Payer: COMMERCIAL

## 2023-08-24 ENCOUNTER — OFFICE VISIT (OUTPATIENT)
Dept: FAMILY MEDICINE CLINIC | Facility: CLINIC | Age: 43
End: 2023-08-24

## 2023-08-24 VITALS
TEMPERATURE: 97.6 F | BODY MASS INDEX: 36.29 KG/M2 | OXYGEN SATURATION: 98 % | HEART RATE: 83 BPM | WEIGHT: 197.2 LBS | DIASTOLIC BLOOD PRESSURE: 76 MMHG | SYSTOLIC BLOOD PRESSURE: 120 MMHG | RESPIRATION RATE: 18 BRPM | HEIGHT: 62 IN

## 2023-08-24 DIAGNOSIS — R22.0 LUMP OF SCALP: ICD-10-CM

## 2023-08-24 DIAGNOSIS — E66.01 CLASS 3 SEVERE OBESITY DUE TO EXCESS CALORIES WITHOUT SERIOUS COMORBIDITY WITH BODY MASS INDEX (BMI) OF 40.0 TO 44.9 IN ADULT (HCC): ICD-10-CM

## 2023-08-24 DIAGNOSIS — Z00.00 HEALTHCARE MAINTENANCE: Primary | ICD-10-CM

## 2023-08-24 DIAGNOSIS — K21.9 GASTROESOPHAGEAL REFLUX DISEASE, UNSPECIFIED WHETHER ESOPHAGITIS PRESENT: ICD-10-CM

## 2023-08-24 DIAGNOSIS — L02.91 ABSCESS: ICD-10-CM

## 2023-08-24 DIAGNOSIS — F33.9 DEPRESSION, RECURRENT (HCC): ICD-10-CM

## 2023-08-24 DIAGNOSIS — R07.9 CHEST PAIN, UNSPECIFIED TYPE: ICD-10-CM

## 2023-08-24 DIAGNOSIS — R53.83 FATIGUE, UNSPECIFIED TYPE: ICD-10-CM

## 2023-08-24 DIAGNOSIS — Z00.00 HEALTHCARE MAINTENANCE: ICD-10-CM

## 2023-08-24 DIAGNOSIS — F31.9 BIPOLAR AFFECTIVE DISORDER, REMISSION STATUS UNSPECIFIED (HCC): ICD-10-CM

## 2023-08-24 LAB
25(OH)D3 SERPL-MCNC: 9.1 NG/ML (ref 30–100)
ALBUMIN SERPL BCP-MCNC: 3.3 G/DL (ref 3.5–5)
ALP SERPL-CCNC: 48 U/L (ref 34–104)
ALT SERPL W P-5'-P-CCNC: 5 U/L (ref 7–52)
ANION GAP SERPL CALCULATED.3IONS-SCNC: 9 MMOL/L
AST SERPL W P-5'-P-CCNC: 11 U/L (ref 13–39)
BASOPHILS # BLD AUTO: 0.07 THOUSANDS/ÂΜL (ref 0–0.1)
BASOPHILS NFR BLD AUTO: 1 % (ref 0–1)
BILIRUB SERPL-MCNC: 0.29 MG/DL (ref 0.2–1)
BUN SERPL-MCNC: 11 MG/DL (ref 5–25)
CALCIUM ALBUM COR SERPL-MCNC: 9 MG/DL (ref 8.3–10.1)
CALCIUM SERPL-MCNC: 8.4 MG/DL (ref 8.4–10.2)
CHLORIDE SERPL-SCNC: 113 MMOL/L (ref 96–108)
CHOLEST SERPL-MCNC: 142 MG/DL
CO2 SERPL-SCNC: 20 MMOL/L (ref 21–32)
CREAT SERPL-MCNC: 0.9 MG/DL (ref 0.6–1.3)
EOSINOPHIL # BLD AUTO: 0.48 THOUSAND/ÂΜL (ref 0–0.61)
EOSINOPHIL NFR BLD AUTO: 7 % (ref 0–6)
ERYTHROCYTE [DISTWIDTH] IN BLOOD BY AUTOMATED COUNT: 15.3 % (ref 11.6–15.1)
EST. AVERAGE GLUCOSE BLD GHB EST-MCNC: 105 MG/DL
FERRITIN SERPL-MCNC: 8 NG/ML (ref 11–307)
GFR SERPL CREATININE-BSD FRML MDRD: 77 ML/MIN/1.73SQ M
GLUCOSE P FAST SERPL-MCNC: 91 MG/DL (ref 65–99)
HBA1C MFR BLD: 5.3 %
HCT VFR BLD AUTO: 36.8 % (ref 34.8–46.1)
HDLC SERPL-MCNC: 36 MG/DL
HGB BLD-MCNC: 11.6 G/DL (ref 11.5–15.4)
IMM GRANULOCYTES # BLD AUTO: 0.01 THOUSAND/UL (ref 0–0.2)
IMM GRANULOCYTES NFR BLD AUTO: 0 % (ref 0–2)
IRON SATN MFR SERPL: 12 % (ref 15–50)
IRON SERPL-MCNC: 36 UG/DL (ref 50–212)
LDLC SERPL CALC-MCNC: 84 MG/DL (ref 0–100)
LYMPHOCYTES # BLD AUTO: 2.83 THOUSANDS/ÂΜL (ref 0.6–4.47)
LYMPHOCYTES NFR BLD AUTO: 41 % (ref 14–44)
MCH RBC QN AUTO: 30.5 PG (ref 26.8–34.3)
MCHC RBC AUTO-ENTMCNC: 31.5 G/DL (ref 31.4–37.4)
MCV RBC AUTO: 97 FL (ref 82–98)
MONOCYTES # BLD AUTO: 0.67 THOUSAND/ÂΜL (ref 0.17–1.22)
MONOCYTES NFR BLD AUTO: 10 % (ref 4–12)
NEUTROPHILS # BLD AUTO: 2.77 THOUSANDS/ÂΜL (ref 1.85–7.62)
NEUTS SEG NFR BLD AUTO: 41 % (ref 43–75)
NONHDLC SERPL-MCNC: 106 MG/DL
NRBC BLD AUTO-RTO: 0 /100 WBCS
PLATELET # BLD AUTO: 210 THOUSANDS/UL (ref 149–390)
PMV BLD AUTO: 12.4 FL (ref 8.9–12.7)
POTASSIUM SERPL-SCNC: 3.6 MMOL/L (ref 3.5–5.3)
PROT SERPL-MCNC: 6.4 G/DL (ref 6.4–8.4)
RBC # BLD AUTO: 3.8 MILLION/UL (ref 3.81–5.12)
SODIUM SERPL-SCNC: 142 MMOL/L (ref 135–147)
TIBC SERPL-MCNC: 303 UG/DL (ref 250–450)
TRIGL SERPL-MCNC: 109 MG/DL
TSH SERPL DL<=0.05 MIU/L-ACNC: 2.25 UIU/ML (ref 0.45–4.5)
UIBC SERPL-MCNC: 267 UG/DL (ref 155–355)
WBC # BLD AUTO: 6.83 THOUSAND/UL (ref 4.31–10.16)

## 2023-08-24 PROCEDURE — 36415 COLL VENOUS BLD VENIPUNCTURE: CPT

## 2023-08-24 PROCEDURE — 83550 IRON BINDING TEST: CPT

## 2023-08-24 PROCEDURE — 82306 VITAMIN D 25 HYDROXY: CPT

## 2023-08-24 PROCEDURE — 85025 COMPLETE CBC W/AUTO DIFF WBC: CPT

## 2023-08-24 PROCEDURE — 84443 ASSAY THYROID STIM HORMONE: CPT

## 2023-08-24 PROCEDURE — 80053 COMPREHEN METABOLIC PANEL: CPT

## 2023-08-24 PROCEDURE — 99214 OFFICE O/P EST MOD 30 MIN: CPT | Performed by: PHYSICIAN ASSISTANT

## 2023-08-24 PROCEDURE — 83540 ASSAY OF IRON: CPT

## 2023-08-24 PROCEDURE — 80061 LIPID PANEL: CPT

## 2023-08-24 PROCEDURE — 82728 ASSAY OF FERRITIN: CPT

## 2023-08-24 PROCEDURE — 83036 HEMOGLOBIN GLYCOSYLATED A1C: CPT

## 2023-08-24 RX ORDER — OMEPRAZOLE 20 MG/1
20 CAPSULE, DELAYED RELEASE ORAL DAILY
Qty: 90 CAPSULE | Refills: 1 | Status: SHIPPED | OUTPATIENT
Start: 2023-08-24

## 2023-08-24 RX ORDER — DIVALPROEX SODIUM 500 MG/1
500 TABLET, DELAYED RELEASE ORAL EVERY 12 HOURS SCHEDULED
Qty: 180 TABLET | Refills: 1 | Status: SHIPPED | OUTPATIENT
Start: 2023-08-24

## 2023-08-24 RX ORDER — CEPHALEXIN 500 MG/1
500 CAPSULE ORAL EVERY 12 HOURS SCHEDULED
Qty: 14 CAPSULE | Refills: 0 | Status: SHIPPED | OUTPATIENT
Start: 2023-08-24 | End: 2023-08-31

## 2023-08-24 RX ORDER — TOPIRAMATE 100 MG/1
100 TABLET, FILM COATED ORAL 3 TIMES DAILY
Qty: 270 TABLET | Refills: 1 | Status: SHIPPED | OUTPATIENT
Start: 2023-08-24

## 2023-08-24 RX ORDER — BUSPIRONE HYDROCHLORIDE 15 MG/1
15 TABLET ORAL 3 TIMES DAILY
Qty: 270 TABLET | Refills: 1 | Status: SHIPPED | OUTPATIENT
Start: 2023-08-24

## 2023-08-24 RX ORDER — TRAZODONE HYDROCHLORIDE 100 MG/1
100 TABLET ORAL
Qty: 90 TABLET | Refills: 1 | Status: SHIPPED | OUTPATIENT
Start: 2023-08-24

## 2023-08-24 NOTE — ASSESSMENT & PLAN NOTE
- Patient has been able to lose weight, which she is very happy about. Encourage patient to keep up the great work. -Continue Topamax 100 mg 3 times daily. - Continue following healthy diet and regular exercise.   Wt Readings from Last 4 Encounters:   08/24/23 89.4 kg (197 lb 3.2 oz)   05/15/23 94.8 kg (209 lb)   05/09/23 94.8 kg (209 lb)   04/19/23 103 kg (227 lb 15.3 oz)

## 2023-08-24 NOTE — ASSESSMENT & PLAN NOTE
- Patient is no longer following with [facility] health outpatient [facility]- [facility].   - Stable with current medications. Continue with current regimen- trazodone 100 mg daily at bedtime, BuSpar 15 mg 3 times daily, Depakote 500 mg twice daily, Klonopin 0.5 mg twice daily as needed.  -Continue coping mechanisms.

## 2023-08-24 NOTE — PATIENT INSTRUCTIONS
1.) Please call central scheduling at [phone] to schedule mammogram. Thanks! 2.) Please call cardiology to reschedule appointment.    [facility] Cardiology  [phone].) [facility] Surgery - Please call to schedule appointment for lumps of scalp.   [phone]

## 2023-08-24 NOTE — ASSESSMENT & PLAN NOTE
- Recommend patient reschedule missed appointment with cardiology to establish care. - Advised to contact the office or report to ED if symptoms persist, worsen, or new symptoms arise.
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
## 2023-08-25 DIAGNOSIS — E61.1 IRON DEFICIENCY: Primary | ICD-10-CM

## 2023-08-25 DIAGNOSIS — E55.9 VITAMIN D DEFICIENCY: ICD-10-CM

## 2023-08-25 RX ORDER — FERROUS SULFATE 325(65) MG
325 TABLET ORAL
Qty: 90 TABLET | Refills: 1 | Status: SHIPPED | OUTPATIENT
Start: 2023-08-25

## 2023-08-25 RX ORDER — ERGOCALCIFEROL 1.25 MG/1
50000 CAPSULE ORAL WEEKLY
Qty: 12 CAPSULE | Refills: 1 | Status: SHIPPED | OUTPATIENT
Start: 2023-08-25

## 2023-09-01 ENCOUNTER — TELEPHONE (OUTPATIENT)
Dept: FAMILY MEDICINE CLINIC | Facility: CLINIC | Age: 43
End: 2023-09-01

## 2023-09-01 NOTE — TELEPHONE ENCOUNTER
09/01/23    [employer]): PPL    Name on Account: Merlinda Gander / PT     Address on Bill: [address] [address]    Phone number: [phone]    Account number: [de-identified]

## 2023-09-01 NOTE — TELEPHONE ENCOUNTER
FAXED ON 09/01/23 TO [facility] at [phone]. FAX CONFIRMATION RECEIVED.

## 2023-09-21 DIAGNOSIS — J45.20 MILD INTERMITTENT ASTHMA WITHOUT COMPLICATION: ICD-10-CM

## 2023-09-21 RX ORDER — ALBUTEROL SULFATE 90 UG/1
AEROSOL, METERED RESPIRATORY (INHALATION)
Qty: 18 G | Refills: 2 | Status: SHIPPED | OUTPATIENT
Start: 2023-09-21

## 2023-10-10 ENCOUNTER — OFFICE VISIT (OUTPATIENT)
Dept: FAMILY MEDICINE CLINIC | Facility: CLINIC | Age: 43
End: 2023-10-10

## 2023-10-10 VITALS
DIASTOLIC BLOOD PRESSURE: 80 MMHG | RESPIRATION RATE: 16 BRPM | BODY MASS INDEX: 34.96 KG/M2 | WEIGHT: 190 LBS | OXYGEN SATURATION: 99 % | TEMPERATURE: 98.7 F | HEART RATE: 85 BPM | HEIGHT: 62 IN | SYSTOLIC BLOOD PRESSURE: 116 MMHG

## 2023-10-10 DIAGNOSIS — L02.211 ABDOMINAL WALL ABSCESS: Primary | ICD-10-CM

## 2023-10-10 DIAGNOSIS — L30.9 ECZEMA, UNSPECIFIED TYPE: ICD-10-CM

## 2023-10-10 PROCEDURE — 99214 OFFICE O/P EST MOD 30 MIN: CPT | Performed by: FAMILY MEDICINE

## 2023-10-10 RX ORDER — CEPHALEXIN 500 MG/1
500 CAPSULE ORAL EVERY 12 HOURS SCHEDULED
Qty: 14 CAPSULE | Refills: 0 | Status: SHIPPED | OUTPATIENT
Start: 2023-10-10 | End: 2023-10-17

## 2023-10-10 RX ORDER — CHLORHEXIDINE GLUCONATE 4 G/100ML
1 SOLUTION TOPICAL DAILY PRN
Qty: 236 ML | Refills: 0 | Status: SHIPPED | OUTPATIENT
Start: 2023-10-10

## 2023-10-10 RX ORDER — DOXYCYCLINE HYCLATE 100 MG/1
100 CAPSULE ORAL EVERY 12 HOURS SCHEDULED
Qty: 14 CAPSULE | Refills: 0 | Status: SHIPPED | OUTPATIENT
Start: 2023-10-10 | End: 2023-10-17

## 2023-10-10 RX ORDER — TRIAMCINOLONE ACETONIDE 1 MG/G
OINTMENT TOPICAL 2 TIMES DAILY
Qty: 15 G | Refills: 1 | Status: SHIPPED | OUTPATIENT
Start: 2023-10-10

## 2023-10-10 NOTE — PROGRESS NOTES
Name: Jun Abdullahi      : 1980      MRN: [id number]  Encounter Provider: Aneesh Herr MD  Encounter Date: 10/10/2023   Encounter department: [facility]     1. Abdominal wall abscess  Assessment & Plan:  -Abdominal abscess that is already draining  -Recommend warm compresses for 10 to 15 minutes 3 times daily  -We will provide antibiotic coverage  -Return to clinic in 1 week for follow-up  -ED precautions provided to patient including worsening abscess, fever, chills, nausea or vomiting      Orders:  -     cephalexin (KEFLEX) 500 mg capsule; Take 1 capsule (500 mg total) by mouth every 12 (twelve) hours for 7 days  -     doxycycline hyclate (VIBRAMYCIN) 100 mg capsule; Take 1 capsule (100 mg total) by mouth every 12 (twelve) hours for 7 days  -     chlorhexidine (HIBICLENS) 4 % external liquid; Apply 1 Application topically daily as needed for wound care    2. Eczema, unspecified type  Assessment & Plan:  Right index finger scaly pruritic rash  Likely dyshidrotic eczema  Trial of topical steroid for 7 to 14 days    Orders:  -     triamcinolone (KENALOG) 0.1 % ointment; Apply topically 2 (two) times a day           Subjective      17-year-old female with a history of GERD, obesity, and hepatic steatosis who presents today for abdominal abscess. Patient reports that she has had abdominal wall abscess in the past.  She reports feeling a lumpy abscess on her abdominal wall a few days ago. She reports that the abscess has been draining copious amount of purulent discharge. She has not been applying any warm compresses to it. She otherwise feels well. She denies any fever, chills, nausea or vomiting patient also reports    Patient also reports a rash on her right index finger. This rash has been intermittent. The rash is pruritic. She has a history of eczema.       Review of Systems   Constitutional:  Negative for appetite change, chills, diaphoresis, fatigue and fever. Respiratory:  Negative for shortness of breath and wheezing. Cardiovascular:  Negative for chest pain, palpitations and leg swelling. Gastrointestinal:  Negative for abdominal pain, constipation, diarrhea, nausea and vomiting. Musculoskeletal:  Negative for back pain. Skin:  Positive for rash. Neurological:  Negative for dizziness, syncope and headaches.        Current Outpatient Medications on File Prior to Visit   Medication Sig    albuterol (PROVENTIL HFA,VENTOLIN HFA) 90 mcg/act inhaler TAKE 2 PUFFS BY MOUTH EVERY 6 HOURS AS NEEDED FOR WHEEZE OR FOR SHORTNESS OF BREATH    busPIRone (BUSPAR) 10 mg tablet TAKE 1 TABLET BY MOUTH 3 TIMES A DAY FOR ANXIETY (Patient not taking: Reported on 3/15/2023)    busPIRone (BUSPAR) 15 mg tablet Take 1 tablet (15 mg total) by mouth 3 (three) times a day    citalopram (CeleXA) 20 mg tablet Take 20 mg by mouth daily (Patient not taking: Reported on 2/6/2023)    clonazePAM (KlonoPIN) 0.5 mg tablet Take 1 tablet (0.5 mg total) by mouth 2 (two) times a day    divalproex sodium (DEPAKOTE) 500 mg DR tablet Take 1 tablet (500 mg total) by mouth every 12 (twelve) hours    ergocalciferol (VITAMIN D2) 50,000 units Take 1 capsule (50,000 Units total) by mouth once a week    ferrous sulfate 325 (65 Fe) mg tablet Take 1 tablet (325 mg total) by mouth daily with breakfast    ibuprofen (MOTRIN) 400 mg tablet Take 1 tablet (400 mg total) by mouth every 6 (six) hours as needed for mild pain for up to 7 days    ipratropium-albuterol (DUO-NEB) 0.5-2.5 mg/3 mL nebulizer solution INHALE 3 ML BY NEBULIZATION EVERY 6 HOURS AS NEEDED FOR WHEEZE OR FOR SHORTNESS OF BREATH    omeprazole (PriLOSEC) 20 mg delayed release capsule Take 1 capsule (20 mg total) by mouth daily    sucralfate (CARAFATE) 1 g/10 mL suspension Take 10 mL (1 g total) by mouth 4 (four) times a day (with meals and at bedtime)    topiramate (TOPAMAX) 100 mg tablet Take 1 tablet (100 mg total) by mouth 3 (three) times a day    traZODone (DESYREL) 100 mg tablet Take 1 tablet (100 mg total) by mouth daily at bedtime       Objective     /80 (BP Location: Right arm, Patient Position: Sitting, Cuff Size: Standard)   Pulse 85   Temp 98.7 °F (37.1 °C) (Temporal)   Resp 16   Ht 5' 2" (1.575 m)   Wt 86.2 kg (190 lb)   LMP 10/03/2023   SpO2 99%   Breastfeeding No   BMI 34.75 kg/m²     Physical Exam  Constitutional:       General: She is not in acute distress. Appearance: Normal appearance. She is well-developed. She is obese. She is not ill-appearing, toxic-appearing or diaphoretic. HENT:      Head: Normocephalic and atraumatic. Right Ear: External ear normal.      Left Ear: External ear normal.      Nose: Nose normal.   Eyes:      General: No scleral icterus. Right eye: No discharge. Left eye: No discharge. Extraocular Movements: Extraocular movements intact. Pupils: Pupils are equal, round, and reactive to light. Neck:      Thyroid: No thyromegaly. Vascular: No JVD. Trachea: No tracheal deviation. Cardiovascular:      Rate and Rhythm: Normal rate and regular rhythm. Heart sounds: Normal heart sounds. No murmur heard. No friction rub. No gallop. Pulmonary:      Effort: Pulmonary effort is normal. No respiratory distress. Breath sounds: Normal breath sounds. No stridor. No wheezing or rhonchi. Abdominal:      General: Bowel sounds are normal. There is no distension. Palpations: Abdomen is soft. Tenderness: There is no guarding. Comments: Abdominal wall indurated mass with very minimal scant fluid draining (insufficient for culture). Musculoskeletal:         General: Normal range of motion. Cervical back: Normal range of motion. Comments: Right index finger scaly/mildly erythematous macular rash   Skin:     General: Skin is warm. Capillary Refill: Capillary refill takes less than 2 seconds. Neurological:      Mental Status: She is alert and oriented to person, place, and time. Cranial Nerves: No cranial nerve deficit. Motor: No weakness or abnormal muscle tone.       Gait: Gait normal.   Psychiatric:         Behavior: Behavior normal.       Shena Chew MD

## 2023-10-11 PROBLEM — L30.9 ECZEMA: Status: ACTIVE | Noted: 2023-10-11

## 2023-10-11 PROBLEM — L02.211 ABDOMINAL WALL ABSCESS: Status: ACTIVE | Noted: 2023-10-11

## 2023-10-11 NOTE — ASSESSMENT & PLAN NOTE
Right index finger scaly pruritic rash  Likely dyshidrotic eczema  Trial of topical steroid for 7 to 14 days

## 2023-10-11 NOTE — ASSESSMENT & PLAN NOTE
-Abdominal abscess that is already draining  -Recommend warm compresses for 10 to 15 minutes 3 times daily  -We will provide antibiotic coverage  -Return to clinic in 1 week for follow-up  -ED precautions provided to patient including worsening abscess, fever, chills, nausea or vomiting

## 2024-01-24 DIAGNOSIS — J45.20 MILD INTERMITTENT ASTHMA WITHOUT COMPLICATION: ICD-10-CM

## 2024-01-24 DIAGNOSIS — L30.9 ECZEMA, UNSPECIFIED TYPE: ICD-10-CM

## 2024-01-24 RX ORDER — TRIAMCINOLONE ACETONIDE 1 MG/G
1 OINTMENT TOPICAL 2 TIMES DAILY
Qty: 15 G | Refills: 1 | Status: SHIPPED | OUTPATIENT
Start: 2024-01-24

## 2024-01-24 RX ORDER — ALBUTEROL SULFATE 90 UG/1
AEROSOL, METERED RESPIRATORY (INHALATION)
Qty: 18 G | Refills: 2 | Status: SHIPPED | OUTPATIENT
Start: 2024-01-24

## 2024-04-28 DIAGNOSIS — E66.01 CLASS 3 SEVERE OBESITY DUE TO EXCESS CALORIES WITHOUT SERIOUS COMORBIDITY WITH BODY MASS INDEX (BMI) OF 40.0 TO 44.9 IN ADULT (HCC): ICD-10-CM

## 2024-04-28 DIAGNOSIS — E55.9 VITAMIN D DEFICIENCY: ICD-10-CM

## 2024-04-28 DIAGNOSIS — K21.9 GASTROESOPHAGEAL REFLUX DISEASE, UNSPECIFIED WHETHER ESOPHAGITIS PRESENT: ICD-10-CM

## 2024-04-28 DIAGNOSIS — L30.9 ECZEMA, UNSPECIFIED TYPE: ICD-10-CM

## 2024-04-28 DIAGNOSIS — F31.9 BIPOLAR AFFECTIVE DISORDER, REMISSION STATUS UNSPECIFIED (HCC): ICD-10-CM

## 2024-04-29 RX ORDER — TRAZODONE HYDROCHLORIDE 100 MG/1
100 TABLET ORAL
Qty: 90 TABLET | Refills: 1 | Status: SHIPPED | OUTPATIENT
Start: 2024-04-29

## 2024-04-29 RX ORDER — TRIAMCINOLONE ACETONIDE 1 MG/G
1 OINTMENT TOPICAL 2 TIMES DAILY
Qty: 15 G | Refills: 1 | Status: SHIPPED | OUTPATIENT
Start: 2024-04-29

## 2024-04-29 RX ORDER — OMEPRAZOLE 20 MG/1
20 CAPSULE, DELAYED RELEASE ORAL DAILY
Qty: 90 CAPSULE | Refills: 1 | Status: SHIPPED | OUTPATIENT
Start: 2024-04-29

## 2024-04-29 RX ORDER — TOPIRAMATE 100 MG/1
100 TABLET, FILM COATED ORAL 3 TIMES DAILY
Qty: 270 TABLET | Refills: 1 | Status: SHIPPED | OUTPATIENT
Start: 2024-04-29

## 2024-04-29 RX ORDER — ERGOCALCIFEROL 1.25 MG/1
CAPSULE ORAL
Qty: 12 CAPSULE | Refills: 1 | Status: SHIPPED | OUTPATIENT
Start: 2024-04-29

## 2024-04-29 RX ORDER — BUSPIRONE HYDROCHLORIDE 15 MG/1
15 TABLET ORAL 3 TIMES DAILY
Qty: 270 TABLET | Refills: 1 | Status: SHIPPED | OUTPATIENT
Start: 2024-04-29

## 2024-05-22 DIAGNOSIS — J45.20 MILD INTERMITTENT ASTHMA WITHOUT COMPLICATION: ICD-10-CM

## 2024-05-23 RX ORDER — ALBUTEROL SULFATE 90 UG/1
AEROSOL, METERED RESPIRATORY (INHALATION)
Qty: 18 G | Refills: 0 | Status: SHIPPED | OUTPATIENT
Start: 2024-05-23

## 2024-07-10 ENCOUNTER — TELEPHONE (OUTPATIENT)
Dept: FAMILY MEDICINE CLINIC | Facility: CLINIC | Age: 44
End: 2024-07-10

## 2024-07-10 NOTE — TELEPHONE ENCOUNTER
VM: 07/10/2024    Yes, this is Jacki Langford. I'm calling because I need a refill for my medication, Klonopin. It's 5 milligrams. My phone number is [phone]. My YOB: 1980. I guess I'll wait for a call. Or do you guys just put the refill in for me? I was on hold for a period of time. I didn't know I was gonna get sense of voicemail. Thank you so much.    I reached out to pt, no answer, no option to leave . Pt needs to be scheduled for an appt.

## 2024-07-27 DIAGNOSIS — F31.9 BIPOLAR AFFECTIVE DISORDER, REMISSION STATUS UNSPECIFIED (HCC): ICD-10-CM

## 2024-07-27 DIAGNOSIS — F33.9 DEPRESSION, RECURRENT (HCC): ICD-10-CM

## 2024-07-31 RX ORDER — CLONAZEPAM 0.5 MG/1
0.5 TABLET ORAL 2 TIMES DAILY
Qty: 60 TABLET | Refills: 0 | Status: SHIPPED | OUTPATIENT
Start: 2024-07-31

## 2024-08-12 NOTE — TELEPHONE ENCOUNTER
I called and spoke with pt who is requesting her results of her [address]  I informed pt we have yet to receive the results as we are awaiting radiology to still read them   Pt was also questioning the labs she had done in the ER on 06/30/21 [facility]

## 2024-08-21 ENCOUNTER — HOSPITAL ENCOUNTER (EMERGENCY)
Facility: HOSPITAL | Age: 44
Discharge: HOME | End: 2024-08-21
Attending: EMERGENCY MEDICINE
Payer: COMMERCIAL

## 2024-08-21 ENCOUNTER — OFFICE VISIT (OUTPATIENT)
Dept: FAMILY MEDICINE CLINIC | Facility: CLINIC | Age: 44
End: 2024-08-21

## 2024-08-21 VITALS
DIASTOLIC BLOOD PRESSURE: 100 MMHG | OXYGEN SATURATION: 100 % | RESPIRATION RATE: 21 BRPM | BODY MASS INDEX: 35.26 KG/M2 | WEIGHT: 192.8 LBS | TEMPERATURE: 98.5 F | SYSTOLIC BLOOD PRESSURE: 128 MMHG | HEART RATE: 69 BPM

## 2024-08-21 VITALS
HEIGHT: 62 IN | TEMPERATURE: 97.5 F | WEIGHT: 194.2 LBS | SYSTOLIC BLOOD PRESSURE: 116 MMHG | RESPIRATION RATE: 19 BRPM | HEART RATE: 79 BPM | OXYGEN SATURATION: 99 % | BODY MASS INDEX: 35.74 KG/M2 | DIASTOLIC BLOOD PRESSURE: 86 MMHG

## 2024-08-21 DIAGNOSIS — F31.9 BIPOLAR AFFECTIVE DISORDER, REMISSION STATUS UNSPECIFIED (HCC): Primary | ICD-10-CM

## 2024-08-21 DIAGNOSIS — J45.20 MILD INTERMITTENT ASTHMA WITHOUT COMPLICATION: ICD-10-CM

## 2024-08-21 DIAGNOSIS — G43.009 MIGRAINE WITHOUT AURA AND WITHOUT STATUS MIGRAINOSUS, NOT INTRACTABLE: ICD-10-CM

## 2024-08-21 DIAGNOSIS — F41.0 PANIC ATTACKS: ICD-10-CM

## 2024-08-21 DIAGNOSIS — F41.0 PANIC ATTACK: ICD-10-CM

## 2024-08-21 DIAGNOSIS — L30.9 ECZEMA, UNSPECIFIED TYPE: ICD-10-CM

## 2024-08-21 DIAGNOSIS — F41.9 ANXIETY: Primary | ICD-10-CM

## 2024-08-21 PROCEDURE — 96372 THER/PROPH/DIAG INJ SC/IM: CPT

## 2024-08-21 PROCEDURE — 99283 EMERGENCY DEPT VISIT LOW MDM: CPT

## 2024-08-21 PROCEDURE — G2211 COMPLEX E/M VISIT ADD ON: HCPCS | Performed by: FAMILY MEDICINE

## 2024-08-21 PROCEDURE — 99284 EMERGENCY DEPT VISIT MOD MDM: CPT | Performed by: EMERGENCY MEDICINE

## 2024-08-21 PROCEDURE — 99214 OFFICE O/P EST MOD 30 MIN: CPT | Performed by: FAMILY MEDICINE

## 2024-08-21 RX ORDER — RIZATRIPTAN BENZOATE 10 MG/1
10 TABLET, ORALLY DISINTEGRATING ORAL ONCE AS NEEDED
Qty: 10 TABLET | Refills: 5 | Status: SHIPPED | OUTPATIENT
Start: 2024-08-21

## 2024-08-21 RX ORDER — ALBUTEROL SULFATE 90 UG/1
2 AEROSOL, METERED RESPIRATORY (INHALATION) EVERY 6 HOURS PRN
Qty: 18 G | Refills: 2 | Status: SHIPPED | OUTPATIENT
Start: 2024-08-21

## 2024-08-21 RX ORDER — LORAZEPAM 1 MG/1
1 TABLET ORAL ONCE
Status: COMPLETED | OUTPATIENT
Start: 2024-08-21 | End: 2024-08-21

## 2024-08-21 RX ORDER — HYDROXYZINE PAMOATE 25 MG/1
25 CAPSULE ORAL 3 TIMES DAILY PRN
Qty: 30 CAPSULE | Refills: 1 | Status: SHIPPED | OUTPATIENT
Start: 2024-08-21

## 2024-08-21 RX ORDER — KETOROLAC TROMETHAMINE 30 MG/ML
30 INJECTION, SOLUTION INTRAMUSCULAR; INTRAVENOUS ONCE
Status: COMPLETED | OUTPATIENT
Start: 2024-08-21 | End: 2024-08-21

## 2024-08-21 RX ORDER — TRIAMCINOLONE ACETONIDE 5 MG/G
OINTMENT TOPICAL 2 TIMES DAILY
Qty: 15 G | Refills: 1 | Status: SHIPPED | OUTPATIENT
Start: 2024-08-21 | End: 2024-09-04

## 2024-08-21 RX ORDER — HYDROXYZINE HYDROCHLORIDE 50 MG/1
50 TABLET, FILM COATED ORAL ONCE
Status: COMPLETED | OUTPATIENT
Start: 2024-08-21 | End: 2024-08-21

## 2024-08-21 RX ADMIN — LORAZEPAM 1 MG: 1 TABLET ORAL at 00:37

## 2024-08-21 RX ADMIN — HYDROXYZINE HYDROCHLORIDE 50 MG: 50 TABLET, FILM COATED ORAL at 01:36

## 2024-08-21 RX ADMIN — KETOROLAC TROMETHAMINE 30 MG: 30 INJECTION, SOLUTION INTRAMUSCULAR; INTRAVENOUS at 01:36

## 2024-08-21 RX ADMIN — LORAZEPAM 1 MG: 1 TABLET ORAL at 02:05

## 2024-08-21 NOTE — ASSESSMENT & PLAN NOTE
Offered long-acting medication such as SSRI which she declined due to history of weight gain from previous SSRI  -Continue clonazepam as needed  -Trial of hydroxyzine as needed particularly at nighttime to help with sleep  -Provided patient with information for outpatient mental services

## 2024-08-21 NOTE — ASSESSMENT & PLAN NOTE
No longer on any mood stabilizer  No recent mood swings  Lost to follow-up with psychiatry  Provided patient with resources for outpatient mental health services

## 2024-08-21 NOTE — PATIENT INSTRUCTIONS
If you would like to be added to the wait list for services within [facility], you will need to contact them directly at [facility] - [phone]    Outpatient Mental Health Resources     [place] Suicide Prevention Lifeline: Dial #868  If you prefer to text, you can reach the Crisis Text Line by texting “[place]” to [phone]    ¿Te encuentras en crisis? Envía un mensaje de texto con la palabra AYUDA al [phone] para comunicarte de manera gratuita con un Consejero de Crisis  Apoyo gratuito las 24 horas del día, los 7 días de la semana, al alcance de tu mano.    [facility] CRISIS for mental health emergencies and/or please go to your local Emergency Department Call [phone] if you or a loved one are in a mental health crisis.  You can Visit https://www.[facility].[place].gov/Services/Mental-Health-In-[place]/Documents/Suicide_Prevention_Hotlines.pdf to find 24/7 crisis phone line for other [facility].    [facility] Mental Health  If you have NO insurance for outpatient Mental Health services you will need to have a liability appointment with [facility] to assess you to qualify for funding. [facility] does NOT provide funding for Medications.  Please call [phone] or [phone] to schedule an intake assessment    [facility]   ? Location 1: [facility] [place] [address] [phone]   ? Location 2: [facility] [address] [place] [address] [phone]        ? English only* Serves ages 4+          ? Accepts Medicare and some commercial plans    [facility]   ? [address] [place] [address] [phone]; [phone]  ? Bilingual English/Romanian Serves ages 5+   ? Accepts Medical Assistance     [facility]   ? [address] [place] [address] [phone]   ? Bilingual English/Romanian Serves ages 14+   ? Accepts Medical Assistance, (Not currently accepting Medicare), & Major Commercial Insurances     [facility]   ? [address]  [place] [address] [phone]        ? Bilingual English/Mozambican Serves ages 6+   ? Accepts Medical Assistance & Commercial Insurance     [facility]   ? Previous [address] location is closed  ? [address] [facility], [address] [phone]   ? Bilingual English/Mozambican Serves ages 3+   ? Accepts Medical Assistance & Some Commercial Insurance     [facility]   ? [address] [place] [address] [phone]   ? Bilingual English/Mozambican Serves ages 5+   ? Accepts Medical Assistance     [facility]   ? [address] [place] [address] [phone]  ? Bilingual English/Mozambican Serves ages 3+   ? Accepts Medical Assistance & Some Commercial Insurance     PREVENTIVE MEASURES   ? Location 1: [address] [place] [address] [phone]        ? Location 2: [address] [place] [address]   ? Bilingual English/Mozambican Serves ages 18+  ? Accepts Medical Assistance    [place] [facility], [facility]  ? [address], [place] [address] [phone]  ? Bilingual English/Mozambican  ? Accepts Aetna, Cigna, [facility]um[facility], [facility], [employer], Medicare, [employer], [facility]. No Medical Assistance    [facility] ([phone])  Medicare/Medicare Advantage, Medical Assistance/HealthChoices, Commercial, and self-pay as payment.    [facility]         ? [address], at [facility], [place] [address]; [phone]         ? Bilingual English/Mozambican Serves ages 6+         ? Accepts Medical Assistance     TEEN HELP LINE  ? 6-506-069-[phone]    CRIME VICTIMS [place]       ? [phone]       ? 24/7 [facility] Hotline    [facility]       ? [phone]       ? 24/7 [facility] Hotline    www.psychologytoday.nVoq is a resource to find psychotherapy providers, patients can filter therapist search list based on a number of criteria including language, specialty, gender, insurance, etc.  Individuals seeking will need to reach out to perspective providers through information in the directory. You are encouraged to contact multiple providers to given that many providers have a significant wait list for services as well as to find a provider is a good fit for you!    [facility] is an organization of families, friends and individuals whose lives have been affected by mental illness. Together, we advocate for better lives for those individuals who have a m)ental illness. Visit: [facility].org to learn more or to search for local support resources including qualified mental health providers.

## 2024-08-21 NOTE — ASSESSMENT & PLAN NOTE
Poorly controlled  Recently triggered by stress and anxiety  Will provide rizatriptan  Recommend headache diary

## 2024-08-21 NOTE — PROGRESS NOTES
Ambulatory Visit  Name: Jacki Langford      : 1980      MRN: [id number]  Encounter Provider: Tye Matt MD  Encounter Date: 2024   Encounter department: [facility]    Assessment & Plan   1. Bipolar affective disorder, remission status unspecified (HCC)  Assessment & Plan:  No longer on any mood stabilizer  No recent mood swings  Lost to follow-up with psychiatry  Provided patient with resources for outpatient mental health services  2. Panic attacks  Assessment & Plan:  Offered long-acting medication such as SSRI which she declined due to history of weight gain from previous SSRI  -Continue clonazepam as needed  -Trial of hydroxyzine as needed particularly at nighttime to help with sleep  -Provided patient with information for outpatient mental services    Orders:  -     hydrOXYzine pamoate (VISTARIL) 25 mg capsule; Take 1 capsule (25 mg total) by mouth 3 (three) times a day as needed for anxiety  3. Migraine without aura and without status migrainosus, not intractable  Assessment & Plan:  Poorly controlled  Recently triggered by stress and anxiety  Will provide rizatriptan  Recommend headache diary  Orders:  -     rizatriptan (MAXALT-MLT) 10 mg disintegrating tablet; Take 1 tablet (10 mg total) by mouth once as needed for migraine for up to 1 dose may repeat in 2 hours if necessary  4. Mild intermittent asthma without complication  Assessment & Plan:  Stable  No recent exacerbation  Continue albuterol as needed  Orders:  -     albuterol (PROVENTIL HFA,VENTOLIN HFA) 90 mcg/act inhaler; Inhale 2 puffs every 6 (six) hours as needed for wheezing  5. Eczema, unspecified type  Assessment & Plan:  Dyshidrotic eczema of right index finger  -Will increase the dose of her triamcinolone  Orders:  -     triamcinolone (KENALOG) 0.5 % ointment; Apply topically 2 (two) times a day for 14 days       History of Present Illness     43-year-old female with a history of  bipolar disorder, migraine, asthma and anxiety who presents today for follow-up.  Patient was recently in the ED after she was experiencing a lot of anxiety.  Patient states that she has been having very frequent panic attack for the past 2 weeks.  Patient states that she recently received on email that her identity was stolen.  Patient reports that clonazepam has not been very effective at controlling her panic attack.  She was given Ativan in the ED which helped with symptom relief.  She was previously on multiple psychotropic medication for her bipolar disorder.  She is no longer on any of those medications.  She states that the medications made her gain weight and she is not willing to restart any long-acting psychotropic medication.  She has been on a waiting list for psychiatry for a long time.  She was following with [facility] in the past but they no longer takes her insurance.  Patient reports that she has been experiencing migraine headache for the past few days as well.  She attributes a lot of her migraine triggers due to stress.  She states that she has not been sleeping very well lately due to her anxiety.  Patient also has pruritic rash on her right index finger.  She has a history of chronic eczema.  She used to respond to topical 0.1% triamcinolone which is no longer effective.  She is requesting for something stronger.             Review of Systems   Constitutional:  Negative for chills, diaphoresis, fatigue and fever.   Eyes:  Positive for photophobia. Negative for visual disturbance.   Respiratory:  Negative for shortness of breath and wheezing.    Cardiovascular:  Positive for palpitations.   Gastrointestinal:  Negative for abdominal pain, diarrhea, nausea and vomiting.   Endocrine: Negative for polydipsia, polyphagia and polyuria.   Genitourinary:  Negative for dysuria, hematuria and urgency.   Musculoskeletal:  Negative for back pain.   Skin:  Positive for rash.   Neurological:   "Positive for numbness and headaches. Negative for dizziness, tremors, seizures, syncope and light-headedness.   Psychiatric/Behavioral:  Positive for sleep disturbance. Negative for agitation, confusion, dysphoric mood, hallucinations and suicidal ideas. The patient is nervous/anxious.        Objective     /86 (BP Location: Right arm, Patient Position: Sitting, Cuff Size: Standard)   Pulse 79   Temp 97.5 °F (36.4 °C) (Temporal)   Resp 19   Ht 5' 2\" (1.575 m)   Wt 88.1 kg (194 lb 3.2 oz)   SpO2 99%   BMI 35.52 kg/m²     Physical Exam  Constitutional:       General: She is not in acute distress.     Appearance: Normal appearance. She is well-developed. She is not ill-appearing, toxic-appearing or diaphoretic.   HENT:      Head: Normocephalic and atraumatic.      Right Ear: External ear normal.      Left Ear: External ear normal.      Mouth/Throat:      Pharynx: No oropharyngeal exudate.   Eyes:      General:         Right eye: No discharge.         Left eye: No discharge.      Extraocular Movements: Extraocular movements intact.      Pupils: Pupils are equal, round, and reactive to light.   Cardiovascular:      Rate and Rhythm: Normal rate and regular rhythm.      Heart sounds: Normal heart sounds. No murmur heard.     No friction rub. No gallop.   Pulmonary:      Effort: Pulmonary effort is normal. No respiratory distress.      Breath sounds: Normal breath sounds. No stridor. No wheezing or rhonchi.   Abdominal:      General: Bowel sounds are normal.      Palpations: Abdomen is soft. There is no mass.      Tenderness: There is no abdominal tenderness. There is no guarding.   Musculoskeletal:         General: Normal range of motion.      Cervical back: Normal range of motion.   Skin:     General: Skin is warm.      Capillary Refill: Capillary refill takes less than 2 seconds.      Findings: Rash (Right index finger eczematous rash) present.   Neurological:      General: No focal deficit present.      " Mental Status: She is alert and oriented to person, place, and time.      Cranial Nerves: No cranial nerve deficit.      Motor: No weakness.      Gait: Gait normal.   Psychiatric:         Attention and Perception: She does not perceive auditory or visual hallucinations.         Mood and Affect: Mood is anxious. Mood is not elated. Affect is not labile, angry or inappropriate.         Speech: Speech normal. Speech is not rapid and pressured.         Behavior: Behavior normal.         Thought Content: Thought content is not paranoid. Thought content does not include homicidal or suicidal ideation.       Administrative Statements

## 2024-08-21 NOTE — ED PROVIDER NOTES
"History  Chief Complaint   Patient presents with    Panic Attack     Pt reports she was at work today when she received an email stating that her \"identity was stolen\". States she took her klonopin at 5pm and is still hyperventilating w/ palpitations. Denies SI/HI     43-year-old female presents with complaint of a panic attack.  She reports that symptoms began after she received an email this morning stating that her identity has been stolen.  She has a history of anxiety and takes Klonopin 0.5 mg twice a day.  She took both doses today but continued to have ongoing symptoms.  She complains of palpitations and hyperventilation.  She has a history of depression but states that is well-controlled.  Denies any other acute issues or concerns at this point in time.      Anxiety  Degree of incapacity (severity):  Severe  Onset quality:  Sudden  Duration: hours.  Timing:  Constant  Progression:  Waxing and waning  Chronicity:  Recurrent  Treatment compliance:  All of the time  Relieved by:  Nothing  Ineffective treatments:  Anti-anxiety medications  Associated symptoms: anxiety and hyperventilating    Associated symptoms: no feelings of worthlessness, no insomnia and no irritability        Prior to Admission Medications   Prescriptions Last Dose Informant Patient Reported? Taking?   albuterol (PROVENTIL HFA,VENTOLIN HFA) 90 mcg/act inhaler   No No   Sig: TAKE 2 PUFFS BY MOUTH EVERY 6 HOURS AS NEEDED FOR WHEEZE OR FOR SHORTNESS OF BREATH   busPIRone (BUSPAR) 10 mg tablet   Yes No   Sig: TAKE 1 TABLET BY MOUTH 3 TIMES A DAY FOR ANXIETY   Patient not taking: Reported on 3/15/2023   busPIRone (BUSPAR) 15 mg tablet   No No   Sig: TAKE 1 TABLET BY MOUTH 3 TIMES A DAY.   chlorhexidine (HIBICLENS) 4 % external liquid   No No   Sig: Apply 1 Application topically daily as needed for wound care   citalopram (CeleXA) 20 mg tablet  Self Yes No   Sig: Take 20 mg by mouth daily   Patient not taking: Reported on 2/6/2023   clonazePAM " (KlonoPIN) 0.5 mg tablet   No No   Sig: TAKE 1 TABLET BY MOUTH 2 TIMES A DAY.   divalproex sodium (DEPAKOTE) 500 mg DR tablet   No No   Sig: Take 1 tablet (500 mg total) by mouth every 12 (twelve) hours   ergocalciferol (VITAMIN D2) 50,000 units   No No   Sig: TAKE 1 CAPSULE BY MOUTH ONE TIME PER WEEK   ferrous sulfate 325 (65 Fe) mg tablet   No No   Sig: Take 1 tablet (325 mg total) by mouth daily with breakfast   ibuprofen (MOTRIN) 400 mg tablet   No No   Sig: Take 1 tablet (400 mg total) by mouth every 6 (six) hours as needed for mild pain for up to 7 days   ipratropium-albuterol (DUO-NEB) 0.5-2.5 mg/3 mL nebulizer solution   No No   Sig: INHALE 3 ML BY NEBULIZATION EVERY 6 HOURS AS NEEDED FOR WHEEZE OR FOR SHORTNESS OF BREATH   omeprazole (PriLOSEC) 20 mg delayed release capsule   No No   Sig: TAKE 1 CAPSULE BY MOUTH EVERY DAY   sucralfate (CARAFATE) 1 g/10 mL suspension   No No   Sig: Take 10 mL (1 g total) by mouth 4 (four) times a day (with meals and at bedtime)   topiramate (TOPAMAX) 100 mg tablet   No No   Sig: TAKE 1 TABLET BY MOUTH 3 TIMES A DAY.   traZODone (DESYREL) 100 mg tablet   No No   Sig: TAKE 1 TABLET BY MOUTH DAILY AT BEDTIME   triamcinolone (KENALOG) 0.1 % ointment   No No   Sig: APPLY TO AFFECTED AREA TWICE A DAY      Facility-Administered Medications: None       Past Medical History:   Diagnosis Date    Anxiety     Anxiety and depression     Asthma     has not req'd tx since 2016    Bipolar disorder (HCC)     Chlamydia 2000    Depression     Fatty liver     GERD (gastroesophageal reflux disease)     no meds    History of transfusion     Liver disease     Migraine     manages with OTC remedies       Past Surgical History:   Procedure Laterality Date    NASAL/SINUS ENDOSCOPY Right 06/25/2020    Procedure: FUNCTIONAL ENDOSCOPIC SINUS SURGERY WITH RIGHT MAXILLARY ANTROSTOMY REMOVALOF CYST, RIGHT SPHENOIDOTOMY, RIGHT POLYPECTOMY (LARGE ANTRAL CHOANAL POLYP).;  Surgeon: Eze Hartman MD;  Location:  [facility];  Service: ENT    UT LAPAROSCOPY W/RMVL ADNEXAL STRUCTURES Right 10/10/2018    Procedure: SALPINGECTOMY, LAPAROSCOPIC;  Surgeon: Steven Fields MD;  Location: [facility];  Service: Gynecology    SALPINGOSTOMY  2015    tubal reversal    SCALP EXCISION      x4    TUBAL LIGATION  2002    TUBAL REANASTOMOSIS      WISDOM TOOTH EXTRACTION  1998       Family History   Problem Relation Age of Onset    Cancer Maternal Aunt         lung    Breast cancer Neg Hx      I have reviewed and agree with the history as documented.    E-Cigarette/Vaping    E-Cigarette Use Never User      E-Cigarette/Vaping Substances    Nicotine No     THC No     CBD No     Flavoring No      Social History     Tobacco Use    Smoking status: Never     Passive exposure: Never    Smokeless tobacco: Never   Vaping Use    Vaping status: Never Used   Substance Use Topics    Alcohol use: Not Currently    Drug use: Yes     Types: Marijuana     Comment: medical card       Review of Systems   Constitutional:  Negative for irritability.   Cardiovascular:  Positive for palpitations.   Psychiatric/Behavioral:  The patient is nervous/anxious. The patient does not have insomnia.    All other systems reviewed and are negative.      Physical Exam  Physical Exam  Vitals and nursing note reviewed.   Constitutional:       General: She is not in acute distress.     Appearance: Normal appearance. She is well-developed. She is not ill-appearing or toxic-appearing.   HENT:      Head: Normocephalic and atraumatic.      Right Ear: External ear normal.      Left Ear: External ear normal.      Nose: Nose normal. No congestion.      Mouth/Throat:      Mouth: Mucous membranes are moist.      Pharynx: Oropharynx is clear.   Eyes:      Conjunctiva/sclera: Conjunctivae normal.      Pupils: Pupils are equal, round, and reactive to light.   Cardiovascular:      Rate and Rhythm: Normal rate and regular rhythm.      Heart sounds: Normal heart sounds.   Pulmonary:       Effort: Pulmonary effort is normal. No respiratory distress.      Breath sounds: Normal breath sounds. No wheezing.   Abdominal:      General: Bowel sounds are normal.      Palpations: Abdomen is soft.      Tenderness: There is no abdominal tenderness. There is no guarding.   Musculoskeletal:         General: No tenderness or deformity.      Cervical back: Normal range of motion and neck supple. No rigidity.   Skin:     General: Skin is warm and dry.      Capillary Refill: Capillary refill takes less than 2 seconds.      Findings: No rash.   Neurological:      General: No focal deficit present.      Mental Status: She is alert and oriented to person, place, and time.   Psychiatric:         Mood and Affect: Mood is anxious.         Speech: Speech normal.         Behavior: Behavior normal. Behavior is cooperative.         Thought Content: Thought content normal.         Vital Signs  ED Triage Vitals [08/21/24 0030]   Temperature Pulse Respirations Blood Pressure SpO2   98.5 °F (36.9 °C) 69 21 128/100 100 %      Temp Source Heart Rate Source Patient Position - Orthostatic VS BP Location FiO2 (%)   Oral Monitor Sitting Right arm --      Pain Score       --           Vitals:    08/21/24 0030   BP: 128/100   Pulse: 69   Patient Position - Orthostatic VS: Sitting         Visual Acuity      ED Medications  Medications   LORazepam (ATIVAN) tablet 1 mg (1 mg Oral Given 8/21/24 0037)   hydrOXYzine HCL (ATARAX) tablet 50 mg (50 mg Oral Given 8/21/24 0136)   ketorolac (TORADOL) injection 30 mg (30 mg Intramuscular Given 8/21/24 0136)   LORazepam (ATIVAN) tablet 1 mg (1 mg Oral Given 8/21/24 0205)       Diagnostic Studies  Results Reviewed       None                   No orders to display              Procedures  Procedures         ED Course  ED Course as of 08/21/24 0620   Wed Aug 21, 2024   0127 The patient continues to complain of anxiety and now has a h/a as well.                                   SBIRT 20yo+      Flowsheet  Row Most Recent Value   Initial Alcohol Screen: US AUDIT-C     1. How often do you have a drink containing alcohol? 0 Filed at: 08/21/2024 0034   2. How many drinks containing alcohol do you have on a typical day you are drinking?  0 Filed at: 08/21/2024 0034   3b. FEMALE Any Age, or MALE 65+: How often do you have 4 or more drinks on one occassion? 0 Filed at: 08/21/2024 0034   Audit-C Score 0 Filed at: 08/21/2024 0034   NEHA: How many times in the past year have you...    Used an illegal drug or used a prescription medication for non-medical reasons? Never Filed at: 08/21/2024 0034                      Medical Decision Making  43-year-old female presents with complaint of anxiety.  She has history of baseline anxiety for which she takes Xanax twice daily.  She reports that her symptoms acutely worsened when she was notified that she had her identity stolen.  Patient required several medications to improve her symptoms but then was able to rest comfortably.  On discharge the patient denied headache (which she had been experiencing earlier) and her anxiety had nearly resolved.  Patient be following up as an outpatient and is aware of reasons return to the ER.    Risk  Prescription drug management.                 Disposition  Final diagnoses:   Anxiety   Panic attack     Time reflects when diagnosis was documented in both MDM as applicable and the Disposition within this note       Time User Action Codes Description Comment    8/21/2024 12:38 AM Gopi Wang Add [F41.9] Anxiety     8/21/2024 12:38 AM Gopi Wang Add [F41.0] Panic attack           ED Disposition       ED Disposition   Discharge    Condition   Stable    Date/Time   Wed Aug 21, 2024 0038    Comment   Jacki Langford discharge to home/self care.                   Follow-up Information    None         Discharge Medication List as of 8/21/2024  2:00 AM        CONTINUE these medications which have NOT CHANGED    Details   albuterol (PROVENTIL  HFA,VENTOLIN HFA) 90 mcg/act inhaler TAKE 2 PUFFS BY MOUTH EVERY 6 HOURS AS NEEDED FOR WHEEZE OR FOR SHORTNESS OF BREATH, Normal      !! busPIRone (BUSPAR) 10 mg tablet TAKE 1 TABLET BY MOUTH 3 TIMES A DAY FOR ANXIETY, Historical Med      !! busPIRone (BUSPAR) 15 mg tablet TAKE 1 TABLET BY MOUTH 3 TIMES A DAY., Starting Mon 4/29/2024, Normal      chlorhexidine (HIBICLENS) 4 % external liquid Apply 1 Application topically daily as needed for wound care, Starting Tue 10/10/2023, Normal      citalopram (CeleXA) 20 mg tablet Take 20 mg by mouth daily, Historical Med      clonazePAM (KlonoPIN) 0.5 mg tablet TAKE 1 TABLET BY MOUTH 2 TIMES A DAY., Starting Wed 7/31/2024, Normal      divalproex sodium (DEPAKOTE) 500 mg DR tablet Take 1 tablet (500 mg total) by mouth every 12 (twelve) hours, Starting Thu 8/24/2023, Normal      ergocalciferol (VITAMIN D2) 50,000 units TAKE 1 CAPSULE BY MOUTH ONE TIME PER WEEK, Normal      ferrous sulfate 325 (65 Fe) mg tablet Take 1 tablet (325 mg total) by mouth daily with breakfast, Starting Fri 8/25/2023, Normal      ibuprofen (MOTRIN) 400 mg tablet Take 1 tablet (400 mg total) by mouth every 6 (six) hours as needed for mild pain for up to 7 days, Starting Tue 5/9/2023, Until Tue 5/16/2023 at 2359, Normal      ipratropium-albuterol (DUO-NEB) 0.5-2.5 mg/3 mL nebulizer solution INHALE 3 ML BY NEBULIZATION EVERY 6 HOURS AS NEEDED FOR WHEEZE OR FOR SHORTNESS OF BREATH, Normal      omeprazole (PriLOSEC) 20 mg delayed release capsule TAKE 1 CAPSULE BY MOUTH EVERY DAY, Starting Mon 4/29/2024, Normal      sucralfate (CARAFATE) 1 g/10 mL suspension Take 10 mL (1 g total) by mouth 4 (four) times a day (with meals and at bedtime), Starting Mon 2/6/2023, Normal      topiramate (TOPAMAX) 100 mg tablet TAKE 1 TABLET BY MOUTH 3 TIMES A DAY., Starting Mon 4/29/2024, Normal      traZODone (DESYREL) 100 mg tablet TAKE 1 TABLET BY MOUTH DAILY AT BEDTIME, Starting Mon 4/29/2024, Normal      triamcinolone  (KENALOG) 0.1 % ointment APPLY TO AFFECTED AREA TWICE A DAY, Starting Mon 4/29/2024, Normal       !! - Potential duplicate medications found. Please discuss with provider.          No discharge procedures on file.    PDMP Review         Value Time User    PDMP Reviewed  Yes 7/31/2024  3:55 PM Keyona Mayfield PA-C            ED Provider  Electronically Signed by             Gopi Wang DO  08/21/24 0620

## 2024-08-21 NOTE — Clinical Note
Jacki Langford was seen and treated in our emergency department on 8/21/2024.                Diagnosis:     Jacki  may return to work on return date.    She may return on this date: 08/22/2024    Please excuse from work today (8/21).     If you have any questions or concerns, please don't hesitate to call.      Gopi Wang, DO    ______________________________           _______________          _______________  Hospital Representative                              Date                                Time

## 2024-08-30 NOTE — ASSESSMENT & PLAN NOTE
- Continue daily Kegel exercises  - Advised to follow-up as scheduled for later this month with Urology to establish care 
- Continue follow-up with Psychiatry as scheduled for therapy and medication management 
- Reviewed CT abdomen pelvis from 06/30/2021 which revealed suggested hepatic steatosis which can be further assessed with ultrasound  Will order liver ultrasound for further evaluation   -  Reviewed causes and pathophysiology with patient  Patient does admit to heavy alcohol drinking about once a week  Encouraged to cut down on alcohol drinking 
- Symptoms have improved, but still persist   Continue Bentyl 20 mg twice daily as needed and Zofran 4 mg, every 6 hours as needed for nausea/ vomiting     - Due to persistent symptoms, will refer to Gastroenterology for further evaluation and management 
05-Sep-2024

## 2024-08-31 ENCOUNTER — HOSPITAL ENCOUNTER (EMERGENCY)
Facility: HOSPITAL | Age: 44
Discharge: HOME | End: 2024-09-01
Attending: EMERGENCY MEDICINE
Payer: COMMERCIAL

## 2024-08-31 DIAGNOSIS — G43.909 MIGRAINE HEADACHE: Primary | ICD-10-CM

## 2024-08-31 PROCEDURE — 99283 EMERGENCY DEPT VISIT LOW MDM: CPT

## 2024-08-31 PROCEDURE — 96375 TX/PRO/DX INJ NEW DRUG ADDON: CPT

## 2024-08-31 PROCEDURE — 99284 EMERGENCY DEPT VISIT MOD MDM: CPT

## 2024-08-31 PROCEDURE — 96361 HYDRATE IV INFUSION ADD-ON: CPT

## 2024-08-31 RX ORDER — DEXAMETHASONE SODIUM PHOSPHATE 10 MG/ML
10 INJECTION, SOLUTION INTRAMUSCULAR; INTRAVENOUS ONCE
Status: COMPLETED | OUTPATIENT
Start: 2024-08-31 | End: 2024-08-31

## 2024-08-31 RX ORDER — KETOROLAC TROMETHAMINE 30 MG/ML
15 INJECTION, SOLUTION INTRAMUSCULAR; INTRAVENOUS ONCE
Status: COMPLETED | OUTPATIENT
Start: 2024-08-31 | End: 2024-08-31

## 2024-08-31 RX ORDER — DIPHENHYDRAMINE HYDROCHLORIDE 50 MG/ML
25 INJECTION INTRAMUSCULAR; INTRAVENOUS ONCE
Status: COMPLETED | OUTPATIENT
Start: 2024-08-31 | End: 2024-08-31

## 2024-08-31 RX ORDER — METOCLOPRAMIDE HYDROCHLORIDE 5 MG/ML
10 INJECTION INTRAMUSCULAR; INTRAVENOUS ONCE
Status: COMPLETED | OUTPATIENT
Start: 2024-08-31 | End: 2024-08-31

## 2024-08-31 RX ADMIN — DEXAMETHASONE SODIUM PHOSPHATE 10 MG: 10 INJECTION INTRAMUSCULAR; INTRAVENOUS at 23:53

## 2024-08-31 RX ADMIN — DIPHENHYDRAMINE HYDROCHLORIDE 25 MG: 50 INJECTION, SOLUTION INTRAMUSCULAR; INTRAVENOUS at 23:49

## 2024-08-31 RX ADMIN — SODIUM CHLORIDE 1000 ML: 0.9 INJECTION, SOLUTION INTRAVENOUS at 23:48

## 2024-08-31 RX ADMIN — METOCLOPRAMIDE 10 MG: 5 INJECTION, SOLUTION INTRAMUSCULAR; INTRAVENOUS at 23:56

## 2024-08-31 RX ADMIN — KETOROLAC TROMETHAMINE 15 MG: 30 INJECTION, SOLUTION INTRAMUSCULAR; INTRAVENOUS at 23:51

## 2024-08-31 NOTE — Clinical Note
Jacki Langford was seen and treated in our emergency department on 8/31/2024.                Diagnosis: Migraine    Jacki  is off the rest of the shift today.    She may return on this date: 09/02/2024         If you have any questions or concerns, please don't hesitate to call.      Henok Vargas PA-C    ______________________________           _______________          _______________  Hospital Representative                              Date                                Time

## 2024-09-01 ENCOUNTER — APPOINTMENT (EMERGENCY)
Dept: CT IMAGING | Facility: HOSPITAL | Age: 44
End: 2024-09-01
Payer: COMMERCIAL

## 2024-09-01 VITALS
RESPIRATION RATE: 16 BRPM | DIASTOLIC BLOOD PRESSURE: 58 MMHG | BODY MASS INDEX: 35.04 KG/M2 | HEART RATE: 61 BPM | WEIGHT: 191.58 LBS | SYSTOLIC BLOOD PRESSURE: 101 MMHG | OXYGEN SATURATION: 98 % | TEMPERATURE: 98 F

## 2024-09-01 LAB
ALBUMIN SERPL BCG-MCNC: 3.7 G/DL (ref 3.5–5)
ALP SERPL-CCNC: 46 U/L (ref 34–104)
ALT SERPL W P-5'-P-CCNC: 9 U/L (ref 7–52)
ANION GAP SERPL CALCULATED.3IONS-SCNC: 7 MMOL/L (ref 4–13)
AST SERPL W P-5'-P-CCNC: 15 U/L (ref 13–39)
BASOPHILS # BLD AUTO: 0.06 THOUSANDS/ÂΜL (ref 0–0.1)
BASOPHILS NFR BLD AUTO: 1 % (ref 0–1)
BILIRUB SERPL-MCNC: 0.52 MG/DL (ref 0.2–1)
BUN SERPL-MCNC: 12 MG/DL (ref 5–25)
CALCIUM SERPL-MCNC: 8.6 MG/DL (ref 8.4–10.2)
CHLORIDE SERPL-SCNC: 110 MMOL/L (ref 96–108)
CO2 SERPL-SCNC: 20 MMOL/L (ref 21–32)
CREAT SERPL-MCNC: 0.85 MG/DL (ref 0.6–1.3)
EOSINOPHIL # BLD AUTO: 0.28 THOUSAND/ÂΜL (ref 0–0.61)
EOSINOPHIL NFR BLD AUTO: 4 % (ref 0–6)
ERYTHROCYTE [DISTWIDTH] IN BLOOD BY AUTOMATED COUNT: 18.6 % (ref 11.6–15.1)
GFR SERPL CREATININE-BSD FRML MDRD: 82 ML/MIN/1.73SQ M
GLUCOSE SERPL-MCNC: 68 MG/DL (ref 65–140)
HCT VFR BLD AUTO: 36.5 % (ref 34.8–46.1)
HGB BLD-MCNC: 11.4 G/DL (ref 11.5–15.4)
IMM GRANULOCYTES # BLD AUTO: 0.01 THOUSAND/UL (ref 0–0.2)
IMM GRANULOCYTES NFR BLD AUTO: 0 % (ref 0–2)
LYMPHOCYTES # BLD AUTO: 1.83 THOUSANDS/ÂΜL (ref 0.6–4.47)
LYMPHOCYTES NFR BLD AUTO: 25 % (ref 14–44)
MCH RBC QN AUTO: 27.2 PG (ref 26.8–34.3)
MCHC RBC AUTO-ENTMCNC: 31.2 G/DL (ref 31.4–37.4)
MCV RBC AUTO: 87 FL (ref 82–98)
MONOCYTES # BLD AUTO: 0.81 THOUSAND/ÂΜL (ref 0.17–1.22)
MONOCYTES NFR BLD AUTO: 11 % (ref 4–12)
NEUTROPHILS # BLD AUTO: 4.44 THOUSANDS/ÂΜL (ref 1.85–7.62)
NEUTS SEG NFR BLD AUTO: 59 % (ref 43–75)
NRBC BLD AUTO-RTO: 0 /100 WBCS
PLATELET # BLD AUTO: 245 THOUSANDS/UL (ref 149–390)
PMV BLD AUTO: 11.1 FL (ref 8.9–12.7)
POTASSIUM SERPL-SCNC: 3.8 MMOL/L (ref 3.5–5.3)
PROT SERPL-MCNC: 7 G/DL (ref 6.4–8.4)
RBC # BLD AUTO: 4.19 MILLION/UL (ref 3.81–5.12)
SODIUM SERPL-SCNC: 137 MMOL/L (ref 135–147)
WBC # BLD AUTO: 7.43 THOUSAND/UL (ref 4.31–10.16)

## 2024-09-01 PROCEDURE — 96375 TX/PRO/DX INJ NEW DRUG ADDON: CPT

## 2024-09-01 PROCEDURE — 36415 COLL VENOUS BLD VENIPUNCTURE: CPT

## 2024-09-01 PROCEDURE — 85025 COMPLETE CBC W/AUTO DIFF WBC: CPT

## 2024-09-01 PROCEDURE — 96365 THER/PROPH/DIAG IV INF INIT: CPT

## 2024-09-01 PROCEDURE — 96368 THER/DIAG CONCURRENT INF: CPT

## 2024-09-01 PROCEDURE — 80053 COMPREHEN METABOLIC PANEL: CPT

## 2024-09-01 RX ORDER — MAGNESIUM SULFATE HEPTAHYDRATE 40 MG/ML
2 INJECTION, SOLUTION INTRAVENOUS ONCE
Status: COMPLETED | OUTPATIENT
Start: 2024-09-01 | End: 2024-09-01

## 2024-09-01 RX ORDER — HALOPERIDOL 5 MG/ML
5 INJECTION INTRAMUSCULAR ONCE
Status: COMPLETED | OUTPATIENT
Start: 2024-09-01 | End: 2024-09-01

## 2024-09-01 RX ORDER — ACETAMINOPHEN 10 MG/ML
1000 INJECTION, SOLUTION INTRAVENOUS ONCE
Status: COMPLETED | OUTPATIENT
Start: 2024-09-01 | End: 2024-09-01

## 2024-09-01 RX ADMIN — HALOPERIDOL LACTATE 5 MG: 5 INJECTION, SOLUTION INTRAMUSCULAR at 02:47

## 2024-09-01 RX ADMIN — ACETAMINOPHEN 1000 MG: 10 INJECTION INTRAVENOUS at 01:22

## 2024-09-01 RX ADMIN — MAGNESIUM SULFATE HEPTAHYDRATE 2 G: 40 INJECTION, SOLUTION INTRAVENOUS at 01:22

## 2024-09-01 NOTE — ED NOTES
Patient reports taking ibuprofen before trying rizatriptan prescription with no relief.     Eileen Ponce, NICHOLAS  08/31/24 [id number]

## 2024-09-01 NOTE — ED PROVIDER NOTES
History  Chief Complaint   Patient presents with    Migraine     Patient reports migraine since yesterday, patient with new medication from pcp but not helping      44-year-old female with PMH of migraines presents for evaluation of headache worsening x 2 days.  Headache gradual onset, primarily left sided, associated with both photophobia and nausea/vomiting.  Feels similar to prior migraines but worse.  Recently started on rizatriptan through her PCP but this has not been helping her.  No fevers, numbness, tingling, weakness, vision changes, syncope, chest pain, abdominal pain, SOB.        Prior to Admission Medications   Prescriptions Last Dose Informant Patient Reported? Taking?   albuterol (PROVENTIL HFA,VENTOLIN HFA) 90 mcg/act inhaler   No No   Sig: Inhale 2 puffs every 6 (six) hours as needed for wheezing   chlorhexidine (HIBICLENS) 4 % external liquid   No No   Sig: Apply 1 Application topically daily as needed for wound care   clonazePAM (KlonoPIN) 0.5 mg tablet   No No   Sig: TAKE 1 TABLET BY MOUTH 2 TIMES A DAY.   ergocalciferol (VITAMIN D2) 50,000 units   No No   Sig: TAKE 1 CAPSULE BY MOUTH ONE TIME PER WEEK   ferrous sulfate 325 (65 Fe) mg tablet   No No   Sig: Take 1 tablet (325 mg total) by mouth daily with breakfast   hydrOXYzine pamoate (VISTARIL) 25 mg capsule   No No   Sig: Take 1 capsule (25 mg total) by mouth 3 (three) times a day as needed for anxiety   ibuprofen (MOTRIN) 400 mg tablet   No No   Sig: Take 1 tablet (400 mg total) by mouth every 6 (six) hours as needed for mild pain for up to 7 days   ipratropium-albuterol (DUO-NEB) 0.5-2.5 mg/3 mL nebulizer solution   No No   Sig: INHALE 3 ML BY NEBULIZATION EVERY 6 HOURS AS NEEDED FOR WHEEZE OR FOR SHORTNESS OF BREATH   omeprazole (PriLOSEC) 20 mg delayed release capsule   No No   Sig: TAKE 1 CAPSULE BY MOUTH EVERY DAY   rizatriptan (MAXALT-MLT) 10 mg disintegrating tablet 8/31/2024  No Yes   Sig: Take 1 tablet (10 mg total) by mouth once as  Message below noted, will wait on office to receive new forms.    needed for migraine for up to 1 dose may repeat in 2 hours if necessary   sucralfate (CARAFATE) 1 g/10 mL suspension   No No   Sig: Take 10 mL (1 g total) by mouth 4 (four) times a day (with meals and at bedtime)   triamcinolone (KENALOG) 0.5 % ointment   No No   Sig: Apply topically 2 (two) times a day for 14 days      Facility-Administered Medications: None       Past Medical History:   Diagnosis Date    Anxiety     Anxiety and depression     Asthma     has not req'd tx since 2016    Bipolar disorder (HCC)     Chlamydia 2000    Depression     Fatty liver     GERD (gastroesophageal reflux disease)     no meds    History of transfusion     Liver disease     Migraine     manages with OTC remedies       Past Surgical History:   Procedure Laterality Date    NASAL/SINUS ENDOSCOPY Right 06/25/2020    Procedure: FUNCTIONAL ENDOSCOPIC SINUS SURGERY WITH RIGHT MAXILLARY ANTROSTOMY REMOVALOF CYST, RIGHT SPHENOIDOTOMY, RIGHT POLYPECTOMY (LARGE ANTRAL CHOANAL POLYP).;  Surgeon: Eze Hartman MD;  Location:  MAIN OR;  Service: ENT    TX LAPAROSCOPY W/RMVL ADNEXAL STRUCTURES Right 10/10/2018    Procedure: SALPINGECTOMY, LAPAROSCOPIC;  Surgeon: Steven Fields MD;  Location: [facility];  Service: Gynecology    SALPINGOSTOMY  2015    tubal reversal    SCALP EXCISION      x4    TUBAL LIGATION  2002    TUBAL REANASTOMOSIS      WISDOM TOOTH EXTRACTION  1998       Family History   Problem Relation Age of Onset    Cancer Maternal Aunt         lung    Breast cancer Neg Hx      I have reviewed and agree with the history as documented.    E-Cigarette/Vaping    E-Cigarette Use Never User      E-Cigarette/Vaping Substances    Nicotine No     THC No     CBD No     Flavoring No      Social History     Tobacco Use    Smoking status: Never     Passive exposure: Never    Smokeless tobacco: Never   Vaping Use    Vaping status: Never Used   Substance Use Topics    Alcohol use: Not Currently    Drug use: Yes     Types: Marijuana     Comment:  medical card       Review of Systems   Constitutional:  Negative for chills and fever.   HENT:  Negative for ear pain and sore throat.    Eyes:  Positive for photophobia. Negative for pain and visual disturbance.   Respiratory:  Negative for cough and shortness of breath.    Cardiovascular:  Negative for chest pain and palpitations.   Gastrointestinal:  Positive for nausea and vomiting. Negative for abdominal pain.   Genitourinary:  Negative for dysuria and hematuria.   Musculoskeletal:  Negative for arthralgias and back pain.   Skin:  Negative for color change and rash.   Neurological:  Positive for headaches. Negative for seizures and syncope.   All other systems reviewed and are negative.      Physical Exam  Physical Exam  Vitals and nursing note reviewed.   Constitutional:       General: She is in acute distress.      Appearance: She is well-developed.   HENT:      Head: Normocephalic and atraumatic.   Eyes:      Extraocular Movements: Extraocular movements intact.      Conjunctiva/sclera: Conjunctivae normal.      Pupils: Pupils are equal, round, and reactive to light.   Cardiovascular:      Rate and Rhythm: Normal rate and regular rhythm.      Heart sounds: No murmur heard.  Pulmonary:      Effort: Pulmonary effort is normal. No respiratory distress.      Breath sounds: Normal breath sounds.   Abdominal:      Palpations: Abdomen is soft.      Tenderness: There is no abdominal tenderness.   Musculoskeletal:         General: No swelling.      Cervical back: Neck supple.   Skin:     General: Skin is warm and dry.      Capillary Refill: Capillary refill takes less than 2 seconds.   Neurological:      General: No focal deficit present.      Mental Status: She is alert and oriented to person, place, and time.      GCS: GCS eye subscore is 4. GCS verbal subscore is 5. GCS motor subscore is 6.   Psychiatric:         Mood and Affect: Mood normal.         Vital Signs  ED Triage Vitals [08/31/24 2257]   Temperature Pulse  Respirations Blood Pressure SpO2   98 °F (36.7 °C) 79 18 150/81 97 %      Temp Source Heart Rate Source Patient Position - Orthostatic VS BP Location FiO2 (%)   Oral Monitor Sitting Right arm --      Pain Score       9           Vitals:    08/31/24 2257 09/01/24 0134 09/01/24 0300 09/01/24 0400   BP: 150/81 106/69 103/67 101/58   Pulse: 79 73 70 61   Patient Position - Orthostatic VS: Sitting  Lying          Visual Acuity      ED Medications  Medications   sodium chloride 0.9 % bolus 1,000 mL (0 mL Intravenous Stopped 9/1/24 0053)   ketorolac (TORADOL) injection 15 mg (15 mg Intravenous Given 8/31/24 2351)   metoclopramide (REGLAN) injection 10 mg (10 mg Intravenous Given 8/31/24 2356)   diphenhydrAMINE (BENADRYL) injection 25 mg (25 mg Intravenous Given 8/31/24 2349)   dexamethasone (PF) (DECADRON) injection 10 mg (10 mg Intravenous Given 8/31/24 2353)   acetaminophen (Ofirmev) injection 1,000 mg (0 mg Intravenous Stopped 9/1/24 0221)   magnesium sulfate 2 g/50 mL IVPB (premix) 2 g (0 g Intravenous Stopped 9/1/24 0221)   haloperidol lactate (HALDOL) injection 5 mg (5 mg Intravenous Given 9/1/24 0247)       Diagnostic Studies  Results Reviewed       Procedure Component Value Units Date/Time    Comprehensive metabolic panel [[id number]]  (Abnormal) Collected: 09/01/24 0247    Lab Status: Final result Specimen: Blood from Arm, Left Updated: 09/01/24 0312     Sodium 137 mmol/L      Potassium 3.8 mmol/L      Chloride 110 mmol/L      CO2 20 mmol/L      ANION GAP 7 mmol/L      BUN 12 mg/dL      Creatinine 0.85 mg/dL      Glucose 68 mg/dL      Calcium 8.6 mg/dL      AST 15 U/L      ALT 9 U/L      Alkaline Phosphatase 46 U/L      Total Protein 7.0 g/dL      Albumin 3.7 g/dL      Total Bilirubin 0.52 mg/dL      eGFR 82 ml/min/1.73sq m     Narrative:      National Kid[employer] Foundation guidelines for Chronic Kidney Disease (CKD):     Stage 1 with normal or high GFR (GFR > 90 mL/min/1.73 square meters)    Stage 2 Mild CKD  (GFR = 60-89 mL/min/1.73 square meters)    Stage 3A Moderate CKD (GFR = 45-59 mL/min/1.73 square meters)    Stage 3B Moderate CKD (GFR = 30-44 mL/min/1.73 square meters)    Stage 4 Severe CKD (GFR = 15-29 mL/min/1.73 square meters)    Stage 5 End Stage CKD (GFR <15 mL/min/1.73 square meters)  Note: GFR calculation is accurate only with a steady state creatinine    CBC and differential [[id number]]  (Abnormal) Collected: 09/01/24 0247    Lab Status: Final result Specimen: Blood from Arm, Left Updated: 09/01/24 0254     WBC 7.43 Thousand/uL      RBC 4.19 Million/uL      Hemoglobin 11.4 g/dL      Hematocrit 36.5 %      MCV 87 fL      MCH 27.2 pg      MCHC 31.2 g/dL      RDW 18.6 %      MPV 11.1 fL      Platelets 245 Thousands/uL      nRBC 0 /100 WBCs      Segmented % 59 %      Immature Grans % 0 %      Lymphocytes % 25 %      Monocytes % 11 %      Eosinophils Relative 4 %      Basophils Relative 1 %      Absolute Neutrophils 4.44 Thousands/µL      Absolute Immature Grans 0.01 Thousand/uL      Absolute Lymphocytes 1.83 Thousands/µL      Absolute Monocytes 0.81 Thousand/µL      Eosinophils Absolute 0.28 Thousand/µL      Basophils Absolute 0.06 Thousands/µL                    CT head without contrast    (Results Pending)              Procedures  Procedures         ED Course                                 SBIRT 20yo+      Flowsheet Row Most Recent Value   Initial Alcohol Screen: US AUDIT-C     1. How often do you have a drink containing alcohol? 0 Filed at: 08/31/2024 2318   2. How many drinks containing alcohol do you have on a typical day you are drinking?  0 Filed at: 08/31/2024 2318   3b. FEMALE Any Age, or MALE 65+: How often do you have 4 or more drinks on one occassion? 0 Filed at: 08/31/2024 2318   Audit-C Score 0 Filed at: 08/31/2024 2318   NEHA: How many times in the past year have you...    Used an illegal drug or used a prescription medication for non-medical reasons? Never Filed at: 08/31/2024 2318                       Medical Decision Making  44-year-old female presents for evaluation of left-sided headache, photophobia, and nausea/vomiting worsening over 2 days.  Similar to prior migraines.  Exam: Patient appears uncomfortable but nontoxic-appearing AOx3, no focal neurological deficits, vitals WNL.    Presentation is consistent with migraine headache, feel similar to prior migraines per patient but is not responding well to OTC medications or prescribed rizatriptan.  Was not sudden onset or associated with other red flag symptoms concerning for SAH.  Will initiate treatment with IV fluids, Toradol, Reglan, Benadryl.  Will also give Decadron to try to prevent recurrence since patient states she has been admitted for intractable migraines in the past.    Pain improved from about 9/10 to 7/10 following initial medications.  Will proceed with further medications including IV magnesium and Tylenol.    Patient had little to no improvement following additional medications.  Discussed with patient, will proceed with basic labs as well as CT head to evaluate for possible acute underlying pathology.  Will also give IV Haldol to help with persistent pain and nausea.    Sometime after receiving Haldol patient admitted to considerable symptomatic improvement.  At this time she feels that the symptoms are bearable and she feels safe for discharge home.  She is aware of supportive care she can continue with at home, agrees to follow-up with her PCP for further migraine management, and return to the ER if her symptoms continue to worsen.  CT head canceled due to very low concern for any acute intracranial abnormality.  Patient discharged in minimal distress.  Patient expresses understanding of the condition, treatment plan, follow-up instructions, and return precautions.      Amount and/or Complexity of Data Reviewed  Labs: ordered.  Radiology: ordered.    Risk  Prescription drug management.                 Disposition  Final  diagnoses:   Migraine headache     Time reflects when diagnosis was documented in both MDM as applicable and the Disposition within this note       Time User Action Codes Description Comment    9/1/2024  3:56 AM Henok Vargas Add [G43.909] Migraine headache           ED Disposition       ED Disposition   Discharge    Condition   Stable    Date/Time   Sun Sep 1, 2024 0356    Comment   Jacki Langford discharge to home/self care.                   Follow-up Information       Follow up With Specialties Details Why Contact Info Additional Information    Keyona Mayfield, PA-C Family Medicine  As needed [address] [place] [id number]  [phone]       [facility] Emergency Medicine  If symptoms worsen [address] [place] [id number] [phone] [facility] Emergency Department, [address] [place] [address], [id number]            Discharge Medication List as of 9/1/2024  3:57 AM        CONTINUE these medications which have NOT CHANGED    Details   rizatriptan (MAXALT-MLT) 10 mg disintegrating tablet Take 1 tablet (10 mg total) by mouth once as needed for migraine for up to 1 dose may repeat in 2 hours if necessary, Starting Wed 8/21/2024, Normal      albuterol (PROVENTIL HFA,VENTOLIN HFA) 90 mcg/act inhaler Inhale 2 puffs every 6 (six) hours as needed for wheezing, Starting Wed 8/21/2024, Normal      chlorhexidine (HIBICLENS) 4 % external liquid Apply 1 Application topically daily as needed for wound care, Starting Tue 10/10/2023, Normal      clonazePAM (KlonoPIN) 0.5 mg tablet TAKE 1 TABLET BY MOUTH 2 TIMES A DAY., Starting Wed 7/31/2024, Normal      ergocalciferol (VITAMIN D2) 50,000 units TAKE 1 CAPSULE BY MOUTH ONE TIME PER WEEK, Normal      ferrous sulfate 325 (65 Fe) mg tablet Take 1 tablet (325 mg total) by mouth daily with breakfast, Starting Fri 8/25/2023, Normal      hydrOXYzine pamoate (VISTARIL) 25 mg capsule Take 1  capsule (25 mg total) by mouth 3 (three) times a day as needed for anxiety, Starting Wed 8/21/2024, Normal      ibuprofen (MOTRIN) 400 mg tablet Take 1 tablet (400 mg total) by mouth every 6 (six) hours as needed for mild pain for up to 7 days, Starting Tue 5/9/2023, Until Tue 5/16/2023 at 2359, Normal      ipratropium-albuterol (DUO-NEB) 0.5-2.5 mg/3 mL nebulizer solution INHALE 3 ML BY NEBULIZATION EVERY 6 HOURS AS NEEDED FOR WHEEZE OR FOR SHORTNESS OF BREATH, Normal      omeprazole (PriLOSEC) 20 mg delayed release capsule TAKE 1 CAPSULE BY MOUTH EVERY DAY, Starting Mon 4/29/2024, Normal      sucralfate (CARAFATE) 1 g/10 mL suspension Take 10 mL (1 g total) by mouth 4 (four) times a day (with meals and at bedtime), Starting Mon 2/6/2023, Normal      triamcinolone (KENALOG) 0.5 % ointment Apply topically 2 (two) times a day for 14 days, Starting Wed 8/21/2024, Until Wed 9/4/2024, Normal             No discharge procedures on file.    PDMP Review         Value Time User    PDMP Reviewed  Yes 8/21/2024  3:58 PM Tye Matt MD            ED Provider  Electronically Signed by             Henok Vargas PA-C  09/01/24 0551

## 2024-10-07 DIAGNOSIS — E55.9 VITAMIN D DEFICIENCY: ICD-10-CM

## 2024-10-07 RX ORDER — ERGOCALCIFEROL 1.25 MG/1
CAPSULE, LIQUID FILLED ORAL
Qty: 12 CAPSULE | Refills: 1 | Status: SHIPPED | OUTPATIENT
Start: 2024-10-07

## 2024-10-23 ENCOUNTER — HOSPITAL ENCOUNTER (OUTPATIENT)
Dept: MAMMOGRAPHY | Facility: CLINIC | Age: 44
Discharge: HOME | End: 2024-10-23
Payer: COMMERCIAL

## 2024-10-23 VITALS — HEIGHT: 61 IN | BODY MASS INDEX: 36.06 KG/M2 | WEIGHT: 191 LBS

## 2024-10-23 DIAGNOSIS — Z12.31 ENCOUNTER FOR SCREENING MAMMOGRAM FOR MALIGNANT NEOPLASM OF BREAST: ICD-10-CM

## 2024-10-23 PROCEDURE — 77067 SCR MAMMO BI INCL CAD: CPT

## 2024-10-23 PROCEDURE — 77063 BREAST TOMOSYNTHESIS BI: CPT

## 2024-10-26 DIAGNOSIS — F33.9 DEPRESSION, RECURRENT (HCC): ICD-10-CM

## 2024-10-26 DIAGNOSIS — F31.9 BIPOLAR AFFECTIVE DISORDER, REMISSION STATUS UNSPECIFIED (HCC): ICD-10-CM

## 2024-10-28 RX ORDER — CLONAZEPAM 0.5 MG/1
0.5 TABLET ORAL 2 TIMES DAILY
Qty: 60 TABLET | Refills: 0 | Status: SHIPPED | OUTPATIENT
Start: 2024-10-28

## 2024-11-06 ENCOUNTER — HOSPITAL ENCOUNTER (OUTPATIENT)
Dept: ULTRASOUND IMAGING | Facility: CLINIC | Age: 44
Discharge: HOME | End: 2024-11-06
Payer: COMMERCIAL

## 2024-11-06 ENCOUNTER — HOSPITAL ENCOUNTER (OUTPATIENT)
Dept: MAMMOGRAPHY | Facility: CLINIC | Age: 44
Discharge: HOME | End: 2024-11-06
Payer: COMMERCIAL

## 2024-11-06 ENCOUNTER — OFFICE VISIT (OUTPATIENT)
Dept: FAMILY MEDICINE CLINIC | Facility: CLINIC | Age: 44
End: 2024-11-06

## 2024-11-06 VITALS
WEIGHT: 203 LBS | OXYGEN SATURATION: 96 % | RESPIRATION RATE: 18 BRPM | TEMPERATURE: 98.7 F | SYSTOLIC BLOOD PRESSURE: 108 MMHG | HEART RATE: 70 BPM | DIASTOLIC BLOOD PRESSURE: 70 MMHG | HEIGHT: 61 IN | BODY MASS INDEX: 38.33 KG/M2

## 2024-11-06 VITALS — HEIGHT: 61 IN | BODY MASS INDEX: 36.06 KG/M2 | WEIGHT: 191 LBS

## 2024-11-06 DIAGNOSIS — E66.813 CLASS 3 SEVERE OBESITY DUE TO EXCESS CALORIES WITHOUT SERIOUS COMORBIDITY WITH BODY MASS INDEX (BMI) OF 40.0 TO 44.9 IN ADULT (HCC): ICD-10-CM

## 2024-11-06 DIAGNOSIS — D50.9 IRON DEFICIENCY ANEMIA, UNSPECIFIED IRON DEFICIENCY ANEMIA TYPE: ICD-10-CM

## 2024-11-06 DIAGNOSIS — E66.01 CLASS 3 SEVERE OBESITY DUE TO EXCESS CALORIES WITHOUT SERIOUS COMORBIDITY WITH BODY MASS INDEX (BMI) OF 40.0 TO 44.9 IN ADULT (HCC): ICD-10-CM

## 2024-11-06 DIAGNOSIS — F41.9 ANXIETY: Primary | ICD-10-CM

## 2024-11-06 DIAGNOSIS — Z00.00 HEALTHCARE MAINTENANCE: ICD-10-CM

## 2024-11-06 DIAGNOSIS — E55.9 VITAMIN D DEFICIENCY: ICD-10-CM

## 2024-11-06 DIAGNOSIS — F31.9 BIPOLAR AFFECTIVE DISORDER, REMISSION STATUS UNSPECIFIED (HCC): ICD-10-CM

## 2024-11-06 DIAGNOSIS — F33.9 DEPRESSION, RECURRENT (HCC): ICD-10-CM

## 2024-11-06 DIAGNOSIS — R92.8 ABNORMAL SCREENING MAMMOGRAM: ICD-10-CM

## 2024-11-06 DIAGNOSIS — G47.00 INSOMNIA, UNSPECIFIED TYPE: ICD-10-CM

## 2024-11-06 DIAGNOSIS — L30.9 ECZEMA, UNSPECIFIED TYPE: ICD-10-CM

## 2024-11-06 DIAGNOSIS — J45.20 MILD INTERMITTENT ASTHMA WITHOUT COMPLICATION: ICD-10-CM

## 2024-11-06 PROCEDURE — 99215 OFFICE O/P EST HI 40 MIN: CPT | Performed by: PHYSICIAN ASSISTANT

## 2024-11-06 PROCEDURE — 76642 ULTRASOUND BREAST LIMITED: CPT

## 2024-11-06 PROCEDURE — 77065 DX MAMMO INCL CAD UNI: CPT

## 2024-11-06 PROCEDURE — G2211 COMPLEX E/M VISIT ADD ON: HCPCS | Performed by: PHYSICIAN ASSISTANT

## 2024-11-06 PROCEDURE — G0279 TOMOSYNTHESIS, MAMMO: HCPCS

## 2024-11-06 RX ORDER — ALBUTEROL SULFATE 90 UG/1
2 INHALANT RESPIRATORY (INHALATION) EVERY 6 HOURS PRN
Qty: 18 G | Refills: 2 | Status: SHIPPED | OUTPATIENT
Start: 2024-11-06

## 2024-11-06 NOTE — PROGRESS NOTES
Ambulatory Visit  Name: Jacki Langford      : 1980      MRN: [id number]  Encounter Provider: Keyona Mayfield PA-C  Encounter Date: 2024   Encounter department: [facility]    Assessment & Plan  Anxiety  -Previously following with [facility], but then they closed.  Then following with [facility], however they then stopped taking patient's insurance.  - Patient is interested in restarting care with outpatient psychiatry.  Will place referral to [facility] psychiatry.  However, informed patient there is currently a long wait list.  Per chart review, patient was originally referred to [facility] psychiatry in 2022, but never officially established with them.  Updated referral placed today.    -Previously taking Depakote, but patient self discontinued due to side effect of weight gain.  Previously taking BuSpar, but patient self discontinued due to not being effective.  - Patient tried hydroxyzine but did not find any relief.   - Will start Zoloft 25 mg daily x 1 week, then increase to 50 mg daily.  Patient aware medication does take about 4-6 weeks to reach full effectiveness.  -Continue Klonopin 0.5 mg twice daily as needed.  - Assessed possible risk for misuse, abuse, or addiction based on patient's family and social history.  - Educated patient on possible side effects and risks with taking this medication including risks of abuse, misuse, and addiction.  - PDMP reviewed.  No red flags noted.  - Discussed possible side effects.  - Continue coping mechanisms.  Orders:    sertraline (ZOLOFT) 50 mg tablet; Take 1 tablet (50 mg total) by mouth daily    Ambulatory referral to Behavioral Health Services; Future    Class 3 severe obesity due to excess calories without serious comorbidity with body mass index (BMI) of 40.0 to 44.9 in adult (HCC)  Prior Authorization Clinical Questions for Weight Management Pharmacotherapy    1. Does the patient have a contrainidcation to  medication prescribed for weight management?: No  2. Does the patient have a diagnosis of obesity, confirmed by a BMI greater than or equal to 30 kg/m^2?: Yes  3. Does the patient have a BMI of greater than or equal to 27 kg/m^2 with at least one weight-related comorbidity/risk factor/complication (e.g. diabetes, dyslipidemia, coronary artery disease)?: Yes  4. Weight-related co-morbidities/risk factors: depression, asthma/reactive airway disease  5. Has the patient been on a weight loss regimen of low-calorie diet, increased physical activity, and lifestyle modifications for a minimum of 6 months?: Yes  6. Has the patient completed a comprehensive weight loss program (ie, Weight Watchers, Noom, Bariatrics, other meghan on phone)? If so, what?: No  7. Does the patient have a history of type 2 diabetes?: No  8. Has the member tried and failed other weight loss medication within the past 12 months?: Yes   -- Q8 Further explanation: topamax   9. Will the member use requested medication in combination with another GLP agonist or weight loss drug?: No  10. Is the medication a controlled substance?: No     Baseline weight (in pounds): 203 lbs     -Previously taking Topamax with no effect.  - Will prescribe Wegovy 0.25 mg once weekly x 4 weeks.  Discussed possible side effects.  - Will plan to increase dose each month, as tolerated.  - Continue following healthy diet and regular exercise.    Orders:    Semaglutide-Weight Management (WEGOVY) 0.25 MG/0.5ML; Inject 0.5 mL (0.25 mg total) under the skin once a week for 28 days    Healthcare maintenance    Orders:    CBC and differential; Future    Comprehensive metabolic panel; Future    Lipid panel; Future    TSH, 3rd generation with Free T4 reflex; Future    Hemoglobin A1C; Future    Ferritin; Future    TIBC Panel (incl. Iron, TIBC, % Iron Saturation); Future    Vitamin D deficiency    Orders:    Vitamin D 25 hydroxy; Future    Mild intermittent asthma without  "complication    Orders:    albuterol (PROVENTIL HFA,VENTOLIN HFA) 90 mcg/act inhaler; Inhale 2 puffs every 6 (six) hours as needed for wheezing    Eczema, unspecified type  - Located on left hand.  - Symptoms persistent despite using triamcinolone.  Will discontinue.  - Will start betamethasone valerate 0.1% ointment, twice daily to affected area x 2 weeks.  - Continue daily moisturizer.    Orders:    betamethasone valerate (VALISONE) 0.1 % ointment; Apply topically 2 (two) times a day    Bipolar affective disorder, remission status unspecified (HCC)  - See plan for \"anxiety\"  Orders:    Ambulatory referral to Behavioral Health Services; Future    Depression, recurrent (HCC)  - See plan for \"anxiety\"    Orders:    Ambulatory referral to Behavioral Health Services; Future    Insomnia, unspecified type  - Stable. Continue trazodone 100 mg daily at bedtime.     Orders:    traZODone (DESYREL) 100 mg tablet; Take 1 tablet (100 mg total) by mouth daily at bedtime    Iron deficiency anemia, unspecified iron deficiency anemia type  - Patient reports severe constipation with taking iron supplement.  - Will check updated CBC, iron studies.  - Will consider IV iron infusions if iron continues to be low.         BMI Counseling: Body mass index is 38.36 kg/m². The BMI is above normal. Nutrition recommendations include decreasing portion sizes, encouraging healthy choices of fruits and vegetables, consuming healthier snacks, limiting drinks that contain sugar, moderation in carbohydrate intake, increasing intake of lean protein and reducing intake of cholesterol. Exercise recommendations include moderate physical activity 150 minutes/week. Pharmacotherapy was ordered to help aid in weight loss. Rationale for BMI follow-up plan is due to patient being overweight or obese.       History of Present Illness       Patient is a 44 y.o. female whom  has a past medical history of Anxiety, Anxiety and depression, Asthma, Bipolar disorder " (HCC), Chlamydia (2000), Depression, Fatty liver, GERD (gastroesophageal reflux disease), History of transfusion, Liver disease, and Migraine. who is seen today in office for anxiety follow up.    -Patient notes recently her anxiety has been uncontrolled.  Patient notes she is having panic attacks frequently.  Patient notes that generally occurs when she is stressing out a lot.  Patient notes she has been taking the Klonopin but it does not help very much and makes her tired.  Patient notes she was previously following with [facility], but then they closed.  She was then following with [facility], but then they stopped taking her insurance so she had to stop following with them. Patient currently without psychiatric services.  Patient notes she did try taking the hydroxyzine but it did not help.    -Patient notes she had stopped taking Depakote because it was causing weight gain.  She also stopped taking BuSpar.  Patient reports she is still taking trazodone at night which is helping her to sleep.  Patient notes she is still taking the Klonopin, but it does not help very much.  Patient notes her biggest stressor right now is her weight.    -Patient notes she is tired all the time.  Patient notes iron supplements because her severe constipation.  Patient notes she generally takes prune juice to help with this.  She denies ever having iron infusions.    -Patient notes she continues to have difficult time with losing weight.  Patient notes she has tried Topamax previously but it did not help much.      History obtained from : patient  Review of Systems   Constitutional:  Positive for fatigue. Negative for chills and fever.   HENT:  Negative for congestion and sore throat.    Respiratory:  Negative for cough and shortness of breath.    Cardiovascular:  Negative for chest pain, palpitations and leg swelling.   Gastrointestinal:  Negative for abdominal pain, constipation, diarrhea, nausea and vomiting.   Genitourinary:  Negative  for dysuria.   Musculoskeletal:  Negative for arthralgias.   Skin:  Positive for rash.   Neurological:  Negative for dizziness and headaches.   Psychiatric/Behavioral:  Positive for dysphoric mood and sleep disturbance. Negative for self-injury and suicidal ideas. The patient is nervous/anxious.      Pertinent Medical History     Medical History Reviewed by provider this encounter:  Tobacco  Allergies  Meds  Problems  Med Hx  Surg Hx  Fam Hx       Past Medical History   Past Medical History:   Diagnosis Date    Anxiety     Anxiety and depression     Asthma     has not req'd tx since 2016    Bipolar disorder (HCC)     Chlamydia 2000    Depression     Fatty liver     GERD (gastroesophageal reflux disease)     no meds    History of transfusion     Liver disease     Migraine     manages with OTC remedies     Past Surgical History:   Procedure Laterality Date    NASAL/SINUS ENDOSCOPY Right 06/25/2020    Procedure: FUNCTIONAL ENDOSCOPIC SINUS SURGERY WITH RIGHT MAXILLARY ANTROSTOMY REMOVALOF CYST, RIGHT SPHENOIDOTOMY, RIGHT POLYPECTOMY (LARGE ANTRAL CHOANAL POLYP).;  Surgeon: Eze Hartman MD;  Location:  MAIN OR;  Service: ENT    VA LAPAROSCOPY W/RMVL ADNEXAL STRUCTURES Right 10/10/2018    Procedure: SALPINGECTOMY, LAPAROSCOPIC;  Surgeon: Steven Fields MD;  Location: [facility];  Service: Gynecology    SALPINGOSTOMY  2015    tubal reversal    SCALP EXCISION      x4    TUBAL LIGATION  2002    TUBAL REANASTOMOSIS      WISDOM TOOTH EXTRACTION  1998     Family History   Problem Relation Age of Onset    No Known Problems Mother     No Known Problems Father     No Known Problems Sister     No Known Problems Sister     No Known Problems Daughter     No Known Problems Daughter     No Known Problems Daughter     Cancer Maternal Grandmother         unknown    No Known Problems Maternal Grandfather     Cancer Maternal Aunt         lung    Breast cancer Neg Hx      Current Outpatient Medications on File Prior to  Visit   Medication Sig Dispense Refill    chlorhexidine (HIBICLENS) 4 % external liquid Apply 1 Application topically daily as needed for wound care 236 mL 0    clonazePAM (KlonoPIN) 0.5 mg tablet TAKE 1 TABLET BY MOUTH TWICE A DAY 60 tablet 0    ergocalciferol (VITAMIN D2) 50,000 units TAKE 1 CAPSULE BY MOUTH ONE TIME PER WEEK 12 capsule 1    ferrous sulfate 325 (65 Fe) mg tablet Take 1 tablet (325 mg total) by mouth daily with breakfast 90 tablet 1    hydrOXYzine pamoate (VISTARIL) 25 mg capsule Take 1 capsule (25 mg total) by mouth 3 (three) times a day as needed for anxiety 30 capsule 1    ibuprofen (MOTRIN) 400 mg tablet Take 1 tablet (400 mg total) by mouth every 6 (six) hours as needed for mild pain for up to 7 days 28 tablet 0    ipratropium-albuterol (DUO-NEB) 0.5-2.5 mg/3 mL nebulizer solution INHALE 3 ML BY NEBULIZATION EVERY 6 HOURS AS NEEDED FOR WHEEZE OR FOR SHORTNESS OF BREATH 90 mL 1    omeprazole (PriLOSEC) 20 mg delayed release capsule TAKE 1 CAPSULE BY MOUTH EVERY DAY 90 capsule 1    rizatriptan (MAXALT-MLT) 10 mg disintegrating tablet Take 1 tablet (10 mg total) by mouth once as needed for migraine for up to 1 dose may repeat in 2 hours if necessary 10 tablet 5    sucralfate (CARAFATE) 1 g/10 mL suspension Take 10 mL (1 g total) by mouth 4 (four) times a day (with meals and at bedtime) 414 mL 0     No current facility-administered medications on file prior to visit.   No Known Allergies   Current Outpatient Medications on File Prior to Visit   Medication Sig Dispense Refill    chlorhexidine (HIBICLENS) 4 % external liquid Apply 1 Application topically daily as needed for wound care 236 mL 0    clonazePAM (KlonoPIN) 0.5 mg tablet TAKE 1 TABLET BY MOUTH TWICE A DAY 60 tablet 0    ergocalciferol (VITAMIN D2) 50,000 units TAKE 1 CAPSULE BY MOUTH ONE TIME PER WEEK 12 capsule 1    ferrous sulfate 325 (65 Fe) mg tablet Take 1 tablet (325 mg total) by mouth daily with breakfast 90 tablet 1    hydrOXYzine  "pamoate (VISTARIL) 25 mg capsule Take 1 capsule (25 mg total) by mouth 3 (three) times a day as needed for anxiety 30 capsule 1    ibuprofen (MOTRIN) 400 mg tablet Take 1 tablet (400 mg total) by mouth every 6 (six) hours as needed for mild pain for up to 7 days 28 tablet 0    ipratropium-albuterol (DUO-NEB) 0.5-2.5 mg/3 mL nebulizer solution INHALE 3 ML BY NEBULIZATION EVERY 6 HOURS AS NEEDED FOR WHEEZE OR FOR SHORTNESS OF BREATH 90 mL 1    omeprazole (PriLOSEC) 20 mg delayed release capsule TAKE 1 CAPSULE BY MOUTH EVERY DAY 90 capsule 1    rizatriptan (MAXALT-MLT) 10 mg disintegrating tablet Take 1 tablet (10 mg total) by mouth once as needed for migraine for up to 1 dose may repeat in 2 hours if necessary 10 tablet 5    sucralfate (CARAFATE) 1 g/10 mL suspension Take 10 mL (1 g total) by mouth 4 (four) times a day (with meals and at bedtime) 414 mL 0     No current facility-administered medications on file prior to visit.      Social History     Tobacco Use    Smoking status: Never     Passive exposure: Never    Smokeless tobacco: Never   Vaping Use    Vaping status: Never Used   Substance and Sexual Activity    Alcohol use: Not Currently    Drug use: Yes     Types: Marijuana     Comment: medical card    Sexual activity: Yes     Partners: Female     Birth control/protection: Female Sterilization         Objective     /70 (BP Location: Right arm, Patient Position: Sitting, Cuff Size: Large)   Pulse 70   Temp 98.7 °F (37.1 °C) (Temporal)   Resp 18   Ht 5' 1\" (1.549 m)   Wt 92.1 kg (203 lb)   LMP 10/14/2024 (Approximate)   SpO2 96%   Breastfeeding No   BMI 38.36 kg/m²     Physical Exam  Vitals and nursing note reviewed.   Constitutional:       General: She is not in acute distress.     Appearance: She is well-developed.   HENT:      Head: Normocephalic and atraumatic.      Right Ear: External ear normal.      Left Ear: External ear normal.      Nose: Nose normal.      Mouth/Throat:      Pharynx: Uvula " midline.   Eyes:      Conjunctiva/sclera: Conjunctivae normal.   Cardiovascular:      Rate and Rhythm: Normal rate and regular rhythm.      Pulses: Normal pulses.      Heart sounds: Normal heart sounds. No murmur heard.  Pulmonary:      Effort: Pulmonary effort is normal. No respiratory distress.      Breath sounds: Normal breath sounds. No wheezing.   Musculoskeletal:      Cervical back: Normal range of motion and neck supple.   Skin:     General: Skin is warm.   Neurological:      Mental Status: She is alert and oriented to person, place, and time.   Psychiatric:         Speech: Speech normal.         Behavior: Behavior normal.       Administrative Statements   I have spent a total time of 40 minutes in caring for this patient on the day of the visit/encounter including Risks and benefits of tx options, Instructions for management, Patient and family education, Importance of tx compliance, Risk factor reductions, Impressions, Counseling / Coordination of care, Documenting in the medical record, Reviewing / ordering tests, medicine, procedures  , and Obtaining or reviewing history  .

## 2024-11-06 NOTE — PATIENT INSTRUCTIONS
Outpatient Mental Health Resources    Emergency & Crisis Support    Suicide and Crisis Lifeline: Call or text 988 (Available 24 hours)  Crisis Text Line: Text HOME to 784713 (Available 24/7)  Warm Line: Call [phone] (Confidential mental health support, available Monday-Sunday: 6 AM-10 AM & 4 PM-12 AM)  [facility] Crisis: Call [phone] (For mental health emergencies, or visit your local Emergency Department)  Crime Victims [place]: Call [phone] (24/7 Advocate Hotline, counseling, court & hospital accompaniment, free services)    Local Mental Health Services    [facility]  Call [phone]  Website: [email or web address]  Support for mental health conditions; Free services    [facility]   [address], [place] [address] [phone]  Services for all ages; Bilingual (English/Filipino); Accepts [facility], [employer], [employer], [employer] and Cigna    [facility]  [address] [place] [address], [place] [id number] [phone]  Services for ages 4+. English only; Accepts most commercial insurances, but not Medical Assistance    [facility]   [address], [place], [place]   [phone]  Services for all ages; English only; Accepts [facility], [facility] [employer] and Medicare    [facility]  [address] [place] [address] [phone]  Services for ages 6+. Bilingual (English/Filipino); Accepts Medical Assistance only    [facility]  [address] [place] [address] [phone]  Services for ages 14+. Bilingual (English/Filipino); Accepts Medical Assistance, Medicare, and Commercial Insurance    [facility]  [address] [place] [address] [phone]  Services for ages 5+. Bilingual (English/Filipino); Accepts Medical Assistance    [facility]  [address], [place] [address]  [phone]  Services for ages 6+. Bilingual  (English/Bel[place]ian); Accepts Medical Assistance and Commercial Insurance    [facility]  [address] [place] [address]  [phone]  Services for ages 3+. Bilingual (English/Bel[place]ian); Accepts Medical Assistance and some commercial insurance    [facility]  [address] [place] [address]  [phone]  Services for ages 5+. Bilingual (English/Bel[place]ian); Accepts Medical Assistance    [facility]  [address], [place] [address]  [phone]  Services for ages 5+. Bilingual (English/Bel[place]ian); Accepts Medical Assistance    [facility]   [address], [place]  [phone] for ages ages 5+. Bilingual (English/Bel[place]ian); Accepts Medical Assistance, Medicare and Commercial Insurance    [facility] [address], [place] [address]  [phone]  Services for all ages (Therapy); 18+ (Psychiatry); English only; Accepts [employer], Aetna, [employer], [employer], [facility] (CHIP & commercial insurance)    [facility]  [address] [place] [address]  [phone]  Services for ages 3+. Bilingual (English/Bel[place]ian); Accepts Medical Assistance and Some Commercial Insurance                  Please contact your insurance provider for additional information.

## 2024-11-10 PROBLEM — G47.00 INSOMNIA: Status: ACTIVE | Noted: 2024-11-10

## 2024-11-10 PROBLEM — E66.01 CLASS 3 SEVERE OBESITY DUE TO EXCESS CALORIES WITHOUT SERIOUS COMORBIDITY WITH BODY MASS INDEX (BMI) OF 40.0 TO 44.9 IN ADULT (HCC): Chronic | Status: ACTIVE | Noted: 2020-02-24

## 2024-11-10 PROBLEM — F31.9 BIPOLAR DISORDER (HCC): Chronic | Status: ACTIVE | Noted: 2021-08-16

## 2024-11-10 PROBLEM — F41.9 ANXIETY: Chronic | Status: ACTIVE | Noted: 2024-11-10

## 2024-11-10 PROBLEM — E66.813 CLASS 3 SEVERE OBESITY DUE TO EXCESS CALORIES WITHOUT SERIOUS COMORBIDITY WITH BODY MASS INDEX (BMI) OF 40.0 TO 44.9 IN ADULT (HCC): Chronic | Status: ACTIVE | Noted: 2020-02-24

## 2024-11-10 PROBLEM — E55.9 VITAMIN D DEFICIENCY: Status: ACTIVE | Noted: 2024-11-10

## 2024-11-10 PROBLEM — G47.00 INSOMNIA: Chronic | Status: ACTIVE | Noted: 2024-11-10

## 2024-11-10 PROBLEM — D50.9 IRON DEFICIENCY ANEMIA: Status: ACTIVE | Noted: 2024-11-10

## 2024-11-10 PROBLEM — E55.9 VITAMIN D DEFICIENCY: Chronic | Status: ACTIVE | Noted: 2024-11-10

## 2024-11-10 PROBLEM — F33.9 DEPRESSION, RECURRENT (HCC): Chronic | Status: ACTIVE | Noted: 2021-07-11

## 2024-11-10 PROBLEM — F41.9 ANXIETY: Status: ACTIVE | Noted: 2024-11-10

## 2024-11-10 PROBLEM — D50.9 IRON DEFICIENCY ANEMIA: Chronic | Status: ACTIVE | Noted: 2024-11-10

## 2024-11-10 RX ORDER — TRAZODONE HYDROCHLORIDE 100 MG/1
100 TABLET ORAL
Start: 2024-11-10

## 2024-11-11 NOTE — ASSESSMENT & PLAN NOTE
- Patient reports severe constipation with taking iron supplement.  - Will check updated CBC, iron studies.  - Will consider IV iron infusions if iron continues to be low.

## 2024-11-11 NOTE — ASSESSMENT & PLAN NOTE
- Stable. Continue trazodone 100 mg daily at bedtime.     Orders:    traZODone (DESYREL) 100 mg tablet; Take 1 tablet (100 mg total) by mouth daily at bedtime

## 2024-11-11 NOTE — ASSESSMENT & PLAN NOTE
"- See plan for \"anxiety\"  Orders:    Ambulatory referral to Behavioral Health Services; Future    "

## 2024-11-11 NOTE — ASSESSMENT & PLAN NOTE
Prior Authorization Clinical Questions for Weight Management Pharmacotherapy    1. Does the patient have a contrainidcation to medication prescribed for weight management?: No  2. Does the patient have a diagnosis of obesity, confirmed by a BMI greater than or equal to 30 kg/m^2?: Yes  3. Does the patient have a BMI of greater than or equal to 27 kg/m^2 with at least one weight-related comorbidity/risk factor/complication (e.g. diabetes, dyslipidemia, coronary artery disease)?: Yes  4. Weight-related co-morbidities/risk factors: depression, asthma/reactive airway disease  5. Has the patient been on a weight loss regimen of low-calorie diet, increased physical activity, and lifestyle modifications for a minimum of 6 months?: Yes  6. Has the patient completed a comprehensive weight loss program (ie, Weight Watchers, Noom, Bariatrics, other meghan on phone)? If so, what?: No  7. Does the patient have a history of type 2 diabetes?: No  8. Has the member tried and failed other weight loss medication within the past 12 months?: Yes   -- Q8 Further explanation: topamax   9. Will the member use requested medication in combination with another GLP agonist or weight loss drug?: No  10. Is the medication a controlled substance?: No     Baseline weight (in pounds): 203 lbs     -Previously taking Topamax with no effect.  - Will prescribe Wegovy 0.25 mg once weekly x 4 weeks.  Discussed possible side effects.  - Will plan to increase dose each month, as tolerated.  - Continue following healthy diet and regular exercise.    Orders:    Semaglutide-Weight Management (WEGOVY) 0.25 MG/0.5ML; Inject 0.5 mL (0.25 mg total) under the skin once a week for 28 days

## 2024-11-11 NOTE — ASSESSMENT & PLAN NOTE
- Located on left hand.  - Symptoms persistent despite using triamcinolone.  Will discontinue.  - Will start betamethasone valerate 0.1% ointment, twice daily to affected area x 2 weeks.  - Continue daily moisturizer.    Orders:    betamethasone valerate (VALISONE) 0.1 % ointment; Apply topically 2 (two) times a day

## 2024-11-11 NOTE — ASSESSMENT & PLAN NOTE
Orders:    albuterol (PROVENTIL HFA,VENTOLIN HFA) 90 mcg/act inhaler; Inhale 2 puffs every 6 (six) hours as needed for wheezing

## 2024-11-11 NOTE — ASSESSMENT & PLAN NOTE
-Previously following with [facility], but then they closed.  Then following with [facility], however they then stopped taking patient's insurance.  - Patient is interested in restarting care with outpatient psychiatry.  Will place referral to [facility] psychiatry.  However, informed patient there is currently a long wait list.  Per chart review, patient was originally referred to [facility] psychiatry in October 2022, but never officially established with them.  Updated referral placed today.    -Previously taking Depakote, but patient self discontinued due to side effect of weight gain.  Previously taking BuSpar, but patient self discontinued due to not being effective.  - Patient tried hydroxyzine but did not find any relief.   - Will start Zoloft 25 mg daily x 1 week, then increase to 50 mg daily.  Patient aware medication does take about 4-6 weeks to reach full effectiveness.  -Continue Klonopin 0.5 mg twice daily as needed.  - Assessed possible risk for misuse, abuse, or addiction based on patient's family and social history.  - Educated patient on possible side effects and risks with taking this medication including risks of abuse, misuse, and addiction.  - PDMP reviewed.  No red flags noted.  - Discussed possible side effects.  - Continue coping mechanisms.  Orders:    sertraline (ZOLOFT) 50 mg tablet; Take 1 tablet (50 mg total) by mouth daily    Ambulatory referral to Behavioral Health Services; Future

## 2024-11-14 ENCOUNTER — TELEPHONE (OUTPATIENT)
Dept: FAMILY MEDICINE CLINIC | Facility: CLINIC | Age: 44
End: 2024-11-14

## 2024-11-14 NOTE — TELEPHONE ENCOUNTER
Patient call stating that the pharmacy told her that she need alternative medication from       Semaglutide-Weight Management (WEGOVY) 0.25 MG/0.5ML     Please advise

## 2024-11-14 NOTE — TELEPHONE ENCOUNTER
She stating that she receive a message from pharmacy about an alternative medication, and that the pharmacy was trying to contact her primary

## 2024-11-16 DIAGNOSIS — G47.00 INSOMNIA, UNSPECIFIED TYPE: ICD-10-CM

## 2024-11-16 DIAGNOSIS — F31.9 BIPOLAR AFFECTIVE DISORDER, REMISSION STATUS UNSPECIFIED (HCC): ICD-10-CM

## 2024-11-16 DIAGNOSIS — E66.813 CLASS 3 SEVERE OBESITY DUE TO EXCESS CALORIES WITHOUT SERIOUS COMORBIDITY WITH BODY MASS INDEX (BMI) OF 40.0 TO 44.9 IN ADULT (HCC): ICD-10-CM

## 2024-11-16 DIAGNOSIS — K21.9 GASTROESOPHAGEAL REFLUX DISEASE, UNSPECIFIED WHETHER ESOPHAGITIS PRESENT: ICD-10-CM

## 2024-11-16 DIAGNOSIS — E66.01 CLASS 3 SEVERE OBESITY DUE TO EXCESS CALORIES WITHOUT SERIOUS COMORBIDITY WITH BODY MASS INDEX (BMI) OF 40.0 TO 44.9 IN ADULT (HCC): ICD-10-CM

## 2024-11-18 RX ORDER — TOPIRAMATE 100 MG/1
100 TABLET, FILM COATED ORAL 3 TIMES DAILY
Qty: 270 TABLET | Refills: 1 | OUTPATIENT
Start: 2024-11-18

## 2024-11-18 RX ORDER — BUSPIRONE HYDROCHLORIDE 15 MG/1
15 TABLET ORAL 3 TIMES DAILY
Qty: 270 TABLET | Refills: 1 | OUTPATIENT
Start: 2024-11-18

## 2024-11-18 RX ORDER — TRAZODONE HYDROCHLORIDE 100 MG/1
100 TABLET ORAL
Qty: 90 TABLET | Refills: 1 | OUTPATIENT
Start: 2024-11-18

## 2024-11-20 NOTE — TELEPHONE ENCOUNTER
Pt was informed by insurance prior Auth for wegovy was denied. Please advice on prescribing another medication insure could cover.

## 2024-11-20 NOTE — TELEPHONE ENCOUNTER
Pt has been informed of prior authorization initiation. Pt also informed to contact insurance by tomorrow  11/21/2024 to find out if medication was approved by them since she has medicare. Also informed pt if any questions, concerns to give me a call back and I will do my best to assit.     PT was happy to hear an update in regards of her medication.

## 2024-11-20 NOTE — TELEPHONE ENCOUNTER
Patient called office today stating that she have been waiting for a week for this medication. I asked pt if she can clarify if alternative or pa is needed and pt stated that is for us to figured out. She also stated that pharmacy have been trying to contact office. Pt requested to speak with supervisor. Supervisor not available at this time. Informed pt that I will have them reach out to her as soon as they are available, pt stated she will come into the office. Team message was sent to Jacki FRIEDMAN to contact pt. Thanks!

## 2024-11-21 ENCOUNTER — TELEPHONE (OUTPATIENT)
Dept: FAMILY MEDICINE CLINIC | Facility: CLINIC | Age: 44
End: 2024-11-21

## 2024-11-21 NOTE — TELEPHONE ENCOUNTER
Spoke to Savita, front staff coordinator    Unf, medicare denied wegovy    They now only cover for pts with hx of ASCVD    Patient does not have this hx, and therefore will not be covered    There is no way around this. She can pay cash for zepbound 2.5 mg as this is the cheapest option. It is ~$400 per month for vials    Pharmacist Tracking Tool  Reason For Outreach: Embedded Pharmacist  Demographics:  Intervention Method: Chart Review  Type of Intervention: New  Topics Addressed: Obesity  Pharmacologic Interventions: N/A  Non-Pharmacologic Interventions: Care coordination, Chart update, and Cost  Time:  Direct Patient Care:  0  mins  Care Coordination:  10  mins  Recommendation Recipient: Patient/Caregiver and Provider  Outcome: Accepted    Jere Garcia, PharmD, BCACP  Ambulatory Care Clinical Pharmacist

## 2024-11-22 NOTE — TELEPHONE ENCOUNTER
PT is asking if medications options can be resolved prior appt on 12/4. I let pt know I will be forwarding the message over to you.   I provided a sooner appt for pt to come in next week and have the medication discussion with you, but pt insisted in just having the message delivered.

## 2024-11-25 ENCOUNTER — TELEPHONE (OUTPATIENT)
Dept: FAMILY MEDICINE CLINIC | Facility: CLINIC | Age: 44
End: 2024-11-25

## 2024-11-25 NOTE — TELEPHONE ENCOUNTER
PCP SIGNATURE NEEDED FOR [facility]mark FORM RECEIVED VIA FAX AND PLACED IN PCP FOLDER TO BE DELIVERED AT ASSIGNED TIMES.    Case: [id number]

## 2024-11-27 ENCOUNTER — TELEMEDICINE (OUTPATIENT)
Dept: FAMILY MEDICINE CLINIC | Facility: CLINIC | Age: 44
End: 2024-11-27

## 2024-11-27 DIAGNOSIS — E66.813 CLASS 3 SEVERE OBESITY DUE TO EXCESS CALORIES WITHOUT SERIOUS COMORBIDITY WITH BODY MASS INDEX (BMI) OF 40.0 TO 44.9 IN ADULT (HCC): Primary | Chronic | ICD-10-CM

## 2024-11-27 DIAGNOSIS — E66.01 CLASS 3 SEVERE OBESITY DUE TO EXCESS CALORIES WITHOUT SERIOUS COMORBIDITY WITH BODY MASS INDEX (BMI) OF 40.0 TO 44.9 IN ADULT (HCC): Primary | Chronic | ICD-10-CM

## 2024-11-27 PROCEDURE — G2211 COMPLEX E/M VISIT ADD ON: HCPCS | Performed by: PHYSICIAN ASSISTANT

## 2024-11-27 PROCEDURE — 99213 OFFICE O/P EST LOW 20 MIN: CPT | Performed by: PHYSICIAN ASSISTANT

## 2024-11-27 RX ORDER — BUPROPION HYDROCHLORIDE 150 MG/1
150 TABLET ORAL EVERY MORNING
Qty: 30 TABLET | Refills: 1 | Status: SHIPPED | OUTPATIENT
Start: 2024-11-27 | End: 2025-05-26

## 2024-11-27 NOTE — PROGRESS NOTES
Virtual Brief Visit  Name: Jacki Langford      : 1980      MRN: [id number]  Encounter Provider: Keyona Mayfield PA-C  Encounter Date: 2024   [facility]    This Visit is being completed by telephone. The Patient is located at Home and in the following state in which I hold an active license [place]    The patient was identified by name and date of birth. Jacki Langford was informed that this is a telemedicine visit and that the visit is being conducted through the [employer] Teams platform. She agrees to proceed..  My office door was closed. No one else was in the room.  She acknowledged consent and understanding of privacy and security of the video platform. The patient has agreed to participate and understands they can discontinue the visit at any time.    Patient is aware this is a billable service.     :  Assessment & Plan  Class 3 severe obesity due to excess calories without serious comorbidity with body mass index (BMI) of 40.0 to 44.9 in adult ([facility])  -Patient had been taking Topamax, but did not have much success with weight loss.  - At last visit, Wegovy was prescribed, however patient's insurance does not cover Wegovy nor Zepbound.  - Will prescribe Wellbutrin  mg daily.  - Continue following healthy diet and regular exercise.    Orders:    buPROPion (WELLBUTRIN XL) 150 mg 24 hr tablet; Take 1 tablet (150 mg total) by mouth every morning        History of Present Illness     Patient is a 44 y.o. female whom  has a past medical history of Anxiety, Anxiety and depression, Asthma, Bipolar disorder (), Chlamydia (), Depression, Fatty liver, GERD (gastroesophageal reflux disease), History of transfusion, Liver disease, and Migraine. who is seen today in office for weight follow up.    -Patient notes unfortunately her insurance did not cover the Wegovy or Zepbound.  She is looking for a different medication to help with weight  loss.      Visit Time  Total Visit Duration: 10 minutes

## 2024-11-27 NOTE — ASSESSMENT & PLAN NOTE
-Patient had been taking Topamax, but did not have much success with weight loss.  - At last visit, Wegovy was prescribed, however patient's insurance does not cover Wegovy nor Zepbound.  - Will prescribe Wellbutrin  mg daily.  - Continue following healthy diet and regular exercise.    Orders:    buPROPion (WELLBUTRIN XL) 150 mg 24 hr tablet; Take 1 tablet (150 mg total) by mouth every morning

## 2024-11-27 NOTE — Clinical Note
Hi!  I had virtual visit with patient today.  Patient is requesting to cancel her appointment on 12/4/2024.  Could you then please schedule for follow-up in about 6 weeks with me in person for follow-up weight?  Thanks!

## 2024-11-29 ENCOUNTER — TELEPHONE (OUTPATIENT)
Dept: FAMILY MEDICINE CLINIC | Facility: CLINIC | Age: 44
End: 2024-11-29

## 2024-11-29 NOTE — TELEPHONE ENCOUNTER
Callled Pt to cancel appointment on 12/4/24 and reschedule a f/u like her provider requested and She was annoyed and hung up on me!

## 2024-12-03 ENCOUNTER — TELEPHONE (OUTPATIENT)
Age: 44
End: 2024-12-03

## 2024-12-04 ENCOUNTER — TELEPHONE (OUTPATIENT)
Dept: FAMILY MEDICINE CLINIC | Facility: CLINIC | Age: 44
End: 2024-12-04

## 2024-12-04 NOTE — TELEPHONE ENCOUNTER
Pt has an appointment today 12/4/24 at 2pm. Provider will not be in. Called and left a VM to call back and reschedule. Current appointment cancelled

## 2024-12-07 DIAGNOSIS — F41.9 ANXIETY: ICD-10-CM

## 2024-12-12 DIAGNOSIS — E66.813 CLASS 3 SEVERE OBESITY DUE TO EXCESS CALORIES WITHOUT SERIOUS COMORBIDITY WITH BODY MASS INDEX (BMI) OF 40.0 TO 44.9 IN ADULT (HCC): Chronic | ICD-10-CM

## 2024-12-12 DIAGNOSIS — E66.01 CLASS 3 SEVERE OBESITY DUE TO EXCESS CALORIES WITHOUT SERIOUS COMORBIDITY WITH BODY MASS INDEX (BMI) OF 40.0 TO 44.9 IN ADULT (HCC): Chronic | ICD-10-CM

## 2024-12-13 RX ORDER — BUPROPION HYDROCHLORIDE 150 MG/1
150 TABLET ORAL EVERY MORNING
Qty: 90 TABLET | Refills: 1 | Status: SHIPPED | OUTPATIENT
Start: 2024-12-13

## 2025-01-31 ENCOUNTER — HOSPITAL ENCOUNTER (EMERGENCY)
Facility: HOSPITAL | Age: 45
Discharge: HOME | End: 2025-01-31
Attending: EMERGENCY MEDICINE
Payer: COMMERCIAL

## 2025-01-31 ENCOUNTER — APPOINTMENT (EMERGENCY)
Dept: RADIOLOGY | Facility: HOSPITAL | Age: 45
End: 2025-01-31
Payer: COMMERCIAL

## 2025-01-31 VITALS
OXYGEN SATURATION: 100 % | TEMPERATURE: 98.7 F | DIASTOLIC BLOOD PRESSURE: 72 MMHG | SYSTOLIC BLOOD PRESSURE: 157 MMHG | HEART RATE: 74 BPM | RESPIRATION RATE: 18 BRPM

## 2025-01-31 DIAGNOSIS — S00.03XA HEMATOMA OF RIGHT PARIETAL SCALP, INITIAL ENCOUNTER: ICD-10-CM

## 2025-01-31 DIAGNOSIS — M54.2 NECK PAIN: ICD-10-CM
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
## 2025-01-31 DIAGNOSIS — S00.83XA CONTUSION OF RAMUS OF MANDIBLE: ICD-10-CM

## 2025-01-31 DIAGNOSIS — K04.7 PERIAPICAL ABSCESS: ICD-10-CM

## 2025-01-31 LAB
ANION GAP SERPL CALCULATED.3IONS-SCNC: 7 MMOL/L (ref 4–13)
BASOPHILS # BLD AUTO: 0.04 THOUSANDS/ΜL (ref 0–0.1)
BASOPHILS NFR BLD AUTO: 1 % (ref 0–1)
BUN SERPL-MCNC: 9 MG/DL (ref 5–25)
CALCIUM SERPL-MCNC: 8.9 MG/DL (ref 8.4–10.2)
CHLORIDE SERPL-SCNC: 110 MMOL/L (ref 96–108)
CO2 SERPL-SCNC: 23 MMOL/L (ref 21–32)
CREAT SERPL-MCNC: 0.69 MG/DL (ref 0.6–1.3)
EOSINOPHIL # BLD AUTO: 0.31 THOUSAND/ΜL (ref 0–0.61)
EOSINOPHIL NFR BLD AUTO: 5 % (ref 0–6)
ERYTHROCYTE [DISTWIDTH] IN BLOOD BY AUTOMATED COUNT: 17.3 % (ref 11.6–15.1)
EXT PREGNANCY TEST URINE: NEGATIVE
EXT. CONTROL: NORMAL
GFR SERPL CREATININE-BSD FRML MDRD: 104 ML/MIN/1.73SQ M
GLUCOSE SERPL-MCNC: 100 MG/DL (ref 65–140)
HCT VFR BLD AUTO: 35.5 % (ref 34.8–46.1)
HGB BLD-MCNC: 11.2 G/DL (ref 11.5–15.4)
IMM GRANULOCYTES # BLD AUTO: 0.03 THOUSAND/UL (ref 0–0.2)
IMM GRANULOCYTES NFR BLD AUTO: 1 % (ref 0–2)
LYMPHOCYTES # BLD AUTO: 1.88 THOUSANDS/ΜL (ref 0.6–4.47)
LYMPHOCYTES NFR BLD AUTO: 30 % (ref 14–44)
MCH RBC QN AUTO: 26.8 PG (ref 26.8–34.3)
MCHC RBC AUTO-ENTMCNC: 31.5 G/DL (ref 31.4–37.4)
MCV RBC AUTO: 85 FL (ref 82–98)
MONOCYTES # BLD AUTO: 0.5 THOUSAND/ΜL (ref 0.17–1.22)
MONOCYTES NFR BLD AUTO: 8 % (ref 4–12)
NEUTROPHILS # BLD AUTO: 3.47 THOUSANDS/ΜL (ref 1.85–7.62)
NEUTS SEG NFR BLD AUTO: 55 % (ref 43–75)
NRBC BLD AUTO-RTO: 0 /100 WBCS
PLATELET # BLD AUTO: 255 THOUSANDS/UL (ref 149–390)
PMV BLD AUTO: 10.1 FL (ref 8.9–12.7)
POTASSIUM SERPL-SCNC: 3.7 MMOL/L (ref 3.5–5.3)
RBC # BLD AUTO: 4.18 MILLION/UL (ref 3.81–5.12)
SODIUM SERPL-SCNC: 140 MMOL/L (ref 135–147)
WBC # BLD AUTO: 6.23 THOUSAND/UL (ref 4.31–10.16)

## 2025-01-31 PROCEDURE — 70496 CT ANGIOGRAPHY HEAD: CPT

## 2025-01-31 PROCEDURE — 99284 EMERGENCY DEPT VISIT MOD MDM: CPT

## 2025-01-31 PROCEDURE — 85025 COMPLETE CBC W/AUTO DIFF WBC: CPT

## 2025-01-31 PROCEDURE — 80048 BASIC METABOLIC PNL TOTAL CA: CPT

## 2025-01-31 PROCEDURE — 36415 COLL VENOUS BLD VENIPUNCTURE: CPT

## 2025-01-31 PROCEDURE — 96374 THER/PROPH/DIAG INJ IV PUSH: CPT

## 2025-01-31 PROCEDURE — 99285 EMERGENCY DEPT VISIT HI MDM: CPT | Performed by: EMERGENCY MEDICINE

## 2025-01-31 PROCEDURE — 96375 TX/PRO/DX INJ NEW DRUG ADDON: CPT

## 2025-01-31 PROCEDURE — 81025 URINE PREGNANCY TEST: CPT

## 2025-01-31 PROCEDURE — 70498 CT ANGIOGRAPHY NECK: CPT

## 2025-01-31 RX ORDER — KETOROLAC TROMETHAMINE 30 MG/ML
15 INJECTION, SOLUTION INTRAMUSCULAR; INTRAVENOUS ONCE
Status: COMPLETED | OUTPATIENT
Start: 2025-01-31 | End: 2025-01-31

## 2025-01-31 RX ORDER — ACETAMINOPHEN 325 MG/1
650 TABLET ORAL ONCE
Status: COMPLETED | OUTPATIENT
Start: 2025-01-31 | End: 2025-01-31

## 2025-01-31 RX ORDER — HYDROMORPHONE HCL IN WATER/PF 6 MG/30 ML
0.2 PATIENT CONTROLLED ANALGESIA SYRINGE INTRAVENOUS ONCE
Refills: 0 | Status: COMPLETED | OUTPATIENT
Start: 2025-01-31 | End: 2025-01-31

## 2025-01-31 RX ORDER — KETOROLAC TROMETHAMINE 30 MG/ML
15 INJECTION, SOLUTION INTRAMUSCULAR; INTRAVENOUS ONCE
Status: DISCONTINUED | OUTPATIENT
Start: 2025-01-31 | End: 2025-01-31

## 2025-01-31 RX ORDER — LIDOCAINE 50 MG/G
2 PATCH TOPICAL ONCE
Status: DISCONTINUED | OUTPATIENT
Start: 2025-01-31 | End: 2025-01-31 | Stop reason: HOSPADM

## 2025-01-31 RX ORDER — METHOCARBAMOL 500 MG/1
500 TABLET, FILM COATED ORAL ONCE
Status: COMPLETED | OUTPATIENT
Start: 2025-01-31 | End: 2025-01-31

## 2025-01-31 RX ADMIN — METHOCARBAMOL 500 MG: 500 TABLET ORAL at 17:26

## 2025-01-31 RX ADMIN — LIDOCAINE 2 PATCH: 700 PATCH TOPICAL at 17:27

## 2025-01-31 RX ADMIN — AMOXICILLIN AND CLAVULANATE POTASSIUM 1 TABLET: 875; 125 TABLET, COATED ORAL at 19:43

## 2025-01-31 RX ADMIN — HYDROMORPHONE HYDROCHLORIDE 0.2 MG: 0.2 INJECTION, SOLUTION INTRAMUSCULAR; INTRAVENOUS; SUBCUTANEOUS at 18:43

## 2025-01-31 RX ADMIN — KETOROLAC TROMETHAMINE 15 MG: 30 INJECTION, SOLUTION INTRAMUSCULAR; INTRAVENOUS at 19:43

## 2025-01-31 RX ADMIN — ACETAMINOPHEN 650 MG: 325 TABLET, FILM COATED ORAL at 17:26

## 2025-01-31 RX ADMIN — IOHEXOL 85 ML: 350 INJECTION, SOLUTION INTRAVENOUS at 18:36
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
## 2025-01-31 NOTE — Clinical Note
Jacki Langford was seen and treated in our emergency department on 1/31/2025.                Diagnosis:     Jacki  may return to work on return date.    She may return on this date: 02/03/2025         If you have any questions or concerns, please don't hesitate to call.      Yomaira Armijo MD    ______________________________           _______________          _______________  Hospital Representative                              Date                                Time
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
## 2025-02-01 NOTE — DISCHARGE INSTRUCTIONS
"Your CT of your head and neck were normal aside from some finding of periodontal disease     \"No acute osseous abnormality. Periodontal disease with a periapical abscess noted in the right lower first molar tooth. Few scattered dental caries and dental fillings as well as dental implants. \"    Please follow up with a dentist regarding this periapical abscess. You have been prescribed 7 days of Augmentin for this.     Please return to the ER with any other health or safety concerns   "

## 2025-04-09 ENCOUNTER — HOSPITAL ENCOUNTER (EMERGENCY)
Facility: HOSPITAL | Age: 45
Discharge: HOME | End: 2025-04-09
Payer: OTHER MISCELLANEOUS

## 2025-04-09 VITALS
HEART RATE: 81 BPM | TEMPERATURE: 98 F | DIASTOLIC BLOOD PRESSURE: 77 MMHG | RESPIRATION RATE: 18 BRPM | OXYGEN SATURATION: 100 % | SYSTOLIC BLOOD PRESSURE: 105 MMHG

## 2025-04-09 DIAGNOSIS — S39.012A STRAIN OF LUMBAR REGION, INITIAL ENCOUNTER: Primary | ICD-10-CM

## 2025-04-09 PROCEDURE — 99283 EMERGENCY DEPT VISIT LOW MDM: CPT

## 2025-04-09 PROCEDURE — 96372 THER/PROPH/DIAG INJ SC/IM: CPT

## 2025-04-09 PROCEDURE — 99284 EMERGENCY DEPT VISIT MOD MDM: CPT

## 2025-04-09 RX ORDER — KETOROLAC TROMETHAMINE 30 MG/ML
15 INJECTION, SOLUTION INTRAMUSCULAR; INTRAVENOUS ONCE
Status: COMPLETED | OUTPATIENT
Start: 2025-04-09 | End: 2025-04-09

## 2025-04-09 RX ORDER — LIDOCAINE 50 MG/G
1 PATCH TOPICAL DAILY
Qty: 5 PATCH | Refills: 0 | Status: SHIPPED | OUTPATIENT
Start: 2025-04-09

## 2025-04-09 RX ORDER — OXYCODONE HYDROCHLORIDE 5 MG/1
5 TABLET ORAL ONCE
Refills: 0 | Status: COMPLETED | OUTPATIENT
Start: 2025-04-09 | End: 2025-04-09

## 2025-04-09 RX ORDER — LIDOCAINE 50 MG/G
2 PATCH TOPICAL ONCE
Status: DISCONTINUED | OUTPATIENT
Start: 2025-04-09 | End: 2025-04-10 | Stop reason: HOSPADM

## 2025-04-09 RX ADMIN — OXYCODONE HYDROCHLORIDE 5 MG: 5 TABLET ORAL at 23:00

## 2025-04-09 RX ADMIN — LIDOCAINE 2 PATCH: 50 PATCH CUTANEOUS at 23:00

## 2025-04-09 RX ADMIN — KETOROLAC TROMETHAMINE 15 MG: 30 INJECTION, SOLUTION INTRAMUSCULAR; INTRAVENOUS at 23:02

## 2025-04-09 NOTE — Clinical Note
Jacki Langford was seen and treated in our emergency department on 4/9/2025.                Diagnosis:     Jacki  may return to work on return date.    She may return on this date: 04/11/2025         If you have any questions or concerns, please don't hesitate to call.      Landry Gay MD    ______________________________           _______________          _______________  Hospital Representative                              Date                                Time

## 2025-04-10 ENCOUNTER — TELEPHONE (OUTPATIENT)
Dept: PHYSICAL THERAPY | Facility: OTHER | Age: 45
End: 2025-04-10

## 2025-04-10 NOTE — ED PROVIDER NOTES
Time reflects when diagnosis was documented in both MDM as applicable and the Disposition within this note       Time User Action Codes Description Comment    4/9/2025 11:04 PM Palladino, Annette Add [S39.012A] Strain of lumbar region, initial encounter           ED Disposition       ED Disposition   Discharge    Condition   Stable    Date/Time   Wed Apr 9, 2025 11:04 PM    Comment   Jacki YAMILETH Langford discharge to home/self care.                   Assessment & Plan       Medical Decision Making  Risk  Prescription drug management.    44-year-old female with no significant past medical history comes in for evaluation of musculoskeletal back pain.  The patient states that she works as a cleaning company and backed into a door handle which caused her to have severe right-sided lumbar musculoskeletal back pain.  The patient states that she went to [facility] 2 days ago where x-rays were negative and she was given Lidoderm patches as well as a Toradol shot without any relief.    The patient states that she does not have any urinary retention, fever, history of IV drug use, saddle anesthesia or any other red flag symptoms.    On examination, the patient does not have any spinous process tenderness and is tender primarily over the right lumbar musculoskeletal back.  Which is focal in nature    Patient neurologically fully intact heart lungs clear to auscultation abdomen soft and nontender    Plan Toradol Lidoderm patch oxycodone with outpatient follow-up to comprehensive spine and supportive treatment going further         Medications   ketorolac (TORADOL) injection 15 mg (15 mg Intramuscular Given 4/9/25 2302)   oxyCODONE (ROXICODONE) IR tablet 5 mg (5 mg Oral Given 4/9/25 2300)       ED Risk Strat Scores                    No data recorded        SBIRT 22yo+      Flowsheet Row Most Recent Value   Initial Alcohol Screen: US AUDIT-C     1. How often do you have a drink containing alcohol? 0 Filed at:  04/09/2025 2305   2. How many drinks containing alcohol do you have on a typical day you are drinking?  0 Filed at: 04/09/2025 2305   3b. FEMALE Any Age, or MALE 65+: How often do you have 4 or more drinks on one occassion? 0 Filed at: 04/09/2025 2305   Audit-C Score 0 Filed at: 04/09/2025 2305   NEHA: How many times in the past year have you...    Used an illegal drug or used a prescription medication for non-medical reasons? Never Filed at: 04/09/2025 2305                            History of Present Illness       Chief Complaint   Patient presents with    Back Pain     Pt. C/o mid back pain, pain mostly on the right side. 2 days ago walked backwards into a door handle. Went to [facility] 2 days ago, xrays were negative and pt is unable to get to pcp til tomorrow and is in too much pain to wait.        Past Medical History:   Diagnosis Date    Anxiety     Anxiety and depression     Asthma     has not req'd tx since 2016    Bipolar disorder (HCC)     Chlamydia 2000    Depression     Fatty liver     GERD (gastroesophageal reflux disease)     no meds    History of transfusion     Liver disease     Migraine     manages with OTC remedies      Past Surgical History:   Procedure Laterality Date    NASAL/SINUS ENDOSCOPY Right 06/25/2020    Procedure: FUNCTIONAL ENDOSCOPIC SINUS SURGERY WITH RIGHT MAXILLARY ANTROSTOMY REMOVALOF CYST, RIGHT SPHENOIDOTOMY, RIGHT POLYPECTOMY (LARGE ANTRAL CHOANAL POLYP).;  Surgeon: Eze Hartman MD;  Location:  MAIN OR;  Service: ENT    MA LAPAROSCOPY W/RMVL ADNEXAL STRUCTURES Right 10/10/2018    Procedure: SALPINGECTOMY, LAPAROSCOPIC;  Surgeon: Steven Fields MD;  Location: [facility];  Service: Gynecology    SALPINGOSTOMY  2015    tubal reversal    SCALP EXCISION      x4    TUBAL LIGATION  2002    TUBAL REANASTOMOSIS      WISDOM TOOTH EXTRACTION  1998      Family History   Problem Relation Age of Onset    No Known Problems Mother     No Known Problems Father     No Known Problems Sister      No Known Problems Sister     No Known Problems Daughter     No Known Problems Daughter     No Known Problems Daughter     Cancer Maternal Grandmother         unknown    No Known Problems Maternal Grandfather     Cancer Maternal Aunt         lung    Breast cancer Neg Hx       Social History     Tobacco Use    Smoking status: Never     Passive exposure: Never    Smokeless tobacco: Never   Vaping Use    Vaping status: Never Used   Substance Use Topics    Alcohol use: Yes    Drug use: Not Currently     Types: Marijuana     Comment: medical card      E-Cigarette/Vaping    E-Cigarette Use Never User       E-Cigarette/Vaping Substances    Nicotine No     THC No     CBD No     Flavoring No       I have reviewed and agree with the history as documented.     44-year-old female comes in with musculoskeletal back pain unrelieved        Review of Systems   Constitutional:  Negative for chills and fever.   HENT:  Negative for ear pain and sore throat.    Eyes:  Negative for pain and visual disturbance.   Respiratory:  Negative for cough and shortness of breath.    Cardiovascular:  Negative for chest pain and palpitations.   Gastrointestinal:  Negative for abdominal pain and vomiting.   Genitourinary:  Negative for dysuria and hematuria.   Musculoskeletal:  Positive for back pain. Negative for arthralgias.   Skin:  Negative for color change and rash.   Neurological:  Negative for seizures and syncope.   All other systems reviewed and are negative.          Objective       ED Triage Vitals [04/09/25 2241]   Temperature Pulse Blood Pressure Respirations SpO2 Patient Position - Orthostatic VS   98 °F (36.7 °C) 81 105/77 18 100 % Sitting      Temp Source Heart Rate Source BP Location FiO2 (%) Pain Score    Tympanic Monitor Left arm -- 10 - Worst Possible Pain      Vitals      Date and Time Temp Pulse SpO2 Resp BP Pain Score FACES Pain Rating User   04/09/25 2300 -- -- -- -- -- 10 - Worst Possible Pain -- OB   04/09/25 2241 98 °F  (36.7 °C) 81 100 % 18 105/77 10 - Worst Possible Pain -- BK            Physical Exam  Vitals and nursing note reviewed.   Constitutional:       General: She is not in acute distress.     Appearance: She is well-developed.   HENT:      Head: Normocephalic and atraumatic.   Eyes:      Conjunctiva/sclera: Conjunctivae normal.   Cardiovascular:      Rate and Rhythm: Normal rate and regular rhythm.      Heart sounds: No murmur heard.  Pulmonary:      Effort: Pulmonary effort is normal. No respiratory distress.      Breath sounds: Normal breath sounds.   Abdominal:      Palpations: Abdomen is soft.      Tenderness: There is no abdominal tenderness.   Musculoskeletal:         General: No swelling.      Cervical back: Neck supple.        Back:       Comments: No cervical spine tenderness   Skin:     General: Skin is warm and dry.      Capillary Refill: Capillary refill takes less than 2 seconds.   Neurological:      Mental Status: She is alert and oriented to person, place, and time.      Cranial Nerves: No cranial nerve deficit.      Sensory: No sensory deficit.      Motor: No weakness.      Coordination: Coordination normal.      Gait: Gait normal.      Deep Tendon Reflexes: Reflexes normal.   Psychiatric:         Mood and Affect: Mood normal.         Results Reviewed       None            No orders to display       Procedures    ED Medication and Procedure Management   Prior to Admission Medications   Prescriptions Last Dose Informant Patient Reported? Taking?   albuterol (PROVENTIL HFA,VENTOLIN HFA) 90 mcg/act inhaler   No No   Sig: Inhale 2 puffs every 6 (six) hours as needed for wheezing   betamethasone valerate (VALISONE) 0.1 % ointment   No No   Sig: Apply topically 2 (two) times a day   buPROPion (WELLBUTRIN XL) 150 mg 24 hr tablet   No No   Sig: TAKE 1 TABLET BY MOUTH EVERY DAY IN THE MORNING   chlorhexidine (HIBICLENS) 4 % external liquid   No No   Sig: Apply 1 Application topically daily as needed for wound care    clonazePAM (KlonoPIN) 0.5 mg tablet   No No   Sig: TAKE 1 TABLET BY MOUTH TWICE A DAY   ergocalciferol (VITAMIN D2) 50,000 units   No No   Sig: TAKE 1 CAPSULE BY MOUTH ONE TIME PER WEEK   ferrous sulfate 325 (65 Fe) mg tablet   No No   Sig: Take 1 tablet (325 mg total) by mouth daily with breakfast   hydrOXYzine pamoate (VISTARIL) 25 mg capsule   No No   Sig: Take 1 capsule (25 mg total) by mouth 3 (three) times a day as needed for anxiety   ibuprofen (MOTRIN) 400 mg tablet   No No   Sig: Take 1 tablet (400 mg total) by mouth every 6 (six) hours as needed for mild pain for up to 7 days   ipratropium-albuterol (DUO-NEB) 0.5-2.5 mg/3 mL nebulizer solution   No No   Sig: INHALE 3 ML BY NEBULIZATION EVERY 6 HOURS AS NEEDED FOR WHEEZE OR FOR SHORTNESS OF BREATH   omeprazole (PriLOSEC) 20 mg delayed release capsule   No No   Sig: TAKE 1 CAPSULE BY MOUTH EVERY DAY   rizatriptan (MAXALT-MLT) 10 mg disintegrating tablet   No No   Sig: Take 1 tablet (10 mg total) by mouth once as needed for migraine for up to 1 dose may repeat in 2 hours if necessary   sertraline (ZOLOFT) 50 mg tablet   No No   Sig: TAKE 1 TABLET BY MOUTH EVERY DAY   sucralfate (CARAFATE) 1 g/10 mL suspension   No No   Sig: Take 10 mL (1 g total) by mouth 4 (four) times a day (with meals and at bedtime)   traZODone (DESYREL) 100 mg tablet   No No   Sig: Take 1 tablet (100 mg total) by mouth daily at bedtime      Facility-Administered Medications: None     Discharge Medication List as of 4/9/2025 11:07 PM        START taking these medications    Details   lidocaine (Lidoderm) 5 % Apply 1 patch topically over 12 hours daily Remove & Discard patch within 12 hours or as directed by MD, Starting Wed 4/9/2025, Normal           CONTINUE these medications which have NOT CHANGED    Details   albuterol (PROVENTIL HFA,VENTOLIN HFA) 90 mcg/act inhaler Inhale 2 puffs every 6 (six) hours as needed for wheezing, Starting Wed 11/6/2024, Normal      betamethasone valerate  (VALISONE) 0.1 % ointment Apply topically 2 (two) times a day, Starting Wed 11/6/2024, Normal      buPROPion (WELLBUTRIN XL) 150 mg 24 hr tablet TAKE 1 TABLET BY MOUTH EVERY DAY IN THE MORNING, Starting Fri 12/13/2024, Normal      chlorhexidine (HIBICLENS) 4 % external liquid Apply 1 Application topically daily as needed for wound care, Starting Tue 10/10/2023, Normal      clonazePAM (KlonoPIN) 0.5 mg tablet TAKE 1 TABLET BY MOUTH TWICE A DAY, Starting Mon 10/28/2024, Normal      ergocalciferol (VITAMIN D2) 50,000 units TAKE 1 CAPSULE BY MOUTH ONE TIME PER WEEK, Normal      ferrous sulfate 325 (65 Fe) mg tablet Take 1 tablet (325 mg total) by mouth daily with breakfast, Starting Fri 8/25/2023, Normal      hydrOXYzine pamoate (VISTARIL) 25 mg capsule Take 1 capsule (25 mg total) by mouth 3 (three) times a day as needed for anxiety, Starting Wed 8/21/2024, Normal      ibuprofen (MOTRIN) 400 mg tablet Take 1 tablet (400 mg total) by mouth every 6 (six) hours as needed for mild pain for up to 7 days, Starting Tue 5/9/2023, Until Tue 5/16/2023 at 2359, Normal      ipratropium-albuterol (DUO-NEB) 0.5-2.5 mg/3 mL nebulizer solution INHALE 3 ML BY NEBULIZATION EVERY 6 HOURS AS NEEDED FOR WHEEZE OR FOR SHORTNESS OF BREATH, Normal      omeprazole (PriLOSEC) 20 mg delayed release capsule TAKE 1 CAPSULE BY MOUTH EVERY DAY, Starting Mon 11/18/2024, Normal      rizatriptan (MAXALT-MLT) 10 mg disintegrating tablet Take 1 tablet (10 mg total) by mouth once as needed for migraine for up to 1 dose may repeat in 2 hours if necessary, Starting Wed 8/21/2024, Normal      sertraline (ZOLOFT) 50 mg tablet TAKE 1 TABLET BY MOUTH EVERY DAY, Starting Mon 12/9/2024, Normal      sucralfate (CARAFATE) 1 g/10 mL suspension Take 10 mL (1 g total) by mouth 4 (four) times a day (with meals and at bedtime), Starting Mon 2/6/2023, Normal      traZODone (DESYREL) 100 mg tablet Take 1 tablet (100 mg total) by mouth daily at bedtime, Starting Sun  11/10/2024, No Print             ED SEPSIS DOCUMENTATION   Time reflects when diagnosis was documented in both MDM as applicable and the Disposition within this note       Time User Action Codes Description Comment    4/9/2025 11:04 PM Palladino, Annette Add [S39.012A] Strain of lumbar region, initial encounter                  Landry Gay MD  04/12/25 0553

## 2025-04-10 NOTE — ED ATTENDING ATTESTATION
4/9/2025  I, Annette Maria Palladino, DO, saw and evaluated the patient. I have discussed the patient with the resident/non-physician practitioner and agree with the resident's/non-physician practitioner's findings, Plan of Care, and MDM as documented in the resident's/non-physician practitioner's note, except where noted. All available labs and Radiology studies were reviewed.  I was present for key portions of any procedure(s) performed by the resident/non-physician practitioner and I was immediately available to provide assistance.       At this point I agree with the current assessment done in the Emergency Department.  I have conducted an independent evaluation of this patient a history and physical is as follows:    ED Course     Patient is a 44-year-old female presenting to the emergency department for evaluation of right-sided lower back pain.  Patient notes that 2 days ago she backed into a doorknob and since has had muscle spasms and pain.  She denies any dysuria or hematuria she denies any urinary or fecal incontinence.  Denies any fevers or chills she denies any numbness or tingling in any of her extremities.  Patient notes she still able to ambulate however notes that it is hurting.  Patient has not tried anything significant for pain relief once come to the hospital for further evaluation.    General: No acute distress  Neuro: A&Ox3; Following commands; Moving all extremities  HEENT: Moist mucus membranes  CV: Regular rate  Pulm: No respiratory distress  GI: No abdominal tenderness, No distension  MSK: ROM intact, No spinous process tenderness, hypertonicity paraspinal musculature consistent with acute muscle spasm right lumbar., +2 patella and Achilles reflex bilaterally. Normal sensation normal muscle strength bilateral lower extremities, able to ambulate without any difficulty.  Skin: Warm and dry    Denies history of severe or worsening lower extremity weakness/numbness. Denies history of saddle  anesthesia/perineal anesthesia. Denies bowel or bladder incontinence/retention.  History does not suggest diagnosis of cauda equina syndrome.  Patient denies history of IVDA, denies history of fevers, no recent surgeries or any procedures to suggest a transient bacteremia leading to a diagnosis of subdural abscess. Denies history of blood thinner use with recent history of lumbar puncture or any violation of the epidural space to suggest history of epidural hematoma. Therefore these above diagnoses (cauda equina syndrome, epidural abscess, epidural hematoma) were not pursued with diagnostic imaging.     Plan is for multimodal pain regimen consisting of 1 dose of Oxy given that her  is coming to pick her up so she does not have to drive.  Will also do a Lidoderm patch as well as Toradol.  Patient has Flexeril at home we will send Lidoderm patches.  Comprehensive spine follow-up advised.    I estimate there is LOW risk for AAA, Cauda Equina, Epidural Mass Lesion, Spinal Stenosis, or herniated disk causing severe stenosis, thus I consider the discharge disposition reasonable. We have discussed the diagnosis and risks, and we agree with discharging home to follow-up with their primary doctor and following up with Comprehensive Spine. We also discussed returning to the Emergency Department immediately if new or worsening symptoms occur. We have discussed the symptoms which are most concerning (e.g., saddle anesthesia, urinary or bowel incontinence or retention, fevers, changing or worsening pain) that necessitate immediate return.        Critical Care Time  Procedures

## 2025-04-10 NOTE — TELEPHONE ENCOUNTER
Call placed to the patient per [facility] Program referral.    V/M left for patient to call back. Phone number and hours of business provided.    This is the 1st attempt to reach the patient. Will defer referral per protocol.    Mid-lower back pain.

## 2025-04-16 NOTE — TELEPHONE ENCOUNTER
Call placed to the patient per [facility] referral.    Unable to LM. Message said nothing is being recorded. Tried X2    This the 2nd attempt to reach the patient.   Will defer per protocol.

## 2025-05-23 ENCOUNTER — OFFICE VISIT (OUTPATIENT)
Dept: FAMILY MEDICINE CLINIC | Facility: CLINIC | Age: 45
End: 2025-05-23

## 2025-05-23 VITALS
HEART RATE: 86 BPM | WEIGHT: 195 LBS | RESPIRATION RATE: 16 BRPM | HEIGHT: 61 IN | OXYGEN SATURATION: 100 % | SYSTOLIC BLOOD PRESSURE: 118 MMHG | DIASTOLIC BLOOD PRESSURE: 80 MMHG | TEMPERATURE: 98 F | BODY MASS INDEX: 36.82 KG/M2

## 2025-05-23 DIAGNOSIS — L40.9 PSORIASIS: ICD-10-CM

## 2025-05-23 DIAGNOSIS — G43.909 MIGRAINE WITHOUT STATUS MIGRAINOSUS, NOT INTRACTABLE, UNSPECIFIED MIGRAINE TYPE: Primary | ICD-10-CM

## 2025-05-23 RX ORDER — PROPRANOLOL HYDROCHLORIDE 10 MG/1
10 TABLET ORAL DAILY
Qty: 30 TABLET | Refills: 2 | Status: SHIPPED | OUTPATIENT
Start: 2025-05-23

## 2025-05-23 RX ORDER — HYDROCORTISONE 25 MG/G
OINTMENT TOPICAL 2 TIMES DAILY
Qty: 453 G | Refills: 0 | Status: SHIPPED | OUTPATIENT
Start: 2025-05-23

## 2025-05-23 RX ORDER — ONDANSETRON 4 MG/1
4 TABLET, FILM COATED ORAL EVERY 8 HOURS PRN
Qty: 20 TABLET | Refills: 0 | Status: SHIPPED | OUTPATIENT
Start: 2025-05-23

## 2025-05-23 RX ORDER — SUMATRIPTAN SUCCINATE 25 MG/1
25 TABLET ORAL ONCE AS NEEDED
Qty: 30 TABLET | Refills: 0 | Status: SHIPPED | OUTPATIENT
Start: 2025-05-23

## 2025-05-23 NOTE — ASSESSMENT & PLAN NOTE
1/31/25: Cta neck and brain: No acute intracranial abnormality. nremarkable CTA neck and brain   Chronic, past 10 years  History of migraines in maternal side  Possible inducers include home and work related stress      -Start sumatriptan 25 mg as needed  -Start propranolol 10 mg qd  -Zofran 4 mg prn   -Educated patient to do things she enjoys and that certain stressors such as problems with spouse or stress at work can be a contributing factor to headaches  -mental health resources provided on AVS   -Of note patient has tried prior treatment with NSAIDs and rizatriptan with no improvement of migraines.If new pharmacoptherapy prescribed today provides no relief can consider referral to Neurology   Orders:    SUMAtriptan (Imitrex) 25 mg tablet; Take 1 tablet (25 mg total) by mouth once as needed for migraine for up to 1 dose    propranolol (INDERAL) 10 mg tablet; Take 1 tablet (10 mg total) by mouth in the morning    ondansetron (ZOFRAN) 4 mg tablet; Take 1 tablet (4 mg total) by mouth every 8 (eight) hours as needed for nausea or vomiting

## 2025-05-23 NOTE — PATIENT INSTRUCTIONS
Outpatient Mental Health Resources    If you would like to be placed on the wait list for services with [facility] you MUST contact intake at [facility] - [phone]    Emergency & Crisis Support    Suicide and Crisis Lifeline: Call or text 988 (Available 24 hours)  Crisis Text Line: Text HOME to 633434 (Available 24/7)  Warm Line: Call [phone] (Confidential mental health support, available Monday-Sunday: 6 AM-10 AM & 4 PM-12 AM)  [facility] Crisis: Call [phone] (For mental health emergencies, or visit your local Emergency Department)  [facility]: Call [phone] (24/7 Advocate Hotline, counseling, court & hospital accompaniment, free services)    [facility]    [facility]  Call [phone]  Website: [email or web address].[email or web address]  Support for mental health conditions. Free services for all    [facility]  [address], [place] [address] [phone]  Services for all ages; Bilingual (English/Namibian); Accepts Medical Assistance, Medicare and commercial insurance    [facility]  [address], [place] [address] [phone]  Services for all ages; Bilingual (English/Namibian); Accepts [employer] Blue Cross Blue Shield, [employer]ell[employer], Aetna, Optum and Cigna    [facility]  [address], [place] [id number] [phone]  Services for ages 4+. English only; Does NOT accept Medical Assistance (only Commercial Insurance)    [facility]   [address], [place], [place]   [phone]  Services for all ages; English only; Accepts [employer] Blue [employer] Blue Shield, High[employer] Blue Cross Blue Sheild and Medicare    [facility]  [address] [place] [address] [phone]  Services for ages 6+. Bilingual (English/Namibian); Accepts Medical Assistance only    [facility]  [address] [place] [address]  [phone]  Services for ages 5+.  Bilingual (English/Indian); Accepts Medical Assistance    [facility]  [address], [place] [address]  [phone]  Services for ages 14+. Bilingual (English/Indian); Accepts Medical Assistance, Medicare, and Commercial Insurance    [facility]  [address] [place] [address]  [phone]  Services for ages 5+. Bilingual (English/Indian); Accepts Medical Assistance    [facility]  [address], [place] [address]  [phone]  Services for ages 3+. Bilingual (English/Indian); Accepts Medical Assistance and Some Commercial Insurance    [facility]  [address], [address], [place] [address]  [phone]  Services for ages 5+. Bilingual (English/Indian); Accepts Medical Assistance    [facility]  [address], [address], [place] [address]  [phone]  Services for ages 6+. Bilingual (English/Indian); Accepts Medical Assistance and Commercial Insurance    [facility]   [address] [place] [address]  [phone]  Services for ages 5+. Bilingual (English/Indian); Accepts Medical Assistance, Medicare and Commercial Insurance     [facility]  [address], [place] [address]  [phone]  Services for all ages (Therapy); 18+ (Psychiatry); English only; Accepts Capital Blue Cross, Aetna, Highmark, [employer]ellan, [facility] (CHIP & commercial insurance)      [email or web address] is a resource to find psychotherapy providers, patients can filter therapist search list based on several criteria including language, specialty, gender, insurance, etc. Individuals seeking will need to reach out to perspective providers through information in the directory. You are encouraged to contact multiple providers to given that many providers have a significant wait list for services as well as to find a provider is a good fit for you!     Please contact your insurance provider for additional information.
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
## 2025-05-23 NOTE — LETTER
May 23, 2025     Patient: Jacki Langford  YOB: 1980  Date of Visit: 5/23/2025      To Whom it May Concern:    Jacki Langford is under my professional care. Jacki was seen in my office on 5/23/2025. Jacki may return to work on May 27,2025.    If you have any questions or concerns, please don't hesitate to call.         Sincerely,          Reshma Jacinto MD        CC: No Recipients

## 2025-06-06 ENCOUNTER — HOSPITAL ENCOUNTER (EMERGENCY)
Facility: HOSPITAL | Age: 45
Discharge: HOME | End: 2025-06-06
Attending: EMERGENCY MEDICINE
Payer: MEDICARE

## 2025-06-06 VITALS
WEIGHT: 197.2 LBS | SYSTOLIC BLOOD PRESSURE: 148 MMHG | OXYGEN SATURATION: 99 % | HEART RATE: 77 BPM | BODY MASS INDEX: 37.26 KG/M2 | DIASTOLIC BLOOD PRESSURE: 68 MMHG | RESPIRATION RATE: 18 BRPM | TEMPERATURE: 98 F

## 2025-06-06 DIAGNOSIS — L02.91 ABSCESS: Primary | ICD-10-CM

## 2025-06-06 PROCEDURE — 99284 EMERGENCY DEPT VISIT MOD MDM: CPT | Performed by: PHYSICIAN ASSISTANT

## 2025-06-06 PROCEDURE — 10060 I&D ABSCESS SIMPLE/SINGLE: CPT | Performed by: PHYSICIAN ASSISTANT

## 2025-06-06 PROCEDURE — 87186 SC STD MICRODIL/AGAR DIL: CPT | Performed by: PHYSICIAN ASSISTANT

## 2025-06-06 PROCEDURE — 87070 CULTURE OTHR SPECIMN AEROBIC: CPT | Performed by: PHYSICIAN ASSISTANT

## 2025-06-06 PROCEDURE — 99282 EMERGENCY DEPT VISIT SF MDM: CPT

## 2025-06-06 PROCEDURE — 87077 CULTURE AEROBIC IDENTIFY: CPT | Performed by: PHYSICIAN ASSISTANT

## 2025-06-06 PROCEDURE — 87205 SMEAR GRAM STAIN: CPT | Performed by: PHYSICIAN ASSISTANT

## 2025-06-06 RX ORDER — HYDROCODONE BITARTRATE AND ACETAMINOPHEN 5; 325 MG/1; MG/1
1 TABLET ORAL EVERY 6 HOURS PRN
Qty: 10 TABLET | Refills: 0 | Status: SHIPPED | OUTPATIENT
Start: 2025-06-06

## 2025-06-06 RX ORDER — HYDROCODONE BITARTRATE AND ACETAMINOPHEN 5; 325 MG/1; MG/1
1 TABLET ORAL ONCE
Refills: 0 | Status: COMPLETED | OUTPATIENT
Start: 2025-06-06 | End: 2025-06-06

## 2025-06-06 RX ORDER — CEPHALEXIN 500 MG/1
500 CAPSULE ORAL EVERY 6 HOURS SCHEDULED
Qty: 28 CAPSULE | Refills: 0 | Status: SHIPPED | OUTPATIENT
Start: 2025-06-06 | End: 2025-06-13

## 2025-06-06 RX ORDER — LIDOCAINE HYDROCHLORIDE 10 MG/ML
5 INJECTION, SOLUTION EPIDURAL; INFILTRATION; INTRACAUDAL; PERINEURAL ONCE
Status: COMPLETED | OUTPATIENT
Start: 2025-06-06 | End: 2025-06-06

## 2025-06-06 RX ADMIN — CEPHALEXIN 500 MG: 250 CAPSULE ORAL at 21:24

## 2025-06-06 RX ADMIN — LIDOCAINE HYDROCHLORIDE 5 ML: 10 INJECTION, SOLUTION EPIDURAL; INFILTRATION; INTRACAUDAL at 20:32

## 2025-06-06 RX ADMIN — HYDROCODONE BITARTRATE AND ACETAMINOPHEN 1 TABLET: 5; 325 TABLET ORAL at 20:32

## 2025-06-06 NOTE — Clinical Note
Jacki Langford was seen and treated in our emergency department on 6/6/2025.                Diagnosis:     Jacki  may return to work on return date.    She may return on this date: 06/10/2025         If you have any questions or concerns, please don't hesitate to call.      Deirdre Palm PA-C    ______________________________           _______________          _______________  Hospital Representative                              Date                                Time

## 2025-06-07 NOTE — ED PROVIDER NOTES
Time reflects when diagnosis was documented in both MDM as applicable and the Disposition within this note       Time User Action Codes Description Comment    6/6/2025  9:05 PM Néstor Deirdre Add [L02.91] Abscess           ED Disposition       ED Disposition   Discharge    Condition   Stable    Date/Time   Fri Jun 6, 2025  9:05 PM    Comment   Jacki Langford discharge to home/self care.                   Assessment & Plan       Medical Decision Making  DDX including but not limited to: cellulitis, osteomyelitis, lymphangitis, folliculitis, carbuncle, abscess, rhabdomyolysis, necrotizing fasciitis, allergic reaction, angioedema, DVT.     Plan- discussed abscess and I+D with patient. Patient tolerated I+D without complication. Given first dose of keflex and norco here. Patient has a ride home.   Will send rx for keflex and norco. Discussed no driving/working while taking norco as can cause drowsiness. Wound culture sent. Discussed continued wound care and follow up in 2-3 days for recheck. Patient states she has an appt with her PCP already on 6/9/25.   Discussed strict return precautions if symptoms worsen or new symptoms.       The management plan was discussed in detail with the patient at bedside and all questions were answered.  Prior to discharge, we provided both verbal and written instructions.  We discussed with the patient the signs and symptoms for which to return to the emergency department.  All questions were answered and patient was comfortable with the plan of care and discharged to home.  Instructed the patient to follow up with the primary care provider and/or specialist provided and their written instructions.  The patient verbalized understanding of our discussion and plan of care, and agrees to return to the Emergency Department for concerns and progression of illness.     At discharge, I instructed the patient to:  -follow up with pcp  -follow up with the recommended specialists  -return to the  ER if symptoms worsened or new symptoms arose  Patient agreed to this plan and was stable at time of discharge.     Amount and/or Complexity of Data Reviewed  Labs: ordered.    Risk  Prescription drug management.             Medications   HYDROcodone-acetaminophen (NORCO) 5-325 mg per tablet 1 tablet (1 tablet Oral Given 6/6/25 2032)   lidocaine (PF) (XYLOCAINE-MPF) 1 % injection 5 mL (5 mL Infiltration Given 6/6/25 2032)   cephalexin (KEFLEX) capsule 500 mg (500 mg Oral Given 6/6/25 2124)       ED Risk Strat Scores                    No data recorded                            History of Present Illness       Chief Complaint   Patient presents with    Abscess     Patient has an abscess under her right breast for the last 2 weeks. She was seen by her PCP but they told her to wait till it gets bigger.       Past Medical History[1]   Past Surgical History[2]   Family History[3]   Social History[4]   E-Cigarette/Vaping    E-Cigarette Use Never User       E-Cigarette/Vaping Substances    Nicotine No     THC No     CBD No     Flavoring No       I have reviewed and agree with the history as documented.     Patient is a 44-year-old female with past medical history of asthma, bipolar, depression, migraine, GERD who presents for evaluation of abscess.  She states that she started with an abscess beneath her right breast about 2 weeks ago. She saw her PCP a week ago who told her to wait until it got bigger. She is not on abx. She states it hasn't gotten much bigger and she has been using warm compresses without relief. She states no bleeding or drainage. She states it is right where her bra hits and anything she moves it hurts. She states she had an abscess to that same location previously however it drained on it's own. She denies fever, chills, VD, CP, SOB, abdominal pain.         Review of Systems        Objective       ED Triage Vitals [06/06/25 2009]   Temperature Pulse Blood Pressure Respirations SpO2 Patient Position -  Orthostatic VS   98 °F (36.7 °C) 77 148/68 18 99 % Sitting      Temp Source Heart Rate Source BP Location FiO2 (%) Pain Score    Oral Monitor Right arm -- 10 - Worst Possible Pain      Vitals      Date and Time Temp Pulse SpO2 Resp BP Pain Score FACES Pain Rating User   06/06/25 2032 -- -- -- -- -- 10 - Worst Possible Pain -- AB   06/06/25 2009 98 °F (36.7 °C) 77 99 % 18 148/68 10 - Worst Possible Pain -- [facility]            Physical Exam  Vitals and nursing note reviewed.   Constitutional:       General: She is not in acute distress.     Appearance: Normal appearance. She is well-developed. She is not ill-appearing, toxic-appearing or diaphoretic.   HENT:      Head: Normocephalic and atraumatic.      Right Ear: External ear normal.      Left Ear: External ear normal.     Eyes:      Conjunctiva/sclera: Conjunctivae normal.       Cardiovascular:      Rate and Rhythm: Normal rate.   Pulmonary:      Effort: Pulmonary effort is normal. No respiratory distress.   Abdominal:      General: Abdomen is flat. There is no distension.     Musculoskeletal:         General: No swelling. Normal range of motion.      Cervical back: Normal range of motion.     Skin:     General: Skin is warm and dry.      Capillary Refill: Capillary refill takes less than 2 seconds.      Comments: Right chest wall beneath breast with 3 cm abscess, fluctuant. Small amt of surrounding erythema. No bleeding or drainage. No lymphangitis. No crepitus.      Neurological:      Mental Status: She is alert.     Psychiatric:         Mood and Affect: Mood normal.         Results Reviewed       Procedure Component Value Units Date/Time    Wound culture and Gram stain [[id number]]     Lab Status: No result Specimen: Wound from Breast, Right             No orders to display       Incision and drain    Date/Time: 6/6/2025 9:15 PM    Performed by: Deirdre Palm PA-C  Authorized by: Deirdre Palm PA-C    Universal Protocol:  procedure performed by consultantConsent:  Verbal consent obtained  Risks and benefits: risks, benefits and alternatives were discussed  Consent given by: patient  Patient understanding: patient states understanding of the procedure being performed  Procedure consent: procedure consent matches procedure scheduled  Site marked: the operative site was marked  Required items: required blood products, implants, devices, and special equipment available  Patient identity confirmed: verbally with patient    Patient location:  ED  Location:     Type:  Abscess    Size:  3    Location:  Trunk    Trunk location:  Chest (beneath right breast)  Anesthesia (see MAR for exact dosages):     Anesthesia method:  None  Procedure details:     Complexity:  Simple    Incision types:  Stab incision    Scalpel blade:  11    Incision depth:  Subcutaneous    Drainage:  Bloody and purulent    Drainage amount:  Moderate    Wound treatment:  Wound left open    Packing materials:  None  Post-procedure details:     Patient tolerance of procedure:  Tolerated well, no immediate complications      ED Medication and Procedure Management   Prior to Admission Medications   Prescriptions Last Dose Informant Patient Reported? Taking?   SUMAtriptan (Imitrex) 25 mg tablet   No No   Sig: Take 1 tablet (25 mg total) by mouth once as needed for migraine for up to 1 dose   albuterol (PROVENTIL HFA,VENTOLIN HFA) 90 mcg/act inhaler   No No   Sig: Inhale 2 puffs every 6 (six) hours as needed for wheezing   betamethasone valerate (VALISONE) 0.1 % ointment   No No   Sig: Apply topically 2 (two) times a day   buPROPion (WELLBUTRIN XL) 150 mg 24 hr tablet   No No   Sig: TAKE 1 TABLET BY MOUTH EVERY DAY IN THE MORNING   chlorhexidine (HIBICLENS) 4 % external liquid   No No   Sig: Apply 1 Application topically daily as needed for wound care   clonazePAM (KlonoPIN) 0.5 mg tablet   No No   Sig: TAKE 1 TABLET BY MOUTH TWICE A DAY   ergocalciferol (VITAMIN D2) 50,000 units   No No   Sig: TAKE 1 CAPSULE BY MOUTH ONE  TIME PER WEEK   ferrous sulfate 325 (65 Fe) mg tablet   No No   Sig: Take 1 tablet (325 mg total) by mouth daily with breakfast   hydrOXYzine pamoate (VISTARIL) 25 mg capsule   No No   Sig: Take 1 capsule (25 mg total) by mouth 3 (three) times a day as needed for anxiety   hydrocortisone 2.5 % ointment   No No   Sig: Apply topically 2 (two) times a day   ibuprofen (MOTRIN) 400 mg tablet   No No   Sig: Take 1 tablet (400 mg total) by mouth every 6 (six) hours as needed for mild pain for up to 7 days   ipratropium-albuterol (DUO-NEB) 0.5-2.5 mg/3 mL nebulizer solution   No No   Sig: INHALE 3 ML BY NEBULIZATION EVERY 6 HOURS AS NEEDED FOR WHEEZE OR FOR SHORTNESS OF BREATH   lidocaine (Lidoderm) 5 %   No No   Sig: Apply 1 patch topically over 12 hours daily Remove & Discard patch within 12 hours or as directed by MD   omeprazole (PriLOSEC) 20 mg delayed release capsule   No No   Sig: TAKE 1 CAPSULE BY MOUTH EVERY DAY   ondansetron (ZOFRAN) 4 mg tablet   No No   Sig: Take 1 tablet (4 mg total) by mouth every 8 (eight) hours as needed for nausea or vomiting   propranolol (INDERAL) 10 mg tablet   No No   Sig: Take 1 tablet (10 mg total) by mouth in the morning   rizatriptan (MAXALT-MLT) 10 mg disintegrating tablet   No No   Sig: Take 1 tablet (10 mg total) by mouth once as needed for migraine for up to 1 dose may repeat in 2 hours if necessary   sertraline (ZOLOFT) 50 mg tablet   No No   Sig: TAKE 1 TABLET BY MOUTH EVERY DAY   sucralfate (CARAFATE) 1 g/10 mL suspension   No No   Sig: Take 10 mL (1 g total) by mouth 4 (four) times a day (with meals and at bedtime)   traZODone (DESYREL) 100 mg tablet   No No   Sig: Take 1 tablet (100 mg total) by mouth daily at bedtime      Facility-Administered Medications: None     Patient's Medications   Discharge Prescriptions    CEPHALEXIN (KEFLEX) 500 MG CAPSULE    Take 1 capsule (500 mg total) by mouth every 6 (six) hours for 7 days       Start Date: 6/6/2025  End Date: 6/13/2025        Order Dose: 500 mg       Quantity: 28 capsule    Refills: 0    HYDROCODONE-ACETAMINOPHEN (NORCO) 5-325 MG PER TABLET    Take 1 tablet by mouth every 6 (six) hours as needed for pain Max Daily Amount: 4 tablets       Start Date: 6/6/2025  End Date: --       Order Dose: 1 tablet       Quantity: 10 tablet    Refills: 0     No discharge procedures on file.  ED SEPSIS DOCUMENTATION   Time reflects when diagnosis was documented in both MDM as applicable and the Disposition within this note       Time User Action Codes Description Comment    6/6/2025  9:05 PM Deirdre Palm Add [L02.91] Abscess                    [1]   Past Medical History:  Diagnosis Date    Anxiety     Anxiety and depression     Asthma     has not req'd tx since 2016    Bipolar disorder (HCC)     Chlamydia 2000    Depression     Fatty liver     GERD (gastroesophageal reflux disease)     no meds    History of transfusion     Liver disease     Migraine     manages with OTC remedies   [2]   Past Surgical History:  Procedure Laterality Date    NASAL/SINUS ENDOSCOPY Right 06/25/2020    Procedure: FUNCTIONAL ENDOSCOPIC SINUS SURGERY WITH RIGHT MAXILLARY ANTROSTOMY REMOVALOF CYST, RIGHT SPHENOIDOTOMY, RIGHT POLYPECTOMY (LARGE ANTRAL CHOANAL POLYP).;  Surgeon: Eze Hartman MD;  Location:  MAIN OR;  Service: ENT    MI LAPAROSCOPY W/RMVL ADNEXAL STRUCTURES Right 10/10/2018    Procedure: SALPINGECTOMY, LAPAROSCOPIC;  Surgeon: Steven Fields MD;  Location: [facility];  Service: Gynecology    SALPINGOSTOMY  2015    tubal reversal    SCALP EXCISION      x4    TUBAL LIGATION  2002    TUBAL REANASTOMOSIS      WISDOM TOOTH EXTRACTION  1998   [3]   Family History  Problem Relation Name Age of Onset    No Known Problems Mother      No Known Problems Father      No Known Problems Sister      No Known Problems Sister      No Known Problems Daughter      No Known Problems Daughter      No Known Problems Daughter      Cancer Maternal Grandmother          unknown    No  Known Problems Maternal Grandfather      Cancer Maternal Aunt          lung    Breast cancer Neg Hx     [4]   Social History  Tobacco Use    Smoking status: Never     Passive exposure: Never    Smokeless tobacco: Never   Vaping Use    Vaping status: Never Used   Substance Use Topics    Alcohol use: Yes    Drug use: Not Currently     Types: Marijuana     Comment: medical card        Deirdre Palm PA-C  06/06/25 [id number]

## 2025-06-08 LAB
BACTERIA WND AEROBE CULT: NO GROWTH
GRAM STN SPEC: ABNORMAL

## 2025-06-11 ENCOUNTER — RESULTS FOLLOW-UP (OUTPATIENT)
Dept: EMERGENCY DEPT | Facility: HOSPITAL | Age: 45
End: 2025-06-11

## 2025-06-11 LAB
BACTERIA WND AEROBE CULT: ABNORMAL
BACTERIA WND AEROBE CULT: ABNORMAL
GRAM STN SPEC: ABNORMAL

## 2025-06-14 ENCOUNTER — HOSPITAL ENCOUNTER (EMERGENCY)
Facility: HOSPITAL | Age: 45
Discharge: HOME | End: 2025-06-14
Attending: EMERGENCY MEDICINE
Payer: MEDICARE

## 2025-06-14 VITALS
BODY MASS INDEX: 37.7 KG/M2 | SYSTOLIC BLOOD PRESSURE: 106 MMHG | TEMPERATURE: 98.3 F | RESPIRATION RATE: 18 BRPM | HEART RATE: 84 BPM | DIASTOLIC BLOOD PRESSURE: 78 MMHG | OXYGEN SATURATION: 100 % | WEIGHT: 199.52 LBS

## 2025-06-14 DIAGNOSIS — M54.50 ACUTE LOW BACK PAIN: Primary | ICD-10-CM

## 2025-06-14 LAB — EXT PREGNANCY TEST URINE: NEGATIVE

## 2025-06-14 PROCEDURE — 99283 EMERGENCY DEPT VISIT LOW MDM: CPT

## 2025-06-14 PROCEDURE — 81025 URINE PREGNANCY TEST: CPT | Performed by: EMERGENCY MEDICINE

## 2025-06-14 PROCEDURE — 99284 EMERGENCY DEPT VISIT MOD MDM: CPT | Performed by: EMERGENCY MEDICINE

## 2025-06-14 PROCEDURE — 96372 THER/PROPH/DIAG INJ SC/IM: CPT

## 2025-06-14 RX ORDER — KETOROLAC TROMETHAMINE 30 MG/ML
30 INJECTION, SOLUTION INTRAMUSCULAR; INTRAVENOUS ONCE
Status: COMPLETED | OUTPATIENT
Start: 2025-06-14 | End: 2025-06-14

## 2025-06-14 RX ORDER — NAPROXEN 500 MG/1
500 TABLET ORAL 2 TIMES DAILY WITH MEALS
Qty: 20 TABLET | Refills: 0 | Status: SHIPPED | OUTPATIENT
Start: 2025-06-14 | End: 2025-06-16

## 2025-06-14 RX ADMIN — KETOROLAC TROMETHAMINE 30 MG: 30 INJECTION, SOLUTION INTRAMUSCULAR; INTRAVENOUS at 23:26

## 2025-06-14 NOTE — Clinical Note
Jacki Langford was seen and treated in our emergency department on 6/14/2025.    No restrictions            Diagnosis: Back pain    Jacki  may return to school on return date.    She may return on this date: 06/16/2025         If you have any questions or concerns, please don't hesitate to call.      Serg Newman MD    ______________________________           _______________          _______________  Hospital Representative                              Date                                Time

## 2025-06-15 NOTE — DISCHARGE INSTRUCTIONS
Take the naprosyn twice daily for the next 10 days.    Use the Zanaflex as needed for muscle spasm.    Use an electric heating pad over your sore muscles, 4-5 times daily, 20 minutes each time.    Make sure to drink plenty of fluids.    An order for [facility] has been placed, they should be in contact with you. If you have not heard from them in 48 hours, call the number included in these discharge instructions.

## 2025-06-15 NOTE — ED PROVIDER NOTES
Time reflects when diagnosis was documented in both MDM as applicable and the Disposition within this note       Time User Action Codes Description Comment    6/14/2025 11:21 PM Serg Newman Add [M54.50] Acute low back pain           ED Disposition       ED Disposition   Discharge    Condition   Stable    Date/Time   Sat Jun 14, 2025 11:21 PM    Comment   Jacki Langford discharge to home/self care.                   Assessment & Plan       Medical Decision Making  1. Back pain - Will give dose of Toradol for what appears clinically to be MSK pain with sciatica. Will Recommend continuing Naproxen, add Zanaflex instead of Robaxin. A referral to [facility] had previously been placed but they were not able to get through to the patient, will place another referral.     Problems Addressed:  Acute low back pain: acute illness or injury    Amount and/or Complexity of Data Reviewed  External Data Reviewed: notes.  Labs: ordered.    Risk  Prescription drug management.             Medications   ketorolac (TORADOL) injection 30 mg (30 mg Intramuscular Given 6/14/25 7700)       ED Risk Strat Scores                    No data recorded                            History of Present Illness       Chief Complaint   Patient presents with    Back Pain     Lower back pain radiates down right leg        Past Medical History[1]   Past Surgical History[2]   Family History[3]   Social History[4]   E-Cigarette/Vaping    E-Cigarette Use Never User       E-Cigarette/Vaping Substances    Nicotine No     THC No     CBD No     Flavoring No       I have reviewed and agree with the history as documented.     43 YO female presents with Right lower back pain. States this has been present for the last few days, no inciting injury. She has had back pain in the past, recently on the Left. She has been taking NSAIDs and Robaxin without relief. States pain radiating down the leg to the foot. She feels there may be swelling in the ankle.        History provided by:  Patient   used: No        Review of Systems   Constitutional:  Negative for chills, fatigue and fever.   HENT:  Negative for dental problem.    Eyes:  Negative for visual disturbance.   Respiratory:  Negative for shortness of breath.    Cardiovascular:  Negative for chest pain.   Gastrointestinal:  Negative for abdominal pain, diarrhea and vomiting.   Genitourinary:  Negative for dysuria and frequency.   Musculoskeletal:  Positive for back pain. Negative for arthralgias.   Skin:  Negative for rash.   Neurological:  Negative for dizziness, weakness and light-headedness.   Psychiatric/Behavioral:  Negative for agitation, behavioral problems and confusion.    All other systems reviewed and are negative.          Objective       ED Triage Vitals [06/14/25 2253]   Temperature Pulse Blood Pressure Respirations SpO2 Patient Position - Orthostatic VS   98.3 °F (36.8 °C) 84 106/78 18 100 % Sitting      Temp Source Heart Rate Source BP Location FiO2 (%) Pain Score    Oral Monitor Right arm -- 10 - Worst Possible Pain      Vitals      Date and Time Temp Pulse SpO2 Resp BP Pain Score FACES Pain Rating User   06/14/25 2666 -- -- -- -- -- 10 - Worst Possible Pain -- [facility]   06/14/25 2253 98.3 °F (36.8 °C) 84 100 % 18 106/78 10 - Worst Possible Pain -- [facility]            Physical Exam  Vitals and nursing note reviewed.   Constitutional:       Appearance: Normal appearance.   HENT:      Head: Normocephalic and atraumatic.     Eyes:      Extraocular Movements: Extraocular movements intact.      Conjunctiva/sclera: Conjunctivae normal.       Cardiovascular:      Rate and Rhythm: Normal rate.   Pulmonary:      Effort: Pulmonary effort is normal.   Abdominal:      General: There is no distension.     Musculoskeletal:         General: Normal range of motion.      Cervical back: Normal range of motion.      Comments: Exquisitely tender to palpation in the Right lower back, paraspinal. Palpation  reproduces radiating discomfort.      Skin:     Findings: No rash.     Neurological:      General: No focal deficit present.      Mental Status: She is alert.      Cranial Nerves: No cranial nerve deficit.     Psychiatric:         Mood and Affect: Mood normal.         Results Reviewed       Procedure Component Value Units Date/Time    POCT pregnancy, urine [[id number]]  (Normal) Collected: 06/14/25 2322    Lab Status: Final result Updated: 06/14/25 2326     EXT Preg Test, Ur Negative     Control --            No orders to display       Procedures    ED Medication and Procedure Management   Prior to Admission Medications   Prescriptions Last Dose Informant Patient Reported? Taking?   HYDROcodone-acetaminophen (NORCO) 5-325 mg per tablet   No No   Sig: Take 1 tablet by mouth every 6 (six) hours as needed for pain Max Daily Amount: 4 tablets   SUMAtriptan (Imitrex) 25 mg tablet   No No   Sig: Take 1 tablet (25 mg total) by mouth once as needed for migraine for up to 1 dose   albuterol (PROVENTIL HFA,VENTOLIN HFA) 90 mcg/act inhaler   No No   Sig: Inhale 2 puffs every 6 (six) hours as needed for wheezing   betamethasone valerate (VALISONE) 0.1 % ointment   No No   Sig: Apply topically 2 (two) times a day   buPROPion (WELLBUTRIN XL) 150 mg 24 hr tablet   No No   Sig: TAKE 1 TABLET BY MOUTH EVERY DAY IN THE MORNING   cephalexin (KEFLEX) 500 mg capsule   No No   Sig: Take 1 capsule (500 mg total) by mouth every 6 (six) hours for 7 days   chlorhexidine (HIBICLENS) 4 % external liquid   No No   Sig: Apply 1 Application topically daily as needed for wound care   clonazePAM (KlonoPIN) 0.5 mg tablet   No No   Sig: TAKE 1 TABLET BY MOUTH TWICE A DAY   ergocalciferol (VITAMIN D2) 50,000 units   No No   Sig: TAKE 1 CAPSULE BY MOUTH ONE TIME PER WEEK   ferrous sulfate 325 (65 Fe) mg tablet   No No   Sig: Take 1 tablet (325 mg total) by mouth daily with breakfast   hydrOXYzine pamoate (VISTARIL) 25 mg capsule   No No   Sig: Take 1  capsule (25 mg total) by mouth 3 (three) times a day as needed for anxiety   hydrocortisone 2.5 % ointment   No No   Sig: Apply topically 2 (two) times a day   ibuprofen (MOTRIN) 400 mg tablet   No No   Sig: Take 1 tablet (400 mg total) by mouth every 6 (six) hours as needed for mild pain for up to 7 days   ipratropium-albuterol (DUO-NEB) 0.5-2.5 mg/3 mL nebulizer solution   No No   Sig: INHALE 3 ML BY NEBULIZATION EVERY 6 HOURS AS NEEDED FOR WHEEZE OR FOR SHORTNESS OF BREATH   lidocaine (Lidoderm) 5 %   No No   Sig: Apply 1 patch topically over 12 hours daily Remove & Discard patch within 12 hours or as directed by MD   omeprazole (PriLOSEC) 20 mg delayed release capsule   No No   Sig: TAKE 1 CAPSULE BY MOUTH EVERY DAY   ondansetron (ZOFRAN) 4 mg tablet   No No   Sig: Take 1 tablet (4 mg total) by mouth every 8 (eight) hours as needed for nausea or vomiting   propranolol (INDERAL) 10 mg tablet   No No   Sig: Take 1 tablet (10 mg total) by mouth in the morning   rizatriptan (MAXALT-MLT) 10 mg disintegrating tablet   No No   Sig: Take 1 tablet (10 mg total) by mouth once as needed for migraine for up to 1 dose may repeat in 2 hours if necessary   sertraline (ZOLOFT) 50 mg tablet   No No   Sig: TAKE 1 TABLET BY MOUTH EVERY DAY   sucralfate (CARAFATE) 1 g/10 mL suspension   No No   Sig: Take 10 mL (1 g total) by mouth 4 (four) times a day (with meals and at bedtime)   traZODone (DESYREL) 100 mg tablet   No No   Sig: Take 1 tablet (100 mg total) by mouth daily at bedtime      Facility-Administered Medications: None     Discharge Medication List as of 6/14/2025 11:23 PM        START taking these medications    Details   naproxen (NAPROSYN) 500 mg tablet Take 1 tablet (500 mg total) by mouth 2 (two) times a day with meals for 10 days, Starting Sat 6/14/2025, Until Tue 6/24/2025, Normal      tiZANidine (ZANAFLEX) 4 mg tablet Take 1 tablet (4 mg total) by mouth every 8 (eight) hours as needed for muscle spasms, Starting Sat  6/14/2025, Normal           CONTINUE these medications which have NOT CHANGED    Details   albuterol (PROVENTIL HFA,VENTOLIN HFA) 90 mcg/act inhaler Inhale 2 puffs every 6 (six) hours as needed for wheezing, Starting Wed 11/6/2024, Normal      betamethasone valerate (VALISONE) 0.1 % ointment Apply topically 2 (two) times a day, Starting Wed 11/6/2024, Normal      buPROPion (WELLBUTRIN XL) 150 mg 24 hr tablet TAKE 1 TABLET BY MOUTH EVERY DAY IN THE MORNING, Starting Fri 12/13/2024, Normal      chlorhexidine (HIBICLENS) 4 % external liquid Apply 1 Application topically daily as needed for wound care, Starting Tue 10/10/2023, Normal      clonazePAM (KlonoPIN) 0.5 mg tablet TAKE 1 TABLET BY MOUTH TWICE A DAY, Starting Mon 10/28/2024, Normal      ergocalciferol (VITAMIN D2) 50,000 units TAKE 1 CAPSULE BY MOUTH ONE TIME PER WEEK, Normal      ferrous sulfate 325 (65 Fe) mg tablet Take 1 tablet (325 mg total) by mouth daily with breakfast, Starting Fri 8/25/2023, Normal      HYDROcodone-acetaminophen (NORCO) 5-325 mg per tablet Take 1 tablet by mouth every 6 (six) hours as needed for pain Max Daily Amount: 4 tablets, Starting Fri 6/6/2025, Normal      hydrocortisone 2.5 % ointment Apply topically 2 (two) times a day, Starting Fri 5/23/2025, Normal      hydrOXYzine pamoate (VISTARIL) 25 mg capsule Take 1 capsule (25 mg total) by mouth 3 (three) times a day as needed for anxiety, Starting Wed 8/21/2024, Normal      ibuprofen (MOTRIN) 400 mg tablet Take 1 tablet (400 mg total) by mouth every 6 (six) hours as needed for mild pain for up to 7 days, Starting Tue 5/9/2023, Until Tue 5/16/2023 at 2359, Normal      ipratropium-albuterol (DUO-NEB) 0.5-2.5 mg/3 mL nebulizer solution INHALE 3 ML BY NEBULIZATION EVERY 6 HOURS AS NEEDED FOR WHEEZE OR FOR SHORTNESS OF BREATH, Normal      lidocaine (Lidoderm) 5 % Apply 1 patch topically over 12 hours daily Remove & Discard patch within 12 hours or as directed by MD, Starting Wed 4/9/2025,  Normal      omeprazole (PriLOSEC) 20 mg delayed release capsule TAKE 1 CAPSULE BY MOUTH EVERY DAY, Starting Mon 11/18/2024, Normal      ondansetron (ZOFRAN) 4 mg tablet Take 1 tablet (4 mg total) by mouth every 8 (eight) hours as needed for nausea or vomiting, Starting Fri 5/23/2025, Normal      propranolol (INDERAL) 10 mg tablet Take 1 tablet (10 mg total) by mouth in the morning, Starting Fri 5/23/2025, Normal      rizatriptan (MAXALT-MLT) 10 mg disintegrating tablet Take 1 tablet (10 mg total) by mouth once as needed for migraine for up to 1 dose may repeat in 2 hours if necessary, Starting Wed 8/21/2024, Normal      sertraline (ZOLOFT) 50 mg tablet TAKE 1 TABLET BY MOUTH EVERY DAY, Starting Mon 12/9/2024, Normal      sucralfate (CARAFATE) 1 g/10 mL suspension Take 10 mL (1 g total) by mouth 4 (four) times a day (with meals and at bedtime), Starting Mon 2/6/2023, Normal      SUMAtriptan (Imitrex) 25 mg tablet Take 1 tablet (25 mg total) by mouth once as needed for migraine for up to 1 dose, Starting Fri 5/23/2025, Normal      traZODone (DESYREL) 100 mg tablet Take 1 tablet (100 mg total) by mouth daily at bedtime, Starting Sun 11/10/2024, No Print           STOP taking these medications       cephalexin (KEFLEX) 500 mg capsule Comments:   Reason for Stopping:               ED SEPSIS DOCUMENTATION   Time reflects when diagnosis was documented in both MDM as applicable and the Disposition within this note       Time User Action Codes Description Comment    6/14/2025 11:21 PM Serg Newman Add [M54.50] Acute low back pain                      [1]   Past Medical History:  Diagnosis Date    Anxiety     Anxiety and depression     Asthma     has not req'd tx since 2016    Bipolar disorder (HCC)     Chlamydia 2000    Depression     Fatty liver     GERD (gastroesophageal reflux disease)     no meds    History of transfusion     Liver disease     Migraine     manages with OTC remedies   [2]   Past Surgical  History:  Procedure Laterality Date    NASAL/SINUS ENDOSCOPY Right 06/25/2020    Procedure: FUNCTIONAL ENDOSCOPIC SINUS SURGERY WITH RIGHT MAXILLARY ANTROSTOMY REMOVALOF CYST, RIGHT SPHENOIDOTOMY, RIGHT POLYPECTOMY (LARGE ANTRAL CHOANAL POLYP).;  Surgeon: Eze Hartman MD;  Location:  MAIN OR;  Service: ENT    W[place] LAPAROSCOPY W/RMVL ADNEXAL STRUCTURES Right 10/10/2018    Procedure: SALPINGECTOMY, LAPAROSCOPIC;  Surgeon: Steven Fields MD;  Location: [facility];  Service: Gynecology    SALPINGOSTOMY  2015    tubal reversal    SCALP EXCISION      x4    TUBAL LIGATION  2002    TUBAL REANASTOMOSIS      WISDOM TOOTH EXTRACTION  1998   [3]   Family History  Problem Relation Name Age of Onset    No Known Problems Mother      No Known Problems Father      No Known Problems Sister      No Known Problems Sister      No Known Problems Daughter      No Known Problems Daughter      No Known Problems Daughter      Cancer Maternal Grandmother          unknown    No Known Problems Maternal Grandfather      Cancer Maternal Aunt          lung    Breast cancer Neg Hx     [4]   Social History  Tobacco Use    Smoking status: Never     Passive exposure: Never    Smokeless tobacco: Never   Vaping Use    Vaping status: Never Used   Substance Use Topics    Alcohol use: Yes    Drug use: Not Currently     Types: Marijuana     Comment: medical card        Serg Newman MD  06/15/25 5268

## 2025-06-16 ENCOUNTER — OFFICE VISIT (OUTPATIENT)
Dept: FAMILY MEDICINE CLINIC | Facility: CLINIC | Age: 45
End: 2025-06-16

## 2025-06-16 VITALS
SYSTOLIC BLOOD PRESSURE: 118 MMHG | TEMPERATURE: 98.7 F | WEIGHT: 199 LBS | DIASTOLIC BLOOD PRESSURE: 80 MMHG | HEART RATE: 58 BPM | BODY MASS INDEX: 37.57 KG/M2 | OXYGEN SATURATION: 99 % | HEIGHT: 61 IN | RESPIRATION RATE: 18 BRPM

## 2025-06-16 DIAGNOSIS — R22.2 MASS ON BACK: ICD-10-CM

## 2025-06-16 DIAGNOSIS — G43.909 MIGRAINE WITHOUT STATUS MIGRAINOSUS, NOT INTRACTABLE, UNSPECIFIED MIGRAINE TYPE: ICD-10-CM

## 2025-06-16 DIAGNOSIS — M54.41 ACUTE RIGHT-SIDED LOW BACK PAIN WITH RIGHT-SIDED SCIATICA: Primary | ICD-10-CM

## 2025-06-16 PROCEDURE — G2211 COMPLEX E/M VISIT ADD ON: HCPCS

## 2025-06-16 PROCEDURE — 99213 OFFICE O/P EST LOW 20 MIN: CPT

## 2025-06-16 RX ORDER — PROPRANOLOL HYDROCHLORIDE 10 MG/1
10 TABLET ORAL DAILY
Qty: 90 TABLET | Refills: 1 | Status: SHIPPED | OUTPATIENT
Start: 2025-06-16

## 2025-06-16 RX ORDER — GABAPENTIN 300 MG/1
300 CAPSULE ORAL 3 TIMES DAILY
Qty: 90 CAPSULE | Refills: 0 | Status: SHIPPED | OUTPATIENT
Start: 2025-06-16 | End: 2025-06-26 | Stop reason: SDUPTHER

## 2025-06-16 NOTE — LETTER
June 16, 2025     Patient: Jacki Langford  YOB: 1980  Date of Visit: 6/16/2025      To Whom it May Concern:    Jacki Langford is under my professional care. Jacki was seen in my office on 6/16/2025. Jacki may return to work on 06/23/25.    If you have any questions or concerns, please don't hesitate to call.         Sincerely,          JONATHON Brown        CC: No Recipients

## 2025-06-16 NOTE — PROGRESS NOTES
Name: Jacki Langford      : 1980      MRN: [id number]  Encounter Provider: JONATHON Brown  Encounter Date: 2025   Encounter department: [facility]  :  Assessment & Plan  Acute right-sided low back pain with right-sided sciatica  On exam the patient does not have any red flags. There is tenderness to the right inferior flank.     Recommended ice/heat in 20 minute intervals.  Recommend low-impact aerobic exercise such as walking as soon as possible.  The patient was educated regarding bed rest for acute LBP has worse outcomes than maintaining regular activity.  Recommended restriction of weight at work of no more than 10 pounds for no more than three months.  Educated the patient on good posture habits and to perform regular daily exercise, since this may have benefit in preventing reoccurrence.  Instructions for low back pain management given on AVS.   Advised to call the office immediately or present to the ED for evaluation if new neurologic symptoms appear like saddle anesthesia, sensory loss, weakness, bladder/bowel incontinence, weight loss, nocturnal pain, fever, or urinary retention.  Recommended PT and chiropractic for current treatment and prevention of future exacerbations.  Encouraged patient to participate in PT exercises and complete home exercises as directed for best outcomes.  Discussed alternative modalities for low back pain including massage, heat, acupuncture, and TENS unit.     She reports muscle relaxers and naproxen have not been helpful. She has not scheduled with [facility] as referred by ED provider.   Prescribed gabapentin and Voltaren gel for acute treatment.  in 3 month(s) or sooner as needed.     Orders:    gabapentin (Neurontin) 300 mg capsule; Take 1 capsule (300 mg total) by mouth 3 (three) times a day    Diclofenac Sodium (VOLTAREN) 1 %; Apply 2 g topically 4 (four) times a day    Ambulatory Referral to Physical  Therapy; Future    Ambulatory Referral to Chiropractic; Future    Mass on back  Patient reports finding a mass on her back when she was rubbing her back due to the pain. She is not sure how long it has been there.   On exam, there is a palpable mass on the right lower lumbar area of her back. It is not visible, only palpable to deep palpation. It is approximately 1-2 cm, firm. We discussed possible etiologies such as a cyst versus lipoma. Will obtain an ultrasound for further evaluation.     Orders:    US MSK limited; Future           History of Present Illness         Jacki Langford is a 44 year old female who presents to the office today for evaluation of low back pain. She was recently treated in the ED for this pain and reports the tizanidine and naproxen she was prescribed has not provided any relief.     Low Back Pain: Patient complains of low back pain. The patient first noted symptoms 2months ago. It was not related to a known injury. The pain is rated severe, and is located at the right lumbar area or radiating to right leg(s). The pain is described as sharp and occurs all day. The symptoms has not been progressive. Symptoms are exacerbated by lifting, sitting, and bending. Factors which relieve the pain include nothing. Other associated symptoms include: none. She denies: weakness in the right leg, weakness in the left leg, tingling in the right leg, tingling in the left leg, burning pain in the right leg, burning pain in the left leg, urinary hesitancy, urinary incontinence, bowel incontinence, and groin/perineal numbness. Treatment efforts have included ice, prescription NSAIDS, acetaminophen, and muscle relaxers, with and without relief.          Review of Systems   Constitutional:  Negative for chills and fever.   Respiratory:  Negative for cough and shortness of breath.    Cardiovascular:  Negative for chest pain.   Musculoskeletal:  Positive for back pain.   Skin:  Negative for rash.   Neurological:   "Negative for weakness and numbness.   All other systems reviewed and are negative.      Objective   /80 (BP Location: Right arm, Patient Position: Sitting, Cuff Size: Large)   Pulse 58   Temp 98.7 °F (37.1 °C) (Temporal)   Resp 18   Ht 5' 1\" (1.549 m)   Wt 90.3 kg (199 lb)   LMP 05/21/2025 (Exact Date)   SpO2 99%   Breastfeeding No   BMI 37.60 kg/m²      Physical Exam  Vitals and nursing note reviewed.   Constitutional:       General: She is not in acute distress.     Appearance: She is well-developed.   HENT:      Head: Normocephalic and atraumatic.     Eyes:      Conjunctiva/sclera: Conjunctivae normal.       Cardiovascular:      Rate and Rhythm: Normal rate and regular rhythm.      Heart sounds: No murmur heard.  Pulmonary:      Effort: Pulmonary effort is normal. No respiratory distress.      Breath sounds: Normal breath sounds.   Abdominal:      Palpations: Abdomen is soft.      Tenderness: There is no abdominal tenderness.     Musculoskeletal:         General: Tenderness present. No swelling.      Cervical back: Neck supple.      Comments: Tenderness to palpation of the right inferior flank.     Skin:     General: Skin is warm and dry.      Capillary Refill: Capillary refill takes less than 2 seconds.     Neurological:      Mental Status: She is alert.      Sensory: No sensory deficit.      Motor: No weakness.     Psychiatric:         Mood and Affect: Mood normal.         "

## 2025-06-16 NOTE — PATIENT INSTRUCTIONS
The [facility] program is a virtual, rapid-referral triage program for back-pain patients. To contact the [facility] triage team for scheduling or for questions, please call [phone] and follow prompts for [facility].     Call [phone] to schedule ultrasound.     Patient instructions for back pain:  I recommend using ice or heat in 20 minute intervals.  I recommend low-impact aerobic exercise such as walking as soon as possible.  Bed rest for acute LBP has worse outcomes than maintaining regular activity, so stay active.  I recommend restriction of weight at work of no more than 10 pounds for no more than three months.  Practice good posture habits and perform regular daily exercise since this may prevent reoccurrence.  Call the office immediately or go to the nearest emergency room for evaluation if new neurologic symptoms appear such as numbness of the groin/genitals, numbness in your leg(s), leg weakness, bladder/bowel incontinence, weight loss, nocturnal pain, fever, or urinary retention.  I recommend physical therapy and chiropractic for current treatment and prevention of future exacerbations.  Participate fully in physical therapy directed exercises and complete home exercises as directed for best outcomes.  Take medications as prescribed.  Alternative modalities for low back pain include: massage, heat (avoid using over Lidoderm as discussed), acupuncture, and TENS unit. .      [facility]   [address] [place] [address]   [phone]     [facility] [address] [place] [address]   [phone]     [facility] [address] [address]   [phone]     [facility]   [address] [facility] [address]   [phone]

## 2025-06-17 ENCOUNTER — TELEPHONE (OUTPATIENT)
Age: 45
End: 2025-06-17

## 2025-06-17 NOTE — TELEPHONE ENCOUNTER
Called patient to make aware of medicare guidelines regarding consult fee estimate . Left message to call back with any questions .

## 2025-06-25 ENCOUNTER — HOSPITAL ENCOUNTER (OUTPATIENT)
Dept: ULTRASOUND IMAGING | Facility: HOSPITAL | Age: 45
Discharge: HOME | End: 2025-06-25
Payer: MEDICARE

## 2025-06-25 DIAGNOSIS — R22.2 MASS ON BACK: ICD-10-CM

## 2025-06-25 PROCEDURE — 76705 ECHO EXAM OF ABDOMEN: CPT

## 2025-06-26 ENCOUNTER — TELEPHONE (OUTPATIENT)
Dept: FAMILY MEDICINE CLINIC | Facility: CLINIC | Age: 45
End: 2025-06-26

## 2025-06-26 DIAGNOSIS — M54.50 ACUTE LOW BACK PAIN: ICD-10-CM

## 2025-06-26 DIAGNOSIS — M54.41 ACUTE RIGHT-SIDED LOW BACK PAIN WITH RIGHT-SIDED SCIATICA: ICD-10-CM

## 2025-06-26 RX ORDER — GABAPENTIN 300 MG/1
300 CAPSULE ORAL 3 TIMES DAILY
Qty: 90 CAPSULE | Refills: 0 | Status: SHIPPED | OUTPATIENT
Start: 2025-06-26

## 2025-06-26 NOTE — TELEPHONE ENCOUNTER
Pt is requesting medication refill for       gabapentin (Neurontin) 300 mg capsule   tiZANidine (ZANAFLEX) 4 mg tablet       Please send to pharmacy on file

## 2025-07-02 ENCOUNTER — TELEPHONE (OUTPATIENT)
Dept: FAMILY MEDICINE CLINIC | Facility: CLINIC | Age: 45
End: 2025-07-02

## 2025-07-02 NOTE — TELEPHONE ENCOUNTER
Patient still in a lot of pain even when she states the medication given is not helping her at all. .  Patient would like to know if there another muscle relaxer that could help her out.    Medication that was given:  gabapentin (Neurontin) 300 mg capsule   tiZANidine (ZANAFLEX) 4 mg tablet   Not helping her.

## 2025-07-08 DIAGNOSIS — M54.50 ACUTE BILATERAL LOW BACK PAIN WITHOUT SCIATICA: Primary | ICD-10-CM

## 2025-07-08 RX ORDER — METHOCARBAMOL 750 MG/1
750 TABLET, FILM COATED ORAL 3 TIMES DAILY PRN
Qty: 30 TABLET | Refills: 1 | Status: SHIPPED | OUTPATIENT
Start: 2025-07-08

## 2025-07-08 NOTE — TELEPHONE ENCOUNTER
Patient called the nurse line upset in regards to her sciatica pain, she stated the medication she was prescribed is not helping.

## 2025-07-09 NOTE — TELEPHONE ENCOUNTER
I called this patient today, traying to help her with an appointment , the only one that I have was for Monday at 8. I told her but she refused stating that was to early, I explained that I will add her to a waiting list, she got upset and hung up

## 2025-07-22 NOTE — PROGRESS NOTES
PT Evaluation     Today's date: 2025  Patient name: Jacki Langford  : 1980  MRN: [id number]  Referring provider: Keyona Mayfield PA-C  Dx:   Encounter Diagnosis     ICD-10-CM    1. Acute right-sided low back pain with right-sided sciatica  M54.41 Ambulatory Referral to Physical Therapy          Start Time: 1608  Stop Time: 1702  Total time in clinic (min): 54 minutes    Assessment  Impairments: abnormal gait, abnormal muscle tone, abnormal or restricted ROM, abnormal movement, activity intolerance, impaired physical strength, lacks appropriate home exercise program, pain with function, weight-bearing intolerance, poor posture , poor body mechanics, unable to perform ADL, sensory processing, participation limitations, activity limitations and endurance  Symptom irritability: high    Assessment details: Jacki Langford is a pleasant 44 y.o. female with a referred dx of Acute right-sided low back pain with right-sided sciatica from Bettie Mtz CRNP.  Has been referred to [facility]. Patient may benefit from referral to [facility] for MRI if does not respond to interventions over the next few visits. I am suspicious of a discogenic cause due to significant peripheral neurogenic presentation. Upon further clinical testing, patient presents with the following deficits: significant active lumbar motor dysfunction, +SLR at 30 deg and Slump testing indicates dural sensitivity, decrease RLE strength, pain with indirect neural palpation, and overall limited functionally. These deficits and impairments result in decrease QoL, poor sleep quality, and inability to maximally perform ADLs and work duties.  Pt was provided with a basic HEP focused on Raeann-biased repeated extensions which will be reviewed in the upcoming session. Pt able to demonstrate HEP with good technique and no pain. Educated pt to stop any exercises causing pain increase, pt verbalizes understanding. Pt  was educated on anatomy and physiology of diagnosis and demonstrated verbal understanding. Positive prognostic indicators include positive attitude toward recovery, good understanding of diagnosis and treatment plan options, and acuity of symptoms. Negative prognostic indicators include anxiety, depression, hypertension, high symptom irritability, high ANN MARIE, and obesity. Pt would benefit from skilled OP physical therapy to address these, and the below impairments in order to optimize outcomes and promote return to functional baseline.     Patient verbalized understanding of POC.    Please contact me if you have any questions or recommendations. Thank you for the referral and the opportunity to share in Jacki's care      Barriers to intervention: participation and behavior  Barriers to intervention comments: ANN MARIE  Understanding of Dx/Px/POC: good     Prognosis: fair    Goals    Short Term Goals:  In 3-4 weeks, the patient will:  1. Pt will report at least a 25% reduction in subjective pain complaints/symptoms to better manage ADLs and household chores.   2. Pt will demonstrate atleast 10 deg improvement on her SLR and lumbar AROM  3. Pt independent with initial HEP, rationale, technique and frequency, for ROM and pain control.  4. Pt will be able to self-centralize using PPU and extension exercies      Long Term Goals:  In 12+ weeks, the patient will:  1. Pt will have WNL lumbar AROM and no peripheralization of RLE  2. FOTO to greater than predicted value.  3. Independent with comprehensive HEP upon discharge.  4. Pt will be able to perform ADLs, iADLS, and household duties with minimal restriction indicating return to PLOF.  5. Pt independent with rationale, technique and plan for performance of advanced HEP to ensure independent self-management of symptoms upon discharge.  6. Pt will be able to perform work duties and daily tasks with no issues    Plan  Patient would benefit from: PT pita, skilled physical therapy  and home program  Referral necessary: No  Planned modality interventions: cryotherapy, biofeedback and TENS    Planned therapy interventions: activity modification, ADL retraining, joint mobilization, manual therapy, massage, behavior modification, body mechanics training, muscle pump exercises, motor coordination training, nerve gliding, neuromuscular re-education, patient education, postural training, community reintegration, self care, sensory integrative techniques, strengthening, stretching, therapeutic activities, therapeutic exercise, therapeutic training, home exercise program, graded motor, graded exercise, graded activity, functional ROM exercises, abdominal trunk stabilization, IASTM, patient/caregiver education and transfer training    Frequency: 1-2x week  Plan of Care beginning date: 7/23/2025  Plan of Care expiration date: 10/15/2025  Treatment plan discussed with: patient        Subjective Evaluation    History of Present Illness  Mechanism of injury: IE 7/23 : Patient presents for PT initial evaluation regarding concerns of acute LBP with right sided radicular symptoms. he was recently treated in the ED (6/6/2025) for this pain and reports the tizanidine and naproxen she was prescribed has not provided any relief. Patient reports symptoms began approx 2 months ago. No known HERMELINDO or distinct trauma. She hit her back She also expresses concerns of palpable deep mass of right lumbar region.     + numbness, tingling, giving out    Denies any red flags which include: fever, chills, chest pain, focal neurologic deficits, urinary distention, urinary incontinence, fecal incontinence, saddle anesthesia.    Aggravating Factors: lifting, sitting (5-10 min), bending, laying down, tying shoes,   Relieving Factors: nothing     Personal Functional Goals: sitting, standing,             Not a recurrent problem   Quality of life: good    Patient Goals  Patient goals for therapy: increased strength, independence with  ADLs/IADLs, increased motion, decreased pain, return to sport/leisure activities and return to work    Pain  Current pain ratin  At best pain ratin  At worst pain rating: 10  Location: Back  Quality: sharp, dull ache, discomfort, radiating, pulling, pressure, knife-like and tight  Aggravating factors: standing, walking, stair climbing, sitting and lifting  Progression: worsening    Social Support  Steps to enter house: yes  Stairs in house: yes   Lives in: multiple-level home  Lives with: spouse    Employment status: working  Exercise history: None      Diagnostic Tests  No diagnostic tests performed  Treatments  Previous treatment: medication  Current treatment: physical therapy      Objective      Strength and ROM evaluated B from a regional biomechanical perspective and values relevant to this episode recorded in tables below.    ROM:   Joint / Motion  Right  Left    Lumbar Flexion  20 deg P!    Lumbar Extension   10 deg P!    Hip ER  WNL  WNL   Hip IR  WNL WNL     Repeated Movements:  Raeann Assessment  Rep FIS: peripheralize  Rep EIS: Centralized  Rep EIL: centralized      LE MMT Strength:  Test Action RIGHT  LEFT   Hip Flexion 3/5 5/5   Knee Extension 3/5 5/5   Knee Flexion 4/5 5/5   Ankle DF 5/5 5/5   Ankle PF 5/5 5/5         NEURO Screen  Reflexes  Right Left   Patellar (L3-L4) NV NV   Achilles (S1-S2) NV NV   Bennett's N N   Clonus N N        LE Myotomes:  Nerve root Test Action RIGHT LEFT   L1-L2 Hip Flexion difficult intact   L3 Knee Extension intact intact   L4  Ankle DF intact intact   L5 Great toe Extension intact intact   S1 Ankle PF, Ankle Eversion, Hip Extension, Knee flexion intact intact   S2 Ankle PF intact intact       LE Dermatomes: Intact    Special Tests:  Lumbar Specific and Neural Tension                                                                            Test / Measure  Right 2025 Left 2025   Straight Leg raise + n   Crossed straight leg raise n n   Slump test +  n          Precautions: Problem List[1]    POC expires Unit limit Auth Expiration date PT/OT + Visit Limit?   10/23/25 bomn 12/31/25 bomn         Visit/Unit Tracking  AUTH Status:  Date 7/23        10 visits  Used 1         Remaining             Pertinent Findings:                                                                                             Test / Measure  7/23/25   FOTO (Predicted )                  Access Code: [email or web address]  URL: [email or web address]  Date: 07/23/2025  Prepared by: Amber Maloney    Exercises  - Prone Press Up On Elbows  - 1-5 x daily - 7 x weekly - 1 sets - 10 reps  - Seated Thoracic Lumbar Extension  - 1-5 x daily - 7 x weekly - 1 sets - 10 reps      Manuals 7/23            Lumbar Manual Distraction             SideLying Grade V Lumbar Mobilization AR                                      Neuro Re-Ed             HEP/PNE AR performed              Prone-Press Up             Slump Nerve Glides             TrA Bridge             TrA Hooklying Pball Presses                                                    Ther Ex             KASSY stretch                                                                                                        Ther Activity                                       Gait Training                                       Modalities                                                 [1]   Patient Active Problem List  Diagnosis    Cyst of maxillary sinus    Class 3 severe obesity due to excess calories without serious comorbidity with body mass index (BMI) of 40.0 to 44.9 in adult    Hepatic steatosis    Depression, recurrent (HCC)    Generalized abdominal pain    Urge and stress incontinence    Bipolar disorder (HCC)    Pain of finger of left hand    Tear of medial collateral ligament of right knee    Primary osteoarthritis of right knee    Mild intermittent asthma without complication    Lump of scalp    Gastroesophageal reflux disease    Left foot pain     Fatigue    Chest pain    Abdominal wall abscess    Eczema    Migraine    Panic attacks    Anxiety    Vitamin D deficiency    Insomnia    Iron deficiency anemia

## 2025-07-23 ENCOUNTER — EVALUATION (OUTPATIENT)
Dept: PHYSICAL THERAPY | Facility: REHABILITATION | Age: 45
End: 2025-07-23
Payer: MEDICARE

## 2025-07-23 DIAGNOSIS — M54.41 ACUTE RIGHT-SIDED LOW BACK PAIN WITH RIGHT-SIDED SCIATICA: Primary | ICD-10-CM

## 2025-07-23 PROCEDURE — 97161 PT EVAL LOW COMPLEX 20 MIN: CPT

## 2025-07-23 PROCEDURE — 97112 NEUROMUSCULAR REEDUCATION: CPT

## 2025-07-23 PROCEDURE — 97140 MANUAL THERAPY 1/> REGIONS: CPT

## 2025-07-25 ENCOUNTER — RA CDI HCC (OUTPATIENT)
Dept: OTHER | Facility: HOSPITAL | Age: 45
End: 2025-07-25

## 2025-07-30 ENCOUNTER — APPOINTMENT (OUTPATIENT)
Dept: PHYSICAL THERAPY | Facility: REHABILITATION | Age: 45
End: 2025-07-30
Payer: MEDICARE

## 2025-07-31 ENCOUNTER — OFFICE VISIT (OUTPATIENT)
Dept: FAMILY MEDICINE CLINIC | Facility: CLINIC | Age: 45
End: 2025-07-31

## 2025-07-31 VITALS
SYSTOLIC BLOOD PRESSURE: 118 MMHG | TEMPERATURE: 98 F | HEIGHT: 61 IN | BODY MASS INDEX: 37.6 KG/M2 | DIASTOLIC BLOOD PRESSURE: 70 MMHG

## 2025-07-31 DIAGNOSIS — R22.2 MASS ON BACK: ICD-10-CM

## 2025-07-31 DIAGNOSIS — M54.41 ACUTE RIGHT-SIDED LOW BACK PAIN WITH RIGHT-SIDED SCIATICA: ICD-10-CM

## 2025-07-31 DIAGNOSIS — R22.0 LUMP OF SCALP: Primary | ICD-10-CM

## 2025-07-31 DIAGNOSIS — L30.9 ECZEMA, UNSPECIFIED TYPE: ICD-10-CM

## 2025-07-31 DIAGNOSIS — E66.813 CLASS 3 SEVERE OBESITY DUE TO EXCESS CALORIES WITHOUT SERIOUS COMORBIDITY WITH BODY MASS INDEX (BMI) OF 40.0 TO 44.9 IN ADULT: Chronic | ICD-10-CM

## 2025-07-31 PROCEDURE — 99214 OFFICE O/P EST MOD 30 MIN: CPT | Performed by: PHYSICIAN ASSISTANT

## 2025-07-31 PROCEDURE — G2211 COMPLEX E/M VISIT ADD ON: HCPCS | Performed by: PHYSICIAN ASSISTANT

## 2025-07-31 RX ORDER — IBUPROFEN 800 MG/1
800 TABLET, FILM COATED ORAL EVERY 6 HOURS PRN
Qty: 30 TABLET | Refills: 0 | Status: SHIPPED | OUTPATIENT
Start: 2025-07-31

## 2025-07-31 RX ORDER — METHYLPREDNISOLONE 4 MG/1
TABLET ORAL
Qty: 21 EACH | Refills: 0 | Status: SHIPPED | OUTPATIENT
Start: 2025-07-31

## 2025-07-31 RX ORDER — GABAPENTIN 600 MG/1
600 TABLET ORAL 3 TIMES DAILY
Qty: 90 TABLET | Refills: 1 | Status: SHIPPED | OUTPATIENT
Start: 2025-07-31

## 2025-07-31 RX ORDER — CYCLOBENZAPRINE HCL 10 MG
10 TABLET ORAL 3 TIMES DAILY PRN
Qty: 60 TABLET | Refills: 1 | Status: SHIPPED | OUTPATIENT
Start: 2025-07-31

## 2025-08-01 ENCOUNTER — TELEPHONE (OUTPATIENT)
Dept: FAMILY MEDICINE CLINIC | Facility: CLINIC | Age: 45
End: 2025-08-01

## 2025-08-04 ENCOUNTER — OFFICE VISIT (OUTPATIENT)
Dept: SURGERY | Facility: CLINIC | Age: 45
End: 2025-08-04
Payer: COMMERCIAL

## 2025-08-04 VITALS
BODY MASS INDEX: 37.95 KG/M2 | OXYGEN SATURATION: 99 % | HEART RATE: 106 BPM | TEMPERATURE: 96.8 F | DIASTOLIC BLOOD PRESSURE: 78 MMHG | SYSTOLIC BLOOD PRESSURE: 98 MMHG | HEIGHT: 61 IN | WEIGHT: 201 LBS | RESPIRATION RATE: 16 BRPM

## 2025-08-04 DIAGNOSIS — R22.2 MASS ON BACK: ICD-10-CM

## 2025-08-04 DIAGNOSIS — E66.813 CLASS 3 SEVERE OBESITY DUE TO EXCESS CALORIES WITHOUT SERIOUS COMORBIDITY WITH BODY MASS INDEX (BMI) OF 40.0 TO 44.9 IN ADULT: ICD-10-CM

## 2025-08-04 DIAGNOSIS — R22.0 LUMP OF SCALP: ICD-10-CM

## 2025-08-04 PROCEDURE — 99205 OFFICE O/P NEW HI 60 MIN: CPT | Performed by: STUDENT IN AN ORGANIZED HEALTH CARE EDUCATION/TRAINING PROGRAM

## 2025-08-04 RX ORDER — SODIUM CHLORIDE, SODIUM LACTATE, POTASSIUM CHLORIDE, CALCIUM CHLORIDE 600; 310; 30; 20 MG/100ML; MG/100ML; MG/100ML; MG/100ML
125 INJECTION, SOLUTION INTRAVENOUS CONTINUOUS
OUTPATIENT
Start: 2025-09-01

## 2025-08-06 ENCOUNTER — OFFICE VISIT (OUTPATIENT)
Dept: PHYSICAL THERAPY | Facility: REHABILITATION | Age: 45
End: 2025-08-06
Payer: MEDICARE

## 2025-08-06 DIAGNOSIS — M54.41 ACUTE RIGHT-SIDED LOW BACK PAIN WITH RIGHT-SIDED SCIATICA: Primary | ICD-10-CM

## 2025-08-06 PROCEDURE — 97140 MANUAL THERAPY 1/> REGIONS: CPT

## 2025-08-06 PROCEDURE — 97112 NEUROMUSCULAR REEDUCATION: CPT

## 2025-08-06 PROCEDURE — 97110 THERAPEUTIC EXERCISES: CPT

## 2025-08-08 ENCOUNTER — TELEPHONE (OUTPATIENT)
Age: 45
End: 2025-08-08

## (undated) DEVICE — SKIN MARKER DUAL TIP WITH RULER CAP, FLEXIBLE RULER AND LABELS: Brand: DEVON

## (undated) DEVICE — SUT CHROMIC 4-0 G-3 793G

## (undated) DEVICE — SPECIMEN CONTAINER STERILE PEEL PACK

## (undated) DEVICE — ENDOPATH XCEL BLADELESS TROCARS WITH STABILITY SLEEVES: Brand: ENDOPATH XCEL

## (undated) DEVICE — TIBURON SPLIT SHEET: Brand: CONVERTORS

## (undated) DEVICE — NEURO PATTIES 1/2 X 1 1/2

## (undated) DEVICE — PVC URETHRAL CATHETER: Brand: DOVER

## (undated) DEVICE — SYRINGE 10ML LL CONTROL TOP

## (undated) DEVICE — 1820 FOAM BLOCK NEEDLE COUNTER: Brand: DEVON

## (undated) DEVICE — WAND COBLATION REFLEX ULTRA PTR

## (undated) DEVICE — CHLORAPREP HI-LITE 26ML ORANGE

## (undated) DEVICE — NEEDLE SPINAL QUINCKE POINT 22G X 3-1/2 IN

## (undated) DEVICE — SINGLE PORT MANIFOLD: Brand: NEPTUNE 2

## (undated) DEVICE — SCD SEQUENTIAL COMPRESSION COMFORT SLEEVE MEDIUM KNEE LENGTH: Brand: KENDALL SCD

## (undated) DEVICE — CRADLE EXTREMITY UNIVERSAL CONTOURED

## (undated) DEVICE — SYRINGE 3ML LL

## (undated) DEVICE — ENDOPATH XCEL UNIVERSAL TROCAR STABLILITY SLEEVES: Brand: ENDOPATH XCEL

## (undated) DEVICE — INTENDED FOR TISSUE SEPARATION, AND OTHER PROCEDURES THAT REQUIRE A SHARP SURGICAL BLADE TO PUNCTURE OR CUT.: Brand: BARD-PARKER ® SAFETYLOCK CARBON RIB-BACK BLADES

## (undated) DEVICE — NEEDLE BLUNT 18 G X 1 1/2IN

## (undated) DEVICE — SUT PROLENE 3-0 PC-5 18 IN 8632G

## (undated) DEVICE — BETHLEHEM UNIVERSAL GYN LAP PK: Brand: CARDINAL HEALTH

## (undated) DEVICE — SUT PLAIN 4-0 SC-1 18 IN 1828H

## (undated) DEVICE — ADHESIVE SKN CLSR HISTOACRYL FLEX 0.5ML LF

## (undated) DEVICE — [HIGH FLOW INSUFFLATOR,  DO NOT USE IF PACKAGE IS DAMAGED,  KEEP DRY,  KEEP AWAY FROM SUNLIGHT,  PROTECT FROM HEAT AND RADIOACTIVE SOURCES.]: Brand: PNEUMOSURE

## (undated) DEVICE — SUT VICRYL 0 UR-6 27 IN J603H

## (undated) DEVICE — KIT ENDOSCOPIC FOG INHIBITOR

## (undated) DEVICE — ENSEAL LAPAROSCOPIC TISSUE SEALER G2 STRAIGHT JAW FOR USE WITH G2 GENERATOR 5MM DIAMETER 35CM SHAFT LENGTH: Brand: ENSEAL

## (undated) DEVICE — INTENDED FOR TISSUE SEPARATION, AND OTHER PROCEDURES THAT REQUIRE A SHARP SURGICAL BLADE TO PUNCTURE OR CUT.: Brand: BARD-PARKER SAFETY BLADES SIZE 11, STERILE

## (undated) DEVICE — SUT MONOCRYL 4-0 PS-2 27 IN Y426H

## (undated) DEVICE — STERILE POLYISOPRENE POWDER-FREE SURGICAL GLOVES: Brand: PROTEXIS

## (undated) DEVICE — SYRINGE 5ML LL

## (undated) DEVICE — SYRINGE 30ML LL

## (undated) DEVICE — NEEDLE 25G X 1 1/2

## (undated) DEVICE — BLADE 1884004HR TRICUT 5PK M4 4MM ROTATE: Brand: TRICUT

## (undated) DEVICE — PREMIUM DRY TRAY LF: Brand: MEDLINE INDUSTRIES, INC.

## (undated) DEVICE — SPLINT 1527000 10PK PAIR REUTER NASL THN

## (undated) DEVICE — BLUE HEAT SCOPE WARMER

## (undated) DEVICE — TUBING SUCTION 5MM X 12 FT

## (undated) DEVICE — ENDOPOUCH RETRIEVER SPECIMEN RETRIEVAL BAGS: Brand: ENDOPOUCH RETRIEVER

## (undated) DEVICE — BASIC PACK: Brand: CONVERTORS

## (undated) DEVICE — SPONGE 4 X 4 XRAY 16 PLY STRL LF RFD

## (undated) DEVICE — GLOVE INDICATOR PI UNDERGLOVE SZ 7.5 BLUE